# Patient Record
Sex: FEMALE | Race: BLACK OR AFRICAN AMERICAN | Employment: UNEMPLOYED
[De-identification: names, ages, dates, MRNs, and addresses within clinical notes are randomized per-mention and may not be internally consistent; named-entity substitution may affect disease eponyms.]

---

## 2017-02-02 RX ORDER — HYDROCHLOROTHIAZIDE 25 MG/1
TABLET ORAL
Qty: 30 TABLET | Refills: 3 | Status: SHIPPED | OUTPATIENT
Start: 2017-02-02 | End: 2017-12-30 | Stop reason: SDUPTHER

## 2017-02-06 ENCOUNTER — OFFICE VISIT (OUTPATIENT)
Dept: FAMILY MEDICINE CLINIC | Facility: CLINIC | Age: 57
End: 2017-02-06

## 2017-02-06 VITALS
WEIGHT: 286.2 LBS | SYSTOLIC BLOOD PRESSURE: 110 MMHG | BODY MASS INDEX: 50.7 KG/M2 | DIASTOLIC BLOOD PRESSURE: 72 MMHG | HEART RATE: 79 BPM | TEMPERATURE: 98.1 F

## 2017-02-06 DIAGNOSIS — M25.561 ACUTE PAIN OF RIGHT KNEE: Primary | ICD-10-CM

## 2017-02-06 DIAGNOSIS — E66.01 MORBID OBESITY, UNSPECIFIED OBESITY TYPE (HCC): ICD-10-CM

## 2017-02-06 DIAGNOSIS — M17.11 PRIMARY OSTEOARTHRITIS OF RIGHT KNEE: ICD-10-CM

## 2017-02-06 PROCEDURE — 99213 OFFICE O/P EST LOW 20 MIN: CPT | Performed by: FAMILY MEDICINE

## 2017-02-06 RX ORDER — MELOXICAM 15 MG/1
15 TABLET ORAL DAILY
Qty: 30 TABLET | Refills: 3 | Status: SHIPPED | OUTPATIENT
Start: 2017-02-06 | End: 2017-03-08 | Stop reason: ALTCHOICE

## 2017-02-06 ASSESSMENT — ENCOUNTER SYMPTOMS
BACK PAIN: 0
GASTROINTESTINAL NEGATIVE: 1

## 2017-03-08 ENCOUNTER — OFFICE VISIT (OUTPATIENT)
Dept: BARIATRICS/WEIGHT MGMT | Facility: CLINIC | Age: 57
End: 2017-03-08

## 2017-03-08 VITALS
WEIGHT: 280 LBS | BODY MASS INDEX: 49.61 KG/M2 | DIASTOLIC BLOOD PRESSURE: 77 MMHG | HEART RATE: 99 BPM | HEIGHT: 63 IN | SYSTOLIC BLOOD PRESSURE: 112 MMHG

## 2017-03-08 DIAGNOSIS — E08.00 DIABETES MELLITUS DUE TO UNDERLYING CONDITION WITH HYPEROSMOLARITY WITHOUT COMA, WITHOUT LONG-TERM CURRENT USE OF INSULIN (HCC): ICD-10-CM

## 2017-03-08 DIAGNOSIS — I10 ESSENTIAL HYPERTENSION: ICD-10-CM

## 2017-03-08 DIAGNOSIS — K21.9 GASTROESOPHAGEAL REFLUX DISEASE, ESOPHAGITIS PRESENCE NOT SPECIFIED: ICD-10-CM

## 2017-03-08 DIAGNOSIS — E78.5 HYPERLIPIDEMIA WITH TARGET LDL LESS THAN 100: Primary | ICD-10-CM

## 2017-03-08 PROCEDURE — 99204 OFFICE O/P NEW MOD 45 MIN: CPT | Performed by: SURGERY

## 2017-03-08 RX ORDER — LISINOPRIL 10 MG/1
TABLET ORAL
Refills: 0 | COMMUNITY
Start: 2017-01-04 | End: 2017-03-08 | Stop reason: SDUPTHER

## 2017-03-09 ENCOUNTER — OFFICE VISIT (OUTPATIENT)
Dept: FAMILY MEDICINE CLINIC | Facility: CLINIC | Age: 57
End: 2017-03-09

## 2017-03-09 VITALS
BODY MASS INDEX: 49.25 KG/M2 | TEMPERATURE: 96 F | SYSTOLIC BLOOD PRESSURE: 112 MMHG | WEIGHT: 278 LBS | DIASTOLIC BLOOD PRESSURE: 76 MMHG | HEART RATE: 93 BPM

## 2017-03-09 DIAGNOSIS — M25.561 CHRONIC PAIN OF RIGHT KNEE: ICD-10-CM

## 2017-03-09 DIAGNOSIS — G89.29 CHRONIC PAIN OF RIGHT KNEE: ICD-10-CM

## 2017-03-09 DIAGNOSIS — E11.9 TYPE 2 DIABETES MELLITUS WITHOUT COMPLICATION, WITHOUT LONG-TERM CURRENT USE OF INSULIN (HCC): Primary | ICD-10-CM

## 2017-03-09 DIAGNOSIS — E66.01 MORBID OBESITY WITH BMI OF 45.0-49.9, ADULT (HCC): ICD-10-CM

## 2017-03-09 LAB — HBA1C MFR BLD: 6.8 %

## 2017-03-09 PROCEDURE — 99213 OFFICE O/P EST LOW 20 MIN: CPT | Performed by: FAMILY MEDICINE

## 2017-03-09 PROCEDURE — 83036 HEMOGLOBIN GLYCOSYLATED A1C: CPT | Performed by: FAMILY MEDICINE

## 2017-03-09 ASSESSMENT — ENCOUNTER SYMPTOMS
BACK PAIN: 0
GASTROINTESTINAL NEGATIVE: 1

## 2017-03-23 ENCOUNTER — TELEPHONE (OUTPATIENT)
Dept: BARIATRICS/WEIGHT MGMT | Age: 57
End: 2017-03-23

## 2017-04-04 ENCOUNTER — NURSE ONLY (OUTPATIENT)
Dept: BARIATRICS/WEIGHT MGMT | Age: 57
End: 2017-04-04

## 2017-04-04 VITALS — WEIGHT: 287 LBS | BODY MASS INDEX: 50.84 KG/M2

## 2017-04-04 DIAGNOSIS — E66.01 MORBID OBESITY DUE TO EXCESS CALORIES (HCC): Primary | ICD-10-CM

## 2017-04-25 ENCOUNTER — OFFICE VISIT (OUTPATIENT)
Dept: BARIATRICS/WEIGHT MGMT | Age: 57
End: 2017-04-25
Payer: MEDICARE

## 2017-04-25 VITALS
WEIGHT: 277 LBS | BODY MASS INDEX: 49.08 KG/M2 | HEIGHT: 63 IN | SYSTOLIC BLOOD PRESSURE: 130 MMHG | DIASTOLIC BLOOD PRESSURE: 70 MMHG | HEART RATE: 70 BPM | RESPIRATION RATE: 20 BRPM

## 2017-04-25 DIAGNOSIS — E78.5 HYPERLIPIDEMIA, UNSPECIFIED HYPERLIPIDEMIA TYPE: ICD-10-CM

## 2017-04-25 DIAGNOSIS — E78.5 HYPERLIPIDEMIA WITH TARGET LDL LESS THAN 100: ICD-10-CM

## 2017-04-25 DIAGNOSIS — K21.9 GASTROESOPHAGEAL REFLUX DISEASE, ESOPHAGITIS PRESENCE NOT SPECIFIED: ICD-10-CM

## 2017-04-25 DIAGNOSIS — E66.01 OBESITY, MORBID, BMI 40.0-49.9 (HCC): ICD-10-CM

## 2017-04-25 DIAGNOSIS — I10 ESSENTIAL HYPERTENSION: ICD-10-CM

## 2017-04-25 DIAGNOSIS — E11.9 CONTROLLED TYPE 2 DIABETES MELLITUS WITHOUT COMPLICATION, WITHOUT LONG-TERM CURRENT USE OF INSULIN (HCC): Primary | ICD-10-CM

## 2017-04-25 DIAGNOSIS — K57.30 DIVERTICULOSIS OF LARGE INTESTINE WITHOUT HEMORRHAGE: ICD-10-CM

## 2017-04-25 PROCEDURE — 99213 OFFICE O/P EST LOW 20 MIN: CPT | Performed by: NURSE PRACTITIONER

## 2017-05-09 ENCOUNTER — HOSPITAL ENCOUNTER (OUTPATIENT)
Age: 57
Discharge: HOME | End: 2017-05-09
Payer: MEDICARE

## 2017-05-09 DIAGNOSIS — K21.9 GASTROESOPHAGEAL REFLUX DISEASE, ESOPHAGITIS PRESENCE NOT SPECIFIED: ICD-10-CM

## 2017-05-09 DIAGNOSIS — E08.00 DIABETES MELLITUS DUE TO UNDERLYING CONDITION WITH HYPEROSMOLARITY WITHOUT COMA, WITHOUT LONG-TERM CURRENT USE OF INSULIN (HCC): ICD-10-CM

## 2017-05-09 DIAGNOSIS — E78.5 HYPERLIPIDEMIA WITH TARGET LDL LESS THAN 100: ICD-10-CM

## 2017-05-09 DIAGNOSIS — I10 ESSENTIAL HYPERTENSION: ICD-10-CM

## 2017-05-09 LAB
ALBUMIN SERPL-MCNC: 3.9 G/DL (ref 3.5–5.2)
ALBUMIN/GLOBULIN RATIO: 1 (ref 1–2.5)
ALP BLD-CCNC: 82 U/L (ref 35–104)
ALT SERPL-CCNC: 15 U/L (ref 5–33)
ANION GAP SERPL CALCULATED.3IONS-SCNC: 14 MMOL/L (ref 9–17)
AST SERPL-CCNC: 21 U/L
BILIRUB SERPL-MCNC: 0.22 MG/DL (ref 0.3–1.2)
BUN BLDV-MCNC: 13 MG/DL (ref 6–20)
BUN/CREAT BLD: ABNORMAL (ref 9–20)
CALCIUM SERPL-MCNC: 9.4 MG/DL (ref 8.6–10.4)
CHLORIDE BLD-SCNC: 105 MMOL/L (ref 98–107)
CHOLESTEROL/HDL RATIO: 4.6
CHOLESTEROL: 169 MG/DL
CO2: 24 MMOL/L (ref 20–31)
CREAT SERPL-MCNC: 0.87 MG/DL (ref 0.5–0.9)
ESTIMATED AVERAGE GLUCOSE: 143 MG/DL
GFR AFRICAN AMERICAN: >60 ML/MIN
GFR NON-AFRICAN AMERICAN: >60 ML/MIN
GFR SERPL CREATININE-BSD FRML MDRD: ABNORMAL ML/MIN/{1.73_M2}
GFR SERPL CREATININE-BSD FRML MDRD: ABNORMAL ML/MIN/{1.73_M2}
GLUCOSE BLD-MCNC: 93 MG/DL (ref 70–99)
HBA1C MFR BLD: 6.6 % (ref 4–6)
HCT VFR BLD CALC: 35.6 % (ref 36–46)
HDLC SERPL-MCNC: 37 MG/DL
HEMOGLOBIN: 11.6 G/DL (ref 12–16)
LDL CHOLESTEROL: 112 MG/DL (ref 0–130)
MAGNESIUM: 2.4 MG/DL (ref 1.6–2.6)
MCH RBC QN AUTO: 26.7 PG (ref 26–34)
MCHC RBC AUTO-ENTMCNC: 32.5 G/DL (ref 31–37)
MCV RBC AUTO: 82.3 FL (ref 80–100)
PDW BLD-RTO: 16.3 % (ref 12.5–15.4)
PLATELET # BLD: 232 K/UL (ref 140–450)
PMV BLD AUTO: 9.5 FL (ref 6–12)
POTASSIUM SERPL-SCNC: 4.2 MMOL/L (ref 3.7–5.3)
RBC # BLD: 4.33 M/UL (ref 4–5.2)
SODIUM BLD-SCNC: 143 MMOL/L (ref 135–144)
TOTAL PROTEIN: 7.7 G/DL (ref 6.4–8.3)
TRIGL SERPL-MCNC: 100 MG/DL
TSH SERPL DL<=0.05 MIU/L-ACNC: 1.47 MIU/L (ref 0.3–5)
VLDLC SERPL CALC-MCNC: ABNORMAL MG/DL (ref 1–30)
WBC # BLD: 6.8 K/UL (ref 3.5–11)

## 2017-05-09 PROCEDURE — 80061 LIPID PANEL: CPT

## 2017-05-09 PROCEDURE — 85027 COMPLETE CBC AUTOMATED: CPT

## 2017-05-09 PROCEDURE — 83735 ASSAY OF MAGNESIUM: CPT

## 2017-05-09 PROCEDURE — 83550 IRON BINDING TEST: CPT

## 2017-05-09 PROCEDURE — 84590 ASSAY OF VITAMIN A: CPT

## 2017-05-09 PROCEDURE — 82746 ASSAY OF FOLIC ACID SERUM: CPT

## 2017-05-09 PROCEDURE — 36415 COLL VENOUS BLD VENIPUNCTURE: CPT

## 2017-05-09 PROCEDURE — 84630 ASSAY OF ZINC: CPT

## 2017-05-09 PROCEDURE — 84425 ASSAY OF VITAMIN B-1: CPT

## 2017-05-09 PROCEDURE — 84443 ASSAY THYROID STIM HORMONE: CPT

## 2017-05-09 PROCEDURE — 84436 ASSAY OF TOTAL THYROXINE: CPT

## 2017-05-09 PROCEDURE — 83540 ASSAY OF IRON: CPT

## 2017-05-09 PROCEDURE — 83970 ASSAY OF PARATHORMONE: CPT

## 2017-05-09 PROCEDURE — 82607 VITAMIN B-12: CPT

## 2017-05-09 PROCEDURE — 82306 VITAMIN D 25 HYDROXY: CPT

## 2017-05-09 PROCEDURE — 83036 HEMOGLOBIN GLYCOSYLATED A1C: CPT

## 2017-05-09 PROCEDURE — 80053 COMPREHEN METABOLIC PANEL: CPT

## 2017-05-10 LAB
FOLATE: 11.9 NG/ML
IRON SATURATION: 12 % (ref 20–55)
IRON: 32 UG/DL (ref 37–145)
T4 TOTAL: 6.5 UG/DL (ref 4.5–12)
TOTAL IRON BINDING CAPACITY: 268 UG/DL (ref 250–450)
UNSATURATED IRON BINDING CAPACITY: 236 UG/DL (ref 112–347)
VITAMIN B-12: 650 PG/ML (ref 211–946)
VITAMIN D 25-HYDROXY: 13.2 NG/ML (ref 30–100)

## 2017-05-11 LAB
PTH INTACT: 32.94 PG/ML (ref 15–65)
ZINC: 82 UG/DL (ref 60–120)

## 2017-05-12 ENCOUNTER — TELEPHONE (OUTPATIENT)
Dept: BARIATRICS/WEIGHT MGMT | Age: 57
End: 2017-05-12

## 2017-05-12 DIAGNOSIS — E55.9 VITAMIN D DEFICIENCY: ICD-10-CM

## 2017-05-12 DIAGNOSIS — G47.33 OSA (OBSTRUCTIVE SLEEP APNEA): Primary | ICD-10-CM

## 2017-05-12 LAB — VITAMIN B1 WHOLE BLOOD: 54 NMOL/L (ref 70–180)

## 2017-05-12 RX ORDER — ERGOCALCIFEROL 1.25 MG/1
50000 CAPSULE ORAL WEEKLY
Qty: 12 CAPSULE | Refills: 0 | Status: SHIPPED | OUTPATIENT
Start: 2017-05-12 | End: 2017-08-31 | Stop reason: ALTCHOICE

## 2017-05-13 LAB
RETINYL PALMITATE: <0.02 MG/L (ref 0–0.1)
VITAMIN A LEVEL: 0.51 MG/L (ref 0.3–1.2)
VITAMIN A, INTERP: NORMAL

## 2017-05-15 ENCOUNTER — TELEPHONE (OUTPATIENT)
Dept: BARIATRICS/WEIGHT MGMT | Age: 57
End: 2017-05-15

## 2017-05-17 ENCOUNTER — OFFICE VISIT (OUTPATIENT)
Dept: BARIATRICS/WEIGHT MGMT | Age: 57
End: 2017-05-17
Payer: MEDICARE

## 2017-05-17 VITALS
HEIGHT: 63 IN | RESPIRATION RATE: 20 BRPM | BODY MASS INDEX: 49.26 KG/M2 | HEART RATE: 78 BPM | DIASTOLIC BLOOD PRESSURE: 78 MMHG | WEIGHT: 278 LBS | SYSTOLIC BLOOD PRESSURE: 126 MMHG

## 2017-05-17 DIAGNOSIS — G47.33 OSA (OBSTRUCTIVE SLEEP APNEA): ICD-10-CM

## 2017-05-17 DIAGNOSIS — I10 ESSENTIAL HYPERTENSION: ICD-10-CM

## 2017-05-17 DIAGNOSIS — K21.9 GASTROESOPHAGEAL REFLUX DISEASE, ESOPHAGITIS PRESENCE NOT SPECIFIED: ICD-10-CM

## 2017-05-17 DIAGNOSIS — E78.5 HYPERLIPIDEMIA, UNSPECIFIED HYPERLIPIDEMIA TYPE: ICD-10-CM

## 2017-05-17 DIAGNOSIS — E11.9 CONTROLLED TYPE 2 DIABETES MELLITUS WITHOUT COMPLICATION, WITHOUT LONG-TERM CURRENT USE OF INSULIN (HCC): Primary | ICD-10-CM

## 2017-05-17 DIAGNOSIS — E66.01 OBESITY, MORBID, BMI 40.0-49.9 (HCC): ICD-10-CM

## 2017-05-17 PROCEDURE — 99213 OFFICE O/P EST LOW 20 MIN: CPT | Performed by: NURSE PRACTITIONER

## 2017-05-17 RX ORDER — THIAMINE MONONITRATE (VIT B1) 100 MG
100 TABLET ORAL DAILY
COMMUNITY
End: 2018-05-03

## 2017-05-17 RX ORDER — LISINOPRIL 10 MG/1
10 TABLET ORAL DAILY
COMMUNITY
Start: 2017-03-05 | End: 2017-10-30 | Stop reason: SDUPTHER

## 2017-05-21 ENCOUNTER — HOSPITAL ENCOUNTER (OUTPATIENT)
Dept: SLEEP CENTER | Age: 57
Discharge: HOME | End: 2017-05-21
Payer: MEDICARE

## 2017-05-21 PROCEDURE — 95810 POLYSOM 6/> YRS 4/> PARAM: CPT

## 2017-05-21 ASSESSMENT — SLEEP AND FATIGUE QUESTIONNAIRES
HOW LIKELY ARE YOU TO NOD OFF OR FALL ASLEEP WHILE SITTING QUIETLY AFTER LUNCH WITHOUT ALCOHOL: 2
HOW LIKELY ARE YOU TO NOD OFF OR FALL ASLEEP WHILE WATCHING TV: 3
HOW LIKELY ARE YOU TO NOD OFF OR FALL ASLEEP WHILE LYING DOWN TO REST IN THE AFTERNOON WHEN CIRCUMSTANCES PERMIT: 2
HOW LIKELY ARE YOU TO NOD OFF OR FALL ASLEEP WHILE SITTING INACTIVE IN A PUBLIC PLACE: 2
ESS TOTAL SCORE: 16
HOW LIKELY ARE YOU TO NOD OFF OR FALL ASLEEP WHEN YOU ARE A PASSENGER IN A CAR FOR AN HOUR WITHOUT A BREAK: 2
HOW LIKELY ARE YOU TO NOD OFF OR FALL ASLEEP IN A CAR, WHILE STOPPED FOR A FEW MINUTES IN TRAFFIC: 0
HOW LIKELY ARE YOU TO NOD OFF OR FALL ASLEEP WHILE SITTING AND READING: 3
HOW LIKELY ARE YOU TO NOD OFF OR FALL ASLEEP WHILE SITTING AND TALKING TO SOMEONE: 2
NECK CIRCUMFERENCE (INCHES): 43

## 2017-05-22 VITALS — HEIGHT: 63 IN | HEART RATE: 90 BPM | WEIGHT: 280 LBS | RESPIRATION RATE: 16 BRPM | BODY MASS INDEX: 49.61 KG/M2

## 2017-06-11 ENCOUNTER — HOSPITAL ENCOUNTER (OUTPATIENT)
Dept: SLEEP CENTER | Age: 57
Discharge: HOME | End: 2017-06-11
Payer: MEDICARE

## 2017-06-11 PROCEDURE — 95811 POLYSOM 6/>YRS CPAP 4/> PARM: CPT

## 2017-06-14 ENCOUNTER — OFFICE VISIT (OUTPATIENT)
Dept: FAMILY MEDICINE CLINIC | Age: 57
End: 2017-06-14
Payer: MEDICARE

## 2017-06-14 VITALS
HEART RATE: 73 BPM | HEIGHT: 63 IN | WEIGHT: 282.8 LBS | SYSTOLIC BLOOD PRESSURE: 117 MMHG | DIASTOLIC BLOOD PRESSURE: 78 MMHG | TEMPERATURE: 96.1 F | BODY MASS INDEX: 50.11 KG/M2

## 2017-06-14 DIAGNOSIS — Z00.00 ANNUAL PHYSICAL EXAM: Primary | ICD-10-CM

## 2017-06-14 PROCEDURE — 99402 PREV MED CNSL INDIV APPRX 30: CPT | Performed by: FAMILY MEDICINE

## 2017-06-14 PROCEDURE — 86580 TB INTRADERMAL TEST: CPT | Performed by: FAMILY MEDICINE

## 2017-06-14 ASSESSMENT — ENCOUNTER SYMPTOMS
BACK PAIN: 0
GASTROINTESTINAL NEGATIVE: 1

## 2017-06-23 DIAGNOSIS — G47.33 OSA (OBSTRUCTIVE SLEEP APNEA): Primary | ICD-10-CM

## 2017-06-27 ENCOUNTER — OFFICE VISIT (OUTPATIENT)
Dept: BARIATRICS/WEIGHT MGMT | Age: 57
End: 2017-06-27
Payer: MEDICARE

## 2017-06-27 VITALS
SYSTOLIC BLOOD PRESSURE: 132 MMHG | WEIGHT: 282 LBS | RESPIRATION RATE: 20 BRPM | BODY MASS INDEX: 51.89 KG/M2 | HEART RATE: 76 BPM | DIASTOLIC BLOOD PRESSURE: 78 MMHG | HEIGHT: 62 IN

## 2017-06-27 DIAGNOSIS — K21.9 GASTROESOPHAGEAL REFLUX DISEASE, ESOPHAGITIS PRESENCE NOT SPECIFIED: ICD-10-CM

## 2017-06-27 DIAGNOSIS — I10 ESSENTIAL HYPERTENSION: ICD-10-CM

## 2017-06-27 DIAGNOSIS — E11.9 CONTROLLED TYPE 2 DIABETES MELLITUS WITHOUT COMPLICATION, WITHOUT LONG-TERM CURRENT USE OF INSULIN (HCC): Primary | ICD-10-CM

## 2017-06-27 DIAGNOSIS — E78.5 HYPERLIPIDEMIA, UNSPECIFIED HYPERLIPIDEMIA TYPE: ICD-10-CM

## 2017-06-27 DIAGNOSIS — E66.01 MORBID OBESITY WITH BMI OF 50.0-59.9, ADULT (HCC): ICD-10-CM

## 2017-06-27 DIAGNOSIS — G47.33 OSA (OBSTRUCTIVE SLEEP APNEA): ICD-10-CM

## 2017-06-27 PROCEDURE — 99213 OFFICE O/P EST LOW 20 MIN: CPT | Performed by: NURSE PRACTITIONER

## 2017-07-12 ENCOUNTER — NURSE ONLY (OUTPATIENT)
Dept: FAMILY MEDICINE CLINIC | Age: 57
End: 2017-07-12
Payer: MEDICARE

## 2017-07-12 VITALS
WEIGHT: 282.2 LBS | DIASTOLIC BLOOD PRESSURE: 68 MMHG | HEART RATE: 87 BPM | TEMPERATURE: 96.4 F | SYSTOLIC BLOOD PRESSURE: 110 MMHG | HEIGHT: 62 IN | BODY MASS INDEX: 51.93 KG/M2

## 2017-07-12 DIAGNOSIS — Z11.1 SCREENING FOR TUBERCULOSIS: ICD-10-CM

## 2017-07-12 DIAGNOSIS — Z11.1 VISIT FOR MANTOUX TEST: Primary | ICD-10-CM

## 2017-07-12 PROCEDURE — 86580 TB INTRADERMAL TEST: CPT | Performed by: FAMILY MEDICINE

## 2017-07-14 ENCOUNTER — NURSE ONLY (OUTPATIENT)
Dept: FAMILY MEDICINE CLINIC | Age: 57
End: 2017-07-14
Payer: MEDICARE

## 2017-07-14 DIAGNOSIS — Z11.1 ENCOUNTER FOR PPD SKIN TEST READING: Primary | ICD-10-CM

## 2017-07-19 ENCOUNTER — ANESTHESIA (OUTPATIENT)
Dept: ENDOSCOPY | Age: 57
End: 2017-07-19
Payer: MEDICARE

## 2017-07-19 ENCOUNTER — HOSPITAL ENCOUNTER (OUTPATIENT)
Age: 57
Setting detail: OUTPATIENT SURGERY
Discharge: HOME | End: 2017-07-19
Attending: SURGERY | Admitting: SURGERY
Payer: MEDICARE

## 2017-07-19 ENCOUNTER — ANESTHESIA EVENT (OUTPATIENT)
Dept: ENDOSCOPY | Age: 57
End: 2017-07-19
Payer: MEDICARE

## 2017-07-19 VITALS
DIASTOLIC BLOOD PRESSURE: 65 MMHG | SYSTOLIC BLOOD PRESSURE: 128 MMHG | RESPIRATION RATE: 17 BRPM | OXYGEN SATURATION: 97 %

## 2017-07-19 VITALS
WEIGHT: 278 LBS | DIASTOLIC BLOOD PRESSURE: 65 MMHG | SYSTOLIC BLOOD PRESSURE: 100 MMHG | HEART RATE: 67 BPM | RESPIRATION RATE: 19 BRPM | BODY MASS INDEX: 51.16 KG/M2 | TEMPERATURE: 96.8 F | OXYGEN SATURATION: 96 % | HEIGHT: 62 IN

## 2017-07-19 LAB — POC POTASSIUM: 4 MMOL/L (ref 3.5–4.5)

## 2017-07-19 PROCEDURE — 43239 EGD BIOPSY SINGLE/MULTIPLE: CPT | Performed by: SURGERY

## 2017-07-19 PROCEDURE — 7100000011 HC PHASE II RECOVERY - ADDTL 15 MIN: Performed by: SURGERY

## 2017-07-19 PROCEDURE — 3700000000 HC ANESTHESIA ATTENDED CARE: Performed by: SURGERY

## 2017-07-19 PROCEDURE — 3609012400 HC EGD TRANSORAL BIOPSY SINGLE/MULTIPLE: Performed by: SURGERY

## 2017-07-19 PROCEDURE — 87077 CULTURE AEROBIC IDENTIFY: CPT

## 2017-07-19 PROCEDURE — 2580000003 HC RX 258: Performed by: SURGERY

## 2017-07-19 PROCEDURE — 2500000003 HC RX 250 WO HCPCS: Performed by: NURSE ANESTHETIST, CERTIFIED REGISTERED

## 2017-07-19 PROCEDURE — 84132 ASSAY OF SERUM POTASSIUM: CPT

## 2017-07-19 PROCEDURE — 7100000010 HC PHASE II RECOVERY - FIRST 15 MIN: Performed by: SURGERY

## 2017-07-19 PROCEDURE — 6360000002 HC RX W HCPCS: Performed by: NURSE ANESTHETIST, CERTIFIED REGISTERED

## 2017-07-19 RX ORDER — PROPOFOL 10 MG/ML
INJECTION, EMULSION INTRAVENOUS PRN
Status: DISCONTINUED | OUTPATIENT
Start: 2017-07-19 | End: 2017-07-19 | Stop reason: SDUPTHER

## 2017-07-19 RX ORDER — FERROUS SULFATE 325(65) MG
29 TABLET ORAL
COMMUNITY
End: 2018-03-07 | Stop reason: ALTCHOICE

## 2017-07-19 RX ORDER — SODIUM CHLORIDE 9 MG/ML
INJECTION, SOLUTION INTRAVENOUS CONTINUOUS
Status: DISCONTINUED | OUTPATIENT
Start: 2017-07-19 | End: 2017-07-19 | Stop reason: HOSPADM

## 2017-07-19 RX ORDER — LIDOCAINE HYDROCHLORIDE 10 MG/ML
INJECTION, SOLUTION INFILTRATION; PERINEURAL PRN
Status: DISCONTINUED | OUTPATIENT
Start: 2017-07-19 | End: 2017-07-19 | Stop reason: SDUPTHER

## 2017-07-19 RX ADMIN — PROPOFOL 20 MG: 10 INJECTION, EMULSION INTRAVENOUS at 08:00

## 2017-07-19 RX ADMIN — PROPOFOL 120 MG: 10 INJECTION, EMULSION INTRAVENOUS at 07:58

## 2017-07-19 RX ADMIN — SODIUM CHLORIDE 30 ML: 9 INJECTION, SOLUTION INTRAVENOUS at 07:47

## 2017-07-19 RX ADMIN — LIDOCAINE HYDROCHLORIDE 50 MG: 10 INJECTION, SOLUTION INFILTRATION; PERINEURAL at 07:58

## 2017-07-19 RX ADMIN — PROPOFOL 30 MG: 10 INJECTION, EMULSION INTRAVENOUS at 08:02

## 2017-07-19 ASSESSMENT — PAIN SCALES - GENERAL
PAINLEVEL_OUTOF10: 0

## 2017-07-19 ASSESSMENT — PAIN - FUNCTIONAL ASSESSMENT: PAIN_FUNCTIONAL_ASSESSMENT: 0-10

## 2017-07-20 LAB
DIRECT EXAM: NEGATIVE
DIRECT EXAM: NORMAL
Lab: NORMAL
SPECIMEN DESCRIPTION: NORMAL
STATUS: NORMAL

## 2017-07-25 ENCOUNTER — OFFICE VISIT (OUTPATIENT)
Dept: BARIATRICS/WEIGHT MGMT | Age: 57
End: 2017-07-25
Payer: MEDICARE

## 2017-07-25 VITALS
RESPIRATION RATE: 20 BRPM | DIASTOLIC BLOOD PRESSURE: 70 MMHG | SYSTOLIC BLOOD PRESSURE: 110 MMHG | WEIGHT: 280 LBS | BODY MASS INDEX: 51.53 KG/M2 | HEART RATE: 72 BPM | HEIGHT: 62 IN

## 2017-07-25 DIAGNOSIS — E78.5 HYPERLIPIDEMIA, UNSPECIFIED HYPERLIPIDEMIA TYPE: ICD-10-CM

## 2017-07-25 DIAGNOSIS — K21.9 GASTROESOPHAGEAL REFLUX DISEASE, ESOPHAGITIS PRESENCE NOT SPECIFIED: ICD-10-CM

## 2017-07-25 DIAGNOSIS — E66.01 MORBID OBESITY WITH BMI OF 50.0-59.9, ADULT (HCC): ICD-10-CM

## 2017-07-25 DIAGNOSIS — E11.9 CONTROLLED TYPE 2 DIABETES MELLITUS WITHOUT COMPLICATION, WITHOUT LONG-TERM CURRENT USE OF INSULIN (HCC): Primary | ICD-10-CM

## 2017-07-25 DIAGNOSIS — I10 ESSENTIAL HYPERTENSION: ICD-10-CM

## 2017-07-25 DIAGNOSIS — G47.33 OSA (OBSTRUCTIVE SLEEP APNEA): ICD-10-CM

## 2017-07-25 PROCEDURE — 99213 OFFICE O/P EST LOW 20 MIN: CPT | Performed by: NURSE PRACTITIONER

## 2017-07-26 ENCOUNTER — NURSE ONLY (OUTPATIENT)
Dept: BARIATRICS/WEIGHT MGMT | Age: 57
End: 2017-07-26

## 2017-08-23 ENCOUNTER — TELEPHONE (OUTPATIENT)
Dept: BARIATRICS/WEIGHT MGMT | Age: 57
End: 2017-08-23

## 2017-08-31 ENCOUNTER — OFFICE VISIT (OUTPATIENT)
Dept: BARIATRICS/WEIGHT MGMT | Age: 57
End: 2017-08-31
Payer: MEDICARE

## 2017-08-31 VITALS
RESPIRATION RATE: 20 BRPM | HEART RATE: 82 BPM | HEIGHT: 62 IN | WEIGHT: 282 LBS | SYSTOLIC BLOOD PRESSURE: 126 MMHG | DIASTOLIC BLOOD PRESSURE: 74 MMHG | BODY MASS INDEX: 51.89 KG/M2

## 2017-08-31 DIAGNOSIS — G47.33 OSA (OBSTRUCTIVE SLEEP APNEA): ICD-10-CM

## 2017-08-31 DIAGNOSIS — K21.9 GASTROESOPHAGEAL REFLUX DISEASE, ESOPHAGITIS PRESENCE NOT SPECIFIED: ICD-10-CM

## 2017-08-31 DIAGNOSIS — E66.01 MORBID OBESITY WITH BMI OF 50.0-59.9, ADULT (HCC): ICD-10-CM

## 2017-08-31 DIAGNOSIS — E78.5 HYPERLIPIDEMIA, UNSPECIFIED HYPERLIPIDEMIA TYPE: ICD-10-CM

## 2017-08-31 DIAGNOSIS — I10 ESSENTIAL HYPERTENSION: ICD-10-CM

## 2017-08-31 DIAGNOSIS — E11.9 CONTROLLED TYPE 2 DIABETES MELLITUS WITHOUT COMPLICATION, WITHOUT LONG-TERM CURRENT USE OF INSULIN (HCC): Primary | ICD-10-CM

## 2017-08-31 PROCEDURE — 99213 OFFICE O/P EST LOW 20 MIN: CPT | Performed by: NURSE PRACTITIONER

## 2017-09-27 ENCOUNTER — OFFICE VISIT (OUTPATIENT)
Dept: PULMONOLOGY | Age: 57
End: 2017-09-27
Payer: MEDICARE

## 2017-09-27 VITALS
OXYGEN SATURATION: 99 % | RESPIRATION RATE: 18 BRPM | WEIGHT: 280 LBS | SYSTOLIC BLOOD PRESSURE: 127 MMHG | HEART RATE: 100 BPM | HEIGHT: 63 IN | DIASTOLIC BLOOD PRESSURE: 86 MMHG | BODY MASS INDEX: 49.61 KG/M2

## 2017-09-27 DIAGNOSIS — E66.01 MORBID OBESITY WITH BMI OF 45.0-49.9, ADULT (HCC): ICD-10-CM

## 2017-09-27 DIAGNOSIS — Z99.89 OSA ON CPAP: Primary | ICD-10-CM

## 2017-09-27 DIAGNOSIS — G47.33 OSA ON CPAP: Primary | ICD-10-CM

## 2017-09-27 PROCEDURE — 99204 OFFICE O/P NEW MOD 45 MIN: CPT | Performed by: INTERNAL MEDICINE

## 2017-09-27 RX ORDER — BIOTIN 1000 MCG
TABLET,CHEWABLE ORAL
COMMUNITY
End: 2018-05-03

## 2017-09-27 ASSESSMENT — SLEEP AND FATIGUE QUESTIONNAIRES
HOW LIKELY ARE YOU TO NOD OFF OR FALL ASLEEP WHILE SITTING AND TALKING TO SOMEONE: 1
HOW LIKELY ARE YOU TO NOD OFF OR FALL ASLEEP WHILE SITTING QUIETLY AFTER LUNCH WITHOUT ALCOHOL: 1
HOW LIKELY ARE YOU TO NOD OFF OR FALL ASLEEP IN A CAR, WHILE STOPPED FOR A FEW MINUTES IN TRAFFIC: 0
HOW LIKELY ARE YOU TO NOD OFF OR FALL ASLEEP WHILE SITTING AND READING: 1
ESS TOTAL SCORE: 8
HOW LIKELY ARE YOU TO NOD OFF OR FALL ASLEEP WHILE SITTING INACTIVE IN A PUBLIC PLACE: 1
HOW LIKELY ARE YOU TO NOD OFF OR FALL ASLEEP WHILE LYING DOWN TO REST IN THE AFTERNOON WHEN CIRCUMSTANCES PERMIT: 1
HOW LIKELY ARE YOU TO NOD OFF OR FALL ASLEEP WHEN YOU ARE A PASSENGER IN A CAR FOR AN HOUR WITHOUT A BREAK: 1
HOW LIKELY ARE YOU TO NOD OFF OR FALL ASLEEP WHILE WATCHING TV: 2

## 2017-09-29 ENCOUNTER — OFFICE VISIT (OUTPATIENT)
Dept: BARIATRICS/WEIGHT MGMT | Age: 57
End: 2017-09-29
Payer: MEDICARE

## 2017-09-29 VITALS
HEIGHT: 62 IN | HEART RATE: 68 BPM | BODY MASS INDEX: 51.34 KG/M2 | DIASTOLIC BLOOD PRESSURE: 64 MMHG | SYSTOLIC BLOOD PRESSURE: 104 MMHG | WEIGHT: 279 LBS

## 2017-09-29 DIAGNOSIS — K21.9 GASTROESOPHAGEAL REFLUX DISEASE, ESOPHAGITIS PRESENCE NOT SPECIFIED: ICD-10-CM

## 2017-09-29 DIAGNOSIS — G47.33 OSA (OBSTRUCTIVE SLEEP APNEA): ICD-10-CM

## 2017-09-29 DIAGNOSIS — I10 ESSENTIAL HYPERTENSION: ICD-10-CM

## 2017-09-29 DIAGNOSIS — E66.01 MORBID OBESITY WITH BMI OF 50.0-59.9, ADULT (HCC): ICD-10-CM

## 2017-09-29 DIAGNOSIS — E78.5 HYPERLIPIDEMIA WITH TARGET LDL LESS THAN 100: ICD-10-CM

## 2017-09-29 DIAGNOSIS — E78.5 HYPERLIPIDEMIA, UNSPECIFIED HYPERLIPIDEMIA TYPE: ICD-10-CM

## 2017-09-29 DIAGNOSIS — E11.9 CONTROLLED TYPE 2 DIABETES MELLITUS WITHOUT COMPLICATION, WITHOUT LONG-TERM CURRENT USE OF INSULIN (HCC): Primary | ICD-10-CM

## 2017-09-29 PROCEDURE — 99213 OFFICE O/P EST LOW 20 MIN: CPT | Performed by: NURSE PRACTITIONER

## 2017-10-30 NOTE — TELEPHONE ENCOUNTER
Please address the medication refill and close the encounter. If I can be of assistance, please route to the applicable pool. Thank you. Next Visit Date:  Future Appointments  Date Time Provider Rico Lucy   1/10/2018 1:45 PM Edgardo Paiz  [address] Maintenance   Topic Date Due    Diabetic retinal exam  10/24/1972    Cervical cancer screen  10/24/1983    Flu vaccine (1) 09/01/2017    Breast cancer screen  12/08/2017    Diabetic foot exam  12/16/2017    Diabetic microalbuminuria test  01/03/2018    Diabetic hemoglobin A1C test  05/09/2018    Lipid screen  05/09/2018    Colon cancer screen colonoscopy  01/01/2020    DTaP/Tdap/Td vaccine (2 - Td) 03/07/2024    Pneumococcal med risk  Completed    Hepatitis C screen  Completed    HIV screen  Completed       Hemoglobin A1C (%)   Date Value   05/09/2017 6.6 (H)   03/09/2017 6.8   12/16/2016 6.5             ( goal A1C is < 7)   Microalb/Crt.  Ratio (mcg/mg creat)   Date Value   01/03/2017 7     LDL Cholesterol (mg/dL)   Date Value   05/09/2017 112       (goal LDL is <100)   AST (U/L)   Date Value   05/09/2017 21     ALT (U/L)   Date Value   05/09/2017 15     BUN (mg/dL)   Date Value   05/09/2017 13     BP Readings from Last 3 Encounters:   09/29/17 104/64   09/27/17 127/86   08/31/17 126/74          (goal 120/80)    All Future Testing planned in [facility]  Lab Frequency Next Occurrence               Patient Active Problem List:     Gastroesophageal reflux disease     Hyperlipidemia with target LDL less than 100     HTN (hypertension)     Numbness and tingling     Diverticula, colon     Diverticulitis, acute     Eczema     Lump of left breast     Shoulder pain     Right groin pain     Tinea corporis     Hyperlipidemia     Need for influenza vaccination     Screening for condition     Controlled type 2 diabetes mellitus without complication, without long-term current use of insulin (HCC)     Morbid obesity with BMI of 50. 0-59.9, adult ([facility]     SINDI (obstructive sleep apnea)     Vitamin D deficiency

## 2017-10-31 RX ORDER — LISINOPRIL 10 MG/1
10 TABLET ORAL DAILY
Qty: 30 TABLET | Refills: 3 | Status: SHIPPED | OUTPATIENT
Start: 2017-10-31 | End: 2018-05-10 | Stop reason: SDUPTHER

## 2017-11-20 ENCOUNTER — APPOINTMENT (OUTPATIENT)
Dept: CT IMAGING | Age: 57
End: 2017-11-20
Payer: MEDICARE

## 2017-11-20 ENCOUNTER — HOSPITAL ENCOUNTER (EMERGENCY)
Age: 57
Discharge: HOME | End: 2017-11-20
Attending: EMERGENCY MEDICINE
Payer: MEDICARE

## 2017-11-20 ENCOUNTER — APPOINTMENT (OUTPATIENT)
Dept: GENERAL RADIOLOGY | Age: 57
End: 2017-11-20
Payer: MEDICARE

## 2017-11-20 VITALS
BODY MASS INDEX: 49.26 KG/M2 | OXYGEN SATURATION: 99 % | HEART RATE: 70 BPM | HEIGHT: 63 IN | DIASTOLIC BLOOD PRESSURE: 138 MMHG | RESPIRATION RATE: 9 BRPM | TEMPERATURE: 99.1 F | WEIGHT: 278 LBS | SYSTOLIC BLOOD PRESSURE: 155 MMHG

## 2017-11-20 DIAGNOSIS — M54.9 UPPER BACK PAIN: Primary | ICD-10-CM

## 2017-11-20 DIAGNOSIS — M25.512 LEFT SHOULDER PAIN, UNSPECIFIED CHRONICITY: ICD-10-CM

## 2017-11-20 LAB
ANION GAP SERPL CALCULATED.3IONS-SCNC: 9 MMOL/L (ref 9–17)
BUN BLDV-MCNC: 14 MG/DL (ref 6–20)
BUN/CREAT BLD: ABNORMAL (ref 9–20)
CALCIUM SERPL-MCNC: 9 MG/DL (ref 8.6–10.4)
CHLORIDE BLD-SCNC: 105 MMOL/L (ref 98–107)
CO2: 27 MMOL/L (ref 20–31)
CREAT SERPL-MCNC: 0.89 MG/DL (ref 0.5–0.9)
EKG ATRIAL RATE: 96 BPM
EKG P AXIS: 40 DEGREES
EKG P-R INTERVAL: 168 MS
EKG Q-T INTERVAL: 390 MS
EKG QRS DURATION: 90 MS
EKG QTC CALCULATION (BAZETT): 492 MS
EKG R AXIS: -29 DEGREES
EKG T AXIS: 54 DEGREES
EKG VENTRICULAR RATE: 96 BPM
GFR AFRICAN AMERICAN: >60 ML/MIN
GFR NON-AFRICAN AMERICAN: >60 ML/MIN
GFR SERPL CREATININE-BSD FRML MDRD: ABNORMAL ML/MIN/{1.73_M2}
GFR SERPL CREATININE-BSD FRML MDRD: ABNORMAL ML/MIN/{1.73_M2}
GLUCOSE BLD-MCNC: 103 MG/DL (ref 70–99)
POC TROPONIN I: 0 NG/ML (ref 0–0.1)
POC TROPONIN I: 0 NG/ML (ref 0–0.1)
POC TROPONIN INTERP: NORMAL
POC TROPONIN INTERP: NORMAL
POTASSIUM SERPL-SCNC: 4.5 MMOL/L (ref 3.7–5.3)
SODIUM BLD-SCNC: 141 MMOL/L (ref 135–144)

## 2017-11-20 PROCEDURE — 6360000002 HC RX W HCPCS: Performed by: EMERGENCY MEDICINE

## 2017-11-20 PROCEDURE — 96376 TX/PRO/DX INJ SAME DRUG ADON: CPT

## 2017-11-20 PROCEDURE — 73030 X-RAY EXAM OF SHOULDER: CPT

## 2017-11-20 PROCEDURE — 84484 ASSAY OF TROPONIN QUANT: CPT

## 2017-11-20 PROCEDURE — 6360000002 HC RX W HCPCS: Performed by: STUDENT IN AN ORGANIZED HEALTH CARE EDUCATION/TRAINING PROGRAM

## 2017-11-20 PROCEDURE — 72191 CT ANGIOGRAPH PELV W/O&W/DYE: CPT

## 2017-11-20 PROCEDURE — 6360000004 HC RX CONTRAST MEDICATION: Performed by: STUDENT IN AN ORGANIZED HEALTH CARE EDUCATION/TRAINING PROGRAM

## 2017-11-20 PROCEDURE — 71020 XR CHEST STANDARD TWO VW: CPT

## 2017-11-20 PROCEDURE — 6370000000 HC RX 637 (ALT 250 FOR IP): Performed by: STUDENT IN AN ORGANIZED HEALTH CARE EDUCATION/TRAINING PROGRAM

## 2017-11-20 PROCEDURE — 71275 CT ANGIOGRAPHY CHEST: CPT

## 2017-11-20 PROCEDURE — 96374 THER/PROPH/DIAG INJ IV PUSH: CPT

## 2017-11-20 PROCEDURE — 80048 BASIC METABOLIC PNL TOTAL CA: CPT

## 2017-11-20 PROCEDURE — 99285 EMERGENCY DEPT VISIT HI MDM: CPT

## 2017-11-20 PROCEDURE — 93005 ELECTROCARDIOGRAM TRACING: CPT

## 2017-11-20 RX ORDER — TRAMADOL HYDROCHLORIDE 50 MG/1
50 TABLET ORAL EVERY 6 HOURS PRN
Qty: 10 TABLET | Refills: 0 | Status: SHIPPED | OUTPATIENT
Start: 2017-11-20 | End: 2017-11-30

## 2017-11-20 RX ORDER — TRAMADOL HYDROCHLORIDE 50 MG/1
50 TABLET ORAL PRN
Status: DISCONTINUED | OUTPATIENT
Start: 2017-11-20 | End: 2017-11-20

## 2017-11-20 RX ORDER — TRAMADOL HYDROCHLORIDE 50 MG/1
50 TABLET ORAL ONCE
Status: COMPLETED | OUTPATIENT
Start: 2017-11-20 | End: 2017-11-20

## 2017-11-20 RX ADMIN — HYDROMORPHONE HYDROCHLORIDE 0.5 MG: 1 INJECTION, SOLUTION INTRAMUSCULAR; INTRAVENOUS; SUBCUTANEOUS at 12:35

## 2017-11-20 RX ADMIN — TRAMADOL HYDROCHLORIDE 50 MG: 50 TABLET, FILM COATED ORAL at 09:40

## 2017-11-20 RX ADMIN — IOVERSOL 100 ML: 741 INJECTION INTRA-ARTERIAL; INTRAVENOUS at 15:53

## 2017-11-20 RX ADMIN — HYDROMORPHONE HYDROCHLORIDE 0.5 MG: 1 INJECTION, SOLUTION INTRAMUSCULAR; INTRAVENOUS; SUBCUTANEOUS at 10:47

## 2017-11-20 ASSESSMENT — PAIN SCALES - GENERAL
PAINLEVEL_OUTOF10: 8
PAINLEVEL_OUTOF10: 5
PAINLEVEL_OUTOF10: 8
PAINLEVEL_OUTOF10: 8

## 2017-11-20 ASSESSMENT — ENCOUNTER SYMPTOMS
GASTROINTESTINAL NEGATIVE: 1
EYES NEGATIVE: 1
SHORTNESS OF BREATH: 1
BACK PAIN: 1
ALLERGIC/IMMUNOLOGIC NEGATIVE: 1

## 2017-11-20 ASSESSMENT — PAIN DESCRIPTION - LOCATION: LOCATION: BACK;CHEST

## 2017-11-20 ASSESSMENT — PAIN DESCRIPTION - PAIN TYPE: TYPE: ACUTE PAIN

## 2017-11-20 NOTE — ED NOTES
Pt resting on cot, eyes closed. Writer awoke pt to administer medications.       Amber Bird RN  11/20/17 2212

## 2017-11-20 NOTE — ED NOTES
Pt resting on cot, eyes closed. NAD noted. RR even and unlabored.       Ekaterina Carbajal RN  11/20/17 4883

## 2017-11-20 NOTE — ED PROVIDER NOTES
CHEST, BACK, SHOULDER PAIN. HEART SCORE 4. CTA TO R/O DISSECTION DONE, INTERP PENDING. IF NEG PROB OK FOR DISCHARGE. Aminah Prince MD  11/20/17 1504    CTA OF ABD AND CHEST NEG FOR ACUTE VASCULAR OR OTHER SIGNIFICANT ABNORMALITIES. LABWORK NEG. WILL DISCHARGE WITH RX FOR TRAMADOL. F/U WITH DR. Jacob Ya IN AM. RETURN IF SX WORSEN, PROGRESS.         Aminah Prince MD  11/20/17 1781

## 2017-11-20 NOTE — ED PROVIDER NOTES
[facility] ED  Emergency Department Encounter  Non Emergency Medicine Resident     Pt Name: Crystal Zuniga  MRN: [id number]  [place] 1960  Date of evaluation: 11/20/17  PCP:  Shahbaz Negron MD    [address] [place] [address]       Chief Complaint   Patient presents with    Back Pain    Chest Pain     into left arm       HISTORY OF PRESENT ILLNESS  (Location/Symptom, Timing/Onset, Context/Setting, Quality, Duration, Modifying Factors, Severity.)      Crystal Zuniga is a 56 y.o. female who presents with c/o upper back pain since last Thursday. , 8/10 in intensity and without any radiation. She took Flexeril and Motrin for the pain this morning but it did not help. Also c/o pain over the left shoulder. Patient has difficulty in sleeping because of pain. No trauma to the back, did not lift anything heavy. Also c/o SOB since last 1 week. Has h/o sleep apnea and uses CPAP at night. No h/o smoking, Alcohol or REC drugs in the past.           PAST MEDICAL / SURGICAL / SOCIAL / FAMILY HISTORY      has a past medical history of Diverticulitis; GERD (gastroesophageal reflux disease); Hyperlipidemia; Hypertension; Obesity; Sleep apnea; and Type II or unspecified type diabetes mellitus without mention of complication, not stated as uncontrolled. has a past surgical history that includes Hysterectomy; Cholecystectomy; Colonoscopy; and egd transoral biopsy single/multiple (7/19/2017). Social History     Social History    Marital status: Single     Spouse name: N/A    Number of children: N/A    Years of education: N/A     Occupational History    Not on file. Social History Main Topics    Smoking status: Former Smoker    Smokeless tobacco: Never Used    Alcohol use Yes    Drug use: No    Sexual activity: Not on file     Other Topics Concern    Not on file     Social History Narrative    No narrative on file       History reviewed. No pertinent family history.     Allergies:  Review of patient's allergies indicates no known allergies. Home Medications:  Prior to Admission medications    Medication Sig Start Date End Date Taking? Authorizing Provider   lisinopril (PRINIVIL;ZESTRIL) 10 MG tablet Take 1 tablet by mouth daily 10/31/17   Kassie Solares MD   Biotin 1000 MCG CHEW Take by mouth    Historical Provider, MD   ferrous sulfate 325 (65 Fe) MG tablet Take 29 mg by mouth daily (with breakfast)    Historical Provider, MD   vitamin B-1 (THIAMINE) 100 MG tablet Take 100 mg by mouth daily    Historical Provider, MD   metFORMIN (GLUCOPHAGE) 850 MG tablet Take 1 tablet by mouth 2 times daily (with meals) 3/9/17 3/9/18  Sal Cam MD   Elastic Bandages & Supports (KNEE BRACE ADJUSTABLE HINGED) MISC 1 Units by Does not apply route daily 2/6/17   Charly Wolfe DPM   hydrochlorothiazide (HYDRODIURIL) 25 MG tablet take 1 tablet by mouth once daily 2/2/17   Rosa Horton MD   aspirin EC 81 MG EC tablet Take 1 tablet by mouth daily 12/16/16 12/16/17  Rosa Horton MD       REVIEW OF SYSTEMS    (2-9 systems for level 4, 10 or more for level 5)      Review of Systems   Constitutional: Negative. HENT: Positive for congestion. Eyes: Negative. Respiratory: Positive for shortness of breath. Cardiovascular: Negative. Gastrointestinal: Negative. Endocrine: Negative. Genitourinary: Negative. Musculoskeletal: Positive for back pain. Left shoulder pain      Skin: Negative. Allergic/Immunologic: Negative. Neurological: Negative. Hematological: Negative. Psychiatric/Behavioral: Negative. PHYSICAL EXAM   (up to 7 for level 4, 8 or more for level 5)      INITIAL VITALS:   BP (!) 150/100   Pulse 95   Temp 99.1 °F (37.3 °C) (Oral)   Resp 22   Ht 5' 3\" (1.6 m)   Wt 278 lb (126.1 kg)   SpO2 100%   BMI 49.25 kg/m²     Physical Exam   Constitutional: She appears well-nourished. HENT:   Head: Normocephalic and atraumatic.    Eyes: Pupils are equal, round, and

## 2017-11-20 NOTE — ED NOTES
Writer to patient room, patient requesting something to drink. Writer explained awaiting CT.        Nicki Sanchez RN  11/20/17 3110

## 2017-11-20 NOTE — ED NOTES
Pt resting on cot, nad, rr even and unlabored. Pt updated, no needs.  Awaiting testing results     Heena Zepeda RN  11/20/17 5004

## 2017-11-20 NOTE — ED NOTES
Warm blankets provided to patient. Room darkened to promote rest.  NAD note.d  Pt awaiting next step at this time.        Nicki Sanchez RN  11/20/17 0892

## 2017-12-01 ENCOUNTER — OFFICE VISIT (OUTPATIENT)
Dept: FAMILY MEDICINE CLINIC | Age: 57
End: 2017-12-01
Payer: MEDICARE

## 2017-12-01 VITALS
DIASTOLIC BLOOD PRESSURE: 82 MMHG | SYSTOLIC BLOOD PRESSURE: 123 MMHG | BODY MASS INDEX: 49.54 KG/M2 | TEMPERATURE: 98.9 F | WEIGHT: 279.6 LBS | HEIGHT: 63 IN | HEART RATE: 88 BPM

## 2017-12-01 DIAGNOSIS — Z12.39 BREAST CANCER SCREENING: ICD-10-CM

## 2017-12-01 DIAGNOSIS — R05.8 COUGH WITH SPUTUM: ICD-10-CM

## 2017-12-01 DIAGNOSIS — Z13.5 DIABETIC RETINOPATHY SCREENING: ICD-10-CM

## 2017-12-01 DIAGNOSIS — M54.9 ACUTE UPPER BACK PAIN: Primary | ICD-10-CM

## 2017-12-01 DIAGNOSIS — Z23 NEED FOR INFLUENZA VACCINATION: ICD-10-CM

## 2017-12-01 PROCEDURE — 3014F SCREEN MAMMO DOC REV: CPT | Performed by: FAMILY MEDICINE

## 2017-12-01 PROCEDURE — G8417 CALC BMI ABV UP PARAM F/U: HCPCS | Performed by: FAMILY MEDICINE

## 2017-12-01 PROCEDURE — 90688 IIV4 VACCINE SPLT 0.5 ML IM: CPT | Performed by: FAMILY MEDICINE

## 2017-12-01 PROCEDURE — G8427 DOCREV CUR MEDS BY ELIG CLIN: HCPCS | Performed by: FAMILY MEDICINE

## 2017-12-01 PROCEDURE — 99213 OFFICE O/P EST LOW 20 MIN: CPT | Performed by: FAMILY MEDICINE

## 2017-12-01 PROCEDURE — G8484 FLU IMMUNIZE NO ADMIN: HCPCS | Performed by: FAMILY MEDICINE

## 2017-12-01 PROCEDURE — 1036F TOBACCO NON-USER: CPT | Performed by: FAMILY MEDICINE

## 2017-12-01 PROCEDURE — 3017F COLORECTAL CA SCREEN DOC REV: CPT | Performed by: FAMILY MEDICINE

## 2017-12-01 RX ORDER — ACETAMINOPHEN 500 MG
500 TABLET ORAL EVERY 6 HOURS PRN
Qty: 120 TABLET | Refills: 3 | Status: SHIPPED | OUTPATIENT
Start: 2017-12-01 | End: 2018-05-03

## 2017-12-01 RX ORDER — GUAIFENESIN/DEXTROMETHORPHAN 100-10MG/5
5 SYRUP ORAL 3 TIMES DAILY PRN
Qty: 120 ML | Refills: 0 | Status: SHIPPED | OUTPATIENT
Start: 2017-12-01 | End: 2017-12-11

## 2017-12-01 ASSESSMENT — ENCOUNTER SYMPTOMS
SHORTNESS OF BREATH: 0
COUGH: 1
ABDOMINAL PAIN: 0
BACK PAIN: 1
CHEST TIGHTNESS: 0

## 2017-12-01 ASSESSMENT — PATIENT HEALTH QUESTIONNAIRE - PHQ9
2. FEELING DOWN, DEPRESSED OR HOPELESS: 1
SUM OF ALL RESPONSES TO PHQ QUESTIONS 1-9: 1
SUM OF ALL RESPONSES TO PHQ9 QUESTIONS 1 & 2: 1
1. LITTLE INTEREST OR PLEASURE IN DOING THINGS: 0

## 2017-12-01 NOTE — PROGRESS NOTES
Subjective:    Cy Katz is a 56 y.o. female with  has a past medical history of Diverticulitis; GERD (gastroesophageal reflux disease); Hyperlipidemia; Hypertension; Obesity; Sleep apnea; and Type II or unspecified type diabetes mellitus without mention of complication, not stated as uncontrolled. No family history on file. Presented to the office today for:  Chief Complaint   Patient presents with    Follow-Up from Hospital     Shoulder and back pain. Has had for approx. 2 to 2.5 weeks. Was seen at [facility] on 11/20/17. HPI   Ms. Cy Katz is a 57 y/o female patient who comes in today s/p discharge from [facility]. V's for back pain and acute upper back chest pain. Diagnostic imaging were negative for any acute findings and patient was discharged with Tramadol. Patient comes in today because the back pain has not resolved. Although better from time of discharge it has not resolved completely. The pain radiates to her left shoulder blade. She denies any chest pain, SOB, N/V, or F/C. She also comes in today for progressive cough with yellowish / green sputum production present for the last 10 days. Pt states the cough and sputum production is mostly during the nights and completey asymptomatic during the days. Pt admits to a smoking history 20 y/o ago and drinking occasionally. Review of Systems   Constitutional: Negative for chills, fatigue and fever. Eyes: Negative for visual disturbance. Respiratory: Positive for cough. Negative for chest tightness and shortness of breath. Cough with sputum production    Cardiovascular: Negative for chest pain and palpitations. Gastrointestinal: Negative for abdominal pain. Genitourinary: Negative for dysuria and pelvic pain. Musculoskeletal: Positive for back pain. Neurological: Negative for weakness and numbness.           Objective:    /82 (Site: Left Arm, Position: Sitting, Cuff Size: Large Adult)   Pulse 88   Temp 98.9 °F Dispense: 120 tablet; Refill: 3  - Discontinue Tramadol   - Return to office or visit ED, if symptoms do not improve. 6. Pulmonary Nodules on CTA chest  - F/u CTA in 6-12 months         Requested Prescriptions     Signed Prescriptions Disp Refills    acetaminophen (APAP EXTRA STRENGTH) 500 MG tablet 120 tablet 3     Sig: Take 1 tablet by mouth every 6 hours as needed for Pain    guaiFENesin-dextromethorphan (ROBITUSSIN DM) 100-10 MG/5ML syrup 120 mL 0     Sig: Take 5 mLs by mouth 3 times daily as needed for Cough       There are no discontinued medications. Lizbeth received counseling on the following healthy behaviors: nutrition, exercise and medication adherence    Discussed use, benefit, and side effects of prescribed medications. Barriers to medication compliance addressed. All patient questions answered. Pt voiced understanding. Return in about 6 months (around 6/1/2018).

## 2017-12-01 NOTE — PROGRESS NOTES
Visit Information    Have you changed or started any medications since your last visit including any over-the-counter medicines, vitamins, or herbal medicines? no   Have you stopped taking any of your medications? Is so, why? -  no  Are you having any side effects from any of your medications? - no    Have you seen any other physician or provider since your last visit?  no   Have you had any other diagnostic tests since your last visit? yes - X-Ray   Have you been seen in the emergency room and/or had an admission in a hospital since we last saw you?  yes - [facility]   Have you had your routine dental cleaning in the past 6 months?  yes - September     Do you have an active MyChart account? If no, what is the barrier?   No: Declined    Patient Care Team:  Agueda Self MD as PCP - General (Family Medicine)  Quique Hunt MD as Surgeon (Bariatric Surgery)  Kee Crouch MD as Consulting Physician (Pulmonology)    Medical History Review  Past Medical, Family, and Social History reviewed and does not contribute to the patient presenting condition    Health Maintenance   Topic Date Due    Diabetic retinal exam  10/24/1972    Cervical cancer screen  10/24/1983    Flu vaccine (1) 09/01/2017    Breast cancer screen  12/08/2017    Diabetic foot exam  12/16/2017    Diabetic microalbuminuria test  01/03/2018    Diabetic hemoglobin A1C test  05/09/2018    Lipid screen  05/09/2018    Colon cancer screen colonoscopy  01/01/2020    DTaP/Tdap/Td vaccine (2 - Td) 03/07/2024    Pneumococcal med risk  Completed    Hepatitis C screen  Completed    HIV screen  Completed

## 2017-12-30 DIAGNOSIS — E11.9 TYPE 2 DIABETES MELLITUS WITHOUT COMPLICATION, WITHOUT LONG-TERM CURRENT USE OF INSULIN (HCC): ICD-10-CM

## 2018-01-02 RX ORDER — HYDROCHLOROTHIAZIDE 25 MG/1
TABLET ORAL
Qty: 30 TABLET | Refills: 3 | Status: SHIPPED | OUTPATIENT
Start: 2018-01-02 | End: 2019-01-09 | Stop reason: SDUPTHER

## 2018-03-06 ENCOUNTER — OFFICE VISIT (OUTPATIENT)
Dept: FAMILY MEDICINE CLINIC | Age: 58
End: 2018-03-06
Payer: MEDICARE

## 2018-03-06 VITALS
SYSTOLIC BLOOD PRESSURE: 134 MMHG | HEIGHT: 63 IN | TEMPERATURE: 96.6 F | DIASTOLIC BLOOD PRESSURE: 80 MMHG | BODY MASS INDEX: 50.04 KG/M2 | HEART RATE: 102 BPM | WEIGHT: 282.4 LBS

## 2018-03-06 DIAGNOSIS — N39.0 URINARY TRACT INFECTION WITH HEMATURIA, SITE UNSPECIFIED: Primary | ICD-10-CM

## 2018-03-06 DIAGNOSIS — K57.92 DIVERTICULITIS OF INTESTINE WITHOUT PERFORATION OR ABSCESS WITHOUT BLEEDING, UNSPECIFIED PART OF INTESTINAL TRACT: ICD-10-CM

## 2018-03-06 DIAGNOSIS — R31.9 URINARY TRACT INFECTION WITH HEMATURIA, SITE UNSPECIFIED: Primary | ICD-10-CM

## 2018-03-06 LAB
BILIRUBIN, POC: NEGATIVE
BLOOD URINE, POC: NEGATIVE
CLARITY, POC: CLEAR
COLOR, POC: YELLOW
GLUCOSE URINE, POC: NORMAL
KETONES, POC: NEGATIVE
LEUKOCYTE EST, POC: NEGATIVE
NITRITE, POC: NEGATIVE
PH, POC: 5.5
PROTEIN, POC: NEGATIVE
SPECIFIC GRAVITY, POC: 1.01
UROBILINOGEN, POC: 0.2

## 2018-03-06 PROCEDURE — 81003 URINALYSIS AUTO W/O SCOPE: CPT | Performed by: STUDENT IN AN ORGANIZED HEALTH CARE EDUCATION/TRAINING PROGRAM

## 2018-03-06 PROCEDURE — G8484 FLU IMMUNIZE NO ADMIN: HCPCS | Performed by: STUDENT IN AN ORGANIZED HEALTH CARE EDUCATION/TRAINING PROGRAM

## 2018-03-06 PROCEDURE — G8427 DOCREV CUR MEDS BY ELIG CLIN: HCPCS | Performed by: STUDENT IN AN ORGANIZED HEALTH CARE EDUCATION/TRAINING PROGRAM

## 2018-03-06 PROCEDURE — 99213 OFFICE O/P EST LOW 20 MIN: CPT | Performed by: STUDENT IN AN ORGANIZED HEALTH CARE EDUCATION/TRAINING PROGRAM

## 2018-03-06 PROCEDURE — 1036F TOBACCO NON-USER: CPT | Performed by: STUDENT IN AN ORGANIZED HEALTH CARE EDUCATION/TRAINING PROGRAM

## 2018-03-06 PROCEDURE — G8417 CALC BMI ABV UP PARAM F/U: HCPCS | Performed by: STUDENT IN AN ORGANIZED HEALTH CARE EDUCATION/TRAINING PROGRAM

## 2018-03-06 PROCEDURE — 3017F COLORECTAL CA SCREEN DOC REV: CPT | Performed by: STUDENT IN AN ORGANIZED HEALTH CARE EDUCATION/TRAINING PROGRAM

## 2018-03-06 PROCEDURE — 3014F SCREEN MAMMO DOC REV: CPT | Performed by: STUDENT IN AN ORGANIZED HEALTH CARE EDUCATION/TRAINING PROGRAM

## 2018-03-06 RX ORDER — METRONIDAZOLE 500 MG/1
500 TABLET ORAL 3 TIMES DAILY
Qty: 21 TABLET | Refills: 0 | Status: SHIPPED | OUTPATIENT
Start: 2018-03-06 | End: 2018-03-13

## 2018-03-06 RX ORDER — CIPROFLOXACIN 500 MG/1
500 TABLET, FILM COATED ORAL 2 TIMES DAILY
Qty: 14 TABLET | Refills: 0 | Status: SHIPPED | OUTPATIENT
Start: 2018-03-06 | End: 2018-03-13

## 2018-03-06 ASSESSMENT — ENCOUNTER SYMPTOMS
ABDOMINAL DISTENTION: 0
EYE PAIN: 0
CHEST TIGHTNESS: 0
ABDOMINAL PAIN: 1
VOMITING: 0
BLOOD IN STOOL: 0
SORE THROAT: 0
CONSTIPATION: 0
RHINORRHEA: 0
NAUSEA: 0
COUGH: 0
TROUBLE SWALLOWING: 0
DIARRHEA: 0
APNEA: 0
WHEEZING: 0
SHORTNESS OF BREATH: 0

## 2018-03-06 NOTE — PROGRESS NOTES
Attending Physician Statement  I have discussed the care of Torri Beltran, 56 y.o. female,including pertinent history and exam findings,  with the resident Dr. Mayra Hoover MD.  History:  Chief Complaint   Patient presents with    Abdominal Pain     Pt has hx of diverticulits, pain started yesterday      55 yo female here in the office with complaints of left lower quadrant abdominal pain without fever, diarrhea, hematochezia, nausea or vomiting. Patient has history of diverticulosis with diverticulitis and her last exacerbation was approximately 2 years ago. I have reviewed the key elements of the encounter with the resident. Examination was done by resident as documented in residents note. BP Readings from Last 3 Encounters:   03/06/18 134/80   12/01/17 123/82   11/20/17 (!) 155/138     /80 (Site: Left Arm, Position: Sitting, Cuff Size: Medium Adult) Comment: manual  Pulse 102   Temp 96.6 °F (35.9 °C) (Temporal)   Ht 5' 2.99\" (1.6 m)   Wt 282 lb 6.4 oz (128.1 kg)   BMI 50.04 kg/m²   Lab Results   Component Value Date    WBC 6.8 05/09/2017    HGB 11.6 (L) 05/09/2017    HCT 35.6 (L) 05/09/2017     05/09/2017    CHOL 169 05/09/2017    TRIG 100 05/09/2017    HDL 37 (L) 05/09/2017    ALT 15 05/09/2017    AST 21 05/09/2017     11/20/2017    K 4.5 11/20/2017     11/20/2017    CREATININE 0.89 11/20/2017    BUN 14 11/20/2017    CO2 27 11/20/2017    TSH 1.47 05/09/2017    LABA1C 6.6 (H) 05/09/2017    LABMICR 7 01/03/2017     Lab Results   Component Value Date    CALCIUM 9.0 11/20/2017    PHOS 3.1 09/12/2011     Lab Results   Component Value Date    LDLCHOLESTEROL 112 05/09/2017     I agree with the assessment, plan and diagnosis of   1.  Diverticulitis of intestine without perforation or abscess without bleeding, unspecified part of intestinal tract  ciprofloxacin (CIPRO) 500 MG tablet    metroNIDAZOLE (FLAGYL) 500 MG tablet     I agree with  orders as documented by the

## 2018-03-06 NOTE — PROGRESS NOTES
Subjective:    Fernando Okeefe is a 56 y.o. female with  has a past medical history of Diverticulitis; GERD (gastroesophageal reflux disease); Hyperlipidemia; Hypertension; Obesity; Sleep apnea; and Type II or unspecified type diabetes mellitus without mention of complication, not stated as uncontrolled. The patient has a No family history on file. Presented to the office today for:  Chief Complaint   Patient presents with    Abdominal Pain     Pt has hx of diverticulits, pain started yesterday        HPI    The patient presents here today complaining of a flareup of her Diverticulitis. Her symptoms started last night, the patient states that her only symptom right now is left lower quadrant abdominal pain. The pain is 4 out of 10, describes it as burning in nature and dull and achy. The patient denied any associated diarrhea or change in bowel habits. The patient denied any fever or chills. The patient also denied nausea or vomiting. She states that she had some egg salad yesterday morning and has had this discomfort since then. She also said she had pecan nuts, she was previously told not to have any seasonal notes in her diet. The patient denied any dysuria, frequency, urgency or lower abdominal (suprapubic) pain. Review of Systems   Constitutional: Positive for appetite change. Negative for activity change, chills, diaphoresis, fatigue and fever. HENT: Negative for rhinorrhea, sore throat and trouble swallowing. Eyes: Negative for pain and visual disturbance. Respiratory: Negative for apnea, cough, chest tightness, shortness of breath and wheezing. Cardiovascular: Negative for chest pain, palpitations and leg swelling. Gastrointestinal: Positive for abdominal pain. Negative for abdominal distention, blood in stool, constipation, diarrhea, nausea and vomiting. Left lower quadrant abdominal pain, burning characteristic, dull and achy in nature 4 out of 10.    Genitourinary: Component Value Date    CALCIUM 9.0 11/20/2017    PHOS 3.1 09/12/2011     Lab Results   Component Value Date    LDLCHOLESTEROL 112 05/09/2017       Assessment and Plan:    1. Diverticulitis of intestine without perforation or abscess without bleeding, unspecified part of intestinal tract  - Left lower quadrant abdominal tenderness, not associated with nausea,   vomiting, diarrhea, hematochezia, melena or any blood in stool.  - Patient is afebrile, vitals are stable and in no acute distress  - We'll treat this as outpatient diverticulitis, mild  - We'll follow-up with the patient on Friday, patient was told that if condition worsens physical to the emergency department. - ciprofloxacin (CIPRO) 500 MG tablet; Take 1 tablet by mouth 2 times daily for 7 days  Dispense: 14 tablet; Refill: 0  - metroNIDAZOLE (FLAGYL) 500 MG tablet; Take 1 tablet by mouth 3 times daily for 7 days  Dispense: 21 tablet; Refill: 0          Requested Prescriptions     Signed Prescriptions Disp Refills    ciprofloxacin (CIPRO) 500 MG tablet 14 tablet 0     Sig: Take 1 tablet by mouth 2 times daily for 7 days    metroNIDAZOLE (FLAGYL) 500 MG tablet 21 tablet 0     Sig: Take 1 tablet by mouth 3 times daily for 7 days       There are no discontinued medications. Lizbeth received counseling on the following healthy behaviors: nutrition, exercise and medication adherence    Discussed use, benefit, and side effects of prescribed medications. Barriers to medication compliance addressed. All patient questions answered. Pt voiced understanding. Return in about 4 days (around 3/10/2018) for follow up diverticulitis.

## 2018-03-07 ENCOUNTER — OFFICE VISIT (OUTPATIENT)
Dept: PULMONOLOGY | Age: 58
End: 2018-03-07
Payer: MEDICARE

## 2018-03-07 VITALS
TEMPERATURE: 98.5 F | HEIGHT: 62 IN | DIASTOLIC BLOOD PRESSURE: 78 MMHG | RESPIRATION RATE: 14 BRPM | OXYGEN SATURATION: 97 % | HEART RATE: 105 BPM | BODY MASS INDEX: 51.16 KG/M2 | SYSTOLIC BLOOD PRESSURE: 132 MMHG | WEIGHT: 278 LBS

## 2018-03-07 DIAGNOSIS — G47.33 OSA ON CPAP: Primary | ICD-10-CM

## 2018-03-07 DIAGNOSIS — E66.01 MORBID OBESITY WITH BODY MASS INDEX (BMI) OF 50.0 TO 59.9 IN ADULT (HCC): ICD-10-CM

## 2018-03-07 DIAGNOSIS — Z99.89 OSA ON CPAP: Primary | ICD-10-CM

## 2018-03-07 PROCEDURE — G8427 DOCREV CUR MEDS BY ELIG CLIN: HCPCS | Performed by: INTERNAL MEDICINE

## 2018-03-07 PROCEDURE — 1036F TOBACCO NON-USER: CPT | Performed by: INTERNAL MEDICINE

## 2018-03-07 PROCEDURE — 3014F SCREEN MAMMO DOC REV: CPT | Performed by: INTERNAL MEDICINE

## 2018-03-07 PROCEDURE — G8417 CALC BMI ABV UP PARAM F/U: HCPCS | Performed by: INTERNAL MEDICINE

## 2018-03-07 PROCEDURE — G8484 FLU IMMUNIZE NO ADMIN: HCPCS | Performed by: INTERNAL MEDICINE

## 2018-03-07 PROCEDURE — 99214 OFFICE O/P EST MOD 30 MIN: CPT | Performed by: INTERNAL MEDICINE

## 2018-03-07 PROCEDURE — 3017F COLORECTAL CA SCREEN DOC REV: CPT | Performed by: INTERNAL MEDICINE

## 2018-03-07 ASSESSMENT — SLEEP AND FATIGUE QUESTIONNAIRES
HOW LIKELY ARE YOU TO NOD OFF OR FALL ASLEEP WHILE WATCHING TV: 2
HOW LIKELY ARE YOU TO NOD OFF OR FALL ASLEEP WHILE SITTING QUIETLY AFTER LUNCH WITHOUT ALCOHOL: 0
ESS TOTAL SCORE: 11
HOW LIKELY ARE YOU TO NOD OFF OR FALL ASLEEP WHILE SITTING AND READING: 2
HOW LIKELY ARE YOU TO NOD OFF OR FALL ASLEEP WHILE SITTING INACTIVE IN A PUBLIC PLACE: 0
HOW LIKELY ARE YOU TO NOD OFF OR FALL ASLEEP WHILE SITTING AND TALKING TO SOMEONE: 1
HOW LIKELY ARE YOU TO NOD OFF OR FALL ASLEEP IN A CAR, WHILE STOPPED FOR A FEW MINUTES IN TRAFFIC: 0
HOW LIKELY ARE YOU TO NOD OFF OR FALL ASLEEP WHEN YOU ARE A PASSENGER IN A CAR FOR AN HOUR WITHOUT A BREAK: 3
HOW LIKELY ARE YOU TO NOD OFF OR FALL ASLEEP WHILE LYING DOWN TO REST IN THE AFTERNOON WHEN CIRCUMSTANCES PERMIT: 3

## 2018-03-07 NOTE — PROGRESS NOTES
OUTPATIENT PULMONARY PROGRESS NOTE      Patient:  Queta Courser  MRN: [id number]    Consulting Physician: Darling Haque  Reason for Consult: SINDI  Primacy Care Physician: Quinn Hamman, MD    HISTORY OF PRESENT ILLNESS:   The patient is a 56 y.o. female     Since she was seen last time she claims that she's been using CPAP, she admitted that she does not use CPAP every night and when she use it she is not sure that how many hours she use it at night, when she uses CPAP she claimed that she feels better she's more refreshing sleep and during the daytime she also is more active and usually she take a short nap for about 30 minutes almost daily. 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    Initially  She was undergoing the evaluation for bariatric surgery, he had sleep study which was done in may thousand 17 and showed moderate SINDI, she had titration done and started on CPAP at 10 cm with face mask. She started using cpap since June last year. Does not wakes up with choking and gasping sensation. Occasional dry mouth upon awakening. No Fatigue and tiredness during the day. Goes to sleep at 12 30 am and wakes up 6 am. It takes 10 minutes to fall asleep. Wakes up once at night to go to bathroom. Takes 1 nap during the day ( 30 minutes). No headache in am. No car wrecks or near wrecks because of the sleepiness. No nodding off while driving. No weight gain. No forgetfulness or decreased concentration. No nasal congestion or obstruction at night. No significant caffienated drinks or alcohol. Using CPAP 4-5 hr/night. No leg jerks during sleep. No restless feelings in legs at night. No numbness or burning in leg or feet. No leg aches or cramps . No loss of muscle strength when angry or laugh.  No hallucination lisinopril (PRINIVIL;ZESTRIL) 10 MG tablet Take 1 tablet by mouth daily 30 tablet 3    Biotin 1000 MCG CHEW Take by mouth      vitamin B-1 (THIAMINE) 100 MG tablet Take 100 mg by mouth daily      Elastic Bandages & Supports (KNEE BRACE ADJUSTABLE HINGED) MISC 1 Units by Does not apply route daily 1 each 0    [DISCONTINUED] ferrous sulfate 325 (65 Fe) MG tablet Take 29 mg by mouth daily (with breakfast)      aspirin EC 81 MG EC tablet Take 1 tablet by mouth daily 30 tablet 11     No facility-administered encounter medications on file as of 3/7/2018. Social History:   TOBACCO:   reports that she has quit smoking 22 years ago, smoked less then 1 ppd for 13 years. She has never used smokeless tobacco.  ETOH:   reports that she drinks alcohol. OCCUPATION:  Childcare    Family History:   History reviewed. No pertinent family history.     Immunizations:    Immunization History   Administered Date(s) Administered    Influenza Virus Vaccine 10/29/2015    Influenza, Nicci Justinjordyn, 3 Years and older, IM 12/16/2016    Influenza, Nicci Keating, 6 mo and older, IM (FLULAVAL Tyrese Rony) 12/01/2017    PPD Test 07/11/2012, 06/14/2017, 07/12/2017    Pneumococcal Polysaccharide ([id number]) 01/01/2010    Tdap (Boostrix, Adacel) 03/07/2014         REVIEW OF SYSTEMS:  CONSTITUTIONAL:  negative for  fevers, sweats, fatigue, malaise, anorexia and weight loss  EYES:  negative for  double vision, blurred vision, dry eyes, visual disturbance, irritation, redness, icterus and color blindness  HEENT:  negative for  ear drainage, earaches, nasal congestion, epistaxis, sore mouth, sore throat, hoarseness and voice change  RESPIRATORY:  negative for  dry cough, cough with sputum, dyspnea, wheezing, hemoptysis, chest pain, pleuritic pain and cyanosis  CARDIOVASCULAR:  negative for  chest pain, dyspnea, palpitations, orthopnea, PND, exertional chest pressure/discomfort, fatigue, early saiety, edema, syncope  GASTROINTESTINAL:  negative for vomiting, Range Status   11/20/2017 14 6 - 20 mg/dL Final   05/09/2017 13 6 - 20 mg/dL Final   01/03/2017 13 6 - 20 mg/dL Final     CREATININE   Date Value Ref Range Status   11/20/2017 0.89 0.50 - 0.90 mg/dL Final   05/09/2017 0.87 0.50 - 0.90 mg/dL Final   01/03/2017 0.85 0.50 - 0.90 mg/dL Final     Glucose   Date Value Ref Range Status   11/20/2017 103 (H) 70 - 99 mg/dL Final   05/09/2017 93 70 - 99 mg/dL Final   01/03/2017 139 (H) 70 - 99 mg/dL Final   10/09/2011 136 (H) 74 - 106 mg/dL Final   09/12/2011 93 74 - 106 mg/dL Final   09/11/2011 115 (H) 74 - 106 mg/dL Final     Hepatic:   AST   Date Value Ref Range Status   05/09/2017 21 <32 U/L Final   02/16/2015 17 <32 U/L Final   08/24/2013 21 8 - 36 U/L Final     ALT   Date Value Ref Range Status   05/09/2017 15 5 - 33 U/L Final   02/16/2015 14 5 - 33 U/L Final   08/24/2013 22 4 - 40 U/L Final     Total Bilirubin   Date Value Ref Range Status   05/09/2017 0.22 (L) 0.3 - 1.2 mg/dL Final   02/16/2015 0.32 0.3 - 1.2 mg/dL Final   08/24/2013 0.47 0.3 - 1.2 mg/dL Final     Alkaline Phosphatase   Date Value Ref Range Status   05/09/2017 82 35 - 104 U/L Final   02/16/2015 82 35 - 104 U/L Final   08/24/2013 84 25 - 100 U/L Final     Amylase:   Amylase   Date Value Ref Range Status   08/24/2013 77 20 - 104 U/L Final     Comment:     [employer] [id number] [employer], [facility] [phone]     Lipase:   Lipase   Date Value Ref Range Status   02/16/2015 33 13 - 60 U/L Final     Comment:     [employer] [id number] [employer], [address] [place] [address] [phone]     CARDIAC ENZYMES:   Total CK   Date Value Ref Range Status   10/09/2011 200 (H) 26 - 140 U/L Final   10/09/2011 229 (H) 26 - 140 U/L Final     CK-MB   Date Value Ref Range Status   10/09/2011 1.8 0 - 5 ng/mL Final   10/09/2011 1.7 0 - 5 ng/mL Final     POC Troponin I   Date Value Ref Range Status   11/20/2017 0.00 0.00 - 0.10 ng/mL Final   11/20/2017 0.00 0.00 - 0.10 ng/mL Final     Troponin I   Date Value Ref Range Status   10/09/2011 <0.01 0.00 - 0.04 ng/mL Final   10/09/2011 <0.01 0.00 - 0.04 ng/mL Final     BNP: No results found for: BNP  Lipids:   Cholesterol   Date Value Ref Range Status   05/09/2017 169 <200 mg/dL Final     Comment:        Cholesterol Guidelines:      <200  Desirable   200-240  Borderline      >240  Undesirable          HDL   Date Value Ref Range Status   05/09/2017 37 (L) >40 mg/dL Final     Comment:        HDL Guidelines:    <40     Undesirable   40-59    Borderline    >59     Desirable            INR: No results found for: INR  Thyroid:   TSH   Date Value Ref Range Status   05/09/2017 1.47 0.30 - 5.00 mIU/L Final     Comment:     [facility] [id number] [facility], [address] [place] [address] [phone]     Urinalysis:   Blood, UA POC   Date Value Ref Range Status   03/06/2018 negative  Final     Clarity, UA   Date Value Ref Range Status   03/06/2018 clear  Final     Color, UA   Date Value Ref Range Status   03/06/2018 yellow  Final   02/16/2015 YELLOW YEL Final     pH, UA   Date Value Ref Range Status   03/06/2018 5.5  Final   02/16/2015 6.0 5.0 - 8.0 Final     Protein, UA   Date Value Ref Range Status   02/16/2015 NEGATIVE NEG Final     Protein, UA POC   Date Value Ref Range Status   03/06/2018 negative  Final     Specific Gravity, UA   Date Value Ref Range Status   02/16/2015 1.010 1.005 - 1.030 Final     Spec Grav, UA   Date Value Ref Range Status   03/06/2018 1.015  Final     Bilirubin, Urine   Date Value Ref Range Status   09/11/2011 NEGATIVE NEG Final     Bilirubin, UA   Date Value Ref Range Status   03/06/2018 negative  Final     Nitrite, Urine   Date Value Ref Range Status   02/16/2015 NEGATIVE NEG Final     Leukocyte Esterase, Urine   Date Value Ref Range Status   02/16/2015 NEGATIVE NEG Final     Leukocytes, UA   Date Value Ref Range Status   03/06/2018 negative  Final     Glucose, UA   Date Value Ref Range Status   09/11/2011 NEGATIVE NEG Final     Glucose, Ur   Date Value Ref Range Status 02/16/2015 NEGATIVE NEG Final     Glucose, UA POC   Date Value Ref Range Status   03/06/2018 negtive  Final     Cultures:-  -----------------------------------------------------------------    ABGs: No results found for: PHART, PO2ART, SKI8ISN    Pulmonary Functions Testing Results:    No results found for: FEV1, FVC, QSI1MQA, TLC, DLCO    CXR    Sleep Study   May 21/17 moderate SINDI with AHI of 15    Titration study: 6/11/17  Titrated to cpap of 10 cm    Compliance data seen from  1/29/2018 - 2/27/2018  Used 14 out of 30 days, compliance 47%. Usage 1 hour 44 minutes for total days used. Usage 3 hours 43 minutes for the days used. Assessment and Plan       ICD-10-CM ICD-9-CM    1. SINDI on CPAP G47.33 327.23     Z99.89 V46.8    2. Morbid obesity with body mass index (BMI) of 50.0 to 59.9 in adult [facility]) E66.01 278.01     Z68.43 V85.43       Gastroesophageal reflux disease    Hyperlipidemia with target LDL less than 100    HTN (hypertension)    Controlled type 2 diabetes mellitus without complication, without long-term current use of insulin ([facility] [address]       Plan and Recommendations:  Long discussion with the patient as she wanted to undergo bariatric surgery about the compliance and compliance data reviewed and advised her that she need CPAP every night at least 4 hour every night especially the outcome of the surgery would be better if she use CPAP preop and perioperative and to continue CPAP after surgery  She is ok for surgery from pulmonary standpoint provided she continue to use cpap and stay compliant  CPAP at least 4 hrs qhs  Wt loss is recommended and discussed  Follow good sleep hygeine instructions  Use humidifier   Questions answered pertaining to diagnosis and management explained importance of compliance with therapy   Need Compliance data in 3 months  RTC 6 months    It was my pleasure to evaluate Glenis Gonzalez today. Please call with questions.     Talat Amor MD             3/7/2018, 5:03

## 2018-03-12 ENCOUNTER — OFFICE VISIT (OUTPATIENT)
Dept: FAMILY MEDICINE CLINIC | Age: 58
End: 2018-03-12
Payer: MEDICARE

## 2018-03-12 ENCOUNTER — HOSPITAL ENCOUNTER (OUTPATIENT)
Age: 58
Setting detail: SPECIMEN
Discharge: HOME | End: 2018-03-12
Payer: MEDICARE

## 2018-03-12 VITALS
DIASTOLIC BLOOD PRESSURE: 63 MMHG | HEIGHT: 62 IN | WEIGHT: 284.8 LBS | SYSTOLIC BLOOD PRESSURE: 104 MMHG | HEART RATE: 102 BPM | TEMPERATURE: 97.9 F | BODY MASS INDEX: 52.41 KG/M2

## 2018-03-12 DIAGNOSIS — K57.92 DIVERTICULITIS OF INTESTINE WITHOUT PERFORATION OR ABSCESS WITHOUT BLEEDING, UNSPECIFIED PART OF INTESTINAL TRACT: Primary | ICD-10-CM

## 2018-03-12 DIAGNOSIS — E11.9 TYPE 2 DIABETES MELLITUS WITHOUT COMPLICATION, WITHOUT LONG-TERM CURRENT USE OF INSULIN (HCC): ICD-10-CM

## 2018-03-12 LAB
CREATININE URINE: 168.5 MG/DL (ref 28–217)
MICROALBUMIN/CREAT 24H UR: 19 MG/L
MICROALBUMIN/CREAT UR-RTO: 11 MCG/MG CREAT

## 2018-03-12 PROCEDURE — 3017F COLORECTAL CA SCREEN DOC REV: CPT | Performed by: STUDENT IN AN ORGANIZED HEALTH CARE EDUCATION/TRAINING PROGRAM

## 2018-03-12 PROCEDURE — 1036F TOBACCO NON-USER: CPT | Performed by: STUDENT IN AN ORGANIZED HEALTH CARE EDUCATION/TRAINING PROGRAM

## 2018-03-12 PROCEDURE — 3046F HEMOGLOBIN A1C LEVEL >9.0%: CPT | Performed by: STUDENT IN AN ORGANIZED HEALTH CARE EDUCATION/TRAINING PROGRAM

## 2018-03-12 PROCEDURE — G8417 CALC BMI ABV UP PARAM F/U: HCPCS | Performed by: STUDENT IN AN ORGANIZED HEALTH CARE EDUCATION/TRAINING PROGRAM

## 2018-03-12 PROCEDURE — G8482 FLU IMMUNIZE ORDER/ADMIN: HCPCS | Performed by: STUDENT IN AN ORGANIZED HEALTH CARE EDUCATION/TRAINING PROGRAM

## 2018-03-12 PROCEDURE — 3014F SCREEN MAMMO DOC REV: CPT | Performed by: STUDENT IN AN ORGANIZED HEALTH CARE EDUCATION/TRAINING PROGRAM

## 2018-03-12 PROCEDURE — 99213 OFFICE O/P EST LOW 20 MIN: CPT | Performed by: STUDENT IN AN ORGANIZED HEALTH CARE EDUCATION/TRAINING PROGRAM

## 2018-03-12 PROCEDURE — G8427 DOCREV CUR MEDS BY ELIG CLIN: HCPCS | Performed by: STUDENT IN AN ORGANIZED HEALTH CARE EDUCATION/TRAINING PROGRAM

## 2018-03-12 ASSESSMENT — ENCOUNTER SYMPTOMS
VOMITING: 0
ABDOMINAL PAIN: 1
CHEST TIGHTNESS: 0
EYE PAIN: 0
TROUBLE SWALLOWING: 0
NAUSEA: 0
COUGH: 0
ABDOMINAL DISTENTION: 0
BLOOD IN STOOL: 0
SORE THROAT: 0
DIARRHEA: 0
APNEA: 0
CONSTIPATION: 0
WHEEZING: 0
SHORTNESS OF BREATH: 0
RHINORRHEA: 0

## 2018-03-14 ENCOUNTER — NURSE ONLY (OUTPATIENT)
Dept: BARIATRICS/WEIGHT MGMT | Age: 58
End: 2018-03-14

## 2018-03-19 ENCOUNTER — OFFICE VISIT (OUTPATIENT)
Dept: BARIATRICS/WEIGHT MGMT | Age: 58
End: 2018-03-19
Payer: MEDICARE

## 2018-03-19 VITALS
SYSTOLIC BLOOD PRESSURE: 124 MMHG | WEIGHT: 286 LBS | HEART RATE: 68 BPM | BODY MASS INDEX: 52.63 KG/M2 | DIASTOLIC BLOOD PRESSURE: 74 MMHG | HEIGHT: 62 IN | RESPIRATION RATE: 20 BRPM

## 2018-03-19 DIAGNOSIS — E66.01 MORBID OBESITY WITH BMI OF 50.0-59.9, ADULT (HCC): ICD-10-CM

## 2018-03-19 DIAGNOSIS — I10 ESSENTIAL HYPERTENSION: ICD-10-CM

## 2018-03-19 DIAGNOSIS — G47.33 OSA (OBSTRUCTIVE SLEEP APNEA): ICD-10-CM

## 2018-03-19 DIAGNOSIS — K21.9 GASTROESOPHAGEAL REFLUX DISEASE, ESOPHAGITIS PRESENCE NOT SPECIFIED: ICD-10-CM

## 2018-03-19 DIAGNOSIS — E11.9 CONTROLLED TYPE 2 DIABETES MELLITUS WITHOUT COMPLICATION, WITHOUT LONG-TERM CURRENT USE OF INSULIN (HCC): Primary | ICD-10-CM

## 2018-03-19 DIAGNOSIS — E78.5 HYPERLIPIDEMIA, UNSPECIFIED HYPERLIPIDEMIA TYPE: ICD-10-CM

## 2018-03-19 DIAGNOSIS — K57.30 DIVERTICULA, COLON: ICD-10-CM

## 2018-03-19 PROCEDURE — G8417 CALC BMI ABV UP PARAM F/U: HCPCS | Performed by: NURSE PRACTITIONER

## 2018-03-19 PROCEDURE — 99213 OFFICE O/P EST LOW 20 MIN: CPT | Performed by: NURSE PRACTITIONER

## 2018-03-19 PROCEDURE — G8482 FLU IMMUNIZE ORDER/ADMIN: HCPCS | Performed by: NURSE PRACTITIONER

## 2018-03-19 PROCEDURE — 1036F TOBACCO NON-USER: CPT | Performed by: NURSE PRACTITIONER

## 2018-03-19 PROCEDURE — 3017F COLORECTAL CA SCREEN DOC REV: CPT | Performed by: NURSE PRACTITIONER

## 2018-03-19 PROCEDURE — 3014F SCREEN MAMMO DOC REV: CPT | Performed by: NURSE PRACTITIONER

## 2018-03-19 PROCEDURE — G8427 DOCREV CUR MEDS BY ELIG CLIN: HCPCS | Performed by: NURSE PRACTITIONER

## 2018-03-19 PROCEDURE — 3046F HEMOGLOBIN A1C LEVEL >9.0%: CPT | Performed by: NURSE PRACTITIONER

## 2018-03-19 NOTE — PROGRESS NOTES
Pt did not bring goal card, exercise log or education binder; pt reports that she has them at home; requested pt bring these materials with her on her next visit so we could review nutrition goals.
encounter.       Electronically signed by:  Gwen Weiss CNP

## 2018-04-17 ENCOUNTER — OFFICE VISIT (OUTPATIENT)
Dept: BARIATRICS/WEIGHT MGMT | Age: 58
End: 2018-04-17
Payer: MEDICARE

## 2018-04-17 VITALS
DIASTOLIC BLOOD PRESSURE: 76 MMHG | BODY MASS INDEX: 51.89 KG/M2 | WEIGHT: 282 LBS | HEART RATE: 80 BPM | HEIGHT: 62 IN | RESPIRATION RATE: 20 BRPM | SYSTOLIC BLOOD PRESSURE: 126 MMHG

## 2018-04-17 DIAGNOSIS — E66.01 MORBID OBESITY WITH BMI OF 50.0-59.9, ADULT (HCC): ICD-10-CM

## 2018-04-17 DIAGNOSIS — K21.9 GASTROESOPHAGEAL REFLUX DISEASE, ESOPHAGITIS PRESENCE NOT SPECIFIED: ICD-10-CM

## 2018-04-17 DIAGNOSIS — E78.5 HYPERLIPIDEMIA, UNSPECIFIED HYPERLIPIDEMIA TYPE: ICD-10-CM

## 2018-04-17 DIAGNOSIS — G47.33 OSA (OBSTRUCTIVE SLEEP APNEA): ICD-10-CM

## 2018-04-17 DIAGNOSIS — E11.9 CONTROLLED TYPE 2 DIABETES MELLITUS WITHOUT COMPLICATION, WITHOUT LONG-TERM CURRENT USE OF INSULIN (HCC): Primary | ICD-10-CM

## 2018-04-17 DIAGNOSIS — I10 ESSENTIAL HYPERTENSION: ICD-10-CM

## 2018-04-17 PROCEDURE — 3014F SCREEN MAMMO DOC REV: CPT | Performed by: NURSE PRACTITIONER

## 2018-04-17 PROCEDURE — 99213 OFFICE O/P EST LOW 20 MIN: CPT | Performed by: NURSE PRACTITIONER

## 2018-04-17 PROCEDURE — G8427 DOCREV CUR MEDS BY ELIG CLIN: HCPCS | Performed by: NURSE PRACTITIONER

## 2018-04-17 PROCEDURE — 1036F TOBACCO NON-USER: CPT | Performed by: NURSE PRACTITIONER

## 2018-04-17 PROCEDURE — 2022F DILAT RTA XM EVC RTNOPTHY: CPT | Performed by: NURSE PRACTITIONER

## 2018-04-17 PROCEDURE — 3017F COLORECTAL CA SCREEN DOC REV: CPT | Performed by: NURSE PRACTITIONER

## 2018-04-17 PROCEDURE — G8417 CALC BMI ABV UP PARAM F/U: HCPCS | Performed by: NURSE PRACTITIONER

## 2018-04-17 PROCEDURE — 3046F HEMOGLOBIN A1C LEVEL >9.0%: CPT | Performed by: NURSE PRACTITIONER

## 2018-04-18 ENCOUNTER — HOSPITAL ENCOUNTER (OUTPATIENT)
Age: 58
Discharge: HOME | End: 2018-04-18
Payer: MEDICARE

## 2018-04-18 DIAGNOSIS — E66.01 MORBID OBESITY WITH BMI OF 50.0-59.9, ADULT (HCC): ICD-10-CM

## 2018-04-18 DIAGNOSIS — E55.9 VITAMIN D DEFICIENCY: Primary | ICD-10-CM

## 2018-04-18 DIAGNOSIS — G47.33 OSA (OBSTRUCTIVE SLEEP APNEA): ICD-10-CM

## 2018-04-18 DIAGNOSIS — E11.9 CONTROLLED TYPE 2 DIABETES MELLITUS WITHOUT COMPLICATION, WITHOUT LONG-TERM CURRENT USE OF INSULIN (HCC): ICD-10-CM

## 2018-04-18 DIAGNOSIS — K21.9 GASTROESOPHAGEAL REFLUX DISEASE, ESOPHAGITIS PRESENCE NOT SPECIFIED: ICD-10-CM

## 2018-04-18 DIAGNOSIS — E78.5 HYPERLIPIDEMIA, UNSPECIFIED HYPERLIPIDEMIA TYPE: ICD-10-CM

## 2018-04-18 DIAGNOSIS — I10 ESSENTIAL HYPERTENSION: ICD-10-CM

## 2018-04-18 LAB
ABSOLUTE EOS #: 0.22 K/UL (ref 0–0.44)
ABSOLUTE IMMATURE GRANULOCYTE: <0.03 K/UL (ref 0–0.3)
ABSOLUTE LYMPH #: 2.96 K/UL (ref 1.1–3.7)
ABSOLUTE MONO #: 0.38 K/UL (ref 0.1–1.2)
ALBUMIN SERPL-MCNC: 3.9 G/DL (ref 3.5–5.2)
ALBUMIN/GLOBULIN RATIO: 1.1 (ref 1–2.5)
ALP BLD-CCNC: 77 U/L (ref 35–104)
ALT SERPL-CCNC: 13 U/L (ref 5–33)
ANION GAP SERPL CALCULATED.3IONS-SCNC: 16 MMOL/L (ref 9–17)
AST SERPL-CCNC: 16 U/L
BASOPHILS # BLD: 0 % (ref 0–2)
BASOPHILS ABSOLUTE: <0.03 K/UL (ref 0–0.2)
BILIRUB SERPL-MCNC: 0.33 MG/DL (ref 0.3–1.2)
BUN BLDV-MCNC: 14 MG/DL (ref 6–20)
BUN/CREAT BLD: ABNORMAL (ref 9–20)
CALCIUM SERPL-MCNC: 8.9 MG/DL (ref 8.6–10.4)
CHLORIDE BLD-SCNC: 105 MMOL/L (ref 98–107)
CHOLESTEROL/HDL RATIO: 3.9
CHOLESTEROL: 148 MG/DL
CO2: 22 MMOL/L (ref 20–31)
CREAT SERPL-MCNC: 0.91 MG/DL (ref 0.5–0.9)
DIFFERENTIAL TYPE: ABNORMAL
EOSINOPHILS RELATIVE PERCENT: 3 % (ref 1–4)
ESTIMATED AVERAGE GLUCOSE: 143 MG/DL
FERRITIN: 78 UG/L (ref 13–150)
FOLATE: 14.4 NG/ML
GFR AFRICAN AMERICAN: >60 ML/MIN
GFR NON-AFRICAN AMERICAN: >60 ML/MIN
GFR SERPL CREATININE-BSD FRML MDRD: ABNORMAL ML/MIN/{1.73_M2}
GFR SERPL CREATININE-BSD FRML MDRD: ABNORMAL ML/MIN/{1.73_M2}
GLUCOSE BLD-MCNC: 108 MG/DL (ref 70–99)
HBA1C MFR BLD: 6.6 % (ref 4–6)
HCT VFR BLD CALC: 38 % (ref 36.3–47.1)
HDLC SERPL-MCNC: 38 MG/DL
HEMOGLOBIN: 11.7 G/DL (ref 11.9–15.1)
IMMATURE GRANULOCYTES: 0 %
IRON SATURATION: 17 % (ref 20–55)
IRON: 46 UG/DL (ref 37–145)
LDL CHOLESTEROL: 87 MG/DL (ref 0–130)
LYMPHOCYTES # BLD: 43 % (ref 24–43)
MAGNESIUM: 2 MG/DL (ref 1.6–2.6)
MCH RBC QN AUTO: 26.8 PG (ref 25.2–33.5)
MCHC RBC AUTO-ENTMCNC: 30.8 G/DL (ref 28.4–34.8)
MCV RBC AUTO: 87 FL (ref 82.6–102.9)
MONOCYTES # BLD: 6 % (ref 3–12)
NRBC AUTOMATED: 0 PER 100 WBC
PDW BLD-RTO: 15.4 % (ref 11.8–14.4)
PLATELET # BLD: 235 K/UL (ref 138–453)
PLATELET ESTIMATE: ABNORMAL
PMV BLD AUTO: 11.6 FL (ref 8.1–13.5)
POTASSIUM SERPL-SCNC: 4.3 MMOL/L (ref 3.7–5.3)
PTH INTACT: 32.41 PG/ML (ref 15–65)
RBC # BLD: 4.37 M/UL (ref 3.95–5.11)
RBC # BLD: ABNORMAL 10*6/UL
SEG NEUTROPHILS: 48 % (ref 36–65)
SEGMENTED NEUTROPHILS ABSOLUTE COUNT: 3.38 K/UL (ref 1.5–8.1)
SODIUM BLD-SCNC: 143 MMOL/L (ref 135–144)
THYROXINE, FREE: 0.94 NG/DL (ref 0.93–1.7)
TOTAL IRON BINDING CAPACITY: 273 UG/DL (ref 250–450)
TOTAL PROTEIN: 7.5 G/DL (ref 6.4–8.3)
TRIGL SERPL-MCNC: 114 MG/DL
TSH SERPL DL<=0.05 MIU/L-ACNC: 2.25 MIU/L (ref 0.3–5)
UNSATURATED IRON BINDING CAPACITY: 227 UG/DL (ref 112–347)
VITAMIN B-12: 702 PG/ML (ref 232–1245)
VITAMIN D 25-HYDROXY: 14.6 NG/ML (ref 30–100)
VLDLC SERPL CALC-MCNC: ABNORMAL MG/DL (ref 1–30)
WBC # BLD: 7 K/UL (ref 3.5–11.3)
WBC # BLD: ABNORMAL 10*3/UL

## 2018-04-18 PROCEDURE — 82306 VITAMIN D 25 HYDROXY: CPT

## 2018-04-18 PROCEDURE — 83970 ASSAY OF PARATHORMONE: CPT

## 2018-04-18 PROCEDURE — 83540 ASSAY OF IRON: CPT

## 2018-04-18 PROCEDURE — 84590 ASSAY OF VITAMIN A: CPT

## 2018-04-18 PROCEDURE — 80053 COMPREHEN METABOLIC PANEL: CPT

## 2018-04-18 PROCEDURE — 83036 HEMOGLOBIN GLYCOSYLATED A1C: CPT

## 2018-04-18 PROCEDURE — 80061 LIPID PANEL: CPT

## 2018-04-18 PROCEDURE — 82607 VITAMIN B-12: CPT

## 2018-04-18 PROCEDURE — 36415 COLL VENOUS BLD VENIPUNCTURE: CPT

## 2018-04-18 PROCEDURE — 85025 COMPLETE CBC W/AUTO DIFF WBC: CPT

## 2018-04-18 PROCEDURE — 82746 ASSAY OF FOLIC ACID SERUM: CPT

## 2018-04-18 PROCEDURE — 84443 ASSAY THYROID STIM HORMONE: CPT

## 2018-04-18 PROCEDURE — 83550 IRON BINDING TEST: CPT

## 2018-04-18 PROCEDURE — 84439 ASSAY OF FREE THYROXINE: CPT

## 2018-04-18 PROCEDURE — 84425 ASSAY OF VITAMIN B-1: CPT

## 2018-04-18 PROCEDURE — 84630 ASSAY OF ZINC: CPT

## 2018-04-18 PROCEDURE — 82728 ASSAY OF FERRITIN: CPT

## 2018-04-18 PROCEDURE — 83735 ASSAY OF MAGNESIUM: CPT

## 2018-04-18 RX ORDER — ERGOCALCIFEROL 1.25 MG/1
50000 CAPSULE ORAL WEEKLY
Qty: 8 CAPSULE | Refills: 0 | Status: SHIPPED | OUTPATIENT
Start: 2018-04-18 | End: 2018-05-03

## 2018-04-20 LAB
RETINYL PALMITATE: <0.02 MG/L (ref 0–0.1)
VITAMIN A LEVEL: 0.54 MG/L (ref 0.3–1.2)
VITAMIN A, INTERP: NORMAL
ZINC: 91 UG/DL (ref 60–120)

## 2018-04-26 LAB — VITAMIN B1 WHOLE BLOOD: 94 NMOL/L (ref 70–180)

## 2018-05-01 ENCOUNTER — TELEPHONE (OUTPATIENT)
Dept: ADMINISTRATIVE | Age: 58
End: 2018-05-01

## 2018-05-01 ENCOUNTER — TELEPHONE (OUTPATIENT)
Dept: FAMILY MEDICINE CLINIC | Age: 58
End: 2018-05-01

## 2018-05-02 ENCOUNTER — OFFICE VISIT (OUTPATIENT)
Dept: FAMILY MEDICINE CLINIC | Age: 58
End: 2018-05-02
Payer: MEDICARE

## 2018-05-02 VITALS
TEMPERATURE: 96.2 F | SYSTOLIC BLOOD PRESSURE: 105 MMHG | WEIGHT: 278.6 LBS | HEIGHT: 62 IN | DIASTOLIC BLOOD PRESSURE: 61 MMHG | BODY MASS INDEX: 51.27 KG/M2 | HEART RATE: 81 BPM

## 2018-05-02 DIAGNOSIS — M54.9 ACUTE LEFT-SIDED BACK PAIN, UNSPECIFIED BACK LOCATION: ICD-10-CM

## 2018-05-02 DIAGNOSIS — N39.0 URINARY TRACT INFECTION WITHOUT HEMATURIA, SITE UNSPECIFIED: Primary | ICD-10-CM

## 2018-05-02 LAB
BILIRUBIN, POC: NEGATIVE
BLOOD URINE, POC: NEGATIVE
CLARITY, POC: CLEAR
COLOR, POC: NORMAL
GLUCOSE URINE, POC: NEGATIVE
KETONES, POC: NEGATIVE
LEUKOCYTE EST, POC: NEGATIVE
NITRITE, POC: NEGATIVE
PH, POC: 6.5
PROTEIN, POC: NEGATIVE
SPECIFIC GRAVITY, POC: 1.02
UROBILINOGEN, POC: 0.2

## 2018-05-02 PROCEDURE — 3017F COLORECTAL CA SCREEN DOC REV: CPT | Performed by: HOSPITALIST

## 2018-05-02 PROCEDURE — 81002 URINALYSIS NONAUTO W/O SCOPE: CPT | Performed by: HOSPITALIST

## 2018-05-02 PROCEDURE — 99213 OFFICE O/P EST LOW 20 MIN: CPT | Performed by: HOSPITALIST

## 2018-05-02 PROCEDURE — 1036F TOBACCO NON-USER: CPT | Performed by: HOSPITALIST

## 2018-05-02 PROCEDURE — 87086 URINE CULTURE/COLONY COUNT: CPT | Performed by: HOSPITALIST

## 2018-05-02 PROCEDURE — G8427 DOCREV CUR MEDS BY ELIG CLIN: HCPCS | Performed by: HOSPITALIST

## 2018-05-02 PROCEDURE — G8417 CALC BMI ABV UP PARAM F/U: HCPCS | Performed by: HOSPITALIST

## 2018-05-02 RX ORDER — HYDROCODONE BITARTRATE AND ACETAMINOPHEN 5; 325 MG/1; MG/1
1 TABLET ORAL EVERY 6 HOURS PRN
Qty: 10 TABLET | Refills: 0 | Status: SHIPPED | OUTPATIENT
Start: 2018-05-02 | End: 2018-05-05

## 2018-05-02 RX ORDER — CIPROFLOXACIN HCL 100 MG
100 TABLET ORAL 2 TIMES DAILY
Qty: 14 TABLET | Refills: 0 | Status: SHIPPED | OUTPATIENT
Start: 2018-05-02 | End: 2018-05-09

## 2018-05-02 ASSESSMENT — PATIENT HEALTH QUESTIONNAIRE - PHQ9
1. LITTLE INTEREST OR PLEASURE IN DOING THINGS: 0
2. FEELING DOWN, DEPRESSED OR HOPELESS: 0
SUM OF ALL RESPONSES TO PHQ QUESTIONS 1-9: 0
SUM OF ALL RESPONSES TO PHQ9 QUESTIONS 1 & 2: 0

## 2018-05-02 ASSESSMENT — ENCOUNTER SYMPTOMS
APNEA: 0
ABDOMINAL DISTENTION: 0

## 2018-05-03 ENCOUNTER — HOSPITAL ENCOUNTER (EMERGENCY)
Age: 58
Discharge: HOME | End: 2018-05-03
Attending: EMERGENCY MEDICINE
Payer: MEDICARE

## 2018-05-03 ENCOUNTER — APPOINTMENT (OUTPATIENT)
Dept: CT IMAGING | Age: 58
End: 2018-05-03
Payer: MEDICARE

## 2018-05-03 VITALS
TEMPERATURE: 98.1 F | RESPIRATION RATE: 17 BRPM | BODY MASS INDEX: 50.29 KG/M2 | OXYGEN SATURATION: 100 % | WEIGHT: 275 LBS | HEART RATE: 112 BPM | DIASTOLIC BLOOD PRESSURE: 89 MMHG | SYSTOLIC BLOOD PRESSURE: 122 MMHG

## 2018-05-03 DIAGNOSIS — R10.9 LEFT FLANK PAIN: Primary | ICD-10-CM

## 2018-05-03 DIAGNOSIS — R10.32 ABDOMINAL PAIN, LEFT LOWER QUADRANT: ICD-10-CM

## 2018-05-03 LAB
-: NORMAL
ABSOLUTE EOS #: 0.28 K/UL (ref 0–0.44)
ABSOLUTE IMMATURE GRANULOCYTE: <0.03 K/UL (ref 0–0.3)
ABSOLUTE LYMPH #: 3.68 K/UL (ref 1.1–3.7)
ABSOLUTE MONO #: 0.5 K/UL (ref 0.1–1.2)
ALBUMIN SERPL-MCNC: 3.6 G/DL (ref 3.5–5.2)
ALBUMIN/GLOBULIN RATIO: 1 (ref 1–2.5)
ALP BLD-CCNC: 77 U/L (ref 35–104)
ALT SERPL-CCNC: 14 U/L (ref 5–33)
AMORPHOUS: NORMAL
ANION GAP SERPL CALCULATED.3IONS-SCNC: 11 MMOL/L (ref 9–17)
AST SERPL-CCNC: 20 U/L
BACTERIA: NORMAL
BASOPHILS # BLD: 0 % (ref 0–2)
BASOPHILS ABSOLUTE: <0.03 K/UL (ref 0–0.2)
BILIRUB SERPL-MCNC: 0.2 MG/DL (ref 0.3–1.2)
BILIRUBIN DIRECT: <0.08 MG/DL
BILIRUBIN URINE: NEGATIVE
BILIRUBIN, INDIRECT: ABNORMAL MG/DL (ref 0–1)
BUN BLDV-MCNC: 14 MG/DL (ref 6–20)
BUN/CREAT BLD: ABNORMAL (ref 9–20)
CALCIUM SERPL-MCNC: 8.7 MG/DL (ref 8.6–10.4)
CASTS UA: NORMAL /LPF (ref 0–8)
CHLORIDE BLD-SCNC: 104 MMOL/L (ref 98–107)
CO2: 26 MMOL/L (ref 20–31)
COLOR: YELLOW
CREAT SERPL-MCNC: 1.05 MG/DL (ref 0.5–0.9)
CRYSTALS, UA: NORMAL /HPF
DIFFERENTIAL TYPE: ABNORMAL
EOSINOPHILS RELATIVE PERCENT: 4 % (ref 1–4)
EPITHELIAL CELLS UA: NORMAL /HPF (ref 0–5)
GFR AFRICAN AMERICAN: >60 ML/MIN
GFR NON-AFRICAN AMERICAN: 54 ML/MIN
GFR SERPL CREATININE-BSD FRML MDRD: ABNORMAL ML/MIN/{1.73_M2}
GFR SERPL CREATININE-BSD FRML MDRD: ABNORMAL ML/MIN/{1.73_M2}
GLOBULIN: ABNORMAL G/DL (ref 1.5–3.8)
GLUCOSE BLD-MCNC: 115 MG/DL (ref 70–99)
GLUCOSE URINE: NEGATIVE
HCT VFR BLD CALC: 38.9 % (ref 36.3–47.1)
HEMOGLOBIN: 12.1 G/DL (ref 11.9–15.1)
IMMATURE GRANULOCYTES: 0 %
KETONES, URINE: NEGATIVE
LACTIC ACID, WHOLE BLOOD: 1.5 MMOL/L (ref 0.7–2.1)
LEUKOCYTE ESTERASE, URINE: NEGATIVE
LIPASE: 32 U/L (ref 13–60)
LYMPHOCYTES # BLD: 51 % (ref 24–43)
MCH RBC QN AUTO: 27.1 PG (ref 25.2–33.5)
MCHC RBC AUTO-ENTMCNC: 31.1 G/DL (ref 28.4–34.8)
MCV RBC AUTO: 87 FL (ref 82.6–102.9)
MONOCYTES # BLD: 7 % (ref 3–12)
MUCUS: NORMAL
NITRITE, URINE: NEGATIVE
NRBC AUTOMATED: 0 PER 100 WBC
OTHER OBSERVATIONS UA: NORMAL
PDW BLD-RTO: 15.2 % (ref 11.8–14.4)
PH UA: 6 (ref 5–8)
PLATELET # BLD: 217 K/UL (ref 138–453)
PLATELET ESTIMATE: ABNORMAL
PMV BLD AUTO: 11.2 FL (ref 8.1–13.5)
POTASSIUM SERPL-SCNC: 3.9 MMOL/L (ref 3.7–5.3)
PROTEIN UA: NEGATIVE
RBC # BLD: 4.47 M/UL (ref 3.95–5.11)
RBC # BLD: ABNORMAL 10*6/UL
RBC UA: NORMAL /HPF (ref 0–4)
RENAL EPITHELIAL, UA: NORMAL /HPF
SEG NEUTROPHILS: 38 % (ref 36–65)
SEGMENTED NEUTROPHILS ABSOLUTE COUNT: 2.72 K/UL (ref 1.5–8.1)
SODIUM BLD-SCNC: 141 MMOL/L (ref 135–144)
SPECIFIC GRAVITY UA: 1.02 (ref 1–1.03)
TOTAL PROTEIN: 7.3 G/DL (ref 6.4–8.3)
TRICHOMONAS: NORMAL
TURBIDITY: CLEAR
URINE HGB: NEGATIVE
UROBILINOGEN, URINE: NORMAL
WBC # BLD: 7.2 K/UL (ref 3.5–11.3)
WBC # BLD: ABNORMAL 10*3/UL
WBC UA: NORMAL /HPF (ref 0–5)
YEAST: NORMAL

## 2018-05-03 PROCEDURE — 6360000002 HC RX W HCPCS: Performed by: EMERGENCY MEDICINE

## 2018-05-03 PROCEDURE — 99284 EMERGENCY DEPT VISIT MOD MDM: CPT

## 2018-05-03 PROCEDURE — 85025 COMPLETE CBC W/AUTO DIFF WBC: CPT

## 2018-05-03 PROCEDURE — 96374 THER/PROPH/DIAG INJ IV PUSH: CPT

## 2018-05-03 PROCEDURE — 80048 BASIC METABOLIC PNL TOTAL CA: CPT

## 2018-05-03 PROCEDURE — 74177 CT ABD & PELVIS W/CONTRAST: CPT

## 2018-05-03 PROCEDURE — 81001 URINALYSIS AUTO W/SCOPE: CPT

## 2018-05-03 PROCEDURE — 2580000003 HC RX 258: Performed by: EMERGENCY MEDICINE

## 2018-05-03 PROCEDURE — 83690 ASSAY OF LIPASE: CPT

## 2018-05-03 PROCEDURE — 80076 HEPATIC FUNCTION PANEL: CPT

## 2018-05-03 PROCEDURE — 83605 ASSAY OF LACTIC ACID: CPT

## 2018-05-03 PROCEDURE — 6360000004 HC RX CONTRAST MEDICATION: Performed by: EMERGENCY MEDICINE

## 2018-05-03 PROCEDURE — 87086 URINE CULTURE/COLONY COUNT: CPT

## 2018-05-03 RX ORDER — 0.9 % SODIUM CHLORIDE 0.9 %
1000 INTRAVENOUS SOLUTION INTRAVENOUS ONCE
Status: COMPLETED | OUTPATIENT
Start: 2018-05-03 | End: 2018-05-03

## 2018-05-03 RX ORDER — KETOROLAC TROMETHAMINE 30 MG/ML
30 INJECTION, SOLUTION INTRAMUSCULAR; INTRAVENOUS ONCE
Status: COMPLETED | OUTPATIENT
Start: 2018-05-03 | End: 2018-05-03

## 2018-05-03 RX ORDER — ONDANSETRON 2 MG/ML
4 INJECTION INTRAMUSCULAR; INTRAVENOUS ONCE
Status: DISCONTINUED | OUTPATIENT
Start: 2018-05-03 | End: 2018-05-03

## 2018-05-03 RX ADMIN — IOPAMIDOL 75 ML: 755 INJECTION, SOLUTION INTRAVENOUS at 16:40

## 2018-05-03 RX ADMIN — SODIUM CHLORIDE 1000 ML: 9 INJECTION, SOLUTION INTRAVENOUS at 15:04

## 2018-05-03 RX ADMIN — KETOROLAC TROMETHAMINE 30 MG: 30 INJECTION, SOLUTION INTRAMUSCULAR at 15:04

## 2018-05-03 ASSESSMENT — ENCOUNTER SYMPTOMS
ABDOMINAL PAIN: 1
CONSTIPATION: 0
RHINORRHEA: 0
DIARRHEA: 1
NAUSEA: 0
SHORTNESS OF BREATH: 0
SORE THROAT: 0
BLOOD IN STOOL: 0
VOMITING: 0
CHEST TIGHTNESS: 0
BACK PAIN: 0

## 2018-05-03 ASSESSMENT — PAIN DESCRIPTION - LOCATION
LOCATION: BACK
LOCATION: BACK

## 2018-05-03 ASSESSMENT — PAIN SCALES - GENERAL
PAINLEVEL_OUTOF10: 10
PAINLEVEL_OUTOF10: 5

## 2018-05-03 ASSESSMENT — PAIN DESCRIPTION - ORIENTATION
ORIENTATION: LEFT;LOWER
ORIENTATION: LOWER

## 2018-05-03 ASSESSMENT — PAIN DESCRIPTION - DESCRIPTORS: DESCRIPTORS: ACHING

## 2018-05-03 ASSESSMENT — PAIN DESCRIPTION - PAIN TYPE: TYPE: ACUTE PAIN

## 2018-05-03 ASSESSMENT — PAIN DESCRIPTION - PROGRESSION: CLINICAL_PROGRESSION: RAPIDLY IMPROVING

## 2018-05-04 ENCOUNTER — CARE COORDINATION (OUTPATIENT)
Dept: CARE COORDINATION | Age: 58
End: 2018-05-04

## 2018-05-04 LAB
CULTURE: NO GROWTH
CULTURE: NORMAL
Lab: NORMAL
SPECIMEN DESCRIPTION: NORMAL
STATUS: NORMAL

## 2018-05-08 ENCOUNTER — TELEPHONE (OUTPATIENT)
Dept: BARIATRICS/WEIGHT MGMT | Age: 58
End: 2018-05-08

## 2018-05-08 ENCOUNTER — NURSE ONLY (OUTPATIENT)
Dept: BARIATRICS/WEIGHT MGMT | Age: 58
End: 2018-05-08

## 2018-05-08 VITALS — WEIGHT: 280 LBS | BODY MASS INDEX: 51.2 KG/M2

## 2018-05-12 RX ORDER — LISINOPRIL 10 MG/1
TABLET ORAL
Qty: 30 TABLET | Refills: 3 | Status: SHIPPED | OUTPATIENT
Start: 2018-05-12 | End: 2018-12-01 | Stop reason: SDUPTHER

## 2018-05-31 ENCOUNTER — OFFICE VISIT (OUTPATIENT)
Dept: BARIATRICS/WEIGHT MGMT | Age: 58
End: 2018-05-31
Payer: MEDICARE

## 2018-05-31 VITALS
RESPIRATION RATE: 20 BRPM | DIASTOLIC BLOOD PRESSURE: 82 MMHG | WEIGHT: 274 LBS | SYSTOLIC BLOOD PRESSURE: 106 MMHG | HEART RATE: 108 BPM | HEIGHT: 62 IN | BODY MASS INDEX: 50.42 KG/M2

## 2018-05-31 DIAGNOSIS — E55.9 VITAMIN D DEFICIENCY: ICD-10-CM

## 2018-05-31 DIAGNOSIS — E78.5 HYPERLIPIDEMIA, UNSPECIFIED HYPERLIPIDEMIA TYPE: ICD-10-CM

## 2018-05-31 DIAGNOSIS — G47.33 OSA (OBSTRUCTIVE SLEEP APNEA): ICD-10-CM

## 2018-05-31 DIAGNOSIS — E11.9 CONTROLLED TYPE 2 DIABETES MELLITUS WITHOUT COMPLICATION, WITHOUT LONG-TERM CURRENT USE OF INSULIN (HCC): Primary | ICD-10-CM

## 2018-05-31 DIAGNOSIS — I10 ESSENTIAL HYPERTENSION: ICD-10-CM

## 2018-05-31 DIAGNOSIS — K21.9 GASTROESOPHAGEAL REFLUX DISEASE, ESOPHAGITIS PRESENCE NOT SPECIFIED: ICD-10-CM

## 2018-05-31 DIAGNOSIS — E66.01 MORBID OBESITY WITH BMI OF 50.0-59.9, ADULT (HCC): ICD-10-CM

## 2018-05-31 PROCEDURE — 3044F HG A1C LEVEL LT 7.0%: CPT | Performed by: NURSE PRACTITIONER

## 2018-05-31 PROCEDURE — 99213 OFFICE O/P EST LOW 20 MIN: CPT | Performed by: NURSE PRACTITIONER

## 2018-05-31 PROCEDURE — 1036F TOBACCO NON-USER: CPT | Performed by: NURSE PRACTITIONER

## 2018-05-31 PROCEDURE — G8417 CALC BMI ABV UP PARAM F/U: HCPCS | Performed by: NURSE PRACTITIONER

## 2018-05-31 PROCEDURE — 2022F DILAT RTA XM EVC RTNOPTHY: CPT | Performed by: NURSE PRACTITIONER

## 2018-05-31 PROCEDURE — G8427 DOCREV CUR MEDS BY ELIG CLIN: HCPCS | Performed by: NURSE PRACTITIONER

## 2018-05-31 PROCEDURE — 3017F COLORECTAL CA SCREEN DOC REV: CPT | Performed by: NURSE PRACTITIONER

## 2018-05-31 RX ORDER — ERGOCALCIFEROL 1.25 MG/1
50000 CAPSULE ORAL WEEKLY
Qty: 12 CAPSULE | Refills: 0 | Status: SHIPPED | OUTPATIENT
Start: 2018-05-31 | End: 2019-01-15 | Stop reason: ALTCHOICE

## 2018-12-03 RX ORDER — LISINOPRIL 10 MG/1
TABLET ORAL
Qty: 30 TABLET | Refills: 3 | Status: SHIPPED | OUTPATIENT
Start: 2018-12-03 | End: 2019-05-28 | Stop reason: SDUPTHER

## 2018-12-14 ENCOUNTER — OFFICE VISIT (OUTPATIENT)
Dept: FAMILY MEDICINE CLINIC | Age: 58
End: 2018-12-14
Payer: MEDICARE

## 2018-12-14 VITALS
WEIGHT: 271.8 LBS | TEMPERATURE: 97.1 F | DIASTOLIC BLOOD PRESSURE: 70 MMHG | HEART RATE: 108 BPM | BODY MASS INDEX: 50.02 KG/M2 | SYSTOLIC BLOOD PRESSURE: 106 MMHG | HEIGHT: 62 IN

## 2018-12-14 DIAGNOSIS — Z12.39 SCREENING FOR BREAST CANCER: ICD-10-CM

## 2018-12-14 DIAGNOSIS — E11.9 TYPE 2 DIABETES MELLITUS WITHOUT COMPLICATION, WITHOUT LONG-TERM CURRENT USE OF INSULIN (HCC): Primary | ICD-10-CM

## 2018-12-14 DIAGNOSIS — M77.12 LATERAL EPICONDYLITIS OF LEFT ELBOW: ICD-10-CM

## 2018-12-14 DIAGNOSIS — Z00.00 HEALTHCARE MAINTENANCE: ICD-10-CM

## 2018-12-14 PROCEDURE — 99214 OFFICE O/P EST MOD 30 MIN: CPT | Performed by: STUDENT IN AN ORGANIZED HEALTH CARE EDUCATION/TRAINING PROGRAM

## 2018-12-14 PROCEDURE — G8482 FLU IMMUNIZE ORDER/ADMIN: HCPCS | Performed by: STUDENT IN AN ORGANIZED HEALTH CARE EDUCATION/TRAINING PROGRAM

## 2018-12-14 PROCEDURE — 1036F TOBACCO NON-USER: CPT | Performed by: STUDENT IN AN ORGANIZED HEALTH CARE EDUCATION/TRAINING PROGRAM

## 2018-12-14 PROCEDURE — 3017F COLORECTAL CA SCREEN DOC REV: CPT | Performed by: STUDENT IN AN ORGANIZED HEALTH CARE EDUCATION/TRAINING PROGRAM

## 2018-12-14 PROCEDURE — 3044F HG A1C LEVEL LT 7.0%: CPT | Performed by: STUDENT IN AN ORGANIZED HEALTH CARE EDUCATION/TRAINING PROGRAM

## 2018-12-14 PROCEDURE — G8427 DOCREV CUR MEDS BY ELIG CLIN: HCPCS | Performed by: STUDENT IN AN ORGANIZED HEALTH CARE EDUCATION/TRAINING PROGRAM

## 2018-12-14 PROCEDURE — 2022F DILAT RTA XM EVC RTNOPTHY: CPT | Performed by: STUDENT IN AN ORGANIZED HEALTH CARE EDUCATION/TRAINING PROGRAM

## 2018-12-14 PROCEDURE — 90686 IIV4 VACC NO PRSV 0.5 ML IM: CPT | Performed by: STUDENT IN AN ORGANIZED HEALTH CARE EDUCATION/TRAINING PROGRAM

## 2018-12-14 PROCEDURE — G8417 CALC BMI ABV UP PARAM F/U: HCPCS | Performed by: STUDENT IN AN ORGANIZED HEALTH CARE EDUCATION/TRAINING PROGRAM

## 2018-12-14 RX ORDER — LEG BRACE
EACH MISCELLANEOUS
Qty: 1 EACH | Refills: 0 | Status: SHIPPED | OUTPATIENT
Start: 2018-12-14

## 2018-12-14 RX ORDER — GLIPIZIDE 5 MG/1
5 TABLET ORAL 2 TIMES DAILY
Qty: 60 TABLET | Refills: 3 | Status: SHIPPED | OUTPATIENT
Start: 2018-12-14 | End: 2020-01-30

## 2018-12-14 ASSESSMENT — ENCOUNTER SYMPTOMS
BACK PAIN: 0
SHORTNESS OF BREATH: 0
PHOTOPHOBIA: 0
CHOKING: 0
COUGH: 0
EYE PAIN: 0

## 2018-12-14 NOTE — PROGRESS NOTES
Temp 97.1 °F (36.2 °C) (Temporal)   Ht 5' 2\" (1.575 m)   Wt 271 lb 12.8 oz (123.3 kg)   BMI 49.71 kg/m²    BP Readings from Last 3 Encounters:   12/14/18 106/70   05/31/18 106/82   05/03/18 122/89     Physical Exam   Constitutional: She appears well-developed and well-nourished. No distress. Cardiovascular: Normal rate, regular rhythm and normal heart sounds. No murmur heard. Pulmonary/Chest: Effort normal and breath sounds normal. No respiratory distress. She has no wheezes. Musculoskeletal:   Left epicondylar tenderness, no erythem or swelling  ROM intact, sensation intact   Neck ROM intact no radiation of pain    Skin: She is not diaphoretic. Lab Results   Component Value Date    WBC 7.2 05/03/2018    HGB 12.1 05/03/2018    HCT 38.9 05/03/2018     05/03/2018    CHOL 148 04/18/2018    TRIG 114 04/18/2018    HDL 38 (L) 04/18/2018    ALT 14 05/03/2018    AST 20 05/03/2018     05/03/2018    K 3.9 05/03/2018     05/03/2018    CREATININE 1.05 (H) 05/03/2018    BUN 14 05/03/2018    CO2 26 05/03/2018    TSH 2.25 04/18/2018    LABA1C 6.6 (H) 04/18/2018    LABMICR 11 03/12/2018     Lab Results   Component Value Date    CALCIUM 8.7 05/03/2018    PHOS 3.1 09/12/2011     Lab Results   Component Value Date    LDLCHOLESTEROL 87 04/18/2018       Assessment:     1. Type 2 diabetes mellitus without complication, without long-term current use of insulin ([address]    2. Lateral epicondylitis of left elbow    3. Healthcare maintenance    4. Screening for breast cancer        Plan:   1. Type 2 diabetes mellitus without complication, without long-term current use of insulin ([facility])  - Last Hba1c in May 6.6  - Stopped Metformin due to side effects   - Started Glipizide (Glucotrol) 5mg twice daily   - Discussed diet and lifestyle modifications for samson loss and low sugar diet  - Comprehensive Metabolic Panel;  Future  - Hemoglobin A1C; Future  - Discussed need for Eye exam, pt to check with her insurance

## 2019-01-04 ENCOUNTER — TELEPHONE (OUTPATIENT)
Dept: BARIATRICS/WEIGHT MGMT | Age: 59
End: 2019-01-04

## 2019-01-10 RX ORDER — HYDROCHLOROTHIAZIDE 25 MG/1
TABLET ORAL
Qty: 27 TABLET | Refills: 0 | Status: SHIPPED | OUTPATIENT
Start: 2019-01-10 | End: 2019-01-15 | Stop reason: SDUPTHER

## 2019-01-12 ENCOUNTER — HOSPITAL ENCOUNTER (OUTPATIENT)
Dept: MAMMOGRAPHY | Age: 59
Discharge: HOME | End: 2019-01-14
Payer: MEDICARE

## 2019-01-12 DIAGNOSIS — Z12.39 SCREENING FOR BREAST CANCER: ICD-10-CM

## 2019-01-12 PROCEDURE — 77063 BREAST TOMOSYNTHESIS BI: CPT

## 2019-01-15 ENCOUNTER — TELEPHONE (OUTPATIENT)
Dept: ADMINISTRATIVE | Age: 59
End: 2019-01-15

## 2019-01-15 ENCOUNTER — OFFICE VISIT (OUTPATIENT)
Dept: FAMILY MEDICINE CLINIC | Age: 59
End: 2019-01-15
Payer: MEDICARE

## 2019-01-15 ENCOUNTER — HOSPITAL ENCOUNTER (OUTPATIENT)
Age: 59
Setting detail: SPECIMEN
Discharge: HOME | End: 2019-01-15
Payer: MEDICARE

## 2019-01-15 VITALS
BODY MASS INDEX: 49.38 KG/M2 | SYSTOLIC BLOOD PRESSURE: 112 MMHG | WEIGHT: 270 LBS | HEART RATE: 90 BPM | DIASTOLIC BLOOD PRESSURE: 67 MMHG | TEMPERATURE: 97 F

## 2019-01-15 DIAGNOSIS — M65.4 DE QUERVAIN'S DISEASE (TENOSYNOVITIS): ICD-10-CM

## 2019-01-15 DIAGNOSIS — E11.9 CONTROLLED TYPE 2 DIABETES MELLITUS WITHOUT COMPLICATION, WITHOUT LONG-TERM CURRENT USE OF INSULIN (HCC): ICD-10-CM

## 2019-01-15 DIAGNOSIS — E03.9 HYPOTHYROIDISM, UNSPECIFIED TYPE: ICD-10-CM

## 2019-01-15 DIAGNOSIS — N89.8 VAGINAL PRURITUS: Primary | ICD-10-CM

## 2019-01-15 DIAGNOSIS — N89.8 VAGINAL PRURITUS: ICD-10-CM

## 2019-01-15 LAB
DIRECT EXAM: NORMAL
HBA1C MFR BLD: 6.5 %
Lab: NORMAL
SPECIMEN DESCRIPTION: NORMAL
STATUS: NORMAL

## 2019-01-15 PROCEDURE — 3017F COLORECTAL CA SCREEN DOC REV: CPT | Performed by: STUDENT IN AN ORGANIZED HEALTH CARE EDUCATION/TRAINING PROGRAM

## 2019-01-15 PROCEDURE — 99214 OFFICE O/P EST MOD 30 MIN: CPT | Performed by: STUDENT IN AN ORGANIZED HEALTH CARE EDUCATION/TRAINING PROGRAM

## 2019-01-15 PROCEDURE — 3044F HG A1C LEVEL LT 7.0%: CPT | Performed by: STUDENT IN AN ORGANIZED HEALTH CARE EDUCATION/TRAINING PROGRAM

## 2019-01-15 PROCEDURE — 2022F DILAT RTA XM EVC RTNOPTHY: CPT | Performed by: STUDENT IN AN ORGANIZED HEALTH CARE EDUCATION/TRAINING PROGRAM

## 2019-01-15 PROCEDURE — 99211 OFF/OP EST MAY X REQ PHY/QHP: CPT | Performed by: STUDENT IN AN ORGANIZED HEALTH CARE EDUCATION/TRAINING PROGRAM

## 2019-01-15 PROCEDURE — G8482 FLU IMMUNIZE ORDER/ADMIN: HCPCS | Performed by: STUDENT IN AN ORGANIZED HEALTH CARE EDUCATION/TRAINING PROGRAM

## 2019-01-15 PROCEDURE — 83036 HEMOGLOBIN GLYCOSYLATED A1C: CPT | Performed by: STUDENT IN AN ORGANIZED HEALTH CARE EDUCATION/TRAINING PROGRAM

## 2019-01-15 PROCEDURE — 1036F TOBACCO NON-USER: CPT | Performed by: STUDENT IN AN ORGANIZED HEALTH CARE EDUCATION/TRAINING PROGRAM

## 2019-01-15 PROCEDURE — G8427 DOCREV CUR MEDS BY ELIG CLIN: HCPCS | Performed by: STUDENT IN AN ORGANIZED HEALTH CARE EDUCATION/TRAINING PROGRAM

## 2019-01-15 PROCEDURE — G8417 CALC BMI ABV UP PARAM F/U: HCPCS | Performed by: STUDENT IN AN ORGANIZED HEALTH CARE EDUCATION/TRAINING PROGRAM

## 2019-01-15 RX ORDER — FLUCONAZOLE 150 MG/1
150 TABLET ORAL ONCE
Qty: 1 TABLET | Refills: 0 | Status: SHIPPED | OUTPATIENT
Start: 2019-01-15 | End: 2019-01-15

## 2019-01-15 RX ORDER — HYDROCHLOROTHIAZIDE 25 MG/1
TABLET ORAL
Qty: 30 TABLET | Refills: 3 | Status: SHIPPED | OUTPATIENT
Start: 2019-01-15 | End: 2019-09-19 | Stop reason: SDUPTHER

## 2019-01-15 RX ORDER — ESTRADIOL 0.1 MG/G
1 CREAM VAGINAL DAILY
Qty: 1 TUBE | Refills: 3 | Status: CANCELLED | OUTPATIENT
Start: 2019-01-15

## 2019-01-15 ASSESSMENT — ENCOUNTER SYMPTOMS: SHORTNESS OF BREATH: 0

## 2019-01-16 ENCOUNTER — OFFICE VISIT (OUTPATIENT)
Dept: BARIATRICS/WEIGHT MGMT | Age: 59
End: 2019-01-16
Payer: MEDICARE

## 2019-01-16 VITALS
SYSTOLIC BLOOD PRESSURE: 122 MMHG | HEIGHT: 62 IN | RESPIRATION RATE: 22 BRPM | DIASTOLIC BLOOD PRESSURE: 78 MMHG | HEART RATE: 80 BPM | BODY MASS INDEX: 50.05 KG/M2 | WEIGHT: 272 LBS

## 2019-01-16 DIAGNOSIS — E78.5 HYPERLIPIDEMIA WITH TARGET LDL LESS THAN 100: ICD-10-CM

## 2019-01-16 DIAGNOSIS — G47.33 OSA (OBSTRUCTIVE SLEEP APNEA): ICD-10-CM

## 2019-01-16 DIAGNOSIS — K57.30 DIVERTICULA, COLON: ICD-10-CM

## 2019-01-16 DIAGNOSIS — E11.9 CONTROLLED TYPE 2 DIABETES MELLITUS WITHOUT COMPLICATION, WITHOUT LONG-TERM CURRENT USE OF INSULIN (HCC): Primary | ICD-10-CM

## 2019-01-16 DIAGNOSIS — I10 ESSENTIAL HYPERTENSION: ICD-10-CM

## 2019-01-16 DIAGNOSIS — E66.01 MORBID OBESITY WITH BODY MASS INDEX (BMI) OF 40.0 TO 49.9 (HCC): ICD-10-CM

## 2019-01-16 DIAGNOSIS — K21.9 GASTROESOPHAGEAL REFLUX DISEASE, ESOPHAGITIS PRESENCE NOT SPECIFIED: ICD-10-CM

## 2019-01-16 PROCEDURE — G8417 CALC BMI ABV UP PARAM F/U: HCPCS | Performed by: NURSE PRACTITIONER

## 2019-01-16 PROCEDURE — 3017F COLORECTAL CA SCREEN DOC REV: CPT | Performed by: NURSE PRACTITIONER

## 2019-01-16 PROCEDURE — G8427 DOCREV CUR MEDS BY ELIG CLIN: HCPCS | Performed by: NURSE PRACTITIONER

## 2019-01-16 PROCEDURE — 1036F TOBACCO NON-USER: CPT | Performed by: NURSE PRACTITIONER

## 2019-01-16 PROCEDURE — 3044F HG A1C LEVEL LT 7.0%: CPT | Performed by: NURSE PRACTITIONER

## 2019-01-16 PROCEDURE — 2022F DILAT RTA XM EVC RTNOPTHY: CPT | Performed by: NURSE PRACTITIONER

## 2019-01-16 PROCEDURE — G8482 FLU IMMUNIZE ORDER/ADMIN: HCPCS | Performed by: NURSE PRACTITIONER

## 2019-01-16 PROCEDURE — 99213 OFFICE O/P EST LOW 20 MIN: CPT | Performed by: NURSE PRACTITIONER

## 2019-02-26 ENCOUNTER — OFFICE VISIT (OUTPATIENT)
Dept: BARIATRICS/WEIGHT MGMT | Age: 59
End: 2019-02-26
Payer: MEDICARE

## 2019-02-26 VITALS
BODY MASS INDEX: 47.91 KG/M2 | HEIGHT: 63 IN | DIASTOLIC BLOOD PRESSURE: 70 MMHG | SYSTOLIC BLOOD PRESSURE: 118 MMHG | WEIGHT: 270.4 LBS

## 2019-02-26 DIAGNOSIS — G47.33 OSA (OBSTRUCTIVE SLEEP APNEA): Primary | ICD-10-CM

## 2019-02-26 DIAGNOSIS — E11.9 CONTROLLED TYPE 2 DIABETES MELLITUS WITHOUT COMPLICATION, WITHOUT LONG-TERM CURRENT USE OF INSULIN (HCC): ICD-10-CM

## 2019-02-26 PROCEDURE — 1036F TOBACCO NON-USER: CPT | Performed by: SURGERY

## 2019-02-26 PROCEDURE — G8427 DOCREV CUR MEDS BY ELIG CLIN: HCPCS | Performed by: SURGERY

## 2019-02-26 PROCEDURE — 3044F HG A1C LEVEL LT 7.0%: CPT | Performed by: SURGERY

## 2019-02-26 PROCEDURE — 99213 OFFICE O/P EST LOW 20 MIN: CPT | Performed by: SURGERY

## 2019-02-26 PROCEDURE — 3017F COLORECTAL CA SCREEN DOC REV: CPT | Performed by: SURGERY

## 2019-02-26 PROCEDURE — G8417 CALC BMI ABV UP PARAM F/U: HCPCS | Performed by: SURGERY

## 2019-02-26 PROCEDURE — 2022F DILAT RTA XM EVC RTNOPTHY: CPT | Performed by: SURGERY

## 2019-02-26 PROCEDURE — G8482 FLU IMMUNIZE ORDER/ADMIN: HCPCS | Performed by: SURGERY

## 2019-03-04 ENCOUNTER — TELEPHONE (OUTPATIENT)
Dept: BARIATRICS/WEIGHT MGMT | Age: 59
End: 2019-03-04

## 2019-03-12 ENCOUNTER — OFFICE VISIT (OUTPATIENT)
Dept: PULMONOLOGY | Age: 59
End: 2019-03-12
Payer: MEDICARE

## 2019-03-12 VITALS
HEART RATE: 78 BPM | HEIGHT: 63 IN | BODY MASS INDEX: 49.08 KG/M2 | SYSTOLIC BLOOD PRESSURE: 120 MMHG | OXYGEN SATURATION: 98 % | RESPIRATION RATE: 12 BRPM | WEIGHT: 277 LBS | DIASTOLIC BLOOD PRESSURE: 82 MMHG

## 2019-03-12 DIAGNOSIS — Z99.89 OSA ON CPAP: Primary | ICD-10-CM

## 2019-03-12 DIAGNOSIS — E66.01 MORBID OBESITY WITH BMI OF 45.0-49.9, ADULT (HCC): ICD-10-CM

## 2019-03-12 DIAGNOSIS — G47.33 OSA ON CPAP: Primary | ICD-10-CM

## 2019-03-12 PROCEDURE — G8417 CALC BMI ABV UP PARAM F/U: HCPCS | Performed by: INTERNAL MEDICINE

## 2019-03-12 PROCEDURE — G8427 DOCREV CUR MEDS BY ELIG CLIN: HCPCS | Performed by: INTERNAL MEDICINE

## 2019-03-12 PROCEDURE — G8482 FLU IMMUNIZE ORDER/ADMIN: HCPCS | Performed by: INTERNAL MEDICINE

## 2019-03-12 PROCEDURE — 99214 OFFICE O/P EST MOD 30 MIN: CPT | Performed by: INTERNAL MEDICINE

## 2019-03-12 PROCEDURE — 1036F TOBACCO NON-USER: CPT | Performed by: INTERNAL MEDICINE

## 2019-03-12 PROCEDURE — 3017F COLORECTAL CA SCREEN DOC REV: CPT | Performed by: INTERNAL MEDICINE

## 2019-03-12 ASSESSMENT — SLEEP AND FATIGUE QUESTIONNAIRES
HOW LIKELY ARE YOU TO NOD OFF OR FALL ASLEEP IN A CAR, WHILE STOPPED FOR A FEW MINUTES IN TRAFFIC: 0
HOW LIKELY ARE YOU TO NOD OFF OR FALL ASLEEP WHILE LYING DOWN TO REST IN THE AFTERNOON WHEN CIRCUMSTANCES PERMIT: 3
HOW LIKELY ARE YOU TO NOD OFF OR FALL ASLEEP WHILE SITTING AND TALKING TO SOMEONE: 0
HOW LIKELY ARE YOU TO NOD OFF OR FALL ASLEEP WHILE SITTING AND READING: 1
HOW LIKELY ARE YOU TO NOD OFF OR FALL ASLEEP WHILE SITTING INACTIVE IN A PUBLIC PLACE: 1
ESS TOTAL SCORE: 10
HOW LIKELY ARE YOU TO NOD OFF OR FALL ASLEEP WHEN YOU ARE A PASSENGER IN A CAR FOR AN HOUR WITHOUT A BREAK: 2
HOW LIKELY ARE YOU TO NOD OFF OR FALL ASLEEP WHILE WATCHING TV: 2
HOW LIKELY ARE YOU TO NOD OFF OR FALL ASLEEP WHILE SITTING QUIETLY AFTER LUNCH WITHOUT ALCOHOL: 1

## 2019-04-15 DIAGNOSIS — E11.9 TYPE 2 DIABETES MELLITUS WITHOUT COMPLICATION, WITHOUT LONG-TERM CURRENT USE OF INSULIN (HCC): ICD-10-CM

## 2019-04-15 NOTE — TELEPHONE ENCOUNTER
Next Visit Date:  Future Appointments   Date Time Provider Rico Ayala   3/10/2020  9:00 [address],  [address] Maintenance   Topic Date Due    Diabetic retinal exam  10/24/1972    Hepatitis B Vaccine (1 of 3 - Risk 3-dose series) 10/24/1981    Cervical cancer screen  10/24/1983    Diabetic foot exam  12/16/2017    Diabetic microalbuminuria test  03/12/2019    Lipid screen  04/18/2019    Shingles Vaccine (1 of 2) 01/15/2020 (Originally 10/24/2012)    Potassium monitoring  05/03/2019    Creatinine monitoring  05/03/2019    A1C test (Diabetic or Prediabetic)  01/15/2020    Colon cancer screen colonoscopy  04/06/2020    Breast cancer screen  01/12/2021    DTaP/Tdap/Td vaccine (2 - Td) 03/07/2024    Flu vaccine  Completed    Pneumococcal 0-64 years Vaccine  Completed    Hepatitis C screen  Completed    HIV screen  Completed       Hemoglobin A1C (%)   Date Value   01/15/2019 6.5   04/18/2018 6.6 (H)   05/09/2017 6.6 (H)             ( goal A1C is < 7)   Microalb/Crt.  Ratio (mcg/mg creat)   Date Value   03/12/2018 11     LDL Cholesterol (mg/dL)   Date Value   04/18/2018 87   05/09/2017 112       (goal LDL is <100)   AST (U/L)   Date Value   05/03/2018 20     ALT (U/L)   Date Value   05/03/2018 14     BUN (mg/dL)   Date Value   05/03/2018 14     BP Readings from Last 3 Encounters:   03/12/19 120/82   02/26/19 118/70   01/16/19 122/78          (goal 120/80)    All Future Testing planned in [facility]  Lab Frequency Next Occurrence   CT ABDOMEN PELVIS WO CONTRAST Additional Contrast? None Once 05/02/2019   Comprehensive Metabolic Panel Once 66/39/2183   Hemoglobin A1C Once 12/14/2019               Patient Active Problem List:     Gastroesophageal reflux disease     Hyperlipidemia with target LDL less than 100     HTN (hypertension)     Numbness and tingling     Diverticula, colon     Diverticulitis, acute     Eczema     Lump of left breast     Shoulder pain     Right groin pain     Tinea corporis     Hyperlipidemia     Controlled type 2 diabetes mellitus without complication, without long-term current use of insulin ([facility])     Morbid obesity with body mass index (BMI) of 40.0 to 49.9 ([facility])     SINDI (obstructive sleep apnea)     Vitamin D deficiency         +

## 2019-05-23 ENCOUNTER — TELEPHONE (OUTPATIENT)
Dept: FAMILY MEDICINE CLINIC | Age: 59
End: 2019-05-23

## 2019-05-28 RX ORDER — LISINOPRIL 10 MG/1
TABLET ORAL
Qty: 30 TABLET | Refills: 3 | Status: SHIPPED | OUTPATIENT
Start: 2019-05-28 | End: 2020-01-16

## 2019-05-28 NOTE — TELEPHONE ENCOUNTER
Last visit:   Last Med refill:   Does patient have enough medication for 72 hours: Yes    Next Visit Date:  Future Appointments   Date Time Provider Rico Thompsoni   6/27/2019  3:15 PM MD Porsha Silva  [facility]   3/10/2020  9:00 AM Edgardo Valdivia Mc,  [address] Maintenance   Topic Date Due    Diabetic retinal exam  10/24/1972    Hepatitis B Vaccine (1 of 3 - Risk 3-dose series) 10/24/1981    Cervical cancer screen  10/24/1983    Diabetic foot exam  12/16/2017    Diabetic microalbuminuria test  03/12/2019    Lipid screen  04/18/2019    Potassium monitoring  05/03/2019    Creatinine monitoring  05/03/2019    Shingles Vaccine (1 of 2) 01/15/2020 (Originally 10/24/2012)    A1C test (Diabetic or Prediabetic)  01/15/2020    Colon cancer screen colonoscopy  04/06/2020    Breast cancer screen  01/12/2021    DTaP/Tdap/Td vaccine (2 - Td) 03/07/2024    Flu vaccine  Completed    Pneumococcal 0-64 years Vaccine  Completed    Hepatitis C screen  Completed    HIV screen  Completed       Hemoglobin A1C (%)   Date Value   01/15/2019 6.5   04/18/2018 6.6 (H)   05/09/2017 6.6 (H)             ( goal A1C is < 7)   Microalb/Crt.  Ratio (mcg/mg creat)   Date Value   03/12/2018 11     LDL Cholesterol (mg/dL)   Date Value   04/18/2018 87   05/09/2017 112       (goal LDL is <100)   AST (U/L)   Date Value   05/03/2018 20     ALT (U/L)   Date Value   05/03/2018 14     BUN (mg/dL)   Date Value   05/03/2018 14     BP Readings from Last 3 Encounters:   03/12/19 120/82   02/26/19 118/70   01/16/19 122/78          (goal 120/80)    All Future Testing planned in [facility]  Lab Frequency Next Occurrence   Comprehensive Metabolic Panel Once 91/64/4155   Hemoglobin A1C Once 12/14/2019               Patient Active Problem List:     Gastroesophageal reflux disease     Hyperlipidemia with target LDL less than 100     HTN (hypertension)     Numbness and tingling     Diverticula, colon     Diverticulitis, acute

## 2019-06-10 ENCOUNTER — HOSPITAL ENCOUNTER (OUTPATIENT)
Age: 59
Discharge: HOME | End: 2019-06-10
Payer: MEDICARE

## 2019-06-10 LAB — RUBV IGG SER QL: 37.7 IU/ML

## 2019-06-10 PROCEDURE — 86762 RUBELLA ANTIBODY: CPT

## 2019-06-10 PROCEDURE — 36415 COLL VENOUS BLD VENIPUNCTURE: CPT

## 2019-06-10 PROCEDURE — 86735 MUMPS ANTIBODY: CPT

## 2019-06-10 PROCEDURE — 86481 TB AG RESPONSE T-CELL SUSP: CPT

## 2019-06-10 PROCEDURE — 86765 RUBEOLA ANTIBODY: CPT

## 2019-06-10 PROCEDURE — 86787 VARICELLA-ZOSTER ANTIBODY: CPT

## 2019-06-12 LAB
MEASLES IMMUNE (IGG): 7.4
MUV IGG SER QL: 8.85
VZV IGG SER QL IA: 0.79

## 2019-06-16 LAB — T-SPOT TB TEST: NORMAL

## 2019-06-24 DIAGNOSIS — E11.40 TYPE 2 DIABETES MELLITUS WITH DIABETIC NEUROPATHY, WITHOUT LONG-TERM CURRENT USE OF INSULIN (HCC): ICD-10-CM

## 2019-06-25 NOTE — TELEPHONE ENCOUNTER
Last visit: 01/15/2019  Last Med refill:   Does patient have enough medication for 72 hours:     Next Visit Date:  Future Appointments   Date Time Provider Rico Ayala   3/10/2020  9:00 [address],  [address] Maintenance   Topic Date Due    Diabetic retinal exam  10/24/1972    Hepatitis B Vaccine (1 of 3 - Risk 3-dose series) 10/24/1981    Cervical cancer screen  10/24/1983    Diabetic foot exam  12/16/2017    Diabetic microalbuminuria test  03/12/2019    Lipid screen  04/18/2019    Potassium monitoring  05/03/2019    Creatinine monitoring  05/03/2019    Shingles Vaccine (1 of 2) 01/15/2020 (Originally 10/24/2012)    A1C test (Diabetic or Prediabetic)  01/15/2020    Colon cancer screen colonoscopy  04/06/2020    Breast cancer screen  01/12/2021    DTaP/Tdap/Td vaccine (2 - Td) 03/07/2024    Flu vaccine  Completed    Pneumococcal 0-64 years Vaccine  Completed    Hepatitis C screen  Completed    HIV screen  Completed       Hemoglobin A1C (%)   Date Value   01/15/2019 6.5   04/18/2018 6.6 (H)   05/09/2017 6.6 (H)             ( goal A1C is < 7)   Microalb/Crt.  Ratio (mcg/mg creat)   Date Value   03/12/2018 11     LDL Cholesterol (mg/dL)   Date Value   04/18/2018 87   05/09/2017 112       (goal LDL is <100)   AST (U/L)   Date Value   05/03/2018 20     ALT (U/L)   Date Value   05/03/2018 14     BUN (mg/dL)   Date Value   05/03/2018 14     BP Readings from Last 3 Encounters:   03/12/19 120/82   02/26/19 118/70   01/16/19 122/78          (goal 120/80)    All Future Testing planned in [facility]  Lab Frequency Next Occurrence   Comprehensive Metabolic Panel Once 03/58/6883   Hemoglobin A1C Once 12/14/2019               Patient Active Problem List:     Gastroesophageal reflux disease     Hyperlipidemia with target LDL less than 100     HTN (hypertension)     Numbness and tingling     Diverticula, colon     Diverticulitis, acute     Eczema     Lump of left breast     Shoulder pain     Right groin pain     Tinea corporis     Hyperlipidemia     Controlled type 2 diabetes mellitus without complication, without long-term current use of insulin ([facility])     Morbid obesity with body mass index (BMI) of 40.0 to 49.9 ([facility])     SINDI (obstructive sleep apnea)     Vitamin D deficiency

## 2019-06-26 RX ORDER — ACETAMINOPHEN/DIPHENHYDRAMINE 500MG-25MG
TABLET ORAL
Qty: 30 TABLET | Refills: 11 | Status: ON HOLD | OUTPATIENT
Start: 2019-06-26 | End: 2019-10-19 | Stop reason: HOSPADM

## 2019-08-19 ENCOUNTER — APPOINTMENT (OUTPATIENT)
Dept: GENERAL RADIOLOGY | Age: 59
End: 2019-08-19

## 2019-08-19 ENCOUNTER — HOSPITAL ENCOUNTER (EMERGENCY)
Age: 59
Discharge: HOME | End: 2019-08-19
Attending: EMERGENCY MEDICINE

## 2019-08-19 VITALS
RESPIRATION RATE: 18 BRPM | TEMPERATURE: 98.7 F | DIASTOLIC BLOOD PRESSURE: 78 MMHG | OXYGEN SATURATION: 97 % | WEIGHT: 271 LBS | BODY MASS INDEX: 49.87 KG/M2 | HEART RATE: 84 BPM | HEIGHT: 62 IN | SYSTOLIC BLOOD PRESSURE: 115 MMHG

## 2019-08-19 DIAGNOSIS — R07.89 MUSCULAR CHEST PAIN: Primary | ICD-10-CM

## 2019-08-19 LAB
ABSOLUTE EOS #: 0.24 K/UL (ref 0–0.44)
ABSOLUTE IMMATURE GRANULOCYTE: 0.03 K/UL (ref 0–0.3)
ABSOLUTE LYMPH #: 3.79 K/UL (ref 1.1–3.7)
ABSOLUTE MONO #: 0.58 K/UL (ref 0.1–1.2)
ANION GAP SERPL CALCULATED.3IONS-SCNC: 12 MMOL/L (ref 9–17)
BASOPHILS # BLD: 0 % (ref 0–2)
BASOPHILS ABSOLUTE: 0.03 K/UL (ref 0–0.2)
BUN BLDV-MCNC: 18 MG/DL (ref 6–20)
BUN/CREAT BLD: ABNORMAL (ref 9–20)
CALCIUM SERPL-MCNC: 8.9 MG/DL (ref 8.6–10.4)
CHLORIDE BLD-SCNC: 102 MMOL/L (ref 98–107)
CO2: 25 MMOL/L (ref 20–31)
CREAT SERPL-MCNC: 0.95 MG/DL (ref 0.5–0.9)
DIFFERENTIAL TYPE: ABNORMAL
EOSINOPHILS RELATIVE PERCENT: 3 % (ref 1–4)
GFR AFRICAN AMERICAN: >60 ML/MIN
GFR NON-AFRICAN AMERICAN: >60 ML/MIN
GFR SERPL CREATININE-BSD FRML MDRD: ABNORMAL ML/MIN/{1.73_M2}
GFR SERPL CREATININE-BSD FRML MDRD: ABNORMAL ML/MIN/{1.73_M2}
GLUCOSE BLD-MCNC: 97 MG/DL (ref 70–99)
HCT VFR BLD CALC: 39.7 % (ref 36.3–47.1)
HEMOGLOBIN: 12.4 G/DL (ref 11.9–15.1)
IMMATURE GRANULOCYTES: 0 %
LYMPHOCYTES # BLD: 44 % (ref 24–43)
MCH RBC QN AUTO: 28.2 PG (ref 25.2–33.5)
MCHC RBC AUTO-ENTMCNC: 31.2 G/DL (ref 28.4–34.8)
MCV RBC AUTO: 90.4 FL (ref 82.6–102.9)
MONOCYTES # BLD: 7 % (ref 3–12)
NRBC AUTOMATED: 0 PER 100 WBC
PDW BLD-RTO: 14.5 % (ref 11.8–14.4)
PLATELET # BLD: 241 K/UL (ref 138–453)
PLATELET ESTIMATE: ABNORMAL
PMV BLD AUTO: 11.8 FL (ref 8.1–13.5)
POTASSIUM SERPL-SCNC: 3.9 MMOL/L (ref 3.7–5.3)
RBC # BLD: 4.39 M/UL (ref 3.95–5.11)
RBC # BLD: ABNORMAL 10*6/UL
SEG NEUTROPHILS: 46 % (ref 36–65)
SEGMENTED NEUTROPHILS ABSOLUTE COUNT: 3.87 K/UL (ref 1.5–8.1)
SODIUM BLD-SCNC: 139 MMOL/L (ref 135–144)
TROPONIN INTERP: NORMAL
TROPONIN T: NORMAL NG/ML
TROPONIN, HIGH SENSITIVITY: 9 NG/L (ref 0–14)
WBC # BLD: 8.5 K/UL (ref 3.5–11.3)
WBC # BLD: ABNORMAL 10*3/UL

## 2019-08-19 PROCEDURE — 93005 ELECTROCARDIOGRAM TRACING: CPT | Performed by: STUDENT IN AN ORGANIZED HEALTH CARE EDUCATION/TRAINING PROGRAM

## 2019-08-19 PROCEDURE — 84484 ASSAY OF TROPONIN QUANT: CPT

## 2019-08-19 PROCEDURE — 99285 EMERGENCY DEPT VISIT HI MDM: CPT

## 2019-08-19 PROCEDURE — 80048 BASIC METABOLIC PNL TOTAL CA: CPT

## 2019-08-19 PROCEDURE — 6370000000 HC RX 637 (ALT 250 FOR IP): Performed by: STUDENT IN AN ORGANIZED HEALTH CARE EDUCATION/TRAINING PROGRAM

## 2019-08-19 PROCEDURE — 85025 COMPLETE CBC W/AUTO DIFF WBC: CPT

## 2019-08-19 PROCEDURE — 71046 X-RAY EXAM CHEST 2 VIEWS: CPT

## 2019-08-19 RX ORDER — ACETAMINOPHEN 500 MG
1000 TABLET ORAL ONCE
Status: COMPLETED | OUTPATIENT
Start: 2019-08-19 | End: 2019-08-19

## 2019-08-19 RX ORDER — IBUPROFEN 800 MG/1
800 TABLET ORAL EVERY 6 HOURS PRN
Qty: 21 TABLET | Refills: 0 | Status: ON HOLD | OUTPATIENT
Start: 2019-08-19 | End: 2019-10-19 | Stop reason: HOSPADM

## 2019-08-19 RX ORDER — ASPIRIN 81 MG/1
324 TABLET, CHEWABLE ORAL ONCE
Status: COMPLETED | OUTPATIENT
Start: 2019-08-19 | End: 2019-08-19

## 2019-08-19 RX ADMIN — ACETAMINOPHEN 1000 MG: 500 TABLET ORAL at 19:19

## 2019-08-19 RX ADMIN — ASPIRIN 81 MG 324 MG: 81 TABLET ORAL at 19:20

## 2019-08-19 ASSESSMENT — ENCOUNTER SYMPTOMS
ABDOMINAL PAIN: 0
DIARRHEA: 0
SHORTNESS OF BREATH: 0
COUGH: 0
VOMITING: 0
WHEEZING: 0
NAUSEA: 0
BLOOD IN STOOL: 0
BACK PAIN: 0

## 2019-08-19 ASSESSMENT — PAIN DESCRIPTION - FREQUENCY: FREQUENCY: INTERMITTENT

## 2019-08-19 ASSESSMENT — PAIN DESCRIPTION - PAIN TYPE: TYPE: ACUTE PAIN

## 2019-08-19 ASSESSMENT — PAIN DESCRIPTION - PROGRESSION: CLINICAL_PROGRESSION: NOT CHANGED

## 2019-08-19 ASSESSMENT — PAIN DESCRIPTION - LOCATION: LOCATION: CHEST

## 2019-08-19 ASSESSMENT — PAIN - FUNCTIONAL ASSESSMENT: PAIN_FUNCTIONAL_ASSESSMENT: PREVENTS OR INTERFERES SOME ACTIVE ACTIVITIES AND ADLS

## 2019-08-19 ASSESSMENT — PAIN SCALES - WONG BAKER: WONGBAKER_NUMERICALRESPONSE: 0

## 2019-08-19 ASSESSMENT — PAIN SCALES - GENERAL: PAINLEVEL_OUTOF10: 8

## 2019-08-19 ASSESSMENT — HEART SCORE: ECG: 0

## 2019-08-19 ASSESSMENT — PAIN DESCRIPTION - ORIENTATION: ORIENTATION: MID

## 2019-08-19 ASSESSMENT — PAIN DESCRIPTION - ONSET: ONSET: GRADUAL

## 2019-08-19 ASSESSMENT — PAIN DESCRIPTION - DESCRIPTORS: DESCRIPTORS: ACHING

## 2019-08-20 LAB
EKG ATRIAL RATE: 92 BPM
EKG P AXIS: 27 DEGREES
EKG P-R INTERVAL: 166 MS
EKG Q-T INTERVAL: 420 MS
EKG QRS DURATION: 98 MS
EKG QTC CALCULATION (BAZETT): 519 MS
EKG R AXIS: -40 DEGREES
EKG T AXIS: 25 DEGREES
EKG VENTRICULAR RATE: 92 BPM

## 2019-09-19 DIAGNOSIS — E03.9 HYPOTHYROIDISM, UNSPECIFIED TYPE: ICD-10-CM

## 2019-09-21 RX ORDER — HYDROCHLOROTHIAZIDE 25 MG/1
TABLET ORAL
Qty: 30 TABLET | Refills: 3 | Status: SHIPPED | OUTPATIENT
Start: 2019-09-21 | End: 2020-04-09

## 2019-10-17 ENCOUNTER — HOSPITAL ENCOUNTER (INPATIENT)
Age: 59
LOS: 2 days | Discharge: HOME | DRG: 378 | End: 2019-10-19
Attending: EMERGENCY MEDICINE | Admitting: FAMILY MEDICINE

## 2019-10-17 ENCOUNTER — NURSE TRIAGE (OUTPATIENT)
Dept: OTHER | Facility: CLINIC | Age: 59
End: 2019-10-17

## 2019-10-17 DIAGNOSIS — K92.2 GASTROINTESTINAL HEMORRHAGE, UNSPECIFIED GASTROINTESTINAL HEMORRHAGE TYPE: Primary | ICD-10-CM

## 2019-10-17 PROBLEM — K92.1 MELENA: Status: ACTIVE | Noted: 2019-10-17

## 2019-10-17 LAB
ABSOLUTE EOS #: 0.22 K/UL (ref 0–0.44)
ABSOLUTE IMMATURE GRANULOCYTE: <0.03 K/UL (ref 0–0.3)
ABSOLUTE LYMPH #: 3.06 K/UL (ref 1.1–3.7)
ABSOLUTE MONO #: 0.5 K/UL (ref 0.1–1.2)
ALBUMIN SERPL-MCNC: 3.6 G/DL (ref 3.5–5.2)
ALBUMIN/GLOBULIN RATIO: 1 (ref 1–2.5)
ALP BLD-CCNC: 72 U/L (ref 35–104)
ALT SERPL-CCNC: 15 U/L (ref 5–33)
ANION GAP SERPL CALCULATED.3IONS-SCNC: 8 MMOL/L (ref 9–17)
AST SERPL-CCNC: 19 U/L
BASOPHILS # BLD: 0 % (ref 0–2)
BASOPHILS ABSOLUTE: 0.03 K/UL (ref 0–0.2)
BILIRUB SERPL-MCNC: 0.16 MG/DL (ref 0.3–1.2)
BUN BLDV-MCNC: 23 MG/DL (ref 6–20)
BUN/CREAT BLD: ABNORMAL (ref 9–20)
CALCIUM SERPL-MCNC: 9.1 MG/DL (ref 8.6–10.4)
CHLORIDE BLD-SCNC: 106 MMOL/L (ref 98–107)
CO2: 24 MMOL/L (ref 20–31)
CREAT SERPL-MCNC: 0.85 MG/DL (ref 0.5–0.9)
DIFFERENTIAL TYPE: ABNORMAL
EOSINOPHILS RELATIVE PERCENT: 3 % (ref 1–4)
GFR AFRICAN AMERICAN: >60 ML/MIN
GFR NON-AFRICAN AMERICAN: >60 ML/MIN
GFR SERPL CREATININE-BSD FRML MDRD: ABNORMAL ML/MIN/{1.73_M2}
GFR SERPL CREATININE-BSD FRML MDRD: ABNORMAL ML/MIN/{1.73_M2}
GLUCOSE BLD-MCNC: 108 MG/DL (ref 70–99)
GLUCOSE BLD-MCNC: 118 MG/DL (ref 65–105)
GLUCOSE BLD-MCNC: 84 MG/DL (ref 65–105)
HCT VFR BLD CALC: 33.6 % (ref 36.3–47.1)
HEMOGLOBIN: 10.5 G/DL (ref 11.9–15.1)
IMMATURE GRANULOCYTES: 0 %
INR BLD: 1
LYMPHOCYTES # BLD: 38 % (ref 24–43)
MCH RBC QN AUTO: 28.1 PG (ref 25.2–33.5)
MCHC RBC AUTO-ENTMCNC: 31.3 G/DL (ref 28.4–34.8)
MCV RBC AUTO: 89.8 FL (ref 82.6–102.9)
MONOCYTES # BLD: 6 % (ref 3–12)
NRBC AUTOMATED: 0 PER 100 WBC
PARTIAL THROMBOPLASTIN TIME: 22.9 SEC (ref 20.5–30.5)
PDW BLD-RTO: 14.8 % (ref 11.8–14.4)
PLATELET # BLD: 226 K/UL (ref 138–453)
PLATELET ESTIMATE: ABNORMAL
PMV BLD AUTO: 11.3 FL (ref 8.1–13.5)
POTASSIUM SERPL-SCNC: 4.3 MMOL/L (ref 3.7–5.3)
PROTHROMBIN TIME: 10.9 SEC (ref 9–12)
RBC # BLD: 3.74 M/UL (ref 3.95–5.11)
RBC # BLD: ABNORMAL 10*6/UL
SEG NEUTROPHILS: 53 % (ref 36–65)
SEGMENTED NEUTROPHILS ABSOLUTE COUNT: 4.25 K/UL (ref 1.5–8.1)
SODIUM BLD-SCNC: 138 MMOL/L (ref 135–144)
TOTAL PROTEIN: 7.2 G/DL (ref 6.4–8.3)
WBC # BLD: 8.1 K/UL (ref 3.5–11.3)
WBC # BLD: ABNORMAL 10*3/UL

## 2019-10-17 PROCEDURE — 99284 EMERGENCY DEPT VISIT MOD MDM: CPT

## 2019-10-17 PROCEDURE — 96374 THER/PROPH/DIAG INJ IV PUSH: CPT

## 2019-10-17 PROCEDURE — C9113 INJ PANTOPRAZOLE SODIUM, VIA: HCPCS | Performed by: STUDENT IN AN ORGANIZED HEALTH CARE EDUCATION/TRAINING PROGRAM

## 2019-10-17 PROCEDURE — 85610 PROTHROMBIN TIME: CPT

## 2019-10-17 PROCEDURE — C9113 INJ PANTOPRAZOLE SODIUM, VIA: HCPCS | Performed by: FAMILY MEDICINE

## 2019-10-17 PROCEDURE — 6360000002 HC RX W HCPCS: Performed by: STUDENT IN AN ORGANIZED HEALTH CARE EDUCATION/TRAINING PROGRAM

## 2019-10-17 PROCEDURE — 2580000003 HC RX 258: Performed by: FAMILY MEDICINE

## 2019-10-17 PROCEDURE — 82947 ASSAY GLUCOSE BLOOD QUANT: CPT

## 2019-10-17 PROCEDURE — 85730 THROMBOPLASTIN TIME PARTIAL: CPT

## 2019-10-17 PROCEDURE — 85025 COMPLETE CBC W/AUTO DIFF WBC: CPT

## 2019-10-17 PROCEDURE — C9113 INJ PANTOPRAZOLE SODIUM, VIA: HCPCS | Performed by: INTERNAL MEDICINE

## 2019-10-17 PROCEDURE — 2580000003 HC RX 258: Performed by: STUDENT IN AN ORGANIZED HEALTH CARE EDUCATION/TRAINING PROGRAM

## 2019-10-17 PROCEDURE — 6360000002 HC RX W HCPCS: Performed by: INTERNAL MEDICINE

## 2019-10-17 PROCEDURE — 85018 HEMOGLOBIN: CPT

## 2019-10-17 PROCEDURE — 6360000002 HC RX W HCPCS: Performed by: FAMILY MEDICINE

## 2019-10-17 PROCEDURE — 2580000003 HC RX 258: Performed by: INTERNAL MEDICINE

## 2019-10-17 PROCEDURE — 99254 IP/OBS CNSLTJ NEW/EST MOD 60: CPT | Performed by: INTERNAL MEDICINE

## 2019-10-17 PROCEDURE — 80053 COMPREHEN METABOLIC PANEL: CPT

## 2019-10-17 PROCEDURE — 6370000000 HC RX 637 (ALT 250 FOR IP): Performed by: INTERNAL MEDICINE

## 2019-10-17 PROCEDURE — 2060000000 HC ICU INTERMEDIATE R&B

## 2019-10-17 PROCEDURE — 99223 1ST HOSP IP/OBS HIGH 75: CPT | Performed by: FAMILY MEDICINE

## 2019-10-17 RX ORDER — MORPHINE SULFATE 2 MG/ML
2 INJECTION, SOLUTION INTRAMUSCULAR; INTRAVENOUS
Status: DISCONTINUED | OUTPATIENT
Start: 2019-10-17 | End: 2019-10-19 | Stop reason: HOSPADM

## 2019-10-17 RX ORDER — DEXTROSE MONOHYDRATE 50 MG/ML
100 INJECTION, SOLUTION INTRAVENOUS PRN
Status: DISCONTINUED | OUTPATIENT
Start: 2019-10-17 | End: 2019-10-19 | Stop reason: HOSPADM

## 2019-10-17 RX ORDER — NICOTINE POLACRILEX 4 MG
15 LOZENGE BUCCAL PRN
Status: DISCONTINUED | OUTPATIENT
Start: 2019-10-17 | End: 2019-10-19 | Stop reason: HOSPADM

## 2019-10-17 RX ORDER — HYDROCODONE BITARTRATE AND ACETAMINOPHEN 5; 325 MG/1; MG/1
2 TABLET ORAL EVERY 4 HOURS PRN
Status: DISCONTINUED | OUTPATIENT
Start: 2019-10-17 | End: 2019-10-19 | Stop reason: HOSPADM

## 2019-10-17 RX ORDER — ACETAMINOPHEN 325 MG/1
650 TABLET ORAL EVERY 4 HOURS PRN
Status: DISCONTINUED | OUTPATIENT
Start: 2019-10-17 | End: 2019-10-19 | Stop reason: HOSPADM

## 2019-10-17 RX ORDER — SODIUM CHLORIDE 0.9 % (FLUSH) 0.9 %
10 SYRINGE (ML) INJECTION PRN
Status: DISCONTINUED | OUTPATIENT
Start: 2019-10-17 | End: 2019-10-19 | Stop reason: HOSPADM

## 2019-10-17 RX ORDER — 0.9 % SODIUM CHLORIDE 0.9 %
10 VIAL (ML) INJECTION EVERY 12 HOURS
Status: DISCONTINUED | OUTPATIENT
Start: 2019-10-17 | End: 2019-10-19 | Stop reason: HOSPADM

## 2019-10-17 RX ORDER — SODIUM CHLORIDE 9 MG/ML
INJECTION, SOLUTION INTRAVENOUS CONTINUOUS
Status: DISCONTINUED | OUTPATIENT
Start: 2019-10-17 | End: 2019-10-19 | Stop reason: HOSPADM

## 2019-10-17 RX ORDER — PANTOPRAZOLE SODIUM 40 MG/10ML
40 INJECTION, POWDER, LYOPHILIZED, FOR SOLUTION INTRAVENOUS ONCE
Status: COMPLETED | OUTPATIENT
Start: 2019-10-17 | End: 2019-10-17

## 2019-10-17 RX ORDER — ONDANSETRON 2 MG/ML
4 INJECTION INTRAMUSCULAR; INTRAVENOUS EVERY 6 HOURS PRN
Status: DISCONTINUED | OUTPATIENT
Start: 2019-10-17 | End: 2019-10-19 | Stop reason: HOSPADM

## 2019-10-17 RX ORDER — 0.9 % SODIUM CHLORIDE 0.9 %
1000 INTRAVENOUS SOLUTION INTRAVENOUS ONCE
Status: COMPLETED | OUTPATIENT
Start: 2019-10-17 | End: 2019-10-17

## 2019-10-17 RX ORDER — 0.9 % SODIUM CHLORIDE 0.9 %
10 VIAL (ML) INJECTION ONCE
Status: COMPLETED | OUTPATIENT
Start: 2019-10-17 | End: 2019-10-17

## 2019-10-17 RX ORDER — MORPHINE SULFATE 4 MG/ML
4 INJECTION, SOLUTION INTRAMUSCULAR; INTRAVENOUS
Status: DISCONTINUED | OUTPATIENT
Start: 2019-10-17 | End: 2019-10-19 | Stop reason: HOSPADM

## 2019-10-17 RX ORDER — PANTOPRAZOLE SODIUM 40 MG/10ML
40 INJECTION, POWDER, LYOPHILIZED, FOR SOLUTION INTRAVENOUS EVERY 12 HOURS
Status: DISCONTINUED | OUTPATIENT
Start: 2019-10-17 | End: 2019-10-17

## 2019-10-17 RX ORDER — HYDROCODONE BITARTRATE AND ACETAMINOPHEN 5; 325 MG/1; MG/1
1 TABLET ORAL EVERY 4 HOURS PRN
Status: DISCONTINUED | OUTPATIENT
Start: 2019-10-17 | End: 2019-10-19 | Stop reason: HOSPADM

## 2019-10-17 RX ORDER — DEXTROSE MONOHYDRATE 25 G/50ML
12.5 INJECTION, SOLUTION INTRAVENOUS PRN
Status: DISCONTINUED | OUTPATIENT
Start: 2019-10-17 | End: 2019-10-19 | Stop reason: HOSPADM

## 2019-10-17 RX ORDER — SODIUM CHLORIDE 0.9 % (FLUSH) 0.9 %
10 SYRINGE (ML) INJECTION EVERY 12 HOURS SCHEDULED
Status: DISCONTINUED | OUTPATIENT
Start: 2019-10-17 | End: 2019-10-19 | Stop reason: HOSPADM

## 2019-10-17 RX ADMIN — Medication 10 ML: at 16:59

## 2019-10-17 RX ADMIN — PANTOPRAZOLE SODIUM 40 MG: 40 INJECTION, POWDER, FOR SOLUTION INTRAVENOUS at 10:34

## 2019-10-17 RX ADMIN — SODIUM CHLORIDE 10 ML: 9 INJECTION, SOLUTION INTRAMUSCULAR; INTRAVENOUS; SUBCUTANEOUS at 10:34

## 2019-10-17 RX ADMIN — SODIUM CHLORIDE: 9 INJECTION, SOLUTION INTRAVENOUS at 23:45

## 2019-10-17 RX ADMIN — PANTOPRAZOLE SODIUM 40 MG: 40 INJECTION, POWDER, FOR SOLUTION INTRAVENOUS at 16:58

## 2019-10-17 RX ADMIN — SODIUM CHLORIDE 8 MG/HR: 9 INJECTION, SOLUTION INTRAVENOUS at 18:22

## 2019-10-17 RX ADMIN — SODIUM CHLORIDE 1000 ML: 9 INJECTION, SOLUTION INTRAVENOUS at 10:33

## 2019-10-17 RX ADMIN — POLYETHYLENE GLYCOL 3350, SODIUM SULFATE ANHYDROUS, SODIUM BICARBONATE, SODIUM CHLORIDE, POTASSIUM CHLORIDE 4000 ML: 236; 22.74; 6.74; 5.86; 2.97 POWDER, FOR SOLUTION ORAL at 18:20

## 2019-10-17 RX ADMIN — SODIUM CHLORIDE: 9 INJECTION, SOLUTION INTRAVENOUS at 16:50

## 2019-10-17 ASSESSMENT — ENCOUNTER SYMPTOMS
NAUSEA: 0
DIARRHEA: 0
WHEEZING: 0
CHEST TIGHTNESS: 0
BLOOD IN STOOL: 1
VOMITING: 0
CONSTIPATION: 0
ABDOMINAL PAIN: 0
SHORTNESS OF BREATH: 0
COUGH: 0
RHINORRHEA: 0

## 2019-10-17 ASSESSMENT — PAIN SCALES - GENERAL: PAINLEVEL_OUTOF10: 0

## 2019-10-18 ENCOUNTER — ANESTHESIA (OUTPATIENT)
Dept: ENDOSCOPY | Age: 59
DRG: 378 | End: 2019-10-18

## 2019-10-18 ENCOUNTER — ANESTHESIA EVENT (OUTPATIENT)
Dept: ENDOSCOPY | Age: 59
DRG: 378 | End: 2019-10-18

## 2019-10-18 VITALS
DIASTOLIC BLOOD PRESSURE: 53 MMHG | SYSTOLIC BLOOD PRESSURE: 86 MMHG | OXYGEN SATURATION: 98 % | RESPIRATION RATE: 15 BRPM

## 2019-10-18 LAB
ANION GAP SERPL CALCULATED.3IONS-SCNC: 9 MMOL/L (ref 9–17)
BUN BLDV-MCNC: 13 MG/DL (ref 6–20)
BUN/CREAT BLD: ABNORMAL (ref 9–20)
CALCIUM SERPL-MCNC: 8.3 MG/DL (ref 8.6–10.4)
CHLORIDE BLD-SCNC: 107 MMOL/L (ref 98–107)
CO2: 26 MMOL/L (ref 20–31)
CREAT SERPL-MCNC: 0.83 MG/DL (ref 0.5–0.9)
GFR AFRICAN AMERICAN: >60 ML/MIN
GFR NON-AFRICAN AMERICAN: >60 ML/MIN
GFR SERPL CREATININE-BSD FRML MDRD: ABNORMAL ML/MIN/{1.73_M2}
GFR SERPL CREATININE-BSD FRML MDRD: ABNORMAL ML/MIN/{1.73_M2}
GLUCOSE BLD-MCNC: 108 MG/DL (ref 65–105)
GLUCOSE BLD-MCNC: 123 MG/DL (ref 65–105)
GLUCOSE BLD-MCNC: 130 MG/DL (ref 70–99)
GLUCOSE BLD-MCNC: 139 MG/DL (ref 65–105)
GLUCOSE BLD-MCNC: 92 MG/DL (ref 65–105)
HCT VFR BLD CALC: 29.6 % (ref 36.3–47.1)
HEMOGLOBIN: 9.3 G/DL (ref 11.9–15.1)
HEMOGLOBIN: 9.4 G/DL (ref 11.9–15.1)
INR BLD: 1.2
MCH RBC QN AUTO: 28.8 PG (ref 25.2–33.5)
MCHC RBC AUTO-ENTMCNC: 31.8 G/DL (ref 28.4–34.8)
MCV RBC AUTO: 90.8 FL (ref 82.6–102.9)
NRBC AUTOMATED: 0 PER 100 WBC
PDW BLD-RTO: 14.8 % (ref 11.8–14.4)
PLATELET # BLD: 209 K/UL (ref 138–453)
PMV BLD AUTO: 11.3 FL (ref 8.1–13.5)
POTASSIUM SERPL-SCNC: 3.9 MMOL/L (ref 3.7–5.3)
PROTHROMBIN TIME: 12.1 SEC (ref 9–12)
RBC # BLD: 3.26 M/UL (ref 3.95–5.11)
SODIUM BLD-SCNC: 142 MMOL/L (ref 135–144)
WBC # BLD: 7.1 K/UL (ref 3.5–11.3)

## 2019-10-18 PROCEDURE — 82947 ASSAY GLUCOSE BLOOD QUANT: CPT

## 2019-10-18 PROCEDURE — 85610 PROTHROMBIN TIME: CPT

## 2019-10-18 PROCEDURE — C9113 INJ PANTOPRAZOLE SODIUM, VIA: HCPCS | Performed by: INTERNAL MEDICINE

## 2019-10-18 PROCEDURE — 0DJ08ZZ INSPECTION OF UPPER INTESTINAL TRACT, VIA NATURAL OR ARTIFICIAL OPENING ENDOSCOPIC: ICD-10-PCS | Performed by: INTERNAL MEDICINE

## 2019-10-18 PROCEDURE — 7100000010 HC PHASE II RECOVERY - FIRST 15 MIN: Performed by: INTERNAL MEDICINE

## 2019-10-18 PROCEDURE — 6360000002 HC RX W HCPCS: Performed by: INTERNAL MEDICINE

## 2019-10-18 PROCEDURE — 45378 DIAGNOSTIC COLONOSCOPY: CPT | Performed by: INTERNAL MEDICINE

## 2019-10-18 PROCEDURE — 2580000003 HC RX 258: Performed by: INTERNAL MEDICINE

## 2019-10-18 PROCEDURE — 6360000002 HC RX W HCPCS: Performed by: SPECIALIST

## 2019-10-18 PROCEDURE — 85027 COMPLETE CBC AUTOMATED: CPT

## 2019-10-18 PROCEDURE — 3700000000 HC ANESTHESIA ATTENDED CARE: Performed by: INTERNAL MEDICINE

## 2019-10-18 PROCEDURE — 0DJD8ZZ INSPECTION OF LOWER INTESTINAL TRACT, VIA NATURAL OR ARTIFICIAL OPENING ENDOSCOPIC: ICD-10-PCS | Performed by: INTERNAL MEDICINE

## 2019-10-18 PROCEDURE — 3700000001 HC ADD 15 MINUTES (ANESTHESIA): Performed by: INTERNAL MEDICINE

## 2019-10-18 PROCEDURE — 36415 COLL VENOUS BLD VENIPUNCTURE: CPT

## 2019-10-18 PROCEDURE — 3609010300 HC COLONOSCOPY W/BIOPSY SINGLE/MULTIPLE: Performed by: INTERNAL MEDICINE

## 2019-10-18 PROCEDURE — 1200000000 HC SEMI PRIVATE

## 2019-10-18 PROCEDURE — 99232 SBSQ HOSP IP/OBS MODERATE 35: CPT | Performed by: FAMILY MEDICINE

## 2019-10-18 PROCEDURE — 80048 BASIC METABOLIC PNL TOTAL CA: CPT

## 2019-10-18 PROCEDURE — 43235 EGD DIAGNOSTIC BRUSH WASH: CPT | Performed by: INTERNAL MEDICINE

## 2019-10-18 PROCEDURE — 7100000011 HC PHASE II RECOVERY - ADDTL 15 MIN: Performed by: INTERNAL MEDICINE

## 2019-10-18 PROCEDURE — 3609017100 HC EGD: Performed by: INTERNAL MEDICINE

## 2019-10-18 PROCEDURE — 85018 HEMOGLOBIN: CPT

## 2019-10-18 RX ORDER — SODIUM CHLORIDE, SODIUM LACTATE, POTASSIUM CHLORIDE, CALCIUM CHLORIDE 600; 310; 30; 20 MG/100ML; MG/100ML; MG/100ML; MG/100ML
INJECTION, SOLUTION INTRAVENOUS CONTINUOUS
Status: CANCELLED | OUTPATIENT
Start: 2019-10-18

## 2019-10-18 RX ORDER — FENTANYL CITRATE 50 UG/ML
25 INJECTION, SOLUTION INTRAMUSCULAR; INTRAVENOUS ONCE
Status: CANCELLED | OUTPATIENT
Start: 2019-10-18 | End: 2019-10-18

## 2019-10-18 RX ORDER — SODIUM CHLORIDE 0.9 % (FLUSH) 0.9 %
10 SYRINGE (ML) INJECTION EVERY 12 HOURS SCHEDULED
Status: CANCELLED | OUTPATIENT
Start: 2019-10-18

## 2019-10-18 RX ORDER — ONDANSETRON 2 MG/ML
4 INJECTION INTRAMUSCULAR; INTRAVENOUS ONCE
Status: CANCELLED | OUTPATIENT
Start: 2019-10-18 | End: 2019-10-18

## 2019-10-18 RX ORDER — PROPOFOL 10 MG/ML
INJECTION, EMULSION INTRAVENOUS PRN
Status: DISCONTINUED | OUTPATIENT
Start: 2019-10-18 | End: 2019-10-18 | Stop reason: SDUPTHER

## 2019-10-18 RX ORDER — SODIUM CHLORIDE 0.9 % (FLUSH) 0.9 %
10 SYRINGE (ML) INJECTION PRN
Status: CANCELLED | OUTPATIENT
Start: 2019-10-18

## 2019-10-18 RX ADMIN — PROPOFOL 100 MG: 10 INJECTION, EMULSION INTRAVENOUS at 13:30

## 2019-10-18 RX ADMIN — SODIUM CHLORIDE, PRESERVATIVE FREE 10 ML: 5 INJECTION INTRAVENOUS at 21:33

## 2019-10-18 RX ADMIN — SODIUM CHLORIDE: 9 INJECTION, SOLUTION INTRAVENOUS at 23:44

## 2019-10-18 RX ADMIN — PROPOFOL 100 MG: 10 INJECTION, EMULSION INTRAVENOUS at 13:36

## 2019-10-18 RX ADMIN — SODIUM CHLORIDE 8 MG/HR: 9 INJECTION, SOLUTION INTRAVENOUS at 03:42

## 2019-10-18 ASSESSMENT — ENCOUNTER SYMPTOMS
SHORTNESS OF BREATH: 0
VOMITING: 0
CONSTIPATION: 0
RHINORRHEA: 0
BLOOD IN STOOL: 1
WHEEZING: 0
NAUSEA: 0
ABDOMINAL PAIN: 0
DIARRHEA: 0
CHEST TIGHTNESS: 0
COUGH: 0

## 2019-10-18 ASSESSMENT — PAIN SCALES - GENERAL
PAINLEVEL_OUTOF10: 0
PAINLEVEL_OUTOF10: 0

## 2019-10-19 VITALS
WEIGHT: 287.26 LBS | TEMPERATURE: 98.5 F | DIASTOLIC BLOOD PRESSURE: 63 MMHG | OXYGEN SATURATION: 90 % | SYSTOLIC BLOOD PRESSURE: 109 MMHG | RESPIRATION RATE: 18 BRPM | BODY MASS INDEX: 52.86 KG/M2 | HEART RATE: 80 BPM | HEIGHT: 62 IN

## 2019-10-19 LAB
GLUCOSE BLD-MCNC: 116 MG/DL (ref 65–105)
GLUCOSE BLD-MCNC: 139 MG/DL (ref 65–105)
HEMOGLOBIN: 8.7 G/DL (ref 11.9–15.1)
HEMOGLOBIN: 9.2 G/DL (ref 11.9–15.1)

## 2019-10-19 PROCEDURE — 82947 ASSAY GLUCOSE BLOOD QUANT: CPT

## 2019-10-19 PROCEDURE — 6360000002 HC RX W HCPCS: Performed by: INTERNAL MEDICINE

## 2019-10-19 PROCEDURE — 90686 IIV4 VACC NO PRSV 0.5 ML IM: CPT | Performed by: INTERNAL MEDICINE

## 2019-10-19 PROCEDURE — 2580000003 HC RX 258: Performed by: INTERNAL MEDICINE

## 2019-10-19 PROCEDURE — G0008 ADMIN INFLUENZA VIRUS VAC: HCPCS | Performed by: INTERNAL MEDICINE

## 2019-10-19 PROCEDURE — 36415 COLL VENOUS BLD VENIPUNCTURE: CPT

## 2019-10-19 PROCEDURE — 99232 SBSQ HOSP IP/OBS MODERATE 35: CPT | Performed by: FAMILY MEDICINE

## 2019-10-19 PROCEDURE — 85018 HEMOGLOBIN: CPT

## 2019-10-19 PROCEDURE — APPNB45 APP NON BILLABLE 31-45 MINUTES: Performed by: INTERNAL MEDICINE

## 2019-10-19 RX ORDER — PANTOPRAZOLE SODIUM 40 MG/1
40 TABLET, DELAYED RELEASE ORAL
Qty: 30 TABLET | Refills: 5 | Status: SHIPPED | OUTPATIENT
Start: 2019-10-19 | End: 2021-04-28

## 2019-10-19 RX ORDER — FERROUS SULFATE 325(65) MG
325 TABLET ORAL
Qty: 60 TABLET | Refills: 6 | Status: ON HOLD | OUTPATIENT
Start: 2019-10-19 | End: 2020-05-18

## 2019-10-19 RX ADMIN — IRON SUCROSE 200 MG: 20 INJECTION, SOLUTION INTRAVENOUS at 09:33

## 2019-10-19 RX ADMIN — SODIUM CHLORIDE, PRESERVATIVE FREE 10 ML: 5 INJECTION INTRAVENOUS at 09:35

## 2019-10-19 RX ADMIN — INFLUENZA A VIRUS A/BRISBANE/02/2018 IVR-190 (H1N1) ANTIGEN (PROPIOLACTONE INACTIVATED), INFLUENZA A VIRUS A/KANSAS/14/2017 X-327 (H3N2) ANTIGEN (PROPIOLACTONE INACTIVATED), INFLUENZA B VIRUS B/MARYLAND/15/2016 ANTIGEN (PROPIOLACTONE INACTIVATED), INFLUENZA B VIRUS B/PHUKET/3073/2013 BVR-1B ANTIGEN (PROPIOLACTONE INACTIVATED) 0.5 ML: 15; 15; 15; 15 INJECTION, SUSPENSION INTRAMUSCULAR at 09:30

## 2019-10-19 ASSESSMENT — PAIN SCALES - GENERAL
PAINLEVEL_OUTOF10: 0

## 2019-10-19 ASSESSMENT — ENCOUNTER SYMPTOMS
WHEEZING: 0
DIARRHEA: 0
CHEST TIGHTNESS: 0
BLOOD IN STOOL: 0
ABDOMINAL PAIN: 0
VOMITING: 0
CONSTIPATION: 0
NAUSEA: 0
COUGH: 0
RHINORRHEA: 0
SHORTNESS OF BREATH: 0

## 2019-10-22 ENCOUNTER — TELEPHONE (OUTPATIENT)
Dept: FAMILY MEDICINE CLINIC | Age: 59
End: 2019-10-22

## 2019-11-01 ENCOUNTER — OFFICE VISIT (OUTPATIENT)
Dept: FAMILY MEDICINE CLINIC | Age: 59
End: 2019-11-01

## 2019-11-01 ENCOUNTER — HOSPITAL ENCOUNTER (OUTPATIENT)
Age: 59
Setting detail: SPECIMEN
Discharge: HOME | End: 2019-11-01

## 2019-11-01 VITALS
SYSTOLIC BLOOD PRESSURE: 118 MMHG | BODY MASS INDEX: 50.26 KG/M2 | TEMPERATURE: 97.6 F | WEIGHT: 274.8 LBS | HEART RATE: 98 BPM | OXYGEN SATURATION: 99 % | DIASTOLIC BLOOD PRESSURE: 62 MMHG

## 2019-11-01 DIAGNOSIS — Z00.00 HEALTHCARE MAINTENANCE: ICD-10-CM

## 2019-11-01 DIAGNOSIS — Z13.220 SCREENING FOR HYPERLIPIDEMIA: ICD-10-CM

## 2019-11-01 DIAGNOSIS — N89.8 VAGINAL PRURITUS: ICD-10-CM

## 2019-11-01 DIAGNOSIS — K92.1 MELENA: ICD-10-CM

## 2019-11-01 DIAGNOSIS — K92.1 MELENA: Primary | ICD-10-CM

## 2019-11-01 LAB
CHOLESTEROL/HDL RATIO: 4.1
CHOLESTEROL: 157 MG/DL
CREATININE URINE: 209.1 MG/DL (ref 28–217)
HCT VFR BLD CALC: 35.7 % (ref 36.3–47.1)
HDLC SERPL-MCNC: 38 MG/DL
HEMOGLOBIN: 10.6 G/DL (ref 11.9–15.1)
LDL CHOLESTEROL: 97 MG/DL (ref 0–130)
MCH RBC QN AUTO: 27.5 PG (ref 25.2–33.5)
MCHC RBC AUTO-ENTMCNC: 29.7 G/DL (ref 28.4–34.8)
MCV RBC AUTO: 92.5 FL (ref 82.6–102.9)
MICROALBUMIN/CREAT 24H UR: <12 MG/L
MICROALBUMIN/CREAT UR-RTO: NORMAL MCG/MG CREAT
NRBC AUTOMATED: 0 PER 100 WBC
PDW BLD-RTO: 15.5 % (ref 11.8–14.4)
PLATELET # BLD: 332 K/UL (ref 138–453)
PMV BLD AUTO: 11.1 FL (ref 8.1–13.5)
RBC # BLD: 3.86 M/UL (ref 3.95–5.11)
TRIGL SERPL-MCNC: 108 MG/DL
VLDLC SERPL CALC-MCNC: ABNORMAL MG/DL (ref 1–30)
WBC # BLD: 7.5 K/UL (ref 3.5–11.3)

## 2019-11-01 PROCEDURE — 1111F DSCHRG MED/CURRENT MED MERGE: CPT | Performed by: STUDENT IN AN ORGANIZED HEALTH CARE EDUCATION/TRAINING PROGRAM

## 2019-11-01 PROCEDURE — 99211 OFF/OP EST MAY X REQ PHY/QHP: CPT | Performed by: HOSPITALIST

## 2019-11-01 PROCEDURE — 99213 OFFICE O/P EST LOW 20 MIN: CPT | Performed by: STUDENT IN AN ORGANIZED HEALTH CARE EDUCATION/TRAINING PROGRAM

## 2019-11-01 ASSESSMENT — PATIENT HEALTH QUESTIONNAIRE - PHQ9
SUM OF ALL RESPONSES TO PHQ9 QUESTIONS 1 & 2: 0
1. LITTLE INTEREST OR PLEASURE IN DOING THINGS: 0
SUM OF ALL RESPONSES TO PHQ QUESTIONS 1-9: 0
SUM OF ALL RESPONSES TO PHQ QUESTIONS 1-9: 0
2. FEELING DOWN, DEPRESSED OR HOPELESS: 0

## 2019-11-01 ASSESSMENT — ENCOUNTER SYMPTOMS
CONSTIPATION: 0
DIARRHEA: 0
ABDOMINAL PAIN: 0
VOMITING: 0
NAUSEA: 0
CHEST TIGHTNESS: 0
SHORTNESS OF BREATH: 0
BLOOD IN STOOL: 0

## 2019-12-20 ENCOUNTER — OFFICE VISIT (OUTPATIENT)
Dept: FAMILY MEDICINE CLINIC | Age: 59
End: 2019-12-20

## 2019-12-20 VITALS
DIASTOLIC BLOOD PRESSURE: 82 MMHG | WEIGHT: 281.8 LBS | TEMPERATURE: 98.5 F | SYSTOLIC BLOOD PRESSURE: 138 MMHG | BODY MASS INDEX: 51.54 KG/M2 | HEART RATE: 88 BPM

## 2019-12-20 DIAGNOSIS — K92.1 MELENA: Primary | ICD-10-CM

## 2019-12-20 DIAGNOSIS — E11.9 TYPE 2 DIABETES MELLITUS WITHOUT COMPLICATION, WITHOUT LONG-TERM CURRENT USE OF INSULIN (HCC): ICD-10-CM

## 2019-12-20 LAB — HBA1C MFR BLD: 6.3 %

## 2019-12-20 PROCEDURE — 99211 OFF/OP EST MAY X REQ PHY/QHP: CPT | Performed by: FAMILY MEDICINE

## 2019-12-20 PROCEDURE — 83036 HEMOGLOBIN GLYCOSYLATED A1C: CPT | Performed by: STUDENT IN AN ORGANIZED HEALTH CARE EDUCATION/TRAINING PROGRAM

## 2019-12-20 PROCEDURE — 99213 OFFICE O/P EST LOW 20 MIN: CPT | Performed by: STUDENT IN AN ORGANIZED HEALTH CARE EDUCATION/TRAINING PROGRAM

## 2019-12-20 RX ORDER — ATORVASTATIN CALCIUM 40 MG/1
40 TABLET, FILM COATED ORAL DAILY
Qty: 30 TABLET | Refills: 3 | Status: ON HOLD | OUTPATIENT
Start: 2019-12-20 | End: 2020-05-21 | Stop reason: SDUPTHER

## 2019-12-20 ASSESSMENT — ENCOUNTER SYMPTOMS
CONSTIPATION: 0
NAUSEA: 0
SHORTNESS OF BREATH: 0
BLOOD IN STOOL: 0
VOMITING: 0
DIARRHEA: 0
ABDOMINAL PAIN: 0

## 2020-01-13 NOTE — TELEPHONE ENCOUNTER
Please address the medication refill and close the encounter. If I can be of assistance, please route to the applicable pool. Thank you. Last visit: 12/20/19  Last Med refill: 05/28/19  Does patient have enough medication for 72 hours: No:     Next Visit Date:  Future Appointments   Date Time Provider Rico Ayala   3/10/2020  9:00 [address],  [address] Maintenance   Topic Date Due    Diabetic retinal exam  10/24/1972    Cervical cancer screen  10/24/1983    Diabetic foot exam  12/16/2017    Shingles Vaccine (1 of 2) 01/15/2020 (Originally 10/24/2012)    Hepatitis B vaccine (1 of 3 - Risk 3-dose series) 11/01/2020 (Originally 10/24/1981)    Potassium monitoring  10/18/2020    Creatinine monitoring  10/18/2020    Diabetic microalbuminuria test  11/01/2020    Lipid screen  11/01/2020    A1C test (Diabetic or Prediabetic)  12/20/2020    Breast cancer screen  01/12/2021    DTaP/Tdap/Td vaccine (2 - Td) 03/07/2024    Colon cancer screen colonoscopy  10/18/2029    Flu vaccine  Completed    Pneumococcal 0-64 years Vaccine  Completed    Hepatitis C screen  Completed    HIV screen  Completed       Hemoglobin A1C (%)   Date Value   12/20/2019 6.3   01/15/2019 6.5   04/18/2018 6.6 (H)             ( goal A1C is < 7)   Microalb/Crt.  Ratio (mcg/mg creat)   Date Value   11/01/2019 CANNOT BE CALCULATED     LDL Cholesterol (mg/dL)   Date Value   11/01/2019 97   04/18/2018 87       (goal LDL is <100)   AST (U/L)   Date Value   10/17/2019 19     ALT (U/L)   Date Value   10/17/2019 15     BUN (mg/dL)   Date Value   10/18/2019 13     BP Readings from Last 3 Encounters:   12/20/19 138/82   11/01/19 118/62   10/19/19 109/63          (goal 120/80)    All Future Testing planned in [facility]  Lab Frequency Next Occurrence               Patient Active Problem List:     Gastroesophageal reflux disease     Hyperlipidemia with target LDL less than 100     HTN (hypertension)     Numbness

## 2020-01-16 RX ORDER — LISINOPRIL 10 MG/1
TABLET ORAL
Qty: 30 TABLET | Refills: 3 | Status: SHIPPED | OUTPATIENT
Start: 2020-01-16 | End: 2020-06-18

## 2020-01-29 NOTE — TELEPHONE ENCOUNTER
Controlled type 2 diabetes mellitus without complication, without long-term current use of insulin ([facility])     Morbid obesity with body mass index (BMI) of 40.0 to 49.9 ([facility])     SINDI (obstructive sleep apnea)     Vitamin D deficiency     Melena           Please address the medication refill and close the encounter. If I can be of assistance, please route to the applicable pool. Thank you.

## 2020-01-30 RX ORDER — GLIPIZIDE 5 MG/1
TABLET ORAL
Qty: 60 TABLET | Refills: 3 | Status: SHIPPED | OUTPATIENT
Start: 2020-01-30 | End: 2020-02-04 | Stop reason: ALTCHOICE

## 2020-01-31 ENCOUNTER — TELEPHONE (OUTPATIENT)
Dept: FAMILY MEDICINE CLINIC | Age: 60
End: 2020-01-31

## 2020-01-31 NOTE — TELEPHONE ENCOUNTER
PC from patient she states since she has been taking the glipiZIDE (GLUCOTROL) 5 MG tablet  She has been feeling real jittery. She also states when she takes her pill her sugars drop down into the 80's-90's.  She would like to know is this normal.

## 2020-02-03 NOTE — TELEPHONE ENCOUNTER
Patient contacted office again regard message. She states she spoke with Matt Cesar on 1/27/20 about this issue and was worried because she has not heard anything. Patient states she has been off medication for 7 days now and her BS was 184 at the time of this call. Please advise.

## 2020-02-03 NOTE — TELEPHONE ENCOUNTER
Patient states she has been off her medication now for 7 days and wants to know what she needs to do. She says her sugars has been high.

## 2020-02-04 ENCOUNTER — OFFICE VISIT (OUTPATIENT)
Dept: FAMILY MEDICINE CLINIC | Age: 60
End: 2020-02-04
Payer: COMMERCIAL

## 2020-02-04 VITALS
SYSTOLIC BLOOD PRESSURE: 105 MMHG | HEIGHT: 62 IN | DIASTOLIC BLOOD PRESSURE: 69 MMHG | HEART RATE: 86 BPM | BODY MASS INDEX: 50.97 KG/M2 | WEIGHT: 277 LBS

## 2020-02-04 PROCEDURE — 99213 OFFICE O/P EST LOW 20 MIN: CPT | Performed by: STUDENT IN AN ORGANIZED HEALTH CARE EDUCATION/TRAINING PROGRAM

## 2020-02-04 RX ORDER — METFORMIN HYDROCHLORIDE 500 MG/1
500 TABLET, EXTENDED RELEASE ORAL 2 TIMES DAILY WITH MEALS
Qty: 30 TABLET | Refills: 5 | Status: SHIPPED | OUTPATIENT
Start: 2020-02-04 | End: 2020-11-09

## 2020-02-04 ASSESSMENT — PATIENT HEALTH QUESTIONNAIRE - PHQ9
SUM OF ALL RESPONSES TO PHQ QUESTIONS 1-9: 0
SUM OF ALL RESPONSES TO PHQ QUESTIONS 1-9: 0
1. LITTLE INTEREST OR PLEASURE IN DOING THINGS: 0
SUM OF ALL RESPONSES TO PHQ9 QUESTIONS 1 & 2: 0
2. FEELING DOWN, DEPRESSED OR HOPELESS: 0

## 2020-02-04 ASSESSMENT — ENCOUNTER SYMPTOMS
ABDOMINAL PAIN: 0
COUGH: 0

## 2020-02-04 NOTE — PATIENT INSTRUCTIONS
Thank you for letting us take care of you today. We hope all your questions were addressed. If a question was overlooked or something else comes to mind after you return home, please contact a member of your Care Team listed below. Please make sure you have a routine office visit set up to follow-up on [address]. Your Care Team at [place] [address] is Team #3  Elijah Bagley MD (Faculty)  Brian Gaitan MD (Faculty  Yodit Powers MD (Resident)  Adrian Grey MD (Resident)   Tati Brown MD (Resident)  Juanjo Leary MD (Resident)  Constantin Alvarado MD (Resident)  GEOVANI Sagastume., MARILU Wood., [facility] office)  [facility] office)  Norman Aparicio ([address] [facility])  Helen Laird [place] ([address] [facility])  Octavia Baker, Ph.D., (Behavioral Services)  Phillip Lewis, [address] [place] [address] (Clinical Pharmacist)     Office phone number: [phone]    If you need to get in right away due to illness, please be advised we have \"Same Day\" appointments available Monday-Friday. Please call us at [phone] option #3 to schedule your \"Same Day\" appointment. Patient Education        metformin  Pronunciation:  met FOR min  Brand:  Fortamet, Glucophage, Glucophage XR, Glumetza, Riomet  What is the most important information I should know about metformin? You should not use this medicine if you have severe kidney disease, metabolic acidosis, or diabetic ketoacidosis (call your doctor for treatment). If you need to have any type of x-ray or CT scan using a dye that is injected into your veins, you may need to temporarily stop taking metformin. You may develop lactic acidosis, a dangerous build-up of lactic acid in your blood. Call your doctor or get emergency medical help if you have unusual muscle pain, trouble breathing, stomach pain, dizziness, feeling cold, or feeling very weak or tired. What is metformin?   Metformin is used together with diet and exercise to improve blood sugar control in adults with type 2 diabetes mellitus. Metformin is sometimes used together with insulin or other medications, but metformin is not for treating type 1 diabetes. Metformin may also be used for purposes not listed in this medication guide. What should I discuss with my healthcare provider before taking metformin? You should not use metformin if you are allergic to it, or if you have:  · severe kidney disease; or  · metabolic acidosis or diabetic ketoacidosis (call your doctor for treatment). If you need to have surgery or any type of x-ray or CT scan using a dye that is injected into your veins, you may need to temporarily stop taking metformin. Be sure your caregivers know ahead of time that you are using this medication. Tell your doctor if you have ever had:  · kidney disease (your kidney function may need to be checked before you take this medicine);  · high ketone levels in your blood or urine;  · heart disease, congestive heart failure;  · liver disease; or  · if you also use insulin, or other oral diabetes medications. You may develop lactic acidosis, a dangerous build-up of lactic acid in your blood. This may be more likely if you have other medical conditions, a severe infection, chronic alcoholism, or if you are 72 or older. Ask your doctor about your risk. Follow your doctor's instructions about using this medicine if you are pregnant. Blood sugar control is very important during pregnancy, and your dose needs may be different during each trimester of pregnancy. Tell your doctor if you become pregnant while taking metformin. Metformin may stimulate ovulation in a premenopausal woman and may increase the risk of unintended pregnancy. Talk to your doctor about your risk. You should not breastfeed while using this medicine. Metformin should not be given to a child younger than 8years old.  Some forms of metformin are not approved for use by anyone younger than 18 years next dose. Do not take two doses at one time. What happens if I overdose? Seek emergency medical attention or call the Poison Help line at 1-142.861.3216. An overdose can cause severe hypoglycemia or lactic acidosis. What should I avoid while taking metformin? Avoid drinking alcohol. It lowers blood sugar and may increase your risk of lactic acidosis. What are the possible side effects of metformin? Get emergency medical help if you have signs of an allergic reaction:  hives; difficult breathing; swelling of your face, lips, tongue, or throat. Some people using metformin develop lactic acidosis, which can be fatal. Get emergency medical help if you have even mild symptoms such as:  · unusual muscle pain;  · feeling cold;  · trouble breathing;  · feeling dizzy, light-headed, tired, or very weak;  · stomach pain, vomiting; or  · slow or irregular heart rate. Common side effects may include:  · low blood sugar;  · nausea, upset stomach; or  · diarrhea. This is not a complete list of side effects and others may occur. Call your doctor for medical advice about side effects. You may report side effects to FDA at 2-475-JCS-3417. What other drugs will affect metformin? Many drugs can affect metformin, making this medicine less effective or increasing your risk of lactic acidosis. This includes prescription and over-the-counter medicines, vitamins, and herbal products. Not all possible interactions are listed here. Tell your doctor about all your current medicines and any medicine you start or stop using. Where can I get more information? Your pharmacist can provide more information about metformin. Remember, keep this and all other medicines out of the reach of children, never share your medicines with others, and use this medication only for the indication prescribed.   Every effort has been made to ensure that the information provided by [address] is accurate, up-to-date, and complete, but no guarantee is made to that effect. Drug information contained herein may be time sensitive. [employer] information has been compiled for use by healthcare practitioners and consumers in the [place] and therefore [facility] does not warrant that uses outside of the [place] are appropriate, unless specifically indicated otherwise. [facility]'s drug information does not endorse drugs, diagnose patients or recommend therapy. [facility] drug information is an informational resource designed to assist licensed healthcare practitioners in caring for their patients and/or to serve consumers viewing this service as a supplement to, and not a substitute for, the expertise, skill, knowledge and judgment of healthcare practitioners. The absence of a warning for a given drug or drug combination in no way should be construed to indicate that the drug or drug combination is safe, effective or appropriate for any given patient. [facility] does not assume any responsibility for any aspect of healthcare administered with the aid of information [facility] provides. The information contained herein is not intended to cover all possible uses, directions, precautions, warnings, drug interactions, allergic reactions, or adverse effects. If you have questions about the drugs you are taking, check with your doctor, nurse or pharmacist.  Copyright 6534-5476 [address]: 17.01. Revision date: 9/10/2019. Care instructions adapted under license by [facility] ([facility]). If you have questions about a medical condition or this instruction, always ask your healthcare professional. [place] [address] any warranty or liability for your use of this information.

## 2020-02-04 NOTE — PROGRESS NOTES
Visit Information    Have you changed or started any medications since your last visit including any over-the-counter medicines, vitamins, or herbal medicines? no   Have you stopped taking any of your medications? Is so, why? -  no  Are you having any side effects from any of your medications? - no    Have you seen any other physician or provider since your last visit?  no   Have you had any other diagnostic tests since your last visit?  no   Have you been seen in the emergency room and/or had an admission in a hospital since we last saw you?  no   Have you had your routine dental cleaning in the past 6 months?  no     Do you have an active MyChart account? If no, what is the barrier?   Yes    Patient Care Team:  Shefali Jo MD as PCP - General (Family Medicine)  Damon Hadley MD as Surgeon (Bariatric Surgery)  Sravan Barakat MD as Consulting Physician (Pulmonology)    Medical History Review  Past Medical, Family, and Social History reviewed and does not contribute to the patient presenting condition    Health Maintenance   Topic Date Due    Diabetic retinal exam  10/24/1972    Cervical cancer screen  10/24/1983    Shingles Vaccine (1 of 2) 10/24/2012    Diabetic foot exam  12/16/2017    Hepatitis B vaccine (1 of 3 - Risk 3-dose series) 11/01/2020 (Originally 10/24/1981)    Potassium monitoring  10/18/2020    Creatinine monitoring  10/18/2020    Diabetic microalbuminuria test  11/01/2020    Lipid screen  11/01/2020    A1C test (Diabetic or Prediabetic)  12/20/2020    Breast cancer screen  01/12/2021    DTaP/Tdap/Td vaccine (2 - Td) 03/07/2024    Colon cancer screen colonoscopy  10/18/2029    Flu vaccine  Completed    Pneumococcal 0-64 years Vaccine  Completed    Hepatitis C screen  Completed    HIV screen  Completed

## 2020-02-04 NOTE — PROGRESS NOTES
Subjective:      Gali Alcala is a 64 y.o. female with Hx of  has a past medical history of Diverticulitis, GERD (gastroesophageal reflux disease), Hyperlipidemia, Hypertension, Obesity, Sleep apnea, and Type II or unspecified type diabetes mellitus without mention of complication, not stated as uncontrolled. HPI    Ms. Sari Wilder is a 51-year-old female presenting to the office with complaints of jitteriness. Patient reports this is been going on for about 3 months. She states that it occurs when she takes her glipizide 5 mg. She states she checks her sugars and they become low. Patient has been on glipizide for about a year but these have recently occurred. Patient reports she has been out of her medication for about 8 days and states that since she has not been taking it the symptoms have resolved. Patient was previously on metformin however she is unsure why it was stopped. Patient last A1c was 6.3 in December. Patient has no other complaints today. Review of Systems   Constitutional: Negative for activity change and appetite change. Respiratory: Negative for cough. Gastrointestinal: Negative for abdominal pain. Musculoskeletal: Negative for arthralgias. Neurological: Positive for dizziness and light-headedness. Negative for tremors, syncope and weakness. History reviewed. No pertinent family history.     Social History     Socioeconomic History    Marital status: Single     Spouse name: Not on file    Number of children: Not on file    Years of education: Not on file    Highest education level: Not on file   Occupational History    Not on file   Social Needs    Financial resource strain: Not on file    Food insecurity:     Worry: Not on file     Inability: Not on file    Transportation needs:     Medical: Not on file     Non-medical: Not on file   Tobacco Use    Smoking status: Former Smoker     Packs/day: 0.25     Years: 7.00     Pack years: 1.75     Start date: 1/1/1984 Last attempt to quit: 1991     Years since quittin.1    Smokeless tobacco: Never Used   Substance and Sexual Activity    Alcohol use: Not Currently    Drug use: No    Sexual activity: Not on file   Lifestyle    Physical activity:     Days per week: Not on file     Minutes per session: Not on file    Stress: Not on file   Relationships    Social connections:     Talks on phone: Not on file     Gets together: Not on file     Attends [employer] service: Not on file     Active member of club or organization: Not on file     Attends meetings of clubs or organizations: Not on file     Relationship status: Not on file    Intimate partner violence:     Fear of current or ex partner: Not on file     Emotionally abused: Not on file     Physically abused: Not on file     Forced sexual activity: Not on file   Other Topics Concern    Not on file   Social History Narrative    Not on file       Objective:      /69 (Site: Right Upper Arm, Position: Sitting, Cuff Size: Large Adult)   Pulse 86   Ht 5' 2\" (1.575 m)   Wt 277 lb (125.6 kg)   BMI 50.66 kg/m²    BP Readings from Last 3 Encounters:   20 105/69   19 138/82   19 118/62       Physical Exam    General Appearance:  A&Ox3, NAD   Lungs:  Clear to auscultation B/L. Cardiovascular:  NL S1/S2, RRR, no m,g,r. Abdomen: + BS, Soft, nontender, nondistended, no masses or organomegaly  Extremities: No cyanosis, clubbing or edema    Assessment:     1. Controlled type 2 diabetes mellitus without complication, without long-term current use of insulin ([facility] [address]        Plan:      1. Controlled type 2 diabetes mellitus without complication, without long-term current use of insulin ([facility])  - metFORMIN (GLUCOPHAGE-XR) 500 MG extended release tablet; Take 1 tablet by mouth 2 times daily (with meals) (Patient not taking: Reported on 2020)  Dispense: 30 tablet;  Refill: 5  - Will dc glipizide and restart Metformin per patient preference       Billye Bosworth received counseling on the following healthy behaviors: nutrition, exercise and medication adherence  Reviewed prior labs and health maintenance  Continue current medications, diet and exercise. Discussed use, benefit, and side effects of prescribed medications. Barriers to medication compliance addressed. Patient given educational materials - see patient instructions  Was a self-tracking handout given in paper form or via [employer]? No    Requested Prescriptions     Signed Prescriptions Disp Refills    metFORMIN (GLUCOPHAGE-XR) 500 MG extended release tablet 30 tablet 5     Sig: Take 1 tablet by mouth 2 times daily (with meals)     Patient not taking: Reported on 2/4/2020       All patient questions answered. Patient voiced understanding. Quality Measures    Body mass index is 50.66 kg/m². Elevated. Weight control planned discussed Healthy diet and regular exercise. BP: 105/69 Blood pressure is normal. Treatment plan consists of No treatment change needed.     Lab Results   Component Value Date    LDLCHOLESTEROL 97 11/01/2019    (goal LDL reduction with dx if diabetes is 50% LDL reduction)      PHQ Scores 2/4/2020 11/1/2019 5/2/2018 12/1/2017   PHQ2 Score 0 0 0 1   PHQ9 Score 0 0 0 1     Interpretation of Total Score Depression Severity: 1-4 = Minimal depression, 5-9 = Mild depression, 10-14 = Moderate depression, 15-19 = Moderately severe depression, 20-27 = Severe depression    Requested Prescriptions     Signed Prescriptions Disp Refills    metFORMIN (GLUCOPHAGE-XR) 500 MG extended release tablet 30 tablet 5     Sig: Take 1 tablet by mouth 2 times daily (with meals)     Patient not taking: Reported on 2/4/2020       Medications Discontinued During This Encounter   Medication Reason    glipiZIDE (GLUCOTROL) 5 MG tablet Therapy completed    metFORMIN (GLUCOPHAGE) 850 MG tablet Therapy completed       Lizbeth received counseling on the following healthy behaviors: nutrition, exercise and medication

## 2020-02-04 NOTE — PROGRESS NOTES
advised to continue to monitor and follow up. Hgb A1C is 6.3. follow up is planned to discuss further course of care. Return in about 3 months (around 5/4/2020).    (Juancarlos Capellan ) Dr. Alex Gonzalez MD

## 2020-02-19 ENCOUNTER — TELEPHONE (OUTPATIENT)
Dept: BARIATRICS/WEIGHT MGMT | Age: 60
End: 2020-02-19

## 2020-04-09 RX ORDER — HYDROCHLOROTHIAZIDE 25 MG/1
TABLET ORAL
Qty: 30 TABLET | Refills: 3 | Status: SHIPPED | OUTPATIENT
Start: 2020-04-09 | End: 2020-10-06

## 2020-04-15 ENCOUNTER — TELEPHONE (OUTPATIENT)
Dept: PULMONOLOGY | Age: 60
End: 2020-04-15

## 2020-04-23 ENCOUNTER — NURSE TRIAGE (OUTPATIENT)
Dept: OTHER | Facility: CLINIC | Age: 60
End: 2020-04-23

## 2020-04-23 NOTE — TELEPHONE ENCOUNTER
you traveled out of the country in the last month? \" (e.g., travel history, exposures)        no    Protocols used: BREATHING DIFFICULTY-ADULT-[place]    Patient calling to report that she has noticed shortness of breath/having to take a deep breath for the past couple months. She states that she does have SINDI and has a CPAP machine but she wasn't using it b/c she was waiting on a chamber part to come in. She states she used it for the first time in 6 months last night and slept well but still noticed today that she is still dealing with the shortness of breath. She also states that she's noticed wheezing when she breaths. Denies fever or chest pain. 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 I gave her the information for the flu clinic with location and hours. She states she is unable to go today but she will go tomorrow.

## 2020-04-29 ENCOUNTER — TELEPHONE (OUTPATIENT)
Dept: FAMILY MEDICINE CLINIC | Age: 60
End: 2020-04-29

## 2020-04-29 NOTE — TELEPHONE ENCOUNTER
Please have patient book a same day evisit/telephone encounter to further address shortness of breath. If there are any concerning signs or symptoms or changes, patient can contact EMS/911, and/or present to the ED for immediate evaluation and management. Thank you.   Electronically signed by Bacilio Sher MD on 4/29/2020 at 1:57 PM

## 2020-04-29 NOTE — TELEPHONE ENCOUNTER
Shaheed Pt is calling to let her Dr know, when she is at work and macey her mask she be SOB, when home mask off she is not, Pt is concern why is she having SOB with her mask on, no fever or cough can reach Pt at [phone] thank you.

## 2020-04-29 NOTE — TELEPHONE ENCOUNTER
Attempted to contact patient to advise of message, no answer voicemail was left. If patient calls back please advise or message and make appt.

## 2020-05-18 ENCOUNTER — HOSPITAL ENCOUNTER (OUTPATIENT)
Age: 60
Setting detail: OBSERVATION
Discharge: HOME | End: 2020-05-21
Attending: EMERGENCY MEDICINE | Admitting: FAMILY MEDICINE
Payer: COMMERCIAL

## 2020-05-18 ENCOUNTER — APPOINTMENT (OUTPATIENT)
Dept: GENERAL RADIOLOGY | Age: 60
End: 2020-05-18
Payer: COMMERCIAL

## 2020-05-18 PROBLEM — R07.9 CHEST PAIN: Status: ACTIVE | Noted: 2020-05-18

## 2020-05-18 LAB
ABSOLUTE EOS #: 0.19 K/UL (ref 0–0.44)
ABSOLUTE IMMATURE GRANULOCYTE: <0.03 K/UL (ref 0–0.3)
ABSOLUTE LYMPH #: 3 K/UL (ref 1.1–3.7)
ABSOLUTE MONO #: 0.45 K/UL (ref 0.1–1.2)
ANION GAP SERPL CALCULATED.3IONS-SCNC: 13 MMOL/L (ref 9–17)
BASOPHILS # BLD: 0 % (ref 0–2)
BASOPHILS ABSOLUTE: 0.03 K/UL (ref 0–0.2)
BUN BLDV-MCNC: 11 MG/DL (ref 6–20)
BUN/CREAT BLD: ABNORMAL (ref 9–20)
CALCIUM SERPL-MCNC: 9.4 MG/DL (ref 8.6–10.4)
CHLORIDE BLD-SCNC: 100 MMOL/L (ref 98–107)
CO2: 23 MMOL/L (ref 20–31)
CREAT SERPL-MCNC: 0.89 MG/DL (ref 0.5–0.9)
D-DIMER QUANTITATIVE: 0.4 MG/L FEU
DIFFERENTIAL TYPE: ABNORMAL
EOSINOPHILS RELATIVE PERCENT: 3 % (ref 1–4)
GFR AFRICAN AMERICAN: >60 ML/MIN
GFR NON-AFRICAN AMERICAN: >60 ML/MIN
GFR SERPL CREATININE-BSD FRML MDRD: ABNORMAL ML/MIN/{1.73_M2}
GFR SERPL CREATININE-BSD FRML MDRD: ABNORMAL ML/MIN/{1.73_M2}
GLUCOSE BLD-MCNC: 105 MG/DL (ref 65–105)
GLUCOSE BLD-MCNC: 107 MG/DL (ref 65–105)
GLUCOSE BLD-MCNC: 132 MG/DL (ref 65–105)
GLUCOSE BLD-MCNC: 134 MG/DL (ref 70–99)
HCT VFR BLD CALC: 41 % (ref 36.3–47.1)
HEMOGLOBIN: 12.5 G/DL (ref 11.9–15.1)
IMMATURE GRANULOCYTES: 0 %
LYMPHOCYTES # BLD: 43 % (ref 24–43)
MCH RBC QN AUTO: 27.2 PG (ref 25.2–33.5)
MCHC RBC AUTO-ENTMCNC: 30.5 G/DL (ref 28.4–34.8)
MCV RBC AUTO: 89.3 FL (ref 82.6–102.9)
MONOCYTES # BLD: 7 % (ref 3–12)
NRBC AUTOMATED: 0 PER 100 WBC
PDW BLD-RTO: 16.1 % (ref 11.8–14.4)
PLATELET # BLD: 232 K/UL (ref 138–453)
PLATELET ESTIMATE: ABNORMAL
PMV BLD AUTO: 11 FL (ref 8.1–13.5)
POTASSIUM SERPL-SCNC: 4 MMOL/L (ref 3.7–5.3)
RBC # BLD: 4.59 M/UL (ref 3.95–5.11)
RBC # BLD: ABNORMAL 10*6/UL
SEG NEUTROPHILS: 47 % (ref 36–65)
SEGMENTED NEUTROPHILS ABSOLUTE COUNT: 3.22 K/UL (ref 1.5–8.1)
SODIUM BLD-SCNC: 136 MMOL/L (ref 135–144)
TROPONIN INTERP: NORMAL
TROPONIN T: NORMAL NG/ML
TROPONIN, HIGH SENSITIVITY: 6 NG/L (ref 0–14)
TROPONIN, HIGH SENSITIVITY: 7 NG/L (ref 0–14)
TROPONIN, HIGH SENSITIVITY: 7 NG/L (ref 0–14)
TROPONIN, HIGH SENSITIVITY: <6 NG/L (ref 0–14)
WBC # BLD: 6.9 K/UL (ref 3.5–11.3)
WBC # BLD: ABNORMAL 10*3/UL

## 2020-05-18 PROCEDURE — 96372 THER/PROPH/DIAG INJ SC/IM: CPT

## 2020-05-18 PROCEDURE — 36415 COLL VENOUS BLD VENIPUNCTURE: CPT

## 2020-05-18 PROCEDURE — 99285 EMERGENCY DEPT VISIT HI MDM: CPT

## 2020-05-18 PROCEDURE — 84484 ASSAY OF TROPONIN QUANT: CPT

## 2020-05-18 PROCEDURE — 6370000000 HC RX 637 (ALT 250 FOR IP): Performed by: EMERGENCY MEDICINE

## 2020-05-18 PROCEDURE — 82947 ASSAY GLUCOSE BLOOD QUANT: CPT

## 2020-05-18 PROCEDURE — 71046 X-RAY EXAM CHEST 2 VIEWS: CPT

## 2020-05-18 PROCEDURE — 6370000000 HC RX 637 (ALT 250 FOR IP): Performed by: STUDENT IN AN ORGANIZED HEALTH CARE EDUCATION/TRAINING PROGRAM

## 2020-05-18 PROCEDURE — 6360000002 HC RX W HCPCS: Performed by: STUDENT IN AN ORGANIZED HEALTH CARE EDUCATION/TRAINING PROGRAM

## 2020-05-18 PROCEDURE — 85025 COMPLETE CBC W/AUTO DIFF WBC: CPT

## 2020-05-18 PROCEDURE — G0378 HOSPITAL OBSERVATION PER HR: HCPCS

## 2020-05-18 PROCEDURE — 93005 ELECTROCARDIOGRAM TRACING: CPT | Performed by: EMERGENCY MEDICINE

## 2020-05-18 PROCEDURE — 85379 FIBRIN DEGRADATION QUANT: CPT

## 2020-05-18 PROCEDURE — 2580000003 HC RX 258: Performed by: STUDENT IN AN ORGANIZED HEALTH CARE EDUCATION/TRAINING PROGRAM

## 2020-05-18 PROCEDURE — 80048 BASIC METABOLIC PNL TOTAL CA: CPT

## 2020-05-18 RX ORDER — HYDROCHLOROTHIAZIDE 25 MG/1
25 TABLET ORAL DAILY
Status: DISCONTINUED | OUTPATIENT
Start: 2020-05-18 | End: 2020-05-21 | Stop reason: HOSPADM

## 2020-05-18 RX ORDER — ASPIRIN 81 MG/1
81 TABLET ORAL DAILY
Status: DISCONTINUED | OUTPATIENT
Start: 2020-05-18 | End: 2020-05-21 | Stop reason: HOSPADM

## 2020-05-18 RX ORDER — ASPIRIN 81 MG/1
243 TABLET, CHEWABLE ORAL ONCE
Status: COMPLETED | OUTPATIENT
Start: 2020-05-18 | End: 2020-05-18

## 2020-05-18 RX ORDER — MAGNESIUM SULFATE 1 G/100ML
1 INJECTION INTRAVENOUS PRN
Status: DISCONTINUED | OUTPATIENT
Start: 2020-05-18 | End: 2020-05-21 | Stop reason: HOSPADM

## 2020-05-18 RX ORDER — POTASSIUM CHLORIDE 7.45 MG/ML
10 INJECTION INTRAVENOUS PRN
Status: DISCONTINUED | OUTPATIENT
Start: 2020-05-18 | End: 2020-05-21 | Stop reason: HOSPADM

## 2020-05-18 RX ORDER — LISINOPRIL 10 MG/1
10 TABLET ORAL DAILY
Status: DISCONTINUED | OUTPATIENT
Start: 2020-05-18 | End: 2020-05-21 | Stop reason: HOSPADM

## 2020-05-18 RX ORDER — NICOTINE POLACRILEX 4 MG
15 LOZENGE BUCCAL PRN
Status: DISCONTINUED | OUTPATIENT
Start: 2020-05-18 | End: 2020-05-21 | Stop reason: HOSPADM

## 2020-05-18 RX ORDER — SODIUM CHLORIDE 0.9 % (FLUSH) 0.9 %
10 SYRINGE (ML) INJECTION EVERY 12 HOURS SCHEDULED
Status: DISCONTINUED | OUTPATIENT
Start: 2020-05-18 | End: 2020-05-21 | Stop reason: HOSPADM

## 2020-05-18 RX ORDER — ACETAMINOPHEN 650 MG/1
650 SUPPOSITORY RECTAL EVERY 6 HOURS PRN
Status: DISCONTINUED | OUTPATIENT
Start: 2020-05-18 | End: 2020-05-21 | Stop reason: HOSPADM

## 2020-05-18 RX ORDER — NITROGLYCERIN 0.4 MG/1
0.4 TABLET SUBLINGUAL EVERY 5 MIN PRN
Status: DISCONTINUED | OUTPATIENT
Start: 2020-05-18 | End: 2020-05-21 | Stop reason: HOSPADM

## 2020-05-18 RX ORDER — ACETAMINOPHEN 325 MG/1
650 TABLET ORAL EVERY 6 HOURS PRN
Status: DISCONTINUED | OUTPATIENT
Start: 2020-05-18 | End: 2020-05-21 | Stop reason: HOSPADM

## 2020-05-18 RX ORDER — SODIUM CHLORIDE 0.9 % (FLUSH) 0.9 %
10 SYRINGE (ML) INJECTION PRN
Status: DISCONTINUED | OUTPATIENT
Start: 2020-05-18 | End: 2020-05-21 | Stop reason: HOSPADM

## 2020-05-18 RX ORDER — PANTOPRAZOLE SODIUM 40 MG/1
40 TABLET, DELAYED RELEASE ORAL
Status: DISCONTINUED | OUTPATIENT
Start: 2020-05-19 | End: 2020-05-21 | Stop reason: HOSPADM

## 2020-05-18 RX ORDER — ATORVASTATIN CALCIUM 40 MG/1
40 TABLET, FILM COATED ORAL DAILY
Status: DISCONTINUED | OUTPATIENT
Start: 2020-05-18 | End: 2020-05-21 | Stop reason: HOSPADM

## 2020-05-18 RX ORDER — ONDANSETRON 2 MG/ML
4 INJECTION INTRAMUSCULAR; INTRAVENOUS EVERY 6 HOURS PRN
Status: DISCONTINUED | OUTPATIENT
Start: 2020-05-18 | End: 2020-05-21 | Stop reason: HOSPADM

## 2020-05-18 RX ORDER — PROMETHAZINE HYDROCHLORIDE 25 MG/1
12.5 TABLET ORAL EVERY 6 HOURS PRN
Status: DISCONTINUED | OUTPATIENT
Start: 2020-05-18 | End: 2020-05-21 | Stop reason: HOSPADM

## 2020-05-18 RX ORDER — DEXTROSE MONOHYDRATE 25 G/50ML
12.5 INJECTION, SOLUTION INTRAVENOUS PRN
Status: DISCONTINUED | OUTPATIENT
Start: 2020-05-18 | End: 2020-05-21 | Stop reason: HOSPADM

## 2020-05-18 RX ORDER — LANOLIN ALCOHOL/MO/W.PET/CERES
325 CREAM (GRAM) TOPICAL
Status: DISCONTINUED | OUTPATIENT
Start: 2020-05-18 | End: 2020-05-21 | Stop reason: HOSPADM

## 2020-05-18 RX ORDER — DEXTROSE MONOHYDRATE 50 MG/ML
100 INJECTION, SOLUTION INTRAVENOUS PRN
Status: DISCONTINUED | OUTPATIENT
Start: 2020-05-18 | End: 2020-05-21 | Stop reason: HOSPADM

## 2020-05-18 RX ORDER — POTASSIUM CHLORIDE 20 MEQ/1
40 TABLET, EXTENDED RELEASE ORAL PRN
Status: DISCONTINUED | OUTPATIENT
Start: 2020-05-18 | End: 2020-05-21 | Stop reason: HOSPADM

## 2020-05-18 RX ADMIN — Medication 10 ML: at 20:59

## 2020-05-18 RX ADMIN — ENOXAPARIN SODIUM 40 MG: 40 INJECTION SUBCUTANEOUS at 14:53

## 2020-05-18 RX ADMIN — DESMOPRESSIN ACETATE 40 MG: 0.2 TABLET ORAL at 14:09

## 2020-05-18 RX ADMIN — ASPIRIN 81 MG 243 MG: 81 TABLET ORAL at 09:15

## 2020-05-18 RX ADMIN — HYDROCHLOROTHIAZIDE 25 MG: 25 TABLET ORAL at 14:10

## 2020-05-18 RX ADMIN — LISINOPRIL 10 MG: 10 TABLET ORAL at 14:10

## 2020-05-18 RX ADMIN — FERROUS SULFATE TAB EC 325 MG (65 MG FE EQUIVALENT) 325 MG: 325 (65 FE) TABLET DELAYED RESPONSE at 14:54

## 2020-05-18 ASSESSMENT — PAIN SCALES - GENERAL
PAINLEVEL_OUTOF10: 0
PAINLEVEL_OUTOF10: 6

## 2020-05-18 ASSESSMENT — PAIN DESCRIPTION - DESCRIPTORS: DESCRIPTORS: ACHING

## 2020-05-18 ASSESSMENT — ENCOUNTER SYMPTOMS
DIARRHEA: 0
CHEST TIGHTNESS: 1
BACK PAIN: 0
COUGH: 0
ABDOMINAL DISTENTION: 0
NAUSEA: 0
APNEA: 0
CONSTIPATION: 0
VOMITING: 0
ABDOMINAL PAIN: 0
CHOKING: 0
SORE THROAT: 0
SHORTNESS OF BREATH: 1

## 2020-05-18 ASSESSMENT — PAIN DESCRIPTION - ORIENTATION: ORIENTATION: MID;RIGHT

## 2020-05-18 ASSESSMENT — HEART SCORE: ECG: 1

## 2020-05-18 ASSESSMENT — PAIN DESCRIPTION - PAIN TYPE: TYPE: ACUTE PAIN

## 2020-05-18 ASSESSMENT — PAIN DESCRIPTION - LOCATION: LOCATION: CHEST

## 2020-05-18 ASSESSMENT — PAIN DESCRIPTION - FREQUENCY: FREQUENCY: CONTINUOUS

## 2020-05-18 NOTE — H&P
4. 59 3.95 - 5.11 m/uL    Hemoglobin 12.5 11.9 - 15.1 g/dL    Hematocrit 41.0 36.3 - 47.1 %    MCV 89.3 82.6 - 102.9 fL    MCH 27.2 25.2 - 33.5 pg    MCHC 30.5 28.4 - 34.8 g/dL    RDW 16.1 (H) 11.8 - 14.4 %    Platelets 940 433 - 909 k/uL    MPV 11.0 8.1 - 13.5 fL    NRBC Automated 0.0 0.0 per 100 WBC    Differential Type NOT REPORTED     Seg Neutrophils 47 36 - 65 %    Lymphocytes 43 24 - 43 %    Monocytes 7 3 - 12 %    Eosinophils % 3 1 - 4 %    Basophils 0 0 - 2 %    Immature Granulocytes 0 0 %    Segs Absolute 3.22 1.50 - 8.10 k/uL    Absolute Lymph # 3.00 1. 10 - 3.70 k/uL    Absolute Mono # 0.45 0.10 - 1.20 k/uL    Absolute Eos # 0.19 0.00 - 0.44 k/uL    Basophils Absolute 0.03 0.00 - 0.20 k/uL    Absolute Immature Granulocyte <0.03 0.00 - 0.30 k/uL    WBC Morphology NOT REPORTED     RBC Morphology ANISOCYTOSIS PRESENT     Platelet Estimate NOT REPORTED    BASIC METABOLIC PANEL    Collection Time: 05/18/20  9:20 AM   Result Value Ref Range    Glucose 134 (H) 70 - 99 mg/dL    BUN 11 6 - 20 mg/dL    CREATININE 0.89 0.50 - 0.90 mg/dL    Bun/Cre Ratio NOT REPORTED 9 - 20    Calcium 9.4 8.6 - 10.4 mg/dL    Sodium 136 135 - 144 mmol/L    Potassium 4.0 3.7 - 5.3 mmol/L    Chloride 100 98 - 107 mmol/L    CO2 23 20 - 31 mmol/L    Anion Gap 13 9 - 17 mmol/L    GFR Non-African American >60 >60 mL/min    GFR African American >60 >60 mL/min    GFR Comment          GFR Staging NOT REPORTED    Troponin    Collection Time: 05/18/20  9:20 AM   Result Value Ref Range    Troponin, High Sensitivity 7 0 - 14 ng/L    Troponin T NOT REPORTED <0.03 ng/mL    Troponin Interp NOT REPORTED    D-Dimer, Quantitative    Collection Time: 05/18/20  9:20 AM   Result Value Ref Range    D-Dimer, Quant 0.40 mg/L FEU         Imaging/Diagonstics:  Xr Chest Standard (2 Vw)    Result Date: 5/18/2020  EXAMINATION: TWO XRAY VIEWS OF THE CHEST 5/18/2020 9:42 am COMPARISON: Chest August 19, 2019.  HISTORY: ORDERING SYSTEM PROVIDED HISTORY: chest pain right side TECHNOLOGIST PROVIDED HISTORY: chest pain right side Reason for Exam: RT chest pain this AM/ erect/  gp used Acuity: Unknown Type of Exam: Unknown FINDINGS: Cardiomegaly is unchanged. Bibasilar atelectasis. No focal consolidation, pneumothorax, pleural effusion or free air. Osseous structures demonstrate degenerative change. Cardiomegaly with bibasilar atelectasis. No focal consolidation is seen to suggest pneumonia. ASSESSMENT:       Principal Problem:    Chest pain  Active Problems:    Gastroesophageal reflux disease    HTN (hypertension)    Hyperlipidemia    Controlled type 2 diabetes mellitus without complication, without long-term current use of insulin (HCC)    Morbid obesity with body mass index (BMI) of 40.0 to 49.9 ([facility])    SINDI (obstructive sleep apnea)  Resolved Problems:    * No resolved hospital problems. *      PLAN:     Patient status: Admit the patient as Observation in the Med/Surge Unit    Acute onset chest pain   -Heart score 5   -On ASA 81 mg at home   -Stress test pending  -D-dimer and trop unremarkable  -CXR - Cardiomegaly with bibasilar atelectasis   -Tylenol 650 mg Q6 h PRN   -Consult to cardiology; appreciate recommendations  -CMP w/ reflex to Mg in AM   -Monitor I&Os  -Potassium sliding scale in place  -Magnesium sliding scale in place     Hyperlipidemia  -Lipitor 40 mg PO as home medication  -Fasting lipid panel pending    Hypertension  -HCTz 25 mg PO  -Lisinopril 10 mg PO    Type 2 DM well controlled without complications  -Metformin held  -LDSS   -Hypoglycemia protocol  -POCT Glucose    DVT prophylaxis: Lovenox 40 mg SC  GI prophylaxis: Protonix 40 mg PO daily before breakfast  PT/OT Eval and treat  Dispositon: social work     Consultations:   Consults: IP CONSULT TO FAMILY MEDICINE  IP CONSULT TO CARDIOLOGY  PT/OT       The severity of this patient's signs and symptoms (specify chest pain) indicate the need for an inpatient admission.       Above plan discussed with the patient, who agreed to the above plan   Plan will be discussed with the attending, Dr. Ravinder Elias MD  Family Medicine Resident  5/18/2020 10:59 AM     ATTENDING NOTE    I have reviewed and discussed key elements of [address] with the resident including plan of care and follow up and agree with the care serenity plan.

## 2020-05-18 NOTE — ED PROVIDER NOTES
Complaint      Chief Complaint   Patient presents with    Chest Pain     intermittent righ/mid sternal cp ongoing since this morning           height is 5' 2\" (1.575 m). Her oral temperature is 98.4 °F (36.9 °C). Her blood pressure is 105/64 and her pulse is 104. Her respiration is 16 and oxygen saturation is 99%. DIAGNOSTIC RESULTS       RADIOLOGY:   XR CHEST STANDARD (2 VW)    (Results Pending)         LABS:  Labs Reviewed   CBC WITH AUTO DIFFERENTIAL - Abnormal; Notable for the following components:       Result Value    RDW 16.1 (*)     All other components within normal limits   D-DIMER, QUANTITATIVE   BASIC METABOLIC PANEL   TROPONIN   TROPONIN         EMERGENCY DEPARTMENT COURSE:     -------------------------      BP: 105/64, Temp: 98.4 °F (36.9 °C), Pulse: 104, Resp: 16    System Problem List     Patient Active Problem List   Diagnosis    Gastroesophageal reflux disease    Hyperlipidemia with target LDL less than 100    HTN (hypertension)    Numbness and tingling    Diverticula, colon    Diverticulitis, acute    Eczema    Lump of left breast    Shoulder pain    Right groin pain    Tinea corporis    Hyperlipidemia    Controlled type 2 diabetes mellitus without complication, without long-term current use of insulin (HCC)    Morbid obesity with body mass index (BMI) of 40.0 to 49.9 (HCC)    SINDI (obstructive sleep apnea)    Vitamin D deficiency    Melena         Comments  Chronic Prob List noted          Singh MD, F.A.C.E.P.   Attending Emergency Physician         Migdalia Yuan MD  05/18/20 3862
consult to [facility]    EKG 12 Lead    Insert peripheral IV    PATIENT STATUS (FROM ED OR OR/PROCEDURAL) Observation    PATIENT STATUS (FROM ED OR OR/PROCEDURAL) Observation       MEDICATIONS ORDERED:  Orders Placed This Encounter   Medications    aspirin chewable tablet 243 mg       DDX: ACS, PE, aortic dissection, pneumonia    DIAGNOSTIC RESULTS / EMERGENCY DEPARTMENT COURSE / MDM   :  Results for orders placed or performed during the hospital encounter of 05/18/20   CBC WITH AUTO DIFFERENTIAL   Result Value Ref Range    WBC 6.9 3.5 - 11.3 k/uL    RBC 4.59 3.95 - 5.11 m/uL    Hemoglobin 12.5 11.9 - 15.1 g/dL    Hematocrit 41.0 36.3 - 47.1 %    MCV 89.3 82.6 - 102.9 fL    MCH 27.2 25.2 - 33.5 pg    MCHC 30.5 28.4 - 34.8 g/dL    RDW 16.1 (H) 11.8 - 14.4 %    Platelets 263 164 - 629 k/uL    MPV 11.0 8.1 - 13.5 fL    NRBC Automated 0.0 0.0 per 100 WBC    Differential Type NOT REPORTED     Seg Neutrophils 47 36 - 65 %    Lymphocytes 43 24 - 43 %    Monocytes 7 3 - 12 %    Eosinophils % 3 1 - 4 %    Basophils 0 0 - 2 %    Immature Granulocytes 0 0 %    Segs Absolute 3.22 1.50 - 8.10 k/uL    Absolute Lymph # 3.00 1. 10 - 3.70 k/uL    Absolute Mono # 0.45 0.10 - 1.20 k/uL    Absolute Eos # 0.19 0.00 - 0.44 k/uL    Basophils Absolute 0.03 0.00 - 0.20 k/uL    Absolute Immature Granulocyte <0.03 0.00 - 0.30 k/uL    WBC Morphology NOT REPORTED     RBC Morphology ANISOCYTOSIS PRESENT     Platelet Estimate NOT REPORTED    BASIC METABOLIC PANEL   Result Value Ref Range    Glucose 134 (H) 70 - 99 mg/dL    BUN 11 6 - 20 mg/dL    CREATININE 0.89 0.50 - 0.90 mg/dL    Bun/Cre Ratio NOT REPORTED 9 - 20    Calcium 9.4 8.6 - 10.4 mg/dL    Sodium 136 135 - 144 mmol/L    Potassium 4.0 3.7 - 5.3 mmol/L    Chloride 100 98 - 107 mmol/L    CO2 23 20 - 31 mmol/L    Anion Gap 13 9 - 17 mmol/L    GFR Non-African American >60 >60 mL/min    GFR African American >60 >60 mL/min    GFR Comment          GFR Staging NOT REPORTED    Troponin

## 2020-05-18 NOTE — CARE COORDINATION
Case Management Initial Discharge Plan  Martín Zaidi,             Met with:patient to discuss discharge plans. Information verified: address, contacts, phone number, , insurance Yes  Emergency Contact/Next of Kin name & number: tulio Goff [phone]  PCP: Live Bolivar MD  Date of last visit: 2020    Insurance Provider: Medical mutual    Discharge Planning    Living Arrangements:  Family Members   Support Systems:  Family Members, Kelvin Ryan has 2 stories  few stairs to climb to get into front door, flight stairs to climb to reach second floor  Location of bedroom/bathroom in home [place]    Patient able to perform ADL's:Independent    Current Services (outpatient & in home) none  DME equipment: none    DME provider:       Potential Assistance Needed:  N/A    Patient agreeable to home care: No  Ovid of choice provided:  n/a    Prior SNF/Rehab Placement and Facility:   Agreeable to SNF/Rehab: No  Ovid of choice provided: n/a   Evaluation: no    Expected Discharge date:  20  Patient expects to be discharged to:  home  Follow Up Appointment: Best Day/ Time:      Transportation provider: self/family  Transportation arrangements needed for discharge: Yes    Readmission Risk              Risk of Unplanned Readmission:        0             Does patient have a readmission risk score greater than 14?: No  If yes, follow-up appointment must be made within 7 days of discharge.      Goals of Care: Pain management find out what is wrong      Discharge Plan: home independently          Electronically signed by [place] RN on 20 at 4:09 PM EDT

## 2020-05-19 ENCOUNTER — APPOINTMENT (OUTPATIENT)
Dept: NUCLEAR MEDICINE | Age: 60
End: 2020-05-19
Payer: COMMERCIAL

## 2020-05-19 LAB
ABSOLUTE EOS #: 0.26 K/UL (ref 0–0.44)
ABSOLUTE IMMATURE GRANULOCYTE: <0.03 K/UL (ref 0–0.3)
ABSOLUTE LYMPH #: 2.89 K/UL (ref 1.1–3.7)
ABSOLUTE MONO #: 0.38 K/UL (ref 0.1–1.2)
ALBUMIN SERPL-MCNC: 3.4 G/DL (ref 3.5–5.2)
ALBUMIN/GLOBULIN RATIO: 0.9 (ref 1–2.5)
ALP BLD-CCNC: 84 U/L (ref 35–104)
ALT SERPL-CCNC: 17 U/L (ref 5–33)
ANION GAP SERPL CALCULATED.3IONS-SCNC: 12 MMOL/L (ref 9–17)
AST SERPL-CCNC: 17 U/L
BASOPHILS # BLD: 0 % (ref 0–2)
BASOPHILS ABSOLUTE: 0.03 K/UL (ref 0–0.2)
BILIRUB SERPL-MCNC: 0.36 MG/DL (ref 0.3–1.2)
BUN BLDV-MCNC: 13 MG/DL (ref 6–20)
BUN/CREAT BLD: ABNORMAL (ref 9–20)
CALCIUM SERPL-MCNC: 9.2 MG/DL (ref 8.6–10.4)
CHLORIDE BLD-SCNC: 102 MMOL/L (ref 98–107)
CHOLESTEROL/HDL RATIO: 3.8
CHOLESTEROL: 124 MG/DL
CO2: 24 MMOL/L (ref 20–31)
CREAT SERPL-MCNC: 0.81 MG/DL (ref 0.5–0.9)
DIFFERENTIAL TYPE: ABNORMAL
EKG ATRIAL RATE: 94 BPM
EKG P AXIS: 45 DEGREES
EKG P-R INTERVAL: 176 MS
EKG Q-T INTERVAL: 386 MS
EKG QRS DURATION: 94 MS
EKG QTC CALCULATION (BAZETT): 482 MS
EKG R AXIS: -41 DEGREES
EKG T AXIS: 23 DEGREES
EKG VENTRICULAR RATE: 94 BPM
EOSINOPHILS RELATIVE PERCENT: 4 % (ref 1–4)
GFR AFRICAN AMERICAN: >60 ML/MIN
GFR NON-AFRICAN AMERICAN: >60 ML/MIN
GFR SERPL CREATININE-BSD FRML MDRD: ABNORMAL ML/MIN/{1.73_M2}
GFR SERPL CREATININE-BSD FRML MDRD: ABNORMAL ML/MIN/{1.73_M2}
GLUCOSE BLD-MCNC: 112 MG/DL (ref 65–105)
GLUCOSE BLD-MCNC: 114 MG/DL (ref 65–105)
GLUCOSE BLD-MCNC: 135 MG/DL (ref 70–99)
GLUCOSE BLD-MCNC: 143 MG/DL (ref 65–105)
GLUCOSE BLD-MCNC: 159 MG/DL (ref 65–105)
HCT VFR BLD CALC: 40.3 % (ref 36.3–47.1)
HDLC SERPL-MCNC: 33 MG/DL
HEMOGLOBIN: 12.4 G/DL (ref 11.9–15.1)
IMMATURE GRANULOCYTES: 0 %
LDL CHOLESTEROL: 70 MG/DL (ref 0–130)
LYMPHOCYTES # BLD: 43 % (ref 24–43)
MAGNESIUM: 2 MG/DL (ref 1.6–2.6)
MCH RBC QN AUTO: 27.9 PG (ref 25.2–33.5)
MCHC RBC AUTO-ENTMCNC: 30.8 G/DL (ref 28.4–34.8)
MCV RBC AUTO: 90.6 FL (ref 82.6–102.9)
MONOCYTES # BLD: 6 % (ref 3–12)
NRBC AUTOMATED: 0 PER 100 WBC
PDW BLD-RTO: 16.3 % (ref 11.8–14.4)
PLATELET # BLD: 213 K/UL (ref 138–453)
PLATELET ESTIMATE: ABNORMAL
PMV BLD AUTO: 11.1 FL (ref 8.1–13.5)
POTASSIUM SERPL-SCNC: 4 MMOL/L (ref 3.7–5.3)
RBC # BLD: 4.45 M/UL (ref 3.95–5.11)
RBC # BLD: ABNORMAL 10*6/UL
SEG NEUTROPHILS: 47 % (ref 36–65)
SEGMENTED NEUTROPHILS ABSOLUTE COUNT: 3.17 K/UL (ref 1.5–8.1)
SODIUM BLD-SCNC: 138 MMOL/L (ref 135–144)
TOTAL PROTEIN: 7.1 G/DL (ref 6.4–8.3)
TRIGL SERPL-MCNC: 103 MG/DL
VLDLC SERPL CALC-MCNC: ABNORMAL MG/DL (ref 1–30)
WBC # BLD: 6.8 K/UL (ref 3.5–11.3)
WBC # BLD: ABNORMAL 10*3/UL

## 2020-05-19 PROCEDURE — 2580000003 HC RX 258: Performed by: STUDENT IN AN ORGANIZED HEALTH CARE EDUCATION/TRAINING PROGRAM

## 2020-05-19 PROCEDURE — 93017 CV STRESS TEST TRACING ONLY: CPT

## 2020-05-19 PROCEDURE — 3430000000 HC RX DIAGNOSTIC RADIOPHARMACEUTICAL: Performed by: STUDENT IN AN ORGANIZED HEALTH CARE EDUCATION/TRAINING PROGRAM

## 2020-05-19 PROCEDURE — 82947 ASSAY GLUCOSE BLOOD QUANT: CPT

## 2020-05-19 PROCEDURE — 6370000000 HC RX 637 (ALT 250 FOR IP): Performed by: STUDENT IN AN ORGANIZED HEALTH CARE EDUCATION/TRAINING PROGRAM

## 2020-05-19 PROCEDURE — 76937 US GUIDE VASCULAR ACCESS: CPT

## 2020-05-19 PROCEDURE — 85025 COMPLETE CBC W/AUTO DIFF WBC: CPT

## 2020-05-19 PROCEDURE — G0378 HOSPITAL OBSERVATION PER HR: HCPCS

## 2020-05-19 PROCEDURE — 93010 ELECTROCARDIOGRAM REPORT: CPT | Performed by: INTERNAL MEDICINE

## 2020-05-19 PROCEDURE — A9500 TC99M SESTAMIBI: HCPCS | Performed by: STUDENT IN AN ORGANIZED HEALTH CARE EDUCATION/TRAINING PROGRAM

## 2020-05-19 PROCEDURE — 80061 LIPID PANEL: CPT

## 2020-05-19 PROCEDURE — 80053 COMPREHEN METABOLIC PANEL: CPT

## 2020-05-19 PROCEDURE — 6360000002 HC RX W HCPCS: Performed by: STUDENT IN AN ORGANIZED HEALTH CARE EDUCATION/TRAINING PROGRAM

## 2020-05-19 PROCEDURE — 83735 ASSAY OF MAGNESIUM: CPT

## 2020-05-19 PROCEDURE — 36415 COLL VENOUS BLD VENIPUNCTURE: CPT

## 2020-05-19 PROCEDURE — 78452 HT MUSCLE IMAGE SPECT MULT: CPT

## 2020-05-19 RX ORDER — ALBUTEROL SULFATE 90 UG/1
2 AEROSOL, METERED RESPIRATORY (INHALATION) PRN
Status: ACTIVE | OUTPATIENT
Start: 2020-05-19 | End: 2020-05-19

## 2020-05-19 RX ORDER — SODIUM CHLORIDE 0.9 % (FLUSH) 0.9 %
10 SYRINGE (ML) INJECTION PRN
Status: ACTIVE | OUTPATIENT
Start: 2020-05-19 | End: 2020-05-19

## 2020-05-19 RX ORDER — UREA 10 %
1 LOTION (ML) TOPICAL NIGHTLY PRN
Status: DISCONTINUED | OUTPATIENT
Start: 2020-05-19 | End: 2020-05-21 | Stop reason: HOSPADM

## 2020-05-19 RX ORDER — NITROGLYCERIN 0.4 MG/1
0.4 TABLET SUBLINGUAL EVERY 5 MIN PRN
Status: ACTIVE | OUTPATIENT
Start: 2020-05-19 | End: 2020-05-19

## 2020-05-19 RX ORDER — SODIUM CHLORIDE 9 MG/ML
500 INJECTION, SOLUTION INTRAVENOUS CONTINUOUS PRN
Status: ACTIVE | OUTPATIENT
Start: 2020-05-19 | End: 2020-05-19

## 2020-05-19 RX ORDER — ATROPINE SULFATE 0.1 MG/ML
0.5 INJECTION INTRAVENOUS EVERY 5 MIN PRN
Status: ACTIVE | OUTPATIENT
Start: 2020-05-19 | End: 2020-05-19

## 2020-05-19 RX ORDER — METOPROLOL TARTRATE 5 MG/5ML
5 INJECTION INTRAVENOUS EVERY 5 MIN PRN
Status: ACTIVE | OUTPATIENT
Start: 2020-05-19 | End: 2020-05-19

## 2020-05-19 RX ORDER — AMINOPHYLLINE DIHYDRATE 25 MG/ML
50 INJECTION, SOLUTION INTRAVENOUS PRN
Status: ACTIVE | OUTPATIENT
Start: 2020-05-19 | End: 2020-05-19

## 2020-05-19 RX ADMIN — Medication 1 MG: at 23:46

## 2020-05-19 RX ADMIN — Medication 81 MG: at 08:01

## 2020-05-19 RX ADMIN — REGADENOSON 0.4 MG: 0.08 INJECTION, SOLUTION INTRAVENOUS at 10:54

## 2020-05-19 RX ADMIN — TETRAKIS(2-METHOXYISOBUTYLISOCYANIDE)COPPER(I) TETRAFLUOROBORATE 30 MILLICURIE: 1 INJECTION, POWDER, LYOPHILIZED, FOR SOLUTION INTRAVENOUS at 10:55

## 2020-05-19 RX ADMIN — SODIUM CHLORIDE, PRESERVATIVE FREE 10 ML: 5 INJECTION INTRAVENOUS at 10:55

## 2020-05-19 RX ADMIN — FERROUS SULFATE TAB EC 325 MG (65 MG FE EQUIVALENT) 325 MG: 325 (65 FE) TABLET DELAYED RESPONSE at 16:31

## 2020-05-19 RX ADMIN — Medication 10 ML: at 20:24

## 2020-05-19 RX ADMIN — Medication 10 ML: at 10:29

## 2020-05-19 RX ADMIN — Medication 10 ML: at 10:54

## 2020-05-19 ASSESSMENT — ENCOUNTER SYMPTOMS
ABDOMINAL PAIN: 0
NAUSEA: 0
WHEEZING: 0
CONSTIPATION: 0
SORE THROAT: 0
VOMITING: 0
DIARRHEA: 0
SHORTNESS OF BREATH: 0

## 2020-05-19 NOTE — PROGRESS NOTES
Estimate NOT REPORTED    BASIC METABOLIC PANEL    Collection Time: 05/18/20  9:20 AM   Result Value Ref Range    Glucose 134 (H) 70 - 99 mg/dL    BUN 11 6 - 20 mg/dL    CREATININE 0.89 0.50 - 0.90 mg/dL    Bun/Cre Ratio NOT REPORTED 9 - 20    Calcium 9.4 8.6 - 10.4 mg/dL    Sodium 136 135 - 144 mmol/L    Potassium 4.0 3.7 - 5.3 mmol/L    Chloride 100 98 - 107 mmol/L    CO2 23 20 - 31 mmol/L    Anion Gap 13 9 - 17 mmol/L    GFR Non-African American >60 >60 mL/min    GFR African American >60 >60 mL/min    GFR Comment          GFR Staging NOT REPORTED    Troponin    Collection Time: 05/18/20  9:20 AM   Result Value Ref Range    Troponin, High Sensitivity 7 0 - 14 ng/L    Troponin T NOT REPORTED <0.03 ng/mL    Troponin Interp NOT REPORTED    D-Dimer, Quantitative    Collection Time: 05/18/20  9:20 AM   Result Value Ref Range    D-Dimer, Quant 0.40 mg/L FEU   Troponin    Collection Time: 05/18/20 11:32 AM   Result Value Ref Range    Troponin, High Sensitivity 7 0 - 14 ng/L    Troponin T NOT REPORTED <0.03 ng/mL    Troponin Interp NOT REPORTED    POC Glucose Fingerstick    Collection Time: 05/18/20 12:27 PM   Result Value Ref Range    POC Glucose 107 (H) 65 - 105 mg/dL   Troponin    Collection Time: 05/18/20  2:14 PM   Result Value Ref Range    Troponin, High Sensitivity 6 0 - 14 ng/L    Troponin T NOT REPORTED <0.03 ng/mL    Troponin Interp NOT REPORTED    Troponin    Collection Time: 05/18/20  4:15 PM   Result Value Ref Range    Troponin, High Sensitivity <6 0 - 14 ng/L    Troponin T NOT REPORTED <0.03 ng/mL    Troponin Interp NOT REPORTED    POC Glucose Fingerstick    Collection Time: 05/18/20  4:38 PM   Result Value Ref Range    POC Glucose 105 65 - 105 mg/dL   POC Glucose Fingerstick    Collection Time: 05/18/20  8:57 PM   Result Value Ref Range    POC Glucose 132 (H) 65 - 105 mg/dL   Comprehensive Metabolic Panel w/ Reflex to MG    Collection Time: 05/19/20  5:37 AM   Result Value Ref Range    Glucose 135 (H) 70 - 99 mg/dL    BUN 13 6 - 20 mg/dL    CREATININE 0.81 0.50 - 0.90 mg/dL    Bun/Cre Ratio NOT REPORTED 9 - 20    Calcium 9.2 8.6 - 10.4 mg/dL    Sodium 138 135 - 144 mmol/L    Potassium 4.0 3.7 - 5.3 mmol/L    Chloride 102 98 - 107 mmol/L    CO2 24 20 - 31 mmol/L    Anion Gap 12 9 - 17 mmol/L    Alkaline Phosphatase 84 35 - 104 U/L    ALT 17 5 - 33 U/L    AST 17 <32 U/L    Total Bilirubin 0.36 0.3 - 1.2 mg/dL    Total Protein 7.1 6.4 - 8.3 g/dL    Alb 3.4 (L) 3.5 - 5.2 g/dL    Albumin/Globulin Ratio 0.9 (L) 1.0 - 2.5    GFR Non-African American >60 >60 mL/min    GFR African American >60 >60 mL/min    GFR Comment          GFR Staging NOT REPORTED    Magnesium    Collection Time: 05/19/20  5:37 AM   Result Value Ref Range    Magnesium 2.0 1.6 - 2.6 mg/dL   Lipid panel - fasting    Collection Time: 05/19/20  5:37 AM   Result Value Ref Range    Cholesterol 124 <200 mg/dL    HDL 33 (L) >40 mg/dL    LDL Cholesterol 70 0 - 130 mg/dL    Chol/HDL Ratio 3.8 <5    Triglycerides 103 <150 mg/dL    VLDL NOT REPORTED 1 - 30 mg/dL   CBC auto differential    Collection Time: 05/19/20  5:37 AM   Result Value Ref Range    WBC 6.8 3.5 - 11.3 k/uL    RBC 4.45 3.95 - 5.11 m/uL    Hemoglobin 12.4 11.9 - 15.1 g/dL    Hematocrit 40.3 36.3 - 47.1 %    MCV 90.6 82.6 - 102.9 fL    MCH 27.9 25.2 - 33.5 pg    MCHC 30.8 28.4 - 34.8 g/dL    RDW 16.3 (H) 11.8 - 14.4 %    Platelets 074 850 - 140 k/uL    MPV 11.1 8.1 - 13.5 fL    NRBC Automated 0.0 0.0 per 100 WBC    Differential Type NOT REPORTED     Seg Neutrophils 47 36 - 65 %    Lymphocytes 43 24 - 43 %    Monocytes 6 3 - 12 %    Eosinophils % 4 1 - 4 %    Basophils 0 0 - 2 %    Immature Granulocytes 0 0 %    Segs Absolute 3.17 1.50 - 8.10 k/uL    Absolute Lymph # 2.89 1.10 - 3.70 k/uL    Absolute Mono # 0.38 0.10 - 1.20 k/uL    Absolute Eos # 0.26 0.00 - 0.44 k/uL    Basophils Absolute 0.03 0.00 - 0.20 k/uL    Absolute Immature Granulocyte <0.03 0.00 - 0.30 k/uL    WBC Morphology NOT REPORTED (obstructive sleep apnea)  Resolved Problems:    * No resolved hospital problems. *      PLAN:     Acute onset chest pain r/o ACS  -Heart score 5   -EKG: No significant changes when compared to prior  -D-dimer and trop unremarkable  -CXR - Cardiomegaly with bibasilar atelectasis   -Stress test ordered this am  -Consult to cardiology; appreciate recommendations  -Tylenol 650 mg Q6 h PRN   -Likely discharge home if Stress Test unremarkable     Hyperlipidemia  -Continue Lipitor 40 mg PO  -Fasting lipid panel reviewed     Hypertension  -HCTZ 25 mg PO  -Lisinopril 10 mg PO  - BP well controlled at 137/77 this am     Type 2 DM well controlled without complications  -Metformin held  -LDSS   -Hypoglycemia protocol  -POCT Glucose     DVT prophylaxis: Lovenox 40 mg SC  GI prophylaxis: Protonix 40 mg PO daily before breakfast      Discussed care plan with nurse after getting her input. Above plan discussed with the patient, who agreed to the above plan   Plan will be discussed with the attending, Dr. Jeremi Colon MD  Family Medicine Resident  5/19/2020 7:53 AM     ATTENDING NOTE    I have reviewed and discussed key elements of [address] with the resident including plan of care and follow up and agree with the care serenity plan. Paitent out of room for stress test.    If stress is negative can go home. MI ruled out . Was admitted for stress test due to risk factors.

## 2020-05-20 LAB
GLUCOSE BLD-MCNC: 107 MG/DL (ref 65–105)
GLUCOSE BLD-MCNC: 114 MG/DL (ref 65–105)
GLUCOSE BLD-MCNC: 130 MG/DL (ref 65–105)
LV EF: 62 %
LVEF MODALITY: NORMAL
SARS-COV-2, PCR: NORMAL
SARS-COV-2, RAPID: NORMAL
SARS-COV-2: NOT DETECTED
SOURCE: NORMAL

## 2020-05-20 PROCEDURE — G0378 HOSPITAL OBSERVATION PER HR: HCPCS

## 2020-05-20 PROCEDURE — A9500 TC99M SESTAMIBI: HCPCS | Performed by: STUDENT IN AN ORGANIZED HEALTH CARE EDUCATION/TRAINING PROGRAM

## 2020-05-20 PROCEDURE — U0003 INFECTIOUS AGENT DETECTION BY NUCLEIC ACID (DNA OR RNA); SEVERE ACUTE RESPIRATORY SYNDROME CORONAVIRUS 2 (SARS-COV-2) (CORONAVIRUS DISEASE [COVID-19]), AMPLIFIED PROBE TECHNIQUE, MAKING USE OF HIGH THROUGHPUT TECHNOLOGIES AS DESCRIBED BY CMS-2020-01-R: HCPCS

## 2020-05-20 PROCEDURE — 2580000003 HC RX 258: Performed by: STUDENT IN AN ORGANIZED HEALTH CARE EDUCATION/TRAINING PROGRAM

## 2020-05-20 PROCEDURE — 1200000000 HC SEMI PRIVATE

## 2020-05-20 PROCEDURE — 82947 ASSAY GLUCOSE BLOOD QUANT: CPT

## 2020-05-20 PROCEDURE — 6370000000 HC RX 637 (ALT 250 FOR IP): Performed by: STUDENT IN AN ORGANIZED HEALTH CARE EDUCATION/TRAINING PROGRAM

## 2020-05-20 PROCEDURE — 6370000000 HC RX 637 (ALT 250 FOR IP): Performed by: INTERNAL MEDICINE

## 2020-05-20 PROCEDURE — 99222 1ST HOSP IP/OBS MODERATE 55: CPT | Performed by: FAMILY MEDICINE

## 2020-05-20 PROCEDURE — 3430000000 HC RX DIAGNOSTIC RADIOPHARMACEUTICAL: Performed by: STUDENT IN AN ORGANIZED HEALTH CARE EDUCATION/TRAINING PROGRAM

## 2020-05-20 RX ORDER — SODIUM CHLORIDE 0.9 % (FLUSH) 0.9 %
10 SYRINGE (ML) INJECTION PRN
Status: DISCONTINUED | OUTPATIENT
Start: 2020-05-20 | End: 2020-05-21 | Stop reason: HOSPADM

## 2020-05-20 RX ADMIN — FERROUS SULFATE TAB EC 325 MG (65 MG FE EQUIVALENT) 325 MG: 325 (65 FE) TABLET DELAYED RESPONSE at 16:42

## 2020-05-20 RX ADMIN — Medication 10 ML: at 20:55

## 2020-05-20 RX ADMIN — SODIUM CHLORIDE, PRESERVATIVE FREE 10 ML: 5 INJECTION INTRAVENOUS at 07:40

## 2020-05-20 RX ADMIN — TETRAKIS(2-METHOXYISOBUTYLISOCYANIDE)COPPER(I) TETRAFLUOROBORATE 40 MILLICURIE: 1 INJECTION, POWDER, LYOPHILIZED, FOR SOLUTION INTRAVENOUS at 07:40

## 2020-05-20 RX ADMIN — ACETAMINOPHEN 650 MG: 325 TABLET ORAL at 19:16

## 2020-05-20 RX ADMIN — DESMOPRESSIN ACETATE 40 MG: 0.2 TABLET ORAL at 09:28

## 2020-05-20 RX ADMIN — Medication 81 MG: at 09:28

## 2020-05-20 RX ADMIN — METOPROLOL TARTRATE 25 MG: 25 TABLET ORAL at 12:24

## 2020-05-20 RX ADMIN — METOPROLOL TARTRATE 25 MG: 25 TABLET ORAL at 20:55

## 2020-05-20 ASSESSMENT — ENCOUNTER SYMPTOMS
WHEEZING: 0
SHORTNESS OF BREATH: 0
VOMITING: 0
CONSTIPATION: 0
SORE THROAT: 0
DIARRHEA: 0
ABDOMINAL PAIN: 0
NAUSEA: 0

## 2020-05-20 ASSESSMENT — PAIN SCALES - GENERAL
PAINLEVEL_OUTOF10: 2
PAINLEVEL_OUTOF10: 0

## 2020-05-20 NOTE — DISCHARGE SUMMARY
plan.    Had cath today. Not available for exam at time of rounds. Cleared by cardiology for discharge after cardiac cath.

## 2020-05-20 NOTE — PROGRESS NOTES
smoking about 36 years ago. She has a 1.75 pack-year smoking history. She has never used smokeless tobacco. She reports previous alcohol use. She reports that she does not use drugs. FAMILY HISTORY:     family history is not on file. HOME MEDICATIONS:      Prior to Admission medications    Medication Sig Start Date End Date Taking? Authorizing Provider   hydroCHLOROthiazide (HYDRODIURIL) 25 MG tablet take 1 tablet by mouth once daily 4/9/20   Leena Eddy MD   metFORMIN (GLUCOPHAGE-XR) 500 MG extended release tablet Take 1 tablet by mouth 2 times daily (with meals)  Patient not taking: Reported on 2/4/2020 2/4/20   Roberto Covarrubias MD   lisinopril (PRINIVIL;ZESTRIL) 10 MG tablet take 1 tablet by mouth once daily 1/16/20   Leena Eddy MD   atorvastatin (LIPITOR) 40 MG tablet Take 1 tablet by mouth daily  Patient not taking: Reported on 2/4/2020 12/20/19   Leena Eddy MD   pantoprazole (PROTONIX) 40 MG tablet Take 1 tablet by mouth every morning (before breakfast) 10/19/19   John Paul Benavides MD   Elastic Bandages & Supports (ACE ELBOW BRACE LARGE/X-LARGE) MISC 1 Brace as need for elbow pain  Patient not taking: Reported on 2/4/2020 12/14/18   Dami Waters MD   aspirin 81 MG tablet Take 81 mg by mouth daily    Historical Provider, MD       ALLERGIES:     Patient has no known allergies. OBJECTIVE:       Vitals:    05/19/20 0622 05/19/20 0923 05/19/20 1930 05/20/20 0600   BP:  106/61 109/69    Pulse:  68 82    Resp:  15 16    Temp:  98.2 °F (36.8 °C) 98 °F (36.7 °C)    TempSrc:  Oral Oral    SpO2:  (!) 77% 95%    Weight: 278 lb 11.2 oz (126.4 kg)   279 lb 6.4 oz (126.7 kg)   Height:           No intake or output data in the 24 hours ending 05/20/20 0747    PHYSICAL EXAM:  Physical Exam  Constitutional:       General: She is not in acute distress. Appearance: She is well-developed. HENT:      Head: Normocephalic and atraumatic. Eyes:      Pupils: Pupils are equal, round, and reactive to light. complications  -Metformin held  -LDSS   -Hypoglycemia protocol  -POCT Glucose     DVT prophylaxis: Lovenox 40 mg SC  GI prophylaxis: Protonix 40 mg PO daily before breakfast    Discussed care plan with nurse after getting her input. Above plan discussed with the patient, who agreed to the above plan   Plan will be discussed with the attending, Dr. Sergei Kim MD  Family Medicine Resident  5/20/2020 7:47 AM     ATTENDING NOTE    i have reviewed key elements of Justina Lopez history and exam. And I examined Dallis Sarah Ann  . I have discussed the treatment plan with the resident and agree with the plan. Patient willing to reconsider getting cardiac cath done during this admission.

## 2020-05-21 ENCOUNTER — APPOINTMENT (OUTPATIENT)
Dept: CARDIAC CATH/INVASIVE PROCEDURES | Age: 60
End: 2020-05-21
Payer: COMMERCIAL

## 2020-05-21 VITALS
HEIGHT: 62 IN | TEMPERATURE: 98.1 F | OXYGEN SATURATION: 97 % | DIASTOLIC BLOOD PRESSURE: 55 MMHG | RESPIRATION RATE: 16 BRPM | WEIGHT: 279.4 LBS | HEART RATE: 91 BPM | BODY MASS INDEX: 51.41 KG/M2 | SYSTOLIC BLOOD PRESSURE: 96 MMHG

## 2020-05-21 LAB
GLUCOSE BLD-MCNC: 138 MG/DL (ref 65–105)
GLUCOSE BLD-MCNC: 163 MG/DL (ref 65–105)
GLUCOSE BLD-MCNC: 93 MG/DL (ref 65–105)
GLUCOSE BLD-MCNC: 96 MG/DL (ref 65–105)

## 2020-05-21 PROCEDURE — 6360000004 HC RX CONTRAST MEDICATION

## 2020-05-21 PROCEDURE — C1725 CATH, TRANSLUMIN NON-LASER: HCPCS

## 2020-05-21 PROCEDURE — 2500000003 HC RX 250 WO HCPCS

## 2020-05-21 PROCEDURE — G0378 HOSPITAL OBSERVATION PER HR: HCPCS

## 2020-05-21 PROCEDURE — 6360000002 HC RX W HCPCS

## 2020-05-21 PROCEDURE — 99238 HOSP IP/OBS DSCHRG MGMT 30/<: CPT | Performed by: FAMILY MEDICINE

## 2020-05-21 PROCEDURE — C1894 INTRO/SHEATH, NON-LASER: HCPCS

## 2020-05-21 PROCEDURE — C1769 GUIDE WIRE: HCPCS

## 2020-05-21 PROCEDURE — 93458 L HRT ARTERY/VENTRICLE ANGIO: CPT | Performed by: INTERNAL MEDICINE

## 2020-05-21 PROCEDURE — 82947 ASSAY GLUCOSE BLOOD QUANT: CPT

## 2020-05-21 PROCEDURE — 2709999900 HC NON-CHARGEABLE SUPPLY

## 2020-05-21 RX ORDER — ATORVASTATIN CALCIUM 40 MG/1
40 TABLET, FILM COATED ORAL DAILY
Qty: 30 TABLET | Refills: 3 | Status: SHIPPED | OUTPATIENT
Start: 2020-05-21 | End: 2021-09-02 | Stop reason: SDUPTHER

## 2020-05-21 RX ORDER — SODIUM CHLORIDE 0.9 % (FLUSH) 0.9 %
10 SYRINGE (ML) INJECTION EVERY 12 HOURS SCHEDULED
Status: DISCONTINUED | OUTPATIENT
Start: 2020-05-21 | End: 2020-05-21 | Stop reason: HOSPADM

## 2020-05-21 RX ORDER — LANOLIN ALCOHOL/MO/W.PET/CERES
325 CREAM (GRAM) TOPICAL
Qty: 90 TABLET | Refills: 3 | Status: SHIPPED | OUTPATIENT
Start: 2020-05-21

## 2020-05-21 RX ORDER — ACETAMINOPHEN 325 MG/1
650 TABLET ORAL EVERY 4 HOURS PRN
Status: DISCONTINUED | OUTPATIENT
Start: 2020-05-21 | End: 2020-05-21 | Stop reason: HOSPADM

## 2020-05-21 RX ORDER — UREA 10 %
1 LOTION (ML) TOPICAL NIGHTLY PRN
Qty: 30 TABLET | Refills: 2 | Status: SHIPPED | OUTPATIENT
Start: 2020-05-21

## 2020-05-21 RX ORDER — NITROGLYCERIN 0.4 MG/1
TABLET SUBLINGUAL
Qty: 25 TABLET | Refills: 3 | Status: SHIPPED | OUTPATIENT
Start: 2020-05-21 | End: 2021-04-28

## 2020-05-21 RX ORDER — SODIUM CHLORIDE 0.9 % (FLUSH) 0.9 %
10 SYRINGE (ML) INJECTION PRN
Status: DISCONTINUED | OUTPATIENT
Start: 2020-05-21 | End: 2020-05-21 | Stop reason: HOSPADM

## 2020-05-21 ASSESSMENT — ENCOUNTER SYMPTOMS
SHORTNESS OF BREATH: 0
WHEEZING: 0
ABDOMINAL PAIN: 0
SORE THROAT: 0
VOMITING: 0
NAUSEA: 0
CONSTIPATION: 0
DIARRHEA: 0

## 2020-05-21 ASSESSMENT — PAIN SCALES - GENERAL: PAINLEVEL_OUTOF10: 0

## 2020-05-21 NOTE — PROGRESS NOTES
history. She has never used smokeless tobacco. She reports previous alcohol use. She reports that she does not use drugs. FAMILY HISTORY:     family history is not on file. HOME MEDICATIONS:      Prior to Admission medications    Medication Sig Start Date End Date Taking? Authorizing Provider   hydroCHLOROthiazide (HYDRODIURIL) 25 MG tablet take 1 tablet by mouth once daily 4/9/20   Mariely Waters MD   metFORMIN (GLUCOPHAGE-XR) 500 MG extended release tablet Take 1 tablet by mouth 2 times daily (with meals)  Patient not taking: Reported on 2/4/2020 2/4/20   Abdirahman Adams MD   lisinopril (PRINIVIL;ZESTRIL) 10 MG tablet take 1 tablet by mouth once daily 1/16/20   Mairely Waters MD   atorvastatin (LIPITOR) 40 MG tablet Take 1 tablet by mouth daily  Patient not taking: Reported on 2/4/2020 12/20/19   Mariely Waters MD   pantoprazole (PROTONIX) 40 MG tablet Take 1 tablet by mouth every morning (before breakfast) 10/19/19   Donita Oquendo MD   Elastic Bandages & Supports (ACE ELBOW BRACE LARGE/X-LARGE) MIS 1 Brace as need for elbow pain  Patient not taking: Reported on 2/4/2020 12/14/18   Saira Brooke MD   aspirin 81 MG tablet Take 81 mg by mouth daily    Historical Provider, MD       ALLERGIES:     Patient has no known allergies. OBJECTIVE:       Vitals:    05/20/20 1212 05/20/20 2018 05/21/20 0419 05/21/20 0814   BP: 126/71 105/64 108/67 117/66   Pulse: 80 76 75 79   Resp:  18 16 16   Temp:  98.4 °F (36.9 °C) 98.1 °F (36.7 °C) 97.9 °F (36.6 °C)   TempSrc:   Oral Oral   SpO2:  98%  98%   Weight:       Height:             Intake/Output Summary (Last 24 hours) at 5/21/2020 2306  Last data filed at 5/21/2020 0419  Gross per 24 hour   Intake 270 ml   Output --   Net 270 ml       PHYSICAL EXAM:  Physical Exam  Constitutional:       General: She is not in acute distress. Appearance: She is well-developed. HENT:      Head: Normocephalic and atraumatic.    Eyes:      Pupils: Pupils are equal, round, and reactive resolved hospital problems. *      PLAN:     Chest Pain with abnormal stress test  -Possible reversible ischemia of the inferior and apical walls. Hypokinesis of the lateral wall. LVEF 62%. High Risk Stratification  -Tylenol and Nitro PRN  - Cardiology following   - Will go down for Cath today  - Currently NPO     Hyperlipidemia  -Continue Lipitor 40 mg PO  -Fasting lipid panel reviewed     Hypertension  -HCTZ 25 mg PO  -Lisinopril 10 mg PO  - BP well controlled at 117/66 this am     Type 2 DM well controlled without complications  -Metformin held  -LDSS   -Hypoglycemia protocol  -POCT Glucose     DVT prophylaxis: Lovenox 40 mg SC  GI prophylaxis: Protonix 40 mg PO daily before breakfast    Discussed care plan with nurse after getting her input. Above plan discussed with the patient, who agreed to the above plan   Plan will be discussed with the attending, Dr. Lauren Miles MD  Family Medicine Resident  5/21/2020 8:52 AM     ATTENDING NOTE    I have reviewed and discussed key elements of [address] with the resident including plan of care and follow up and agree with the care serenity plan. Out for procedure. Home later today if cleared by cardiology.

## 2020-05-21 NOTE — CARE COORDINATION
Discharge 1 [facility] Case Management Department  Written by: Placido Portillo RN    Patient Name: Shawn Gaspar  Attending Provider: Rivka Hernandez MD  Admit Date: 2020  8:57 AM  MRN: [id number]  Account: [de-identified]                     : 1960  Discharge Date: 2020      Disposition: home    Placido Portillo RN

## 2020-05-21 NOTE — OP NOTE
[facility]        Date:   5/21/2020  Patient name:  Michelle Diggs  Date of admission:  5/18/2020  8:57 AM  MRN:   [id number]  YOB: 1960    CARDIAC CATHETERIZATION    Operators:  Primary:  Dr Keegan Kurtz        Procedure performed:     [x] Left Heart Catheterization. [] Graft Angiography. [x] Left Ventriculography. [] Right Heart Catheterization. [x] Coronary Angiography. [] Aortic Valve Studies. [] PCI:      [] Other:       Pre Procedure Conscious Sedation Data:    ASA Class:    [] I [x] II [] III [] IV    Mallampati Class:  [] I [x] II [] III [] IV      Indication:  [] STEMI      [x] + Stress test  [] ACS      [] + EKG Changes  [] Non Q MI       [] Significant Risk Factors  [] Recurrent Angina             [] Diabetes Mellitus    [] New LBBB      [] Uncontrolled HTN. [] CHF / Low EF changes     [] Abnormal CTA / Ca Score  [] Other:     Procedure:  Access:  [] Femoral artery  [x] Radial  artery       [x] Right   [] Left    Procedure: After informed consent was obtained with explanation of the risks and benefits, patient was brought to the cath lab. The access area was prepped and draped in sterile fashion. 1% lidocaine was used for local block. The artery was cannulated with 6  Fr sheath with brisk arterial blood return. The side port was frequently flushed and aspirated with normal saline. Findings:    LMCA: Mild irregularities 10-20%. LAD: Mild irregularities 10-20%. LCx: Mild irregularities 10-20%. RCA: Mild irregularities 10-20%.     Coronary Tree      Dominance: Left      The LV gram was performed in the JAIME 30 position. LVEF: 60%. LV Wall Motion: Normal          Conclusions:   Mild CAD.   Normal global left ventricular systolic function. Recommendations:  Medical therapy and risk factor modifications.         History and Risk Factors    [x] Hypertension     [] Family history of CAD  [x] Hyperlipidemia     [] Cerebrovascular Disease   [] Prior MI

## 2020-05-21 NOTE — PROGRESS NOTES
[facility]  Progress Note                   Date:   5/21/2020  Patient name: Severo Casper  Date of admission:  5/18/2020  8:57 AM  MRN:   [id number]  YOB: 1960  PCP: Marry Busby MD    Reason for Admission: Chest pain [R07.9]  Chest pain [R07.9]  Chest pain [R07.9]    Subjective:       Clinical Changes /Abnormalities: Pt. Seen & examined alone in room. Denies CP or SOB. Tele SR. Awaiting cardiac cath this afternoon. Denies any chest pain or SOB. Review of Systems    Medications:   Scheduled Meds:   metoprolol tartrate  25 mg Oral BID    sodium chloride flush  10 mL Intravenous 2 times per day    enoxaparin  40 mg Subcutaneous Daily    aspirin  81 mg Oral Daily    atorvastatin  40 mg Oral Daily    ferrous sulfate  325 mg Oral BID AC    hydroCHLOROthiazide  25 mg Oral Daily    lisinopril  10 mg Oral Daily    pantoprazole  40 mg Oral QAM AC    insulin lispro  0-6 Units Subcutaneous TID WC    insulin lispro  0-3 Units Subcutaneous Nightly     Continuous Infusions:   dextrose       CBC:   Recent Labs     05/19/20  0537   WBC 6.8   HGB 12.4        BMP:    Recent Labs     05/19/20  0537      K 4.0      CO2 24   BUN 13   CREATININE 0.81   GLUCOSE 135*     Hepatic:  Recent Labs     05/19/20  0537   AST 17   ALT 17   BILITOT 0.36   ALKPHOS 84     Troponin:   Recent Labs     05/18/20  1132 05/18/20  1414 05/18/20  1615   TROPHS 7 6 <6     BNP: No results for input(s): BNP in the last 72 hours. Lipids:   Recent Labs     05/19/20  0537   CHOL 124   HDL 33*     INR: No results for input(s): INR in the last 72 hours. Objective:   Vitals: /66   Pulse 79   Temp 97.9 °F (36.6 °C) (Oral)   Resp 16   Ht 5' 2\" (1.575 m)   Wt 279 lb 6.4 oz (126.7 kg)   SpO2 98%   BMI 51.10 kg/m²   General appearance: alert and cooperative with exam  HEENT: Head: Normocephalic, no lesions, without obvious abnormality.   Neck:no JVD, trachea midline, no

## 2020-05-22 ENCOUNTER — CARE COORDINATION (OUTPATIENT)
Dept: OTHER | Facility: CLINIC | Age: 60
End: 2020-05-22

## 2020-05-26 ENCOUNTER — CARE COORDINATION (OUTPATIENT)
Dept: OTHER | Facility: CLINIC | Age: 60
End: 2020-05-26

## 2020-06-02 ENCOUNTER — TELEPHONE (OUTPATIENT)
Dept: FAMILY MEDICINE CLINIC | Age: 60
End: 2020-06-02

## 2020-06-02 NOTE — TELEPHONE ENCOUNTER
Patient had Work Forms sent to the office but we can not fax them until patient signs them. Writer spoke with patient who says she will come into the office to sign forms. Paperwork left at .

## 2020-06-16 NOTE — TELEPHONE ENCOUNTER
Initial Anesthesia Post-op Note    Patient: Margaux Morrow  Procedure(s) Performed: CYSTOSCOPYRIGHT URETERAL STENT INSERTION  Anesthesia type: General    Vital Last Value   Temperature 37.1 °C (98.8 °F) (03/29/18 1258)   Pulse 66 (03/29/18 1258)   Respiratory Rate 8 (03/29/18 1258)   Non-Invasive   Blood Pressure 143/65 (03/29/18 1258)   Arterial  Blood Pressure     Pulse Oximetry 94 % (03/29/18 1258)     Last 24 I/O:   Intake/Output Summary (Last 24 hours) at 03/29/18 1259  Last data filed at 03/29/18 1249   Gross per 24 hour   Intake              320 ml   Output              250 ml   Net               70 ml       PATIENT LOCATION: PACU Phase 1  POST-OP VITAL SIGNS: stable  LEVEL OF CONSCIOUSNESS: participates in exam, awake, answers questions appropriately, alert and oriented  RESPIRATORY STATUS: spontaneous ventilation, unassisted and room air  CARDIOVASCULAR: blood pressure returned to baseline and stable  HYDRATION: euvolemic    PAIN MANAGEMENT: well controlled  NAUSEA: None  AIRWAY PATENCY: patent  POST-OP ASSESSMENT: no complications, patient tolerated procedure well with no complications, no evidence of recall and sufficiently recovered from acute administration of anesthesia effects and able to participate in evaluation  COMPLICATIONS: none  Comments: Pt to PACU. VSS. Pt denies any pain/nausea. Report given to RN.   HANDOFF:  Handoff to receiving nurse was performed and questions were answered       Please address the medication refill and close the encounter. If I can be of assistance, please route to the applicable pool. Thank you. Last visit: 02/04/20  Last Med refill: 01/16/20  Does patient have enough medication for 72 hours: No:     Next Visit Date:  No future appointments. Health Maintenance   Topic Date Due    Diabetic retinal exam  10/24/1972    Cervical cancer screen  10/24/1983    Diabetic foot exam  12/16/2017    Hepatitis B vaccine (1 of 3 - Risk 3-dose series) 11/01/2020 (Originally 10/24/1981)    Shingles Vaccine (1 of 2) 02/04/2021 (Originally 10/24/2012)    Diabetic microalbuminuria test  11/01/2020    A1C test (Diabetic or Prediabetic)  12/20/2020    Breast cancer screen  01/12/2021    Lipid screen  05/19/2021    Potassium monitoring  05/19/2021    Creatinine monitoring  05/19/2021    DTaP/Tdap/Td vaccine (2 - Td) 03/07/2024    Colon cancer screen colonoscopy  10/18/2029    Flu vaccine  Completed    Pneumococcal 0-64 years Vaccine  Completed    Hepatitis C screen  Completed    HIV screen  Completed    Hepatitis A vaccine  Aged Out    Hib vaccine  Aged Out    Meningococcal (ACWY) vaccine  Aged Out       Hemoglobin A1C (%)   Date Value   12/20/2019 6.3   01/15/2019 6.5   04/18/2018 6.6 (H)             ( goal A1C is < 7)   Microalb/Crt.  Ratio (mcg/mg creat)   Date Value   11/01/2019 CANNOT BE CALCULATED     LDL Cholesterol (mg/dL)   Date Value   05/19/2020 70   11/01/2019 97       (goal LDL is <100)   AST (U/L)   Date Value   05/19/2020 17     ALT (U/L)   Date Value   05/19/2020 17     BUN (mg/dL)   Date Value   05/19/2020 13     BP Readings from Last 3 Encounters:   05/21/20 (!) 96/55   02/04/20 105/69   12/20/19 138/82          (goal 120/80)    All Future Testing planned in [facility]  Lab Frequency Next Occurrence               Patient Active Problem List:     Gastroesophageal reflux disease     Hyperlipidemia with target LDL less than 100     HTN (hypertension)

## 2020-06-18 RX ORDER — LISINOPRIL 10 MG/1
TABLET ORAL
Qty: 30 TABLET | Refills: 3 | Status: SHIPPED | OUTPATIENT
Start: 2020-06-18 | End: 2020-12-01

## 2020-10-01 NOTE — TELEPHONE ENCOUNTER
Last Visit Date:  2-4-20  Next Visit Date:  10/12/2020    Hemoglobin A1C (%)   Date Value   12/20/2019 6.3   01/15/2019 6.5   04/18/2018 6.6 (H)             ( goal A1C is < 7)   Microalb/Crt.  Ratio (mcg/mg creat)   Date Value   11/01/2019 CANNOT BE CALCULATED     LDL Cholesterol (mg/dL)   Date Value   05/19/2020 70       (goal LDL is <100)   AST (U/L)   Date Value   05/19/2020 17     ALT (U/L)   Date Value   05/19/2020 17     BUN (mg/dL)   Date Value   05/19/2020 13     BP Readings from Last 3 Encounters:   05/21/20 (!) 96/55   02/04/20 105/69   12/20/19 138/82          (goal 120/80)        Patient Active Problem List:     Gastroesophageal reflux disease     Hyperlipidemia with target LDL less than 100     HTN (hypertension)     Numbness and tingling     Diverticula, colon     Diverticulitis, acute     Eczema     Lump of left breast     Shoulder pain     Right groin pain     Tinea corporis     Hyperlipidemia     Controlled type 2 diabetes mellitus without complication, without long-term current use of insulin (HCC)     Morbid obesity with body mass index (BMI) of 40.0 to 49.9 (HCC)     SINDI (obstructive sleep apnea)     Vitamin D deficiency     Melena     Chest pain     Abnormal stress test      ----Per Hoyt

## 2020-10-05 ENCOUNTER — OFFICE VISIT (OUTPATIENT)
Dept: PRIMARY CARE CLINIC | Age: 60
End: 2020-10-05
Payer: MEDICARE

## 2020-10-05 VITALS
HEART RATE: 89 BPM | DIASTOLIC BLOOD PRESSURE: 76 MMHG | TEMPERATURE: 99.5 F | SYSTOLIC BLOOD PRESSURE: 118 MMHG | OXYGEN SATURATION: 99 %

## 2020-10-05 PROCEDURE — G8428 CUR MEDS NOT DOCUMENT: HCPCS | Performed by: INTERNAL MEDICINE

## 2020-10-05 PROCEDURE — 3017F COLORECTAL CA SCREEN DOC REV: CPT | Performed by: INTERNAL MEDICINE

## 2020-10-05 PROCEDURE — G8417 CALC BMI ABV UP PARAM F/U: HCPCS | Performed by: INTERNAL MEDICINE

## 2020-10-05 PROCEDURE — 1036F TOBACCO NON-USER: CPT | Performed by: INTERNAL MEDICINE

## 2020-10-05 PROCEDURE — 99213 OFFICE O/P EST LOW 20 MIN: CPT | Performed by: INTERNAL MEDICINE

## 2020-10-05 PROCEDURE — G8484 FLU IMMUNIZE NO ADMIN: HCPCS | Performed by: INTERNAL MEDICINE

## 2020-10-05 RX ORDER — FERROUS SULFATE 325(65) MG
TABLET ORAL
COMMUNITY
Start: 2020-07-11

## 2020-10-05 RX ORDER — SULFACETAMIDE SODIUM 100 MG/ML
2 SOLUTION/ DROPS OPHTHALMIC EVERY 4 HOURS
Qty: 1 BOTTLE | Refills: 1 | Status: SHIPPED | OUTPATIENT
Start: 2020-10-05 | End: 2020-10-15

## 2020-10-05 NOTE — LETTER
[address] [address]  Phone: [phone]  Fax: [phone]    Sophie Kauffman MD        October 5, 2020     Patient: Katja Pearson   YOB: 1960   Date of Visit: 10/5/2020       To Whom it May Concern:    Katja Pearson was seen in my clinic on 10/5/2020. She may return to work on 10/6/20. If you have any questions or concerns, please don't hesitate to call.     Sincerely,         Sophie Kauffman MD

## 2020-10-05 NOTE — PROGRESS NOTES
[address] [address]  Dept: [phone]  Dept Fax: [phone]    Alden Sharpe is a 64 y.o. female who presents to the urgent care today for her medicalconditions/complaints as noted below. Alden Sharpe is c/o of Eye Drainage (Eye Drainage and Swelling)      HPI:     Conjunctivitis    The current episode started 3 to 5 days ago. The onset was sudden. The problem occurs continuously. The problem has been unchanged. The eye pain is moderate. The left eye is affected. She has been behaving normally. Past Medical History:   Diagnosis Date    Diverticulitis     2 times    Former smoker     Former smoker     GERD (gastroesophageal reflux disease)     Hyperlipidemia     Hypertension     Morbid obesity with BMI of 45.0-49.9, adult ([facility] [address]     SINDI on CPAP     Sleep apnea     SINDI on CPAP    Type II or unspecified type diabetes mellitus without mention of complication, not stated as uncontrolled         Current Outpatient Medications   Medication Sig Dispense Refill    FEROSUL 325 (65 Fe) MG tablet take 1 tablet by mouth twice a day before meals      sulfacetamide (BLEPH-10) 10 % ophthalmic solution Place 2 drops into the left eye every 4 hours for 10 days 1 Bottle 1    lisinopril (PRINIVIL;ZESTRIL) 10 MG tablet take 1 tablet by mouth once daily 30 tablet 3    atorvastatin (LIPITOR) 40 MG tablet Take 1 tablet by mouth daily 30 tablet 3    ferrous sulfate (FE TABS 325) 325 (65 Fe) MG EC tablet Take 1 tablet by mouth 2 times daily (before meals) 90 tablet 3    melatonin 1 MG tablet Take 1 tablet by mouth nightly as needed for Sleep 30 tablet 2    metoprolol tartrate (LOPRESSOR) 25 MG tablet Take 1 tablet by mouth 2 times daily 60 tablet 3    nitroGLYCERIN (NITROSTAT) 0.4 MG SL tablet up to max of 3 total doses.  If no relief after 1 dose, call 911. 25 tablet 3    hydroCHLOROthiazide (HYDRODIURIL) 25 MG tablet take 1 tablet by mouth once daily 30 tablet 3    metFORMIN (GLUCOPHAGE-XR) 500 MG extended release tablet Take 1 tablet by mouth 2 times daily (with meals) 30 tablet 5    pantoprazole (PROTONIX) 40 MG tablet Take 1 tablet by mouth every morning (before breakfast) 30 tablet 5    Elastic Bandages & Supports (ACE ELBOW BRACE LARGE/X-LARGE) MISC 1 Brace as need for elbow pain 1 each 0    aspirin 81 MG tablet Take 81 mg by mouth daily       No current facility-administered medications for this visit. No Known Allergies    Health Maintenance   Topic Date Due    Diabetic retinal exam  10/24/1972    Cervical cancer screen  10/24/1983    Diabetic foot exam  12/16/2017    Flu vaccine (1) 09/01/2020    Hepatitis B vaccine (1 of 3 - Risk 3-dose series) 11/01/2020 (Originally 10/24/1981)    Shingles Vaccine (1 of 2) 02/04/2021 (Originally 10/24/2012)    Diabetic microalbuminuria test  11/01/2020    A1C test (Diabetic or Prediabetic)  12/20/2020    Breast cancer screen  01/12/2021    Lipid screen  05/19/2021    Potassium monitoring  05/19/2021    Creatinine monitoring  05/19/2021    DTaP/Tdap/Td vaccine (2 - Td) 03/07/2024    Colon cancer screen colonoscopy  10/18/2029    Pneumococcal 0-64 years Vaccine  Completed    Hepatitis C screen  Completed    HIV screen  Completed    Hepatitis A vaccine  Aged Out    Hib vaccine  Aged Out    Meningococcal (ACWY) vaccine  Aged Out       Subjective:      Review of Systems   All other systems reviewed and are negative. Objective:     Physical Exam  Vitals signs reviewed. Constitutional:       Appearance: Normal appearance. HENT:      Head: Normocephalic and atraumatic. Eyes:      General:         Left eye: Discharge present. Conjunctiva/sclera:      Left eye: Left conjunctiva is injected. Exudate present. Skin:     General: Skin is warm and dry. Neurological:      General: No focal deficit present.       Mental Status: She is alert and oriented to person, place, and time.   Psychiatric:         Mood and Affect: Mood normal.         Behavior: Behavior normal.       /76   Pulse 89   Temp 99.5 °F (37.5 °C)   SpO2 99%       Assessment:       Diagnosis Orders   1. Blepharoconjunctivitis of left eye, unspecified blepharoconjunctivitis type  sulfacetamide (BLEPH-10) 10 % ophthalmic solution       Plan:      No follow-ups on file. Orders Placed This Encounter   Medications    sulfacetamide (BLEPH-10) 10 % ophthalmic solution     Sig: Place 2 drops into the left eye every 4 hours for 10 days     Dispense:  1 Bottle     Refill:  1     No orders of the defined types were placed in this encounter. Patient given educational materials - see patient instructions. Discussed use, benefit, and side effects of prescribed medications. All patientquestions answered. Pt voiced understanding.     Electronically signed by Jaime Dennison MD on 10/5/2020at 10:16 AM

## 2020-10-06 ENCOUNTER — OFFICE VISIT (OUTPATIENT)
Dept: FAMILY MEDICINE CLINIC | Age: 60
End: 2020-10-06
Payer: MEDICARE

## 2020-10-06 VITALS
WEIGHT: 282 LBS | TEMPERATURE: 97.3 F | DIASTOLIC BLOOD PRESSURE: 86 MMHG | BODY MASS INDEX: 51.89 KG/M2 | HEIGHT: 62 IN | SYSTOLIC BLOOD PRESSURE: 134 MMHG | HEART RATE: 107 BPM

## 2020-10-06 PROBLEM — H10.532 CONTACT BLEPHAROCONJUNCTIVITIS OF LEFT EYE: Status: ACTIVE | Noted: 2020-10-06

## 2020-10-06 PROCEDURE — G8484 FLU IMMUNIZE NO ADMIN: HCPCS | Performed by: FAMILY MEDICINE

## 2020-10-06 PROCEDURE — G8417 CALC BMI ABV UP PARAM F/U: HCPCS | Performed by: FAMILY MEDICINE

## 2020-10-06 PROCEDURE — 1036F TOBACCO NON-USER: CPT | Performed by: FAMILY MEDICINE

## 2020-10-06 PROCEDURE — 3017F COLORECTAL CA SCREEN DOC REV: CPT | Performed by: FAMILY MEDICINE

## 2020-10-06 PROCEDURE — G8427 DOCREV CUR MEDS BY ELIG CLIN: HCPCS | Performed by: FAMILY MEDICINE

## 2020-10-06 PROCEDURE — 99213 OFFICE O/P EST LOW 20 MIN: CPT | Performed by: FAMILY MEDICINE

## 2020-10-06 RX ORDER — HYDROCHLOROTHIAZIDE 25 MG/1
TABLET ORAL
Qty: 30 TABLET | Refills: 3 | Status: SHIPPED | OUTPATIENT
Start: 2020-10-06 | End: 2021-09-02 | Stop reason: SDUPTHER

## 2020-10-06 ASSESSMENT — ENCOUNTER SYMPTOMS
EYE DISCHARGE: 1
WHEEZING: 0
SHORTNESS OF BREATH: 0
PHOTOPHOBIA: 0
EYE PAIN: 1
EYE ITCHING: 1
EYE REDNESS: 1

## 2020-10-06 ASSESSMENT — PATIENT HEALTH QUESTIONNAIRE - PHQ9
1. LITTLE INTEREST OR PLEASURE IN DOING THINGS: 0
SUM OF ALL RESPONSES TO PHQ QUESTIONS 1-9: 0
SUM OF ALL RESPONSES TO PHQ9 QUESTIONS 1 & 2: 0
2. FEELING DOWN, DEPRESSED OR HOPELESS: 0
SUM OF ALL RESPONSES TO PHQ QUESTIONS 1-9: 0

## 2020-10-06 NOTE — PROGRESS NOTES
Visit Information    Have you changed or started any medications since your last visit including any over-the-counter medicines, vitamins, or herbal medicines? no   Have you stopped taking any of your medications? Is so, why? -  no  Are you having any side effects from any of your medications? - no    Have you seen any other physician or provider since your last visit?  no   Have you had any other diagnostic tests since your last visit?  no   Have you been seen in the emergency room and/or had an admission in a hospital since we last saw you?  no   Have you had your routine dental cleaning in the past 6 months?  no     Do you have an active MyChart account? If no, what is the barrier?   Yes    Patient Care Team:  Jose Gilbert MD as PCP - General (Family Medicine)  Jannet Mohr MD as PCP - [facility] Empaneled Provider  Piotr Hoffman MD as Consulting Physician (Pulmonology)    Medical History Review  Past Medical, Family, and Social History reviewed and does not contribute to the patient presenting condition    Health Maintenance   Topic Date Due    Diabetic retinal exam  10/24/1972    Cervical cancer screen  10/24/1983    Diabetic foot exam  12/16/2017    Flu vaccine (1) 09/01/2020    Hepatitis B vaccine (1 of 3 - Risk 3-dose series) 11/01/2020 (Originally 10/24/1981)    Shingles Vaccine (1 of 2) 02/04/2021 (Originally 10/24/2012)    Diabetic microalbuminuria test  11/01/2020    A1C test (Diabetic or Prediabetic)  12/20/2020    Breast cancer screen  01/12/2021    Lipid screen  05/19/2021    Potassium monitoring  05/19/2021    Creatinine monitoring  05/19/2021    DTaP/Tdap/Td vaccine (2 - Td) 03/07/2024    Colon cancer screen colonoscopy  10/18/2029    Pneumococcal 0-64 years Vaccine  Completed    Hepatitis C screen  Completed    HIV screen  Completed    Hepatitis A vaccine  Aged Out    Hib vaccine  Aged Out    Meningococcal (ACWY) vaccine  Aged Out

## 2020-10-06 NOTE — PROGRESS NOTES
Subjective:    Vinod Blakely is a 64 y.o. female with  has a past medical history of Diverticulitis, Former smoker, Former smoker, GERD (gastroesophageal reflux disease), Hyperlipidemia, Hypertension, Morbid obesity with BMI of 45.0-49.9, adult ([address] SINDI on CPAP, Sleep apnea, and Type II or unspecified type diabetes mellitus without mention of complication, not stated as uncontrolled. Family History   Problem Relation Age of Onset    High Blood Pressure Mother     Diabetes Mother    Daniela See Cancer Father        Presented tothe office today for:  Chief Complaint   Patient presents with    Eye Problem       HPI  Patient 72-year-old female presenting for blepharoconjunctivitis  Left-sided  Patient was seen in urgent care yesterday and was given sulfacetamide ointment  Still has some swelling which concerned the patient  Patient was reassured to continue with the sulfacetamide ointment and warm compresses. Review of Systems   Constitutional: Negative for chills and fever. Eyes: Positive for pain, discharge, redness and itching. Negative for photophobia and visual disturbance. Respiratory: Negative for shortness of breath and wheezing. Cardiovascular: Negative for chest pain and palpitations. Neurological: Negative for facial asymmetry and headaches. Objective:    /86   Pulse 107   Temp 97.3 °F (36.3 °C) (Temporal)   Ht 5' 2\" (1.575 m)   Wt 282 lb (127.9 kg)   BMI 51.58 kg/m²    BP Readings from Last 3 Encounters:   10/06/20 134/86   10/05/20 118/76   05/21/20 (!) 96/55     Physical Exam  Constitutional:       Appearance: She is obese. Eyes:      General:         Right eye: No discharge. Left eye: Hordeolum present. Extraocular Movements: Extraocular movements intact. Right eye: Normal extraocular motion. Left eye: Normal extraocular motion. Pupils: Pupils are equal, round, and reactive to light.    Neck:      Musculoskeletal: Normal range of motion and neck supple. Cardiovascular:      Rate and Rhythm: Normal rate and regular rhythm. Pulses: Normal pulses. Heart sounds: Normal heart sounds. Pulmonary:      Effort: Pulmonary effort is normal.      Breath sounds: Normal breath sounds. Neurological:      Mental Status: She is alert. Lab Results   Component Value Date    WBC 6.8 05/19/2020    HGB 12.4 05/19/2020    HCT 40.3 05/19/2020     05/19/2020    CHOL 124 05/19/2020    TRIG 103 05/19/2020    HDL 33 (L) 05/19/2020    ALT 17 05/19/2020    AST 17 05/19/2020     05/19/2020    K 4.0 05/19/2020     05/19/2020    CREATININE 0.81 05/19/2020    BUN 13 05/19/2020    CO2 24 05/19/2020    TSH 2.25 04/18/2018    INR 1.2 10/18/2019    LABA1C 6.3 12/20/2019    LABMICR CANNOT BE CALCULATED 11/01/2019     Lab Results   Component Value Date    CALCIUM 9.2 05/19/2020    PHOS 3.1 09/12/2011     Lab Results   Component Value Date    LDLCHOLESTEROL 70 05/19/2020       Assessment and Plan:    1. Contact blepharoconjunctivitis of left eye  Counseled patient on warm compresses over 15 minutes  Eye washing with tear free shampoo  Continue sulfacetamide ointment      Requested Prescriptions      No prescriptions requested or ordered in this encounter       There are no discontinued medications. Return if symptoms worsen or fail to improve. Lizbeth received counseling on the following healthy behaviors: nutrition, exercise and medication adherence  Reviewed prior labs and health maintenance  Continue current medications, diet and exercise. Discussed use, benefit, and side effects of prescribed medications. Barriers to medication compliance addressed. Patient given educational materials - see patient instructions  Was a self-tracking handout given in paper form or via [employer] softwaret? No    Requested Prescriptions      No prescriptions requested or ordered in this encounter       All patient questions answered.   Patient voiced understanding. Quality Measures    Body mass index is 51.58 kg/m². Elevated. Weight control planned discussed Healthy diet and regular exercise. BP: 134/86 Blood pressure is normal. Treatment plan consists of No treatment change needed.     Lab Results   Component Value Date    LDLCHOLESTEROL 70 05/19/2020    (goal LDL reduction with dx if diabetes is 50% LDL reduction)      PHQ Scores 10/6/2020 2/4/2020 11/1/2019 5/2/2018 12/1/2017   PHQ2 Score 0 0 0 0 1   PHQ9 Score 0 0 0 0 1     Interpretation of Total Score Depression Severity: 1-4 = Minimal depression, 5-9 = Mild depression, 10-14 = Moderate depression, 15-19 = Moderately severe depression, 20-27 = Severe depression

## 2020-11-09 RX ORDER — METFORMIN HYDROCHLORIDE 500 MG/1
TABLET, EXTENDED RELEASE ORAL
Qty: 30 TABLET | Refills: 5 | Status: SHIPPED | OUTPATIENT
Start: 2020-11-09 | End: 2021-04-01

## 2020-11-09 NOTE — TELEPHONE ENCOUNTER
Last visit: 10/06/2020  Last Med refill:   Does patient have enough medication for 72 hours:     Next Visit Date:  No future appointments. Health Maintenance   Topic Date Due    Diabetic retinal exam  10/24/1972    Hepatitis B vaccine (1 of 3 - Risk 3-dose series) 10/24/1981    Cervical cancer screen  10/24/1983    Diabetic foot exam  12/16/2017    Flu vaccine (1) 09/01/2020    Diabetic microalbuminuria test  11/01/2020    Shingles Vaccine (1 of 2) 02/04/2021 (Originally 10/24/2012)    A1C test (Diabetic or Prediabetic)  12/20/2020    Breast cancer screen  01/12/2021    Lipid screen  05/19/2021    Potassium monitoring  05/19/2021    Creatinine monitoring  05/19/2021    DTaP/Tdap/Td vaccine (2 - Td) 03/07/2024    Colon cancer screen colonoscopy  10/18/2029    Pneumococcal 0-64 years Vaccine  Completed    Hepatitis C screen  Completed    HIV screen  Completed    Hepatitis A vaccine  Aged Out    Hib vaccine  Aged Out    Meningococcal (ACWY) vaccine  Aged Out       Hemoglobin A1C (%)   Date Value   12/20/2019 6.3   01/15/2019 6.5   04/18/2018 6.6 (H)             ( goal A1C is < 7)   Microalb/Crt.  Ratio (mcg/mg creat)   Date Value   11/01/2019 CANNOT BE CALCULATED     LDL Cholesterol (mg/dL)   Date Value   05/19/2020 70   11/01/2019 97       (goal LDL is <100)   AST (U/L)   Date Value   05/19/2020 17     ALT (U/L)   Date Value   05/19/2020 17     BUN (mg/dL)   Date Value   05/19/2020 13     BP Readings from Last 3 Encounters:   10/06/20 134/86   10/05/20 118/76   05/21/20 (!) 96/55          (goal 120/80)    All Future Testing planned in [facility]  Lab Frequency Next Occurrence               Patient Active Problem List:     Gastroesophageal reflux disease     Hyperlipidemia with target LDL less than 100     HTN (hypertension)     Numbness and tingling     Diverticula, colon     Diverticulitis, acute     Eczema     Lump of left breast     Shoulder pain     Right groin pain     Tinea corporis Hyperlipidemia     Controlled type 2 diabetes mellitus without complication, without long-term current use of insulin ([facility])     Morbid obesity with body mass index (BMI) of 40.0 to 49.9 ([facility])     SINDI (obstructive sleep apnea)     Vitamin D deficiency     Melena     Chest pain     Abnormal stress test     Contact blepharoconjunctivitis of left eye

## 2020-11-25 ENCOUNTER — APPOINTMENT (OUTPATIENT)
Dept: GENERAL RADIOLOGY | Age: 60
End: 2020-11-25
Payer: MEDICARE

## 2020-11-25 ENCOUNTER — HOSPITAL ENCOUNTER (EMERGENCY)
Age: 60
Discharge: HOME | End: 2020-11-25
Attending: EMERGENCY MEDICINE
Payer: MEDICARE

## 2020-11-25 VITALS
OXYGEN SATURATION: 98 % | BODY MASS INDEX: 50.14 KG/M2 | TEMPERATURE: 98.7 F | WEIGHT: 283 LBS | RESPIRATION RATE: 16 BRPM | HEART RATE: 92 BPM | DIASTOLIC BLOOD PRESSURE: 69 MMHG | HEIGHT: 63 IN | SYSTOLIC BLOOD PRESSURE: 135 MMHG

## 2020-11-25 PROCEDURE — 73562 X-RAY EXAM OF KNEE 3: CPT

## 2020-11-25 PROCEDURE — 99282 EMERGENCY DEPT VISIT SF MDM: CPT

## 2020-11-25 RX ORDER — IBUPROFEN 800 MG/1
800 TABLET ORAL EVERY 8 HOURS PRN
Qty: 20 TABLET | Refills: 0 | Status: SHIPPED | OUTPATIENT
Start: 2020-11-25 | End: 2021-04-28

## 2020-11-25 RX ORDER — ACETAMINOPHEN AND CODEINE PHOSPHATE 300; 30 MG/1; MG/1
1 TABLET ORAL EVERY 6 HOURS PRN
Qty: 20 TABLET | Refills: 0 | Status: SHIPPED | OUTPATIENT
Start: 2020-11-25 | End: 2020-11-30

## 2020-11-25 ASSESSMENT — ENCOUNTER SYMPTOMS
COLOR CHANGE: 0
EYE DISCHARGE: 0
VOMITING: 0
SHORTNESS OF BREATH: 0
CONSTIPATION: 0
DIARRHEA: 0
ABDOMINAL PAIN: 0
EYE REDNESS: 0
FACIAL SWELLING: 0
COUGH: 0

## 2020-11-25 ASSESSMENT — PAIN SCALES - GENERAL: PAINLEVEL_OUTOF10: 7

## 2020-11-25 ASSESSMENT — PAIN DESCRIPTION - LOCATION: LOCATION: KNEE

## 2020-11-25 NOTE — ED PROVIDER NOTES
PANTOPRAZOLE (PROTONIX) 40 MG TABLET    Take 1 tablet by mouth every morning (before breakfast)       PAST MEDICAL HISTORY         Diagnosis Date    Diverticulitis     2 times    Former smoker     Former smoker     GERD (gastroesophageal reflux disease)     Hyperlipidemia     Hypertension     Morbid obesity with BMI of 45.0-49.9, adult ([address]     SINDI on CPAP     Sleep apnea     SINDI on CPAP    Type II or unspecified type diabetes mellitus without mention of complication, not stated as uncontrolled        SURGICAL HISTORY           Procedure Laterality Date    CHOLECYSTECTOMY      COLONOSCOPY      COLONOSCOPY N/A 10/18/2019    COLONOSCOPY WITH BIOPSY performed by Brian Turner MD at [address] [place] [address] EGD TRANSORAL BIOPSY SINGLE/MULTIPLE  2017    EGD BIOPSY performed by Dede Torres MD at [address] [place] [address] N/A 10/18/2019    EGD DIAGNOSTIC ONLY performed by Brian Turner MD at [facility]         FAMILY HISTORY           Problem Relation Age of Onset    High Blood Pressure Mother     Diabetes Mother     Cancer Father      Family Status   Relation Name Status    Mother      Father          SOCIAL HISTORY      reports that she quit smoking about 29 years ago. She started smoking about 36 years ago. She has a 1.75 pack-year smoking history. She has never used smokeless tobacco. She reports previous alcohol use. She reports that she does not use drugs. REVIEW OF SYSTEMS    (2-9 systems for level 4, 10 or more for level 5)     Review of Systems   Constitutional: Negative for chills, fatigue and fever. HENT: Negative for congestion, ear discharge and facial swelling. Eyes: Negative for discharge and redness. Respiratory: Negative for cough and shortness of breath. Cardiovascular: Negative for chest pain. Gastrointestinal: Negative for abdominal pain, constipation, diarrhea and vomiting. interpreted by the Emergency Department Physician who either signs or Co-signs this chart in the absence of a cardiologist.    Not indicate    RADIOLOGY:   Non-plain film images such as CT, Ultrasound and MRI are read by the radiologist. Alicia Bartlett radiographic images are visualized and preliminarily interpreted by the emergency physician with the below findings:    Interpretation per the Radiologist below, if available at the time of this note:    Xr Knee Left (3 Views)    Result Date: 11/25/2020  EXAMINATION: THREE X-RAY VIEWS OF THE LEFT KNEE 11/25/2020 10:31 am COMPARISON: 03/03/2010 HISTORY: ORDERING SYSTEM PROVIDED HISTORY: Pain TECHNOLOGIST PROVIDED HISTORY: Pain Acuity: Acute Type of Exam: Unknown FINDINGS: No evidence of acute fracture or dislocation. Similar small benign-appearing soft tissue calcifications in the proximal and posterolateral leg. Radiographically there is no significant joint effusion. Progression of mild to moderate degenerative changes involving primarily the medial compartment with joint space loss and osteophytes. Lateral view is obtained in a slight degree of obliquity. No acute osseous abnormality. Mild-to-moderate degenerative changes have progressed since the prior study. LABS:  Labs Reviewed - No data to display    All other labs were within normal range or not returned as of this dictation. EMERGENCY DEPARTMENT COURSE and DIFFERENTIAL DIAGNOSIS/MDM:   Vitals:    Vitals:    11/25/20 1009   BP: 135/69   Pulse: 92   Resp: 16   Temp: 98.7 °F (37.1 °C)   SpO2: 98%   Weight: 283 lb (128.4 kg)   Height: 5' 3\" (1.6 m)       Orders Placed This Encounter   Medications    ibuprofen (ADVIL;MOTRIN) 800 MG tablet     Sig: Take 1 tablet by mouth every 8 hours as needed for Pain     Dispense:  20 tablet     Refill:  0    acetaminophen-codeine (TYLENOL/CODEINE #3) 300-30 MG per tablet     Sig: Take 1 tablet by mouth every 6 hours as needed for Pain for up to 5 days.      Dispense:  20 tablet     Refill:  0       Medical Decision Making: X-ray findings discussed with the patient. She is referred to orthopedics and was prescribed pain medication. Treatment diagnosis and the importance of follow-up were discussed with the patient. CONSULTS:  None    PROCEDURES:  None    FINAL IMPRESSION      1. Arthritis of left knee          DISPOSITION/PLAN   DISPOSITION Decision To Discharge 11/25/2020 11:05:43 AM      PATIENT REFERRED TO:   Mishel Lopez MD  [facility] [address]  [phone]      As needed    [facility] ED  [address] [facility]  [phone]    If symptoms worsen    Sagrario Baker MD  [address] [address]  [phone]            DISCHARGE MEDICATIONS:     New Prescriptions    ACETAMINOPHEN-CODEINE (TYLENOL/CODEINE #3) 300-30 MG PER TABLET    Take 1 tablet by mouth every 6 hours as needed for Pain for up to 5 days.     IBUPROFEN (ADVIL;MOTRIN) 800 MG TABLET    Take 1 tablet by mouth every 8 hours as needed for Pain         (Please note that portions of this note were completed with a voice recognition program.  Efforts were made to edit the dictations but occasionally words are mis-transcribed.)    Mare Gaona MD  Attending Emergency Physician            Mare Gaona MD  11/25/20 [id number]

## 2020-12-01 RX ORDER — LISINOPRIL 10 MG/1
TABLET ORAL
Qty: 30 TABLET | Refills: 3 | Status: SHIPPED | OUTPATIENT
Start: 2020-12-01 | End: 2021-09-24

## 2020-12-01 NOTE — TELEPHONE ENCOUNTER
Kyree Request for pending medication. Last Visit Date: 10/6/2020  Next Visit Date:  No future appointments. Health Maintenance   Topic Date Due    Diabetic retinal exam  10/24/1972    Hepatitis B vaccine (1 of 3 - Risk 3-dose series) 10/24/1981    Cervical cancer screen  10/24/1983    Diabetic foot exam  12/16/2017    Flu vaccine (1) 09/01/2020    Diabetic microalbuminuria test  11/01/2020    Shingles Vaccine (1 of 2) 02/04/2021 (Originally 10/24/2012)    A1C test (Diabetic or Prediabetic)  12/20/2020    Breast cancer screen  01/12/2021    Lipid screen  05/19/2021    Potassium monitoring  05/19/2021    Creatinine monitoring  05/19/2021    DTaP/Tdap/Td vaccine (2 - Td) 03/07/2024    Colon cancer screen colonoscopy  10/18/2029    Hepatitis C screen  Completed    HIV screen  Completed    Hepatitis A vaccine  Aged Out    Hib vaccine  Aged Out    Meningococcal (ACWY) vaccine  Aged Out    Pneumococcal 0-64 years Vaccine  Aged Out       Hemoglobin A1C (%)   Date Value   12/20/2019 6.3   01/15/2019 6.5   04/18/2018 6.6 (H)             ( goal A1C is < 7)   Microalb/Crt. Ratio (mcg/mg creat)   Date Value   11/01/2019 CANNOT BE CALCULATED     LDL Cholesterol (mg/dL)   Date Value   05/19/2020 70       (goal LDL is <100)   AST (U/L)   Date Value   05/19/2020 17     ALT (U/L)   Date Value   05/19/2020 17     BUN (mg/dL)   Date Value   05/19/2020 13     BP Readings from Last 3 Encounters:   11/25/20 135/69   10/06/20 134/86   10/05/20 118/76          (goal 120/80)    All Future Testing planned in [facility]  Lab Frequency Next Occurrence       Next Visit Date:  No future appointments.       Patient Active Problem List:     Gastroesophageal reflux disease     Hyperlipidemia with target LDL less than 100     HTN (hypertension)     Numbness and tingling     Diverticula, colon     Diverticulitis, acute     Eczema     Lump of left breast     Shoulder pain     Right groin pain     Tinea corporis

## 2020-12-16 ENCOUNTER — OFFICE VISIT (OUTPATIENT)
Dept: ORTHOPEDIC SURGERY | Age: 60
End: 2020-12-16
Payer: MEDICARE

## 2020-12-16 VITALS — TEMPERATURE: 97 F | HEIGHT: 63 IN | RESPIRATION RATE: 16 BRPM | WEIGHT: 283 LBS | BODY MASS INDEX: 50.14 KG/M2

## 2020-12-16 PROCEDURE — 3017F COLORECTAL CA SCREEN DOC REV: CPT | Performed by: ORTHOPAEDIC SURGERY

## 2020-12-16 PROCEDURE — 99204 OFFICE O/P NEW MOD 45 MIN: CPT | Performed by: ORTHOPAEDIC SURGERY

## 2020-12-16 PROCEDURE — G8427 DOCREV CUR MEDS BY ELIG CLIN: HCPCS | Performed by: ORTHOPAEDIC SURGERY

## 2020-12-16 PROCEDURE — 1036F TOBACCO NON-USER: CPT | Performed by: ORTHOPAEDIC SURGERY

## 2020-12-16 PROCEDURE — G8484 FLU IMMUNIZE NO ADMIN: HCPCS | Performed by: ORTHOPAEDIC SURGERY

## 2020-12-16 PROCEDURE — G8417 CALC BMI ABV UP PARAM F/U: HCPCS | Performed by: ORTHOPAEDIC SURGERY

## 2020-12-16 NOTE — LETTER
[facility]  [address]  [phone]        12/16/2020     Patient: Shaista Coronel  YOB: 1960    Dear Farhana Barboza MD,    I had the pleasure of seeing one of your patients, Shaista Coronel recently in the office. Below are the relevant portions of my assessment and plan of care. ASSESSMENT AND PLAN:  She has left knee pain, likely secondary to degenerative meniscal tearing as well as some underlying arthritis. She also has left foot pain secondary multiple issues--she has a component of posterior tibial tendinitis along with some degenerative changes of her subtalar joint and mildly diffusely throughout the midfoot. She also has a trigger finger of her left ring finger. Notably, she has a somewhat complex past medical history. She has a history of non-insulin-dependent diabetes. We had a discussion today about the likely diagnosis and its natural history, physical exam and imaging findings, as well as various treatment options in detail. Surgically, we discussed multiple options for the future, but at this time patient wishes to pursue conservative management. Orders/referrals were placed as below at today's visit. The patient was provided information on how to obtain an over-the-counter flatfoot style orthotic. The patient was referred to physical therapy for my flatfoot protocol, as well as for her knee pain. After initially discussing the options, the patient did wish to proceed with an injection of her left ring finger, but then changed her mind after a discussion of the risks.

## 2020-12-16 NOTE — PROGRESS NOTES
[facility]  [facility] [address]  Dept: [phone]    Ambulatory Orthopedic Consult      CHIEF COMPLAINT:    Chief Complaint   Patient presents with    Knee Pain     left knee    Foot Pain     left foot    Finger Pain     left finger       HISTORY OF PRESENT ILLNESS:      The patient is a left-hand dominant 64 y.o. female who is being seen for evaluation of pain at the above location (left anterior knee greater than left dorsal midfoot and lateral/medial hindfoot, as well as left ring finger), which began in the beginning of November 2020 atraumatically. The pain is described mainly with mechanical terms (dull/sharp/throbbing). The pain is worse with activity and better with rest. The patient reports a static course. The patient has tried:      [x]  rest/activity modification          [x]  NSAIDs      []  opiates      []  orthotics        []  change in shoes   [x]  home exercises  []  physical therapy      []  CAM boot     []  brace:    []  injection:       []  surgery:      Patient also reports that her left ring finger pain is associated with intermittent locking. REVIEW OF SYSTEMS:  Constitutional: Negative for fever. HENT: Negative for tinnitus. Eyes: Negative for pain. Respiratory: Negative for shortness of breath. Cardiovascular: Negative for chest pain. Gastrointestinal: Negative for abdominal pain. Genitourinary: Negative for dysuria. Skin: Negative for rash. Neurological: Negative for headaches. Hematological: Does not bruise/bleed easily.    Musculoskeletal: See HPI for pertinent positives     Past Medical History:    She  has a past medical history of Diverticulitis, Former smoker, Former smoker, GERD (gastroesophageal reflux disease), Hyperlipidemia, Hypertension, Morbid obesity with BMI of 45.0-49.9, adult ([facility] [address] SINDI on CPAP, Sleep apnea, and Type II or unspecified type diabetes mellitus without mention of complication, not stated as uncontrolled. Past Surgical History:    She  has a past surgical history that includes Hysterectomy; Cholecystectomy; Colonoscopy; pr egd transoral biopsy single/multiple (7/19/2017); Upper gastrointestinal endoscopy (N/A, 10/18/2019); and Colonoscopy (N/A, 10/18/2019). Current Medications:     Current Outpatient Medications:     lisinopril (PRINIVIL;ZESTRIL) 10 MG tablet, take 1 tablet by mouth once daily, Disp: 30 tablet, Rfl: 3    ibuprofen (ADVIL;MOTRIN) 800 MG tablet, Take 1 tablet by mouth every 8 hours as needed for Pain, Disp: 20 tablet, Rfl: 0    metFORMIN (GLUCOPHAGE-XR) 500 MG extended release tablet, take 1 tablet by mouth twice a day with meals, Disp: 30 tablet, Rfl: 5    hydroCHLOROthiazide (HYDRODIURIL) 25 MG tablet, take 1 tablet by mouth once daily, Disp: 30 tablet, Rfl: 3    FEROSUL 325 (65 Fe) MG tablet, take 1 tablet by mouth twice a day before meals, Disp: , Rfl:     atorvastatin (LIPITOR) 40 MG tablet, Take 1 tablet by mouth daily, Disp: 30 tablet, Rfl: 3    ferrous sulfate (FE TABS 325) 325 (65 Fe) MG EC tablet, Take 1 tablet by mouth 2 times daily (before meals), Disp: 90 tablet, Rfl: 3    melatonin 1 MG tablet, Take 1 tablet by mouth nightly as needed for Sleep, Disp: 30 tablet, Rfl: 2    metoprolol tartrate (LOPRESSOR) 25 MG tablet, Take 1 tablet by mouth 2 times daily, Disp: 60 tablet, Rfl: 3    nitroGLYCERIN (NITROSTAT) 0.4 MG SL tablet, up to max of 3 total doses. If no relief after 1 dose, call 911., Disp: 25 tablet, Rfl: 3    pantoprazole (PROTONIX) 40 MG tablet, Take 1 tablet by mouth every morning (before breakfast), Disp: 30 tablet, Rfl: 5    Elastic Bandages & Supports (ACE ELBOW BRACE LARGE/X-LARGE) MISC, 1 Brace as need for elbow pain, Disp: 1 each, Rfl: 0    aspirin 81 MG tablet, Take 81 mg by mouth daily, Disp: , Rfl:      Allergies:    Patient has no known allergies.     Family History:  family history includes Cancer in her father; Diabetes in her mother; High Blood Pressure in her mother.     Social History:   Social History     Occupational History    Not on file   Tobacco Use    Smoking status: Former Smoker     Packs/day: 0.25     Years: 7.00     Pack years: 1.75     Start date: 1984     Quit date: 1991     Years since quittin.9    Smokeless tobacco: Never Used   Substance and Sexual Activity    Alcohol use: Not Currently    Drug use: No    Sexual activity: Not on file     Occupation: Works as a      OBJECTIVE:  Temp 97 °F (36.1 °C)   Resp 16   Ht 5' 3\" (1.6 m)   Wt 283 lb (128.4 kg)   BMI 50.13 kg/m²    Psych: alert and oriented to person, time, and place  Cardio:  well perfused extremities  Resp:  normal respiratory effort  Skin:  no cyanosis  Hem/lymph:  no lymphedema  Neuro:  sensation to light touch grossly intact throughout all nerve distributions in the bilateral feet and hands  Musculoskeletal:    RLE:    Grossly normal range of motion of hip, slightly decreased flexion/extension of the knee  Painless range of motion of hip, but pain on extremes of knee motion  Stable exam of hip/knee  Skin intact without erythema/warmth  Grossly neurovascularly intact distally in the lower extremity  Appropriate strength on manual muscle testing of the iliopsoas, hamstrings, quadriceps, tibialis anterior, gastroc-soleus complex  Tenderness:  anteromedial and anterolateral knee joint line  -Kirk's test positive for pain      LLE:    Grossly normal range of motion of hip, slightly decreased flexion extension of the knee  Painless range of motion of hip, but pain on extremes of knee motion  Stable exam of hip/knee  Skin intact without erythema/warmth  Grossly neurovascularly intact distally in the lower extremity  Appropriate strength on manual muscle testing of the iliopsoas, hamstrings, quadriceps, tibialis anterior, gastroc-soleus complex  Tenderness:  anteromedial and anterolateral knee joint line, diffusely throughout the midfoot, sinus Tarsi, and along the course the posterior tibial tendon  -Kirk's test positive for pain      Affected upper extremity (left):  Grossly normal range of motion of wrist/fingers  Painless range of motion of wrist/fingers except for ring finger  Stable exam of wrist/hand  Skin intact without erythema/warmth   Grossly neurovascularly intact distally throughout able to fire EPL/FPL/IO/EDC/FDP  Tenderness to palpation:  A1 pulley of the ring finger  -Durkan's compression test negative  -triggering elicited at the Ring finger       Unaffected upper extremity (right):  Grossly normal range of motion of wrist/fingers  Painless range of motion of above  Stable exam of wrist/hand  Skin intact without erythema/warmth  Neurovascularly intact distally, able to fire EPL/FPL/IO/EDC/FDP  Tenderness to palpation:  none   -no triggering elicited, no nodules palpated        RADIOLOGY:   12/16/2020 FINDINGS:  Three weightbearing views (AP, Mortise, and Lateral) of the left ankle and three weightbearing views (AP, Oblique, Lateral) of the left foot and four weightbearing views (AP, Lateral, Tunnel and Sunrise) of the left knee and 3 views (AP, oblique, lateral) of the left hand were obtained in the office today and reviewed, revealing no acute fracture, dislocation, or radioopaque foreign body/tumor. The ankle mortise is maintained with no widening of the clear spaces. Overall alignment is satisfactory. Tricompartmental degenerative changes of the knee with joint space narrowing, sclerosis, and osteophytes. Degenerative changes at the subtalar joint, as well as mildly diffusely throughout the midfoot. IMPRESSION:  No acute fracture/dislocation. Generative changes as above.     Electronically signed by Marilynn Ch MD        Xr Knee Left (3 Views)    Result Date: 11/25/2020  EXAMINATION: THREE X-RAY VIEWS OF THE LEFT KNEE 11/25/2020 10:31 am COMPARISON: 03/03/2010 HISTORY: ORDERING SYSTEM PROVIDED HISTORY: Pain TECHNOLOGIST PROVIDED HISTORY: Pain Acuity: Acute Type of Exam: Unknown FINDINGS: No evidence of acute fracture or dislocation. Similar small benign-appearing soft tissue calcifications in the proximal and posterolateral leg. Radiographically there is no significant joint effusion. Progression of mild to moderate degenerative changes involving primarily the medial compartment with joint space loss and osteophytes. Lateral view is obtained in a slight degree of obliquity. No acute osseous abnormality. Mild-to-moderate degenerative changes have progressed since the prior study. ASSESSMENT AND PLAN:  Body mass index is 50.13 kg/m². She has left knee pain, likely secondary to degenerative meniscal tearing as well as some underlying arthritis. She also has left foot pain secondary multiple issues--she has a component of posterior tibial tendinitis along with some degenerative changes of her subtalar joint and mildly diffusely throughout the midfoot. She also has a trigger finger of her left ring finger. Notably, she has a somewhat complex past medical history. She has a history of non-insulin-dependent diabetes. We had a discussion today about the likely diagnosis and its natural history, physical exam and imaging findings, as well as various treatment options in detail. Surgically, we discussed multiple options for the future, but at this time patient wishes to pursue conservative management. Orders/referrals were placed as below at today's visit. The patient was provided information on how to obtain an over-the-counter flatfoot style orthotic. The patient was referred to physical therapy for my flatfoot protocol, as well as for her knee pain. After initially discussing the options, the patient did wish to proceed with an injection of her left ring finger, but then changed her mind after a discussion of the risks.     I provided a prescription for Voltaren (4g TOPL q QID PRN pain). I recommend this over the use of oral NSAIDs because given the patient's condition, the use of oral NSAIDs for an extended period of time will likely be necessary to help allow appropriate meaningful baseline function; and given the risk for development of side effects (GI bleeding/ulcers) with chronic use of oral anti-inflammatories, this is a much safer option. This is especially important in this situation given the patient's age as well. All questions were answered and the above plan was agreed upon. The patient will return to clinic in 8 weeks without x-rays. At her next visit, we will again discuss a left ring finger trigger finger injection as well as a left knee injection, and possibly consider an MRI of her left knee, depending on how she is doing. At the patient's next visit, depending on how the patient is doing and/or new imaging/labs results, we may consider the following options:    []  Orthotic (OTC)     []  Orthotic (custom)          []  Rocker bottom shoes     []  Brace (OTC)        []  Brace (custom)             []  CAM boot        []  Night splint         []  Heel cups        []  Strap      []  Toe sleeves/splints    []  PT:                     []  Wean out of immobilization   []  Advance activity       []  Topical               []  NSAIDs          []  [place]         []  Referral:         []  Stress xrays       []  CT         []  MRI        []  Injection:         []  Consider OR      []  Pick OR date    Return in about 8 weeks (around 2/10/2021). No orders of the defined types were placed in this encounter.     Orders Placed This Encounter   Procedures    XR ANKLE LEFT (MIN 3 VIEWS)     WEIGHT BEARING 3 VIEWS:  AP, MORTISE, LATERAL  Please include entire foot     Standing Status:   Future     Number of Occurrences:   1     Standing Expiration Date:   1/16/2021     Order Specific Question:   Reason for exam:     Answer: eval alignment    XR FOOT LEFT (MIN 3 VIEWS)     WEIGHTBEARING 3 views: AP, Oblique, Lateral     Standing Status:   Future     Number of Occurrences:   1     Standing Expiration Date:   1/16/2021     Order Specific Question:   Reason for exam:     Answer:   eval alignment    XR HAND LEFT (MIN 3 VIEWS)     Standing Status:   Future     Number of Occurrences:   1     Standing Expiration Date:   12/16/2021    Ambulatory referral to Physical Therapy     Referral Priority:   Routine     Referral Type:   Eval and Treat     Referral Reason:   Specialty Services Required     Requested Specialty:   Physical Therapy     Number of Visits Requested:   1    Ambulatory referral to Physical Therapy     Referral Priority:   Routine     Referral Type:   Eval and Treat     Referral Reason:   Specialty Services Required     Requested Specialty:   Physical Therapy     Number of Visits Requested:   1         Reina Saha MD  Orthopedic Surgery        Please excuse any typos/errors, as this note was created with the assistance of voice recognition software. While intending to generate a document that actually reflects the content of the visit, the document can still have some errors including those of syntax and sound-a-like substitutions which may escape proof reading. In such instances, actual meaning can be extrapolated by context.

## 2020-12-23 ENCOUNTER — HOSPITAL ENCOUNTER (EMERGENCY)
Age: 60
Discharge: HOME | End: 2020-12-23
Attending: EMERGENCY MEDICINE
Payer: MEDICARE

## 2020-12-23 VITALS
RESPIRATION RATE: 20 BRPM | HEART RATE: 81 BPM | WEIGHT: 273 LBS | OXYGEN SATURATION: 100 % | SYSTOLIC BLOOD PRESSURE: 132 MMHG | DIASTOLIC BLOOD PRESSURE: 85 MMHG | TEMPERATURE: 97.9 F | HEIGHT: 62 IN | BODY MASS INDEX: 50.24 KG/M2

## 2020-12-23 PROCEDURE — 6370000000 HC RX 637 (ALT 250 FOR IP): Performed by: STUDENT IN AN ORGANIZED HEALTH CARE EDUCATION/TRAINING PROGRAM

## 2020-12-23 PROCEDURE — 99283 EMERGENCY DEPT VISIT LOW MDM: CPT

## 2020-12-23 RX ORDER — OXYMETAZOLINE HYDROCHLORIDE 5 G/100ML
2 SPRAY NASAL 2 TIMES DAILY PRN
Qty: 1 BOTTLE | Refills: 0
Start: 2020-12-23 | End: 2021-01-22

## 2020-12-23 RX ORDER — OXYMETAZOLINE HYDROCHLORIDE 0.05 G/100ML
1 SPRAY NASAL ONCE
Status: COMPLETED | OUTPATIENT
Start: 2020-12-23 | End: 2020-12-23

## 2020-12-23 RX ADMIN — Medication 1 SPRAY: at 13:01

## 2020-12-23 ASSESSMENT — ENCOUNTER SYMPTOMS
NAUSEA: 0
VOMITING: 0

## 2020-12-23 NOTE — ED PROVIDER NOTES
[facility] ED  Emergency Department Encounter  Emergency Medicine Resident     Pt Name: Vinod Blakely  MRN: [id number]  [place] 1960  Date of evaluation: 12/23/20  PCP:  MD Merrill Sanchez       Chief Complaint   Patient presents with    Epistaxis       HISTORY OFPRESENT ILLNESS  (Location/Symptom, Timing/Onset, Context/Setting, Quality, Duration, Modifying Factors,Severity.)      Vinod Blakely is a 64 y. o.yo female who presents with nose bleed. Patient has been having multiple nosebleeds throughout the week, states there has been multiple losses of family members and she has been crying, blowing her nose along with the winter weather is causing her nose to be dry and she has been having nosebleeds. Denies being on any blood thinners, denies nasal or facial trauma, states she does take aspirin at home but that is the only blood thinning medication that she has. Denies any nosebleeds in the past before this, states that she just lost her brother today and is in a very teary mood, seen blowing her nose with specks of blood on the tissue paper. The nosebleed is from the left nare but seems to be very minimal.  Patient denies feeling lightheaded or short of breath at this time. PAST MEDICAL / SURGICAL / SOCIAL / FAMILY HISTORY      has a past medical history of Diverticulitis, Former smoker, Former smoker, GERD (gastroesophageal reflux disease), Hyperlipidemia, Hypertension, Morbid obesity with BMI of 45.0-49.9, adult ([address] SINDI on CPAP, Sleep apnea, and Type II or unspecified type diabetes mellitus without mention of complication, not stated as uncontrolled. has a past surgical history that includes Hysterectomy; Cholecystectomy; Colonoscopy; pr egd transoral biopsy single/multiple (7/19/2017); Upper gastrointestinal endoscopy (N/A, 10/18/2019); and Colonoscopy (N/A, 10/18/2019).      Social History     Socioeconomic History    Marital status: Single     Spouse name: Not on file    Number of children: Not on file    Years of education: Not on file    Highest education level: Not on file   Occupational History    Not on file   Social Needs    Financial resource strain: Not on file    Food insecurity     Worry: Not on file     Inability: Not on file    Transportation needs     Medical: Not on file     Non-medical: Not on file   Tobacco Use    Smoking status: Former Smoker     Packs/day: 0.25     Years: 7.00     Pack years: 1.75     Start date: 1984     Quit date: 1991     Years since quittin.9    Smokeless tobacco: Never Used   Substance and Sexual Activity    Alcohol use: Not Currently    Drug use: No    Sexual activity: Not on file   Lifestyle    Physical activity     Days per week: Not on file     Minutes per session: Not on file    Stress: Not on file   Relationships    Social connections     Talks on phone: Not on file     Gets together: Not on file     Attends Yarsanism service: Not on file     Active member of club or organization: Not on file     Attends meetings of clubs or organizations: Not on file     Relationship status: Not on file    Intimate partner violence     Fear of current or ex partner: Not on file     Emotionally abused: Not on file     Physically abused: Not on file     Forced sexual activity: Not on file   Other Topics Concern    Not on file   Social History Narrative    Not on file       Family History   Problem Relation Age of Onset    High Blood Pressure Mother     Diabetes Mother     Cancer Father         Allergies:  Patient has no known allergies. Home Medications:  Prior to Admission medications    Medication Sig Start Date End Date Taking?  Authorizing Provider   diclofenac sodium (VOLTAREN) 1 % GEL Apply 2 g topically 2 times daily 20   Jaun Srivastava MD   lisinopril (PRINIVIL;ZESTRIL) 10 MG tablet take 1 tablet by mouth once daily 20   Jaspal Kang MD   ibuprofen (ADVIL;MOTRIN) 800 MG tablet Take 1 tablet by mouth every 8 hours as needed for Pain 11/25/20   Kal Sandoval MD   metFORMIN (GLUCOPHAGE-XR) 500 MG extended release tablet take 1 tablet by mouth twice a day with meals 11/9/20   Kishor Melendez MD   hydroCHLOROthiazide (HYDRODIURIL) 25 MG tablet take 1 tablet by mouth once daily 10/6/20   Jennifer Briseno MD   FEROSUL 325 (65 Fe) MG tablet take 1 tablet by mouth twice a day before meals 7/11/20   Historical Provider, MD   atorvastatin (LIPITOR) 40 MG tablet Take 1 tablet by mouth daily 5/21/20   Nelly Tidwell MD   ferrous sulfate (FE TABS 325) 325 (65 Fe) MG EC tablet Take 1 tablet by mouth 2 times daily (before meals) 5/21/20   Nelly Tidwell MD   melatonin 1 MG tablet Take 1 tablet by mouth nightly as needed for Sleep 5/21/20   Nelly Tidwell MD   metoprolol tartrate (LOPRESSOR) 25 MG tablet Take 1 tablet by mouth 2 times daily 5/21/20   Nelly Tidwell MD   nitroGLYCERIN (NITROSTAT) 0.4 MG SL tablet up to max of 3 total doses. If no relief after 1 dose, call 911. 5/21/20   Nelly Tidwell MD   pantoprazole (PROTONIX) 40 MG tablet Take 1 tablet by mouth every morning (before breakfast) 10/19/19   Matthieu Lopez MD   Elastic Bandages & Supports (ACE ELBOW BRACE LARGE/X-LARGE) MISC 1 Brace as need for elbow pain 12/14/18   Jonah Rodarte MD   aspirin 81 MG tablet Take 81 mg by mouth daily    Historical Provider, MD       REVIEW OFSYSTEMS    (2-9 systems for level 4, 10 or more for level 5)      Review of Systems   Constitutional: Negative for diaphoresis and fever. HENT: Positive for nosebleeds. Negative for congestion. Gastrointestinal: Negative for nausea and vomiting. Skin: Negative for wound. Neurological: Negative for headaches. Psychiatric/Behavioral: Negative for confusion.        PHYSICAL EXAM   (up to 7 for level 4, 8 or more forlevel 5)      ED TRIAGE VITALS BP: 132/85, Temp: 97.9 °F (36.6 °C), Pulse: 81, Resp: 20, SpO2: 100 %    Vitals:    12/23/20 1252 12/23/20 1257   BP:  132/85 Pulse:  81   Resp:  20   Temp: 97.9 °F (36.6 °C)    TempSrc: Skin    SpO2:  100%   Weight:  273 lb (123.8 kg)   Height:  5' 2\" (1.575 m)       Physical Exam  HENT:      Head: Normocephalic. Nose:      Comments: Left nare with erythema and signs of dried blood     Mouth/Throat:      Mouth: Mucous membranes are moist.   Eyes:      Extraocular Movements: Extraocular movements intact. Neck:      Musculoskeletal: Normal range of motion and neck supple. Cardiovascular:      Rate and Rhythm: Normal rate. Pulmonary:      Effort: Pulmonary effort is normal.   Abdominal:      Palpations: Abdomen is soft. Neurological:      Mental Status: She is alert. DIFFERENTIAL  DIAGNOSIS     PLAN (LABS / IMAGING / EKG):  No orders of the defined types were placed in this encounter. MEDICATIONS ORDERED:  Orders Placed This Encounter   Medications    oxymetazoline (AFRIN) 0.05 % nasal spray 1 spray       DDX:     Epistaxis, anticoagulation, nasal trauma    Initial MDM/Plan: 64 y.o. female who presents with epistaxis. Patient has been having multiple nosebleeds throughout the week, states there has been multiple losses of family members and she has been crying, blowing her nose along with the winter weather is causing her nose to be dry and she has been having nosebleeds. Denies being on any blood thinners, denies nasal or facial trauma, states she does take aspirin at home but that is the only blood thinning medication that she has. Denies any nosebleeds in the past before this, states that she just lost her brother today and is in a very teary mood, seen blowing her nose with specks of blood on the tissue paper. The nosebleed is from the left nare but seems to be very minimal.  Patient denies feeling lightheaded or short of breath at this time. DIAGNOSTIC RESULTS / EMERGENCYDEPARTMENT COURSE / MDM     LABS:  No results found for this visit on 12/23/20.     RADIOLOGY:  No orders to display EMERGENCY DEPARTMENT COURSE:  ED Course as of Dec 23 1321   Wed Dec 23, 2020   1302 Patient seen and assessed in the emergency department no acute respiratory or cardiovascular distress. Patient has been having multiple nosebleeds throughout the week, states there has been multiple losses of family members and she has been crying, blowing her nose along with the winter weather is causing her nose to be dry and she has been having nosebleeds. Denies being on any blood thinners, denies nasal or facial trauma, states she does take aspirin at home but that is the only blood thinning medication that she has. Denies any nosebleeds in the past before this, states that she just lost her brother today and is in a very teary mood, seen blowing her nose with specks of blood on the tissue paper. The nosebleed is from the left nare but seems to be very minimal.  Patient denies feeling lightheaded or short of breath at this time. [PS]   1316 Afrin used area clamped bleeding controlled and hemostasis achieved    [PS]   1316 Pulse: 81 [PS]      ED Course User Index  [PS] Patito Madrigal MD          PROCEDURES:  None    CONSULTS:  None    CRITICAL CARE:  Please see attending note    FINAL IMPRESSION      No diagnosis found. DISPOSITION / PLAN     DISPOSITION     discharge    PATIENT REFERRED TO:  No follow-up provider specified.     DISCHARGE MEDICATIONS:  New Prescriptions    No medications on file       Patito Madrigal MD  Emergency Medicine Resident    (Please note that portions of this note were completed with a voice recognition program.Efforts were made to edit the dictations but occasionally words are mis-transcribed.)     Patito Madrigal MD  Resident  12/23/20 9826

## 2020-12-23 NOTE — ED PROVIDER NOTES
[facility]     Emergency Department     Faculty Attestation    I performed a history and physical examination of the patient and discussed management with the resident. I reviewed the residents note and agree with the documented findings including all diagnostic interpretations and plan of care. Any areas of disagreement are noted on the chart. I was personally present for the key portions of any procedures. I have documented in the chart those procedures where I was not present during the key portions. I have reviewed the emergency nurses triage note. I agree with the chief complaint, past medical history, past surgical history, allergies, medications, social and family history as documented unless otherwise noted below. Documentation of the HPI, Physical Exam and Medical Decision Making performed by scribes is based on my personal performance of the HPI, PE and MDM. For Physician Assistant/ Nurse Practitioner cases/documentation I have personally evaluated this patient and have completed at least one if not all key elements of the E/M (history, physical exam, and MDM). Additional findings are as noted. This patient was evaluated in the Emergency Department for symptoms described in the history of present illness. He/she was evaluated in the context of the global COVID-19 pandemic, which necessitated consideration that the patient might be at risk for infection with the SARS-CoV-2 virus that causes COVID-19. Institutional protocols and algorithms that pertain to the evaluation of patients at risk for COVID-19 are in a state of rapid change based on information released by regulatory bodies including the CDC and federal and state organizations. These policies and algorithms were followed during the patient's care in the ED. Primary Care Physician: Juani Steele MD    History:  This is a 64 y.o. female who presents to the Emergency Department with complaint of epistaxis. Intermittent over the past week. Streaky. No large clots. Reports significant crying and blowing her nose due to stress with multiple family members deaths. Physical:     height is 5' 2\" (1.575 m) and weight is 273 lb (123.8 kg). Her skin temperature is 97.9 °F (36.6 °C). Her blood pressure is 132/85 and her pulse is 81. Her respiration is 20 and oxygen saturation is 100%. 64 y.o. female no acute distress, nose bilaterally shows injection there is no obvious bleeding or bulging vessels.   Nasal passages are clear otherwise    Impression: Epistaxis likely due to mucosal irritation    Plan: Melba Harvey MD, Charles Ram  Attending Emergency Physician        Sergio Brothers MD  12/23/20

## 2020-12-24 ENCOUNTER — TELEPHONE (OUTPATIENT)
Dept: FAMILY MEDICINE CLINIC | Age: 60
End: 2020-12-24

## 2021-01-22 ENCOUNTER — TELEPHONE (OUTPATIENT)
Dept: ORTHOPEDIC SURGERY | Age: 61
End: 2021-01-22

## 2021-01-22 NOTE — TELEPHONE ENCOUNTER
Left message to call me back regarding voicemail message that was left. She states that her knee arthritis is no better. She has a follow up on 2/12/21 with Dr Radha Quintanilla. Last ov note states she was referred to physical therapy for her knee, and Voltaren was Rx to use QID. Asked patient to call me back regarding questions I have for her.

## 2021-01-28 ENCOUNTER — TELEPHONE (OUTPATIENT)
Dept: ORTHOPEDIC SURGERY | Age: 61
End: 2021-01-28

## 2021-01-28 DIAGNOSIS — M79.672 LEFT FOOT PAIN: ICD-10-CM

## 2021-01-28 DIAGNOSIS — M23.307 DEGENERATIVE TEAR OF MENISCUS OF LEFT KNEE: ICD-10-CM

## 2021-01-28 DIAGNOSIS — M19.079 ARTHRITIS OF SUBTALAR JOINT: ICD-10-CM

## 2021-01-28 DIAGNOSIS — M19.079 ARTHRITIS OF MIDFOOT: ICD-10-CM

## 2021-01-29 ENCOUNTER — TELEPHONE (OUTPATIENT)
Dept: ORTHOPEDIC SURGERY | Age: 61
End: 2021-01-29

## 2021-01-29 NOTE — TELEPHONE ENCOUNTER
Spoke with the patient and she scheduled an appointment for 2/5/21 for a possible knee injection since Voltaren is not working to control her pain.

## 2021-02-01 ENCOUNTER — HOSPITAL ENCOUNTER (OUTPATIENT)
Dept: PHYSICAL THERAPY | Age: 61
Setting detail: THERAPIES SERIES
Discharge: HOME | End: 2021-02-01
Payer: MEDICARE

## 2021-02-01 NOTE — FLOWSHEET NOTE
[x] [facility]) [facility]  [address]    [phone]  F: [phone]   [] [address]  [facility] [address]   [address]  P: [phone]  F: [phone]  [] [address]  [address]  P: [phone]  F: [phone] [] [address]  P: [phone]  F: [phone]  [] [address]  [facility]   [address]   [place]: [phone]  F: [phone]   [] [place] [address] [facility] [address]  [place]: [phone]   F: [phone]     Physical Therapy Cancel/No Show note    Date: 2021  Patient: Linda Dumont  : 1960  MRN: [id number]    Cancels/No Shows to date:     For today's appointment patient:    [x]  Cancelled    [] Rescheduled appointment    [] No-show     Reason given by patient:    []  Patient ill    []  Conflicting appointment    [] No transportation      [] Conflict with work    [x] No reason given    [] Weather related    [] COVID-19    [] Other:      Comments: Pt cancelled initial evaluation, no reason given, although the weather was bad ([address] under level 1 snow emergency)        [] Next appointment was confirmed    Electronically signed by: La Nena Chery PT

## 2021-02-05 ENCOUNTER — OFFICE VISIT (OUTPATIENT)
Dept: ORTHOPEDIC SURGERY | Age: 61
End: 2021-02-05
Payer: MEDICARE

## 2021-02-05 VITALS — TEMPERATURE: 97.6 F | HEIGHT: 62 IN | RESPIRATION RATE: 12 BRPM | BODY MASS INDEX: 50.24 KG/M2 | WEIGHT: 273 LBS

## 2021-02-05 DIAGNOSIS — M65.342 TRIGGER RING FINGER OF LEFT HAND: ICD-10-CM

## 2021-02-05 DIAGNOSIS — M23.307 DEGENERATIVE TEAR OF MENISCUS OF LEFT KNEE: Primary | ICD-10-CM

## 2021-02-05 PROCEDURE — 3017F COLORECTAL CA SCREEN DOC REV: CPT | Performed by: ORTHOPAEDIC SURGERY

## 2021-02-05 PROCEDURE — G8427 DOCREV CUR MEDS BY ELIG CLIN: HCPCS | Performed by: ORTHOPAEDIC SURGERY

## 2021-02-05 PROCEDURE — 99213 OFFICE O/P EST LOW 20 MIN: CPT | Performed by: ORTHOPAEDIC SURGERY

## 2021-02-05 PROCEDURE — G8417 CALC BMI ABV UP PARAM F/U: HCPCS | Performed by: ORTHOPAEDIC SURGERY

## 2021-02-05 PROCEDURE — G8484 FLU IMMUNIZE NO ADMIN: HCPCS | Performed by: ORTHOPAEDIC SURGERY

## 2021-02-05 PROCEDURE — 20610 DRAIN/INJ JOINT/BURSA W/O US: CPT | Performed by: ORTHOPAEDIC SURGERY

## 2021-02-05 PROCEDURE — 1036F TOBACCO NON-USER: CPT | Performed by: ORTHOPAEDIC SURGERY

## 2021-02-05 RX ORDER — TRIAMCINOLONE ACETONIDE 40 MG/ML
40 INJECTION, SUSPENSION INTRA-ARTICULAR; INTRAMUSCULAR ONCE
Status: COMPLETED | OUTPATIENT
Start: 2021-02-05 | End: 2021-02-05

## 2021-02-05 RX ORDER — LIDOCAINE HYDROCHLORIDE 10 MG/ML
4 INJECTION, SOLUTION INFILTRATION; PERINEURAL ONCE
Status: COMPLETED | OUTPATIENT
Start: 2021-02-05 | End: 2021-02-05

## 2021-02-05 RX ADMIN — TRIAMCINOLONE ACETONIDE 40 MG: 40 INJECTION, SUSPENSION INTRA-ARTICULAR; INTRAMUSCULAR at 10:33

## 2021-02-05 RX ADMIN — LIDOCAINE HYDROCHLORIDE 4 ML: 10 INJECTION, SOLUTION INFILTRATION; PERINEURAL at 10:57

## 2021-02-05 NOTE — PROGRESS NOTES
[facility]  Formerly [facility], [facility] Dereje Pagan  [facility] [address]  Dept: [phone]    Ambulatory Orthopedic Consult      CHIEF COMPLAINT:    Chief Complaint   Patient presents with    Knee Pain     Left Knee       HISTORY OF PRESENT ILLNESS:      The patient is a left-hand dominant 64 y.o. female who is being seen for evaluation of pain at the above location (left anterior knee greater than left dorsal midfoot and lateral/medial hindfoot, as well as left ring finger), which began in the beginning of November 2020 atraumatically. The pain is described mainly with mechanical terms (dull/sharp/throbbing). The pain is worse with activity and better with rest. The patient reports a static course. The patient has tried:      [x]  rest/activity modification          [x]  NSAIDs      []  opiates      []  orthotics        []  change in shoes   [x]  home exercises  []  physical therapy      []  CAM boot     []  brace:    []  injection:       []  surgery:      The patient also reports that her left ring finger pain is associated with intermittent locking. INTERVAL HISTORY 2/5/2021:  She is seen again today in the office for follow up of a previous issue (as above--left knee and left ring finger pain). Since being seen last, the patient is doing worse, particularly in terms of her left knee. At today's visit, she is not using a brace or assistive device. The location and quality of the pain have not significantly changed since the last visit. REVIEW OF SYSTEMS:  Constitutional: Negative for fever. HENT: Negative for tinnitus. Eyes: Negative for pain. Respiratory: Negative for shortness of breath. Cardiovascular: Negative for chest pain. Gastrointestinal: Negative for abdominal pain. Genitourinary: Negative for dysuria. Skin: Negative for rash. Neurological: Negative for headaches. Hematological: Does not bruise/bleed easily. Musculoskeletal: See HPI for pertinent positives     Past Medical History:    She  has a past medical history of Diverticulitis, Former smoker, Former smoker, GERD (gastroesophageal reflux disease), Hyperlipidemia, Hypertension, Morbid obesity with BMI of 45.0-49.9, adult ([address] SINDI on CPAP, Sleep apnea, and Type II or unspecified type diabetes mellitus without mention of complication, not stated as uncontrolled. Past Surgical History:    She  has a past surgical history that includes Hysterectomy; Cholecystectomy; Colonoscopy; pr egd transoral biopsy single/multiple (7/19/2017); Upper gastrointestinal endoscopy (N/A, 10/18/2019); and Colonoscopy (N/A, 10/18/2019). Current Medications:     Current Outpatient Medications:     diclofenac sodium (VOLTAREN) 1 % GEL, Apply 2 g topically 2 times daily, Disp: 350 g, Rfl: 0    lisinopril (PRINIVIL;ZESTRIL) 10 MG tablet, take 1 tablet by mouth once daily, Disp: 30 tablet, Rfl: 3    ibuprofen (ADVIL;MOTRIN) 800 MG tablet, Take 1 tablet by mouth every 8 hours as needed for Pain, Disp: 20 tablet, Rfl: 0    metFORMIN (GLUCOPHAGE-XR) 500 MG extended release tablet, take 1 tablet by mouth twice a day with meals, Disp: 30 tablet, Rfl: 5    hydroCHLOROthiazide (HYDRODIURIL) 25 MG tablet, take 1 tablet by mouth once daily, Disp: 30 tablet, Rfl: 3    FEROSUL 325 (65 Fe) MG tablet, take 1 tablet by mouth twice a day before meals, Disp: , Rfl:     atorvastatin (LIPITOR) 40 MG tablet, Take 1 tablet by mouth daily, Disp: 30 tablet, Rfl: 3    ferrous sulfate (FE TABS 325) 325 (65 Fe) MG EC tablet, Take 1 tablet by mouth 2 times daily (before meals), Disp: 90 tablet, Rfl: 3    melatonin 1 MG tablet, Take 1 tablet by mouth nightly as needed for Sleep, Disp: 30 tablet, Rfl: 2    metoprolol tartrate (LOPRESSOR) 25 MG tablet, Take 1 tablet by mouth 2 times daily, Disp: 60 tablet, Rfl: 3    nitroGLYCERIN (NITROSTAT) 0.4 MG SL tablet, up to max of 3 total doses.  If no relief after 1 dose, call 911., Disp: 25 tablet, Rfl: 3    pantoprazole (PROTONIX) 40 MG tablet, Take 1 tablet by mouth every morning (before breakfast), Disp: 30 tablet, Rfl: 5    Elastic Bandages & Supports (ACE ELBOW BRACE LARGE/X-LARGE) MISC, 1 Brace as need for elbow pain, Disp: 1 each, Rfl: 0    aspirin 81 MG tablet, Take 81 mg by mouth daily, Disp: , Rfl:      Allergies:    Patient has no known allergies. Family History:  family history includes Cancer in her father; Diabetes in her mother; High Blood Pressure in her mother.     Social History:   Social History     Occupational History    Not on file   Tobacco Use    Smoking status: Former Smoker     Packs/day: 0.25     Years: 7.00     Pack years: 1.75     Start date: 1984     Quit date: 1991     Years since quittin.1    Smokeless tobacco: Never Used   Substance and Sexual Activity    Alcohol use: Not Currently    Drug use: No    Sexual activity: Not on file     Occupation: Works as a      OBJECTIVE:  Temp 97.6 °F (36.4 °C)   Resp 12   Ht 5' 2\" (1.575 m)   Wt 273 lb (123.8 kg)   BMI 49.93 kg/m²    Psych: alert and oriented to person, time, and place  Cardio:  well perfused extremities  Resp:  normal respiratory effort  Skin:  no cyanosis  Hem/lymph:  no lymphedema  Neuro:  sensation to light touch grossly intact throughout all nerve distributions in the bilateral feet and hands  Musculoskeletal:    RLE:    Grossly normal range of motion of hip, slightly decreased flexion/extension of the knee  Painless range of motion of hip, but pain on extremes of knee motion  Stable exam of hip/knee  Skin intact without erythema/warmth  Grossly neurovascularly intact distally in the lower extremity  Appropriate strength on manual muscle testing of the iliopsoas, hamstrings, quadriceps, tibialis anterior, gastroc-soleus complex  Tenderness:  anteromedial and anterolateral knee joint line  -Kirk's test positive for pain      LLE: Grossly normal range of motion of hip, slightly decreased flexion extension of the knee  Painless range of motion of hip, but pain on extremes of knee motion  Stable exam of hip/knee  Skin intact without erythema/warmth  Grossly neurovascularly intact distally in the lower extremity  Appropriate strength on manual muscle testing of the iliopsoas, hamstrings, quadriceps, tibialis anterior, gastroc-soleus complex  Tenderness:  anteromedial and anterolateral knee joint line, diffusely throughout the midfoot, sinus Tarsi, and along the course the posterior tibial tendon  -Kirk's test positive for pain      Affected upper extremity (left):  Grossly normal range of motion of wrist/fingers  Painless range of motion of wrist/fingers except for ring finger  Stable exam of wrist/hand  Skin intact without erythema/warmth   Grossly neurovascularly intact distally throughout able to fire EPL/FPL/IO/EDC/FDP  Tenderness to palpation:  A1 pulley of the ring finger  -Durkan's compression test negative  -triggering elicited at the Ring finger       Unaffected upper extremity (right):  Grossly normal range of motion of wrist/fingers  Painless range of motion of above  Stable exam of wrist/hand  Skin intact without erythema/warmth  Neurovascularly intact distally, able to fire EPL/FPL/IO/EDC/FDP  Tenderness to palpation:  none   -no triggering elicited, no nodules palpated        RADIOLOGY:   2/5/2021 No new radiology images today. Prior images reviewed for reference. FINDINGS:  Three weightbearing views (AP, Mortise, and Lateral) of the left ankle and three weightbearing views (AP, Oblique, Lateral) of the left foot and four weightbearing views (AP, Lateral, Tunnel and Sunrise) of the left knee and 3 views (AP, oblique, lateral) of the left hand were obtained in the office today and reviewed, revealing no acute fracture, dislocation, or radioopaque foreign body/tumor.  The ankle mortise is maintained with no widening of for the future, but at this time patient wishes to pursue conservative management. Orders/referrals were placed as below at today's visit. She was previously provided information on how to obtain an over-the-counter flatfoot style orthotic, and referred to physical therapy for my flatfoot protocol as well as for her left knee. She will continue to use Voltaren gel as needed, which she reports helps. After discussing treatment options, she wished to proceed with a left knee injection as below. All questions were answered and the above plan was agreed upon. The patient will return to clinic in 8 weeks without x-rays. At her next visit, depending on how she is doing, we we will again discuss a left ring finger trigger finger injection, a possible left knee MRI, depending on how she is doing. KNEE INJECTION PROCEDURE NOTE: After discussing the risks/benefits/alternatives to injection, an informed consent was obtained verbally. The left knee was verified as the correct location and allergies were reviewed. The skin overlying the injection site was cleaned with an alcohol swab followed by a local prep with Chloraprep. A 22 gauge needle was introduced into the above location via the following approach:  superolateral. A mixture of 40 mg of Kenalog and 4 mL of 1% Lidocaine without epinephrine was injected. The patient was noted to tolerate the procedure well without immediate complication.                  At the patient's next visit, depending on how the patient is doing and/or new imaging/labs results, we may consider the following options:    []  Orthotic (OTC)     []  Orthotic (custom)          []  Rocker bottom shoes     []  Brace (OTC)        []  Brace (custom)             []  CAM boot        []  Night splint         []  Heel cups        []  Strap      []  Toe sleeves/splints    []  PT:                     []  Wean out of immobilization   []  Advance activity       []  Topical               [] NSAIDs          []  [place]nd[place]         []  Referral:         []  Stress xrays       []  CT         []  MRI        []  Injection:         []  Consider OR      []  Pick OR date    Return in about 8 weeks (around 4/2/2021). No orders of the defined types were placed in this encounter. No orders of the defined types were placed in this encounter. Cody Poe MD  Orthopedic Surgery        Please excuse any typos/errors, as this note was created with the assistance of voice recognition software. While intending to generate a document that actually reflects the content of the visit, the document can still have some errors including those of syntax and sound-a-like substitutions which may escape proof reading. In such instances, actual meaning can be extrapolated by context.

## 2021-04-01 ENCOUNTER — HOSPITAL ENCOUNTER (OUTPATIENT)
Age: 61
Setting detail: SPECIMEN
Discharge: HOME | End: 2021-04-01
Payer: MEDICARE

## 2021-04-01 ENCOUNTER — OFFICE VISIT (OUTPATIENT)
Dept: FAMILY MEDICINE CLINIC | Age: 61
End: 2021-04-01
Payer: MEDICARE

## 2021-04-01 VITALS
SYSTOLIC BLOOD PRESSURE: 121 MMHG | HEART RATE: 90 BPM | TEMPERATURE: 96.6 F | WEIGHT: 281 LBS | BODY MASS INDEX: 49.79 KG/M2 | HEIGHT: 63 IN | DIASTOLIC BLOOD PRESSURE: 80 MMHG

## 2021-04-01 DIAGNOSIS — L20.82 FLEXURAL ECZEMA: ICD-10-CM

## 2021-04-01 DIAGNOSIS — Z12.31 ENCOUNTER FOR SCREENING MAMMOGRAM FOR BREAST CANCER: ICD-10-CM

## 2021-04-01 DIAGNOSIS — R73.03 PREDIABETES: ICD-10-CM

## 2021-04-01 DIAGNOSIS — N30.00 ACUTE CYSTITIS WITHOUT HEMATURIA: Primary | ICD-10-CM

## 2021-04-01 DIAGNOSIS — Z23 NEED FOR PROPHYLACTIC VACCINATION AND INOCULATION AGAINST VARICELLA: ICD-10-CM

## 2021-04-01 DIAGNOSIS — I10 ESSENTIAL HYPERTENSION: ICD-10-CM

## 2021-04-01 PROBLEM — E11.9 CONTROLLED TYPE 2 DIABETES MELLITUS WITHOUT COMPLICATION, WITHOUT LONG-TERM CURRENT USE OF INSULIN (HCC): Status: RESOLVED | Noted: 2017-04-25 | Resolved: 2021-04-01

## 2021-04-01 LAB
ANION GAP SERPL CALCULATED.3IONS-SCNC: 10 MMOL/L (ref 9–17)
BUN BLDV-MCNC: 19 MG/DL (ref 6–20)
BUN/CREAT BLD: ABNORMAL (ref 9–20)
CALCIUM SERPL-MCNC: 9.2 MG/DL (ref 8.6–10.4)
CHLORIDE BLD-SCNC: 106 MMOL/L (ref 98–107)
CO2: 25 MMOL/L (ref 20–31)
CREAT SERPL-MCNC: 0.91 MG/DL (ref 0.5–0.9)
GFR AFRICAN AMERICAN: >60 ML/MIN
GFR NON-AFRICAN AMERICAN: >60 ML/MIN
GFR SERPL CREATININE-BSD FRML MDRD: ABNORMAL ML/MIN/{1.73_M2}
GFR SERPL CREATININE-BSD FRML MDRD: ABNORMAL ML/MIN/{1.73_M2}
GLUCOSE BLD-MCNC: 117 MG/DL (ref 70–99)
HBA1C MFR BLD: 6.5 %
POTASSIUM SERPL-SCNC: 4.2 MMOL/L (ref 3.7–5.3)
SODIUM BLD-SCNC: 141 MMOL/L (ref 135–144)

## 2021-04-01 PROCEDURE — 1036F TOBACCO NON-USER: CPT | Performed by: STUDENT IN AN ORGANIZED HEALTH CARE EDUCATION/TRAINING PROGRAM

## 2021-04-01 PROCEDURE — 3017F COLORECTAL CA SCREEN DOC REV: CPT | Performed by: STUDENT IN AN ORGANIZED HEALTH CARE EDUCATION/TRAINING PROGRAM

## 2021-04-01 PROCEDURE — 99213 OFFICE O/P EST LOW 20 MIN: CPT | Performed by: STUDENT IN AN ORGANIZED HEALTH CARE EDUCATION/TRAINING PROGRAM

## 2021-04-01 PROCEDURE — 83036 HEMOGLOBIN GLYCOSYLATED A1C: CPT | Performed by: STUDENT IN AN ORGANIZED HEALTH CARE EDUCATION/TRAINING PROGRAM

## 2021-04-01 PROCEDURE — G8427 DOCREV CUR MEDS BY ELIG CLIN: HCPCS | Performed by: STUDENT IN AN ORGANIZED HEALTH CARE EDUCATION/TRAINING PROGRAM

## 2021-04-01 PROCEDURE — G8417 CALC BMI ABV UP PARAM F/U: HCPCS | Performed by: STUDENT IN AN ORGANIZED HEALTH CARE EDUCATION/TRAINING PROGRAM

## 2021-04-01 RX ORDER — NITROFURANTOIN 25; 75 MG/1; MG/1
100 CAPSULE ORAL 2 TIMES DAILY
Qty: 20 CAPSULE | Refills: 0 | Status: SHIPPED | OUTPATIENT
Start: 2021-04-01 | End: 2021-04-11

## 2021-04-01 ASSESSMENT — ENCOUNTER SYMPTOMS
NAUSEA: 0
VOMITING: 0
COUGH: 0
SHORTNESS OF BREATH: 0
ABDOMINAL PAIN: 0
DIARRHEA: 0
CONSTIPATION: 0

## 2021-04-01 NOTE — PROGRESS NOTES
Subjective:    Genie Hernández is a 64 y.o. female with  has a past medical history of Diverticulitis, Former smoker, Former smoker, GERD (gastroesophageal reflux disease), Hyperlipidemia, Hypertension, Morbid obesity with BMI of 45.0-49.9, adult ([address] SINDI on CPAP, Sleep apnea, and Type II or unspecified type diabetes mellitus without mention of complication, not stated as uncontrolled. Family History   Problem Relation Age of Onset    High Blood Pressure Mother     Diabetes Mother    Sima Guthrie Cancer Father        Presented tot office today for:  Chief Complaint   Patient presents with    Back Pain       HPI   65 yo female presents for     Urinary issues  Bladder pressure post void   Onset 2-3 days  Increased urinary frequency  Denies fever/chills, abdominal pain, hematuria, post void dribbling, vaginal discharge, history of std's    Rash  Onset: off and on for a little while  Pt states she has an external rash on vagina  Used vagisil, vaseline and aquafor with some relief   Pruritis when its dry, but not moisturized     Pre-diabetes   A1c Dec '19: 6.3%   On Metformin XR 500mg daily   Denies polyuria/dipsia/phagia    HTN   Well controlled  Denies HA, blurry vision, chest pain, sob     Review of Systems   Constitutional: Negative for chills, fatigue and fever. HENT: Negative for congestion. Eyes: Negative for visual disturbance. Respiratory: Negative for cough and shortness of breath. Cardiovascular: Negative for chest pain, palpitations and leg swelling. Gastrointestinal: Negative for abdominal pain, constipation, diarrhea, nausea and vomiting. Genitourinary: Negative for dysuria and frequency. Musculoskeletal: Negative for myalgias. Neurological: Negative for weakness, light-headedness and headaches.        Objective:    /80 (Site: Right Upper Arm, Position: Sitting, Cuff Size: Medium Adult)   Pulse 90   Temp 96.6 °F (35.9 °C) (Temporal)   Ht 5' 3\" (1.6 m)   Wt 281 lb (127.5 kg)   BMI 49.78 kg/m²    BP Readings from Last 3 Encounters:   04/01/21 121/80   12/23/20 132/85   11/25/20 135/69       Physical Exam  Vitals signs reviewed. Constitutional:       General: She is not in acute distress. Eyes:      Conjunctiva/sclera: Conjunctivae normal.   Neck:      Musculoskeletal: Neck supple. Thyroid: No thyromegaly. Cardiovascular:      Rate and Rhythm: Normal rate and regular rhythm. Heart sounds: Normal heart sounds. Pulmonary:      Effort: Pulmonary effort is normal.      Breath sounds: Normal breath sounds. Abdominal:      General: Bowel sounds are normal.      Palpations: Abdomen is soft. Tenderness: There is no abdominal tenderness. Musculoskeletal:         General: No tenderness. Right lower leg: No edema. Left lower leg: No edema. Skin:     General: Skin is warm and dry. Findings: Rash (chaperone present. macular papular dry scaling rash noted in flexural areas (neck, b/l cubital fossa area). ) present. Comments: Groin rash: macular lesions varying in sizes, well hdyrated, healing , no scaling or dryness noted  No erythema   Neurological:      Mental Status: She is alert. Motor: No abnormal muscle tone. Lab Results   Component Value Date    WBC 6.8 05/19/2020    HGB 12.4 05/19/2020    HCT 40.3 05/19/2020     05/19/2020    CHOL 124 05/19/2020    TRIG 103 05/19/2020    HDL 33 (L) 05/19/2020    ALT 17 05/19/2020    AST 17 05/19/2020     05/19/2020    K 4.0 05/19/2020     05/19/2020    CREATININE 0.81 05/19/2020    BUN 13 05/19/2020    CO2 24 05/19/2020    TSH 2.25 04/18/2018    INR 1.2 10/18/2019    LABA1C 6.5 04/01/2021    LABMICR CANNOT BE CALCULATED 11/01/2019     Lab Results   Component Value Date    CALCIUM 9.2 05/19/2020    PHOS 3.1 09/12/2011     Lab Results   Component Value Date    LDLCHOLESTEROL 70 05/19/2020       Assessment and Plan:    1.  Acute cystitis without hematuria  - nitrofurantoin, questions answered. Pt voicedunderstanding. Return in about 3 months (around 7/1/2021) for a1c .

## 2021-04-01 NOTE — PROGRESS NOTES
GERTRUDIS Digital Screen Bilateral [[id number]]   6. Need for prophylactic vaccination and inoculation against varicella  zoster recombinant adjuvanted vaccine [facility]) 50 MCG/0.5ML SUSR injection     I agree with  orders as documented by the resident. Recommendations:   Patient was counseled, exam is benign, UA is consistent with UTI and urine sent for culture and sensitivity. Patient was started on Macrobid until culture data is reviewed. Eczema treatment discussed and patient started on topical steroids in addition to emollient use daily. Labs, vaccines and health maintenance updated. Return in about 3 months (around 7/1/2021) for a1c .    ([facility] ) Dr. Cr Burns MD

## 2021-04-01 NOTE — PROGRESS NOTES
Visit Information    Have you changed or started any medications since your last visit including any over-the-counter medicines, vitamins, or herbal medicines? no   Have you stopped taking any of your medications? Is so, why? -  no  Are you having any side effects from any of your medications? - no    Have you seen any other physician or provider since your last visit?  no   Have you had any other diagnostic tests since your last visit?  no   Have you been seen in the emergency room and/or had an admission in a hospital since we last saw you?  no   Have you had your routine dental cleaning in the past 6 months?  no     Do you have an active MyChart account? If no, what is the barrier?   Yes    Patient Care Team:  Toribio Calvin MD as PCP - General (Family Medicine)  Marsha Sanderson MD as PCP - [facility] Provider  Jenelle Conteh MD as Consulting Physician (Pulmonology)    Medical History Review  Past Medical, Family, and Social History reviewed and does not contribute to the patient presenting condition    Health Maintenance   Topic Date Due    Diabetic retinal exam  Never done    Hepatitis B vaccine (1 of 3 - Risk 3-dose series) Never done    Cervical cancer screen  Never done    Shingles Vaccine (1 of 2) Never done    Diabetic foot exam  12/16/2017    Diabetic microalbuminuria test  11/01/2020    A1C test (Diabetic or Prediabetic)  12/20/2020    Breast cancer screen  01/12/2021    COVID-19 Vaccine (2 - Pfizer 2-dose series) 02/10/2021    Lipid screen  05/19/2021    Potassium monitoring  05/19/2021    Creatinine monitoring  05/19/2021    Flu vaccine (Season Ended) 09/01/2021    DTaP/Tdap/Td vaccine (2 - Td) 03/07/2024    Colon cancer screen colonoscopy  10/18/2029    Pneumococcal 0-64 years Vaccine  Completed    Hepatitis C screen  Completed    HIV screen  Completed    Hepatitis A vaccine  Aged Out    Hib vaccine  Aged Out    Meningococcal (ACWY) vaccine  Aged Out

## 2021-04-05 ENCOUNTER — TELEPHONE (OUTPATIENT)
Dept: ORTHOPEDIC SURGERY | Age: 61
End: 2021-04-05

## 2021-04-06 ENCOUNTER — OFFICE VISIT (OUTPATIENT)
Dept: ORTHOPEDIC SURGERY | Age: 61
End: 2021-04-06
Payer: MEDICARE

## 2021-04-06 VITALS
HEART RATE: 77 BPM | RESPIRATION RATE: 12 BRPM | HEIGHT: 63 IN | TEMPERATURE: 97.1 F | OXYGEN SATURATION: 100 % | WEIGHT: 281 LBS | BODY MASS INDEX: 49.79 KG/M2

## 2021-04-06 DIAGNOSIS — M65.342 TRIGGER RING FINGER OF LEFT HAND: ICD-10-CM

## 2021-04-06 DIAGNOSIS — M19.079 ARTHRITIS OF MIDFOOT: ICD-10-CM

## 2021-04-06 DIAGNOSIS — M23.307 DEGENERATIVE TEAR OF MENISCUS OF LEFT KNEE: Primary | ICD-10-CM

## 2021-04-06 DIAGNOSIS — M19.079 ARTHRITIS OF SUBTALAR JOINT: ICD-10-CM

## 2021-04-06 PROCEDURE — 3017F COLORECTAL CA SCREEN DOC REV: CPT | Performed by: ORTHOPAEDIC SURGERY

## 2021-04-06 PROCEDURE — 1036F TOBACCO NON-USER: CPT | Performed by: ORTHOPAEDIC SURGERY

## 2021-04-06 PROCEDURE — 99214 OFFICE O/P EST MOD 30 MIN: CPT | Performed by: ORTHOPAEDIC SURGERY

## 2021-04-06 PROCEDURE — G8417 CALC BMI ABV UP PARAM F/U: HCPCS | Performed by: ORTHOPAEDIC SURGERY

## 2021-04-06 PROCEDURE — G8427 DOCREV CUR MEDS BY ELIG CLIN: HCPCS | Performed by: ORTHOPAEDIC SURGERY

## 2021-04-06 RX ORDER — NAPROXEN 500 MG/1
500 TABLET ORAL 2 TIMES DAILY PRN
Qty: 60 TABLET | Refills: 0 | Status: SHIPPED | OUTPATIENT
Start: 2021-04-06 | End: 2021-04-28

## 2021-04-06 NOTE — PROGRESS NOTES
[facility]  [facility]  [address] [address]  Dept: [phone]    Ambulatory Orthopedic Consult      CHIEF COMPLAINT:    Chief Complaint   Patient presents with    Knee Pain     Left knee       HISTORY OF PRESENT ILLNESS:      The patient is a left-hand dominant 64 y.o. female who is being seen for evaluation of pain at the above location (left anterior knee greater than left dorsal midfoot and lateral/medial hindfoot, as well as left ring finger), which began in the beginning of November 2020 atraumatically. The pain is described mainly with mechanical terms (dull/sharp/throbbing). The pain is worse with activity and better with rest. The patient reports a static course. The patient has tried:      [x]  rest/activity modification          [x]  NSAIDs      []  opiates      []  orthotics        []  change in shoes   [x]  home exercises  []  physical therapy      []  CAM boot     []  brace:    []  injection:       []  surgery:      The patient also reports that her left ring finger pain is associated with intermittent locking. INTERVAL HISTORY 2/5/2021:  She is seen again today in the office for follow up of a previous issue (as above--left knee and left ring finger pain). Since being seen last, the patient is doing worse, particularly in terms of her left knee. At today's visit, she is not using a brace or assistive device. The location and quality of the pain have not significantly changed since the last visit. INTERVAL HISTORY 4/6/2021:  She is seen again today in the office for follow up after an injection. Since being seen last, the patient is doing better. At today's visit, she is using no brace or assistive device. She received an injection of the left knee, and reports the injection helped approximately 100% in terms of pain.   She also reports that her left ring finger trigger finger is not bothering her, and reports that her foot pain has significantly improved. REVIEW OF SYSTEMS:  Constitutional: Negative for fever. HENT: Negative for tinnitus. Eyes: Negative for pain. Respiratory: Negative for shortness of breath. Cardiovascular: Negative for chest pain. Gastrointestinal: Negative for abdominal pain. Genitourinary: Negative for dysuria. Skin: Negative for rash. Neurological: Negative for headaches. Hematological: Does not bruise/bleed easily. Musculoskeletal: See HPI for pertinent positives     Past Medical History:    She  has a past medical history of Diverticulitis, Former smoker, Former smoker, GERD (gastroesophageal reflux disease), Hyperlipidemia, Hypertension, Morbid obesity with BMI of 45.0-49.9, adult ([facility] [address] SINDI on CPAP, Sleep apnea, and Type II or unspecified type diabetes mellitus without mention of complication, not stated as uncontrolled. Past Surgical History:    She  has a past surgical history that includes Hysterectomy; Cholecystectomy; Colonoscopy; pr egd transoral biopsy single/multiple (7/19/2017); Upper gastrointestinal endoscopy (N/A, 10/18/2019); and Colonoscopy (N/A, 10/18/2019). Current Medications:     Current Outpatient Medications:     naproxen (EC NAPROSYN) 500 MG EC tablet, Take 1 tablet by mouth 2 times daily as needed for Pain, Disp: 60 tablet, Rfl: 0    betamethasone valerate (VALISONE) 0.1 % cream, Apply topically 2 times daily. , Disp: 15 g, Rfl: 3    nitrofurantoin, macrocrystal-monohydrate, (MACROBID) 100 MG capsule, Take 1 capsule by mouth 2 times daily for 10 days, Disp: 20 capsule, Rfl: 0    metFORMIN (GLUCOPHAGE) 500 MG tablet, Take 1 tablet by mouth daily (with breakfast), Disp: 30 tablet, Rfl: 0    diclofenac sodium (VOLTAREN) 1 % GEL, Apply 2 g topically 2 times daily, Disp: 350 g, Rfl: 0    lisinopril (PRINIVIL;ZESTRIL) 10 MG tablet, take 1 tablet by mouth once daily, Disp: 30 tablet, Rfl: 3    ibuprofen (ADVIL;MOTRIN) 800 MG tablet, Take 1 tablet by mouth every 8 hours as needed for Pain, Disp: 20 tablet, Rfl: 0    hydroCHLOROthiazide (HYDRODIURIL) 25 MG tablet, take 1 tablet by mouth once daily, Disp: 30 tablet, Rfl: 3    FEROSUL 325 (65 Fe) MG tablet, take 1 tablet by mouth twice a day before meals, Disp: , Rfl:     atorvastatin (LIPITOR) 40 MG tablet, Take 1 tablet by mouth daily, Disp: 30 tablet, Rfl: 3    ferrous sulfate (FE TABS 325) 325 (65 Fe) MG EC tablet, Take 1 tablet by mouth 2 times daily (before meals), Disp: 90 tablet, Rfl: 3    melatonin 1 MG tablet, Take 1 tablet by mouth nightly as needed for Sleep, Disp: 30 tablet, Rfl: 2    metoprolol tartrate (LOPRESSOR) 25 MG tablet, Take 1 tablet by mouth 2 times daily, Disp: 60 tablet, Rfl: 3    nitroGLYCERIN (NITROSTAT) 0.4 MG SL tablet, up to max of 3 total doses. If no relief after 1 dose, call 911., Disp: 25 tablet, Rfl: 3    pantoprazole (PROTONIX) 40 MG tablet, Take 1 tablet by mouth every morning (before breakfast), Disp: 30 tablet, Rfl: 5    Elastic Bandages & Supports (ACE ELBOW BRACE LARGE/X-LARGE) MISC, 1 Brace as need for elbow pain, Disp: 1 each, Rfl: 0    aspirin 81 MG tablet, Take 81 mg by mouth daily, Disp: , Rfl:      Allergies:    Patient has no known allergies. Family History:  family history includes Cancer in her father; Diabetes in her mother; High Blood Pressure in her mother.     Social History:   Social History     Occupational History    Not on file   Tobacco Use    Smoking status: Former Smoker     Packs/day: 0.25     Years: 7.00     Pack years: 1.75     Start date: 1984     Quit date: 1991     Years since quittin.2    Smokeless tobacco: Never Used   Substance and Sexual Activity    Alcohol use: Not Currently    Drug use: No    Sexual activity: Not on file     Occupation: Works as a      OBJECTIVE:  Pulse 77   Temp 97.1 °F (36.2 °C)   Resp 12   Ht 5' 3\" (1.6 m)   Wt 281 lb (127.5 kg)   SpO2 100%   BMI 49.78 kg/m² Psych: alert and oriented to person, time, and place  Cardio:  well perfused extremities  Resp:  normal respiratory effort  Skin:  no cyanosis  Hem/lymph:  no lymphedema  Neuro:  sensation to light touch grossly intact throughout all nerve distributions in the bilateral feet and hands  Musculoskeletal:    RLE:    Grossly normal range of motion of hip, slightly decreased flexion/extension of the knee  Painless range of motion of hip, but pain on extremes of knee motion  Stable exam of hip/knee  Skin intact without erythema/warmth  Grossly neurovascularly intact distally in the lower extremity  Appropriate strength on manual muscle testing of the iliopsoas, hamstrings, quadriceps, tibialis anterior, gastroc-soleus complex  Tenderness:  anteromedial and anterolateral knee joint line --mild  -Kirk's test positive for pain      LLE:    Grossly normal range of motion of hip, slightly decreased flexion extension of the knee  Painless range of motion of hip, but pain on extremes of knee motion  Stable exam of hip/knee  Skin intact without erythema/warmth  Grossly neurovascularly intact distally in the lower extremity  Appropriate strength on manual muscle testing of the iliopsoas, hamstrings, quadriceps, tibialis anterior, gastroc-soleus complex  Tenderness:  anteromedial and anterolateral knee joint line (improved), diffusely throughout the midfoot, sinus Tarsi, and along the course the posterior tibial tendon  -Kirk's test positive for pain      Affected upper extremity (left):  Grossly normal range of motion of wrist/fingers  Painless range of motion of wrist/fingers except for ring finger  Stable exam of wrist/hand  Skin intact without erythema/warmth   Grossly neurovascularly intact distally throughout able to fire EPL/FPL/IO/EDC/FDP  Tenderness to palpation:  A1 pulley of the ring finger--improved  -Durkan's compression test negative  -triggering elicited at the Ring finger       Unaffected upper extremity (right):  Grossly normal range of motion of wrist/fingers  Painless range of motion of above  Stable exam of wrist/hand  Skin intact without erythema/warmth  Neurovascularly intact distally, able to fire EPL/FPL/IO/EDC/FDP  Tenderness to palpation:  none   -no triggering elicited, no nodules palpated        RADIOLOGY:   4/6/2021 No new radiology images today. Prior images reviewed for reference. FINDINGS:  Three weightbearing views (AP, Mortise, and Lateral) of the left ankle and three weightbearing views (AP, Oblique, Lateral) of the left foot and four weightbearing views (AP, Lateral, Tunnel and Sunrise) of the left knee and 3 views (AP, oblique, lateral) of the left hand were obtained in the office today and reviewed, revealing no acute fracture, dislocation, or radioopaque foreign body/tumor. The ankle mortise is maintained with no widening of the clear spaces. Overall alignment is satisfactory. Tricompartmental degenerative changes of the knee with joint space narrowing, sclerosis, and osteophytes. Degenerative changes at the subtalar joint, as well as mildly diffusely throughout the midfoot. IMPRESSION:  No acute fracture/dislocation. Generative changes as above. Electronically signed by Martín Hammond MD        Xr Knee Left (3 Views)    Result Date: 11/25/2020  EXAMINATION: THREE X-RAY VIEWS OF THE LEFT KNEE 11/25/2020 10:31 am COMPARISON: 03/03/2010 HISTORY: ORDERING SYSTEM PROVIDED HISTORY: Pain TECHNOLOGIST PROVIDED HISTORY: Pain Acuity: Acute Type of Exam: Unknown FINDINGS: No evidence of acute fracture or dislocation. Similar small benign-appearing soft tissue calcifications in the proximal and posterolateral leg. Radiographically there is no significant joint effusion. Progression of mild to moderate degenerative changes involving primarily the medial compartment with joint space loss and osteophytes. Lateral view is obtained in a slight degree of obliquity.      No acute osseous in the future. She may also continue to use her Voltaren gel as needed. All questions were answered and the above plan was agreed upon. The patient will return to clinic in the future as needed. At her next visit, depending on how she is doing, we may discuss a left ring finger trigger finger injection, and/or possible left knee MRI if we are heading towards possible surgery as above. At the patient's next visit, depending on how the patient is doing and/or new imaging/labs results, we may consider the following options:    []  Orthotic (OTC)     []  Orthotic (custom)          []  Rocker bottom shoes     []  Brace (OTC)        []  Brace (custom)             []  CAM boot        []  Night splint         []  Heel cups        []  Strap      []  Toe sleeves/splints    []  PT:                     []  Wean out of immobilization   []  Advance activity       []  Topical               []  NSAIDs          []  [place]nd[place]         []  Referral:         []  Stress xrays       []  CT         []  MRI        []  Injection:         []  Consider OR      []  Pick OR date    Return if symptoms worsen or fail to improve. No orders of the defined types were placed in this encounter. Orders Placed This Encounter   Procedures    Ambulatory referral to Physical Therapy     Referral Priority:   Routine     Referral Type:   Eval and Treat     Referral Reason:   Specialty Services Required     Requested Specialty:   Physical Therapy     Number of Visits Requested:   1         Rachna Turner MD  Orthopedic Surgery        Please excuse any typos/errors, as this note was created with the assistance of voice recognition software. While intending to generate a document that actually reflects the content of the visit, the document can still have some errors including those of syntax and sound-a-like substitutions which may escape proof reading. In such instances, actual meaning can be extrapolated by context.

## 2021-04-12 RX ORDER — NAPROXEN 500 MG/1
TABLET ORAL
Qty: 60 TABLET | Refills: 0 | Status: SHIPPED | OUTPATIENT
Start: 2021-04-12

## 2021-04-23 DIAGNOSIS — R73.03 PREDIABETES: ICD-10-CM

## 2021-04-23 NOTE — TELEPHONE ENCOUNTER
E-scribe request for metformin. Please review and e-scribe if applicable. Last Visit Date: 4/1/2021  Next Visit Date:  Visit date not found    Hemoglobin A1C (%)   Date Value   04/01/2021 6.5   12/20/2019 6.3   01/15/2019 6.5             ( goal A1C is < 7)   Microalb/Crt.  Ratio (mcg/mg creat)   Date Value   11/01/2019 CANNOT BE CALCULATED     LDL Cholesterol (mg/dL)   Date Value   05/19/2020 70       (goal LDL is <100)   AST (U/L)   Date Value   05/19/2020 17     ALT (U/L)   Date Value   05/19/2020 17     BUN (mg/dL)   Date Value   04/01/2021 19     BP Readings from Last 3 Encounters:   04/01/21 121/80   12/23/20 132/85   11/25/20 135/69          (goal 120/80)        Patient Active Problem List:     Gastroesophageal reflux disease     Hyperlipidemia with target LDL less than 100     HTN (hypertension)     Numbness and tingling     Diverticula, colon     Diverticulitis, acute     Eczema     Lump of left breast     Shoulder pain     Right groin pain     Tinea corporis     Hyperlipidemia     Morbid obesity with body mass index (BMI) of 40.0 to 49.9 (HCC)     SINDI (obstructive sleep apnea)     Vitamin D deficiency     Melena     Chest pain     Abnormal stress test     Contact blepharoconjunctivitis of left eye     Prediabetes     Encounter for screening mammogram for breast cancer     Need for prophylactic vaccination and inoculation against varicella     Acute cystitis without hematuria

## 2021-04-28 ENCOUNTER — HOSPITAL ENCOUNTER (EMERGENCY)
Age: 61
Discharge: HOME | End: 2021-04-28
Attending: EMERGENCY MEDICINE
Payer: MEDICARE

## 2021-04-28 ENCOUNTER — APPOINTMENT (OUTPATIENT)
Dept: GENERAL RADIOLOGY | Age: 61
End: 2021-04-28
Payer: MEDICARE

## 2021-04-28 VITALS
OXYGEN SATURATION: 100 % | RESPIRATION RATE: 16 BRPM | HEART RATE: 72 BPM | DIASTOLIC BLOOD PRESSURE: 100 MMHG | BODY MASS INDEX: 49.26 KG/M2 | WEIGHT: 278 LBS | SYSTOLIC BLOOD PRESSURE: 149 MMHG | HEIGHT: 63 IN | TEMPERATURE: 97 F

## 2021-04-28 DIAGNOSIS — S86.912A STRAIN OF LEFT KNEE, INITIAL ENCOUNTER: Primary | ICD-10-CM

## 2021-04-28 PROCEDURE — 99283 EMERGENCY DEPT VISIT LOW MDM: CPT

## 2021-04-28 PROCEDURE — 73562 X-RAY EXAM OF KNEE 3: CPT

## 2021-04-28 ASSESSMENT — ENCOUNTER SYMPTOMS
EYES NEGATIVE: 1
GASTROINTESTINAL NEGATIVE: 1
RESPIRATORY NEGATIVE: 1
BACK PAIN: 1

## 2021-04-28 ASSESSMENT — PAIN DESCRIPTION - LOCATION: LOCATION: KNEE

## 2021-04-28 ASSESSMENT — PAIN DESCRIPTION - ORIENTATION: ORIENTATION: LEFT

## 2021-04-28 NOTE — ED NOTES
Pt to ED with c/o L knee pain. Pt reports she is a  through [employer] and she was working yesterday and tripped, (without falling,) over her cart, and twisted her L knee the wrong way. Pt reports hx of arthritis. Pt denies any other complaints at this time. Pt sitting on cot. RR even and non labored. NAD noted at this time. Call light within reach. Will continue to monitor.       Catia Smith RN  04/28/21 [id number]

## 2021-04-28 NOTE — ED PROVIDER NOTES
[address]  ED  Emergency Department Encounter  Emergency Medicine Resident     Pt Name: Zo Bonilla  AP  Birthdate 1960  Date of evaluation: 21  PCP:  Gavino Bach MD    [facility]       Chief Complaint   Patient presents with    Knee Pain     both but left is worse. knees are giving out, states she has history of arthritis. severe left knee pain since yesterday       HISTORY OF PRESENT ILLNESS  (Location/Symptom, Timing/Onset, Context/Setting, Quality, Duration, ModifyingFactors, Severity.)      Zo Bonilla is a 64 y.o. female with presents emerge department for evaluation of left knee pain. Patient states that 2 days ago while she was at work she stumbled over her cleaning cart at which time she twisted her knee. Patient states she did not fall nor lose consciousness and this was a mechanical incident. Patient states that since that time she has had increasing pain in her knee while she is walking, but not at rest.  Patient is neurovascularly intact states that she has full sensation in all her digits, feet, leg and at the knee. Patient states that there is no pain around the knee while at rest and that her knee does not feel more swollen normal.  Patient states that she does have a past history of arthritis and bilaterally in both of her knees but that this is the first time that she feels as though her knee is \"going out\". Patient denies any other constitutional symptoms at this time. Patient denies: Fever, nausea, vomiting, chills, chest pain, headache, changes in vision, changes in her bladder function, shortness of breath or difficulty with ambulation.     PAST MEDICAL / SURGICAL / SOCIAL /FAMILY HISTORY      has a past medical history of Diverticulitis, Former smoker, Former smoker, GERD (gastroesophageal reflux disease), Hyperlipidemia, Hypertension, Morbid obesity with BMI of 45.0-49.9, adult ([facility] [address] SINDI on CPAP, Sleep apnea, and Type II or unspecified type diabetes mellitus without mention of complication, not stated as uncontrolled. No other pertinent PMH on review with patient/guardian. has a past surgical history that includes Hysterectomy; Cholecystectomy; Colonoscopy; pr egd transoral biopsy single/multiple (2017); Upper gastrointestinal endoscopy (N/A, 10/18/2019); and Colonoscopy (N/A, 10/18/2019). No other pertinent PSH on review with patient/guardian.   Social History     Socioeconomic History    Marital status: Single     Spouse name: Not on file    Number of children: Not on file    Years of education: Not on file    Highest education level: Not on file   Occupational History    Not on file   Social Needs    Financial resource strain: Not on file    Food insecurity     Worry: Not on file     Inability: Not on file    Transportation needs     Medical: Not on file     Non-medical: Not on file   Tobacco Use    Smoking status: Former Smoker     Packs/day: 0.25     Years: 7.00     Pack years: 1.75     Start date: 1984     Quit date: 1991     Years since quittin.3    Smokeless tobacco: Never Used   Substance and Sexual Activity    Alcohol use: Not Currently    Drug use: No    Sexual activity: Not on file   Lifestyle    Physical activity     Days per week: Not on file     Minutes per session: Not on file    Stress: Not on file   Relationships    Social connections     Talks on phone: Not on file     Gets together: Not on file     Attends Jew service: Not on file     Active member of club or organization: Not on file     Attends meetings of clubs or organizations: Not on file     Relationship status: Not on file    Intimate partner violence     Fear of current or ex partner: Not on file     Emotionally abused: Not on file     Physically abused: Not on file     Forced sexual activity: Not on file   Other Topics Concern    Not on file   Social History Narrative    Not on file       I counseled the patient against using tobacco products. Family History   Problem Relation Age of Onset    High Blood Pressure Mother     Diabetes Mother     Cancer Father      No other pertinent FamHx on review with patient/guardian. Allergies:  Patient has no known allergies. Home Medications:  Prior to Admission medications    Medication Sig Start Date End Date Taking? Authorizing Provider   metFORMIN (GLUCOPHAGE) 500 MG tablet take 1 tablet by mouth every morning with breakfast 4/23/21  Yes Lennox Reno, MD   naproxen (NAPROSYN) 500 MG tablet take 1 tablet by mouth twice a day if needed for pain 4/12/21  Yes Dax Flores MD   lisinopril (PRINIVIL;ZESTRIL) 10 MG tablet take 1 tablet by mouth once daily 12/1/20  Yes Parker Najera MD   diclofenac sodium (VOLTAREN) 1 % GEL Apply 2 g topically 2 times daily 1/29/21   Dax Flores MD   hydroCHLOROthiazide (HYDRODIURIL) 25 MG tablet take 1 tablet by mouth once daily 10/6/20   Tiffanie Nunez MD   FEROSUL 325 (65 Fe) MG tablet take 1 tablet by mouth twice a day before meals 7/11/20   Historical Provider, MD   atorvastatin (LIPITOR) 40 MG tablet Take 1 tablet by mouth daily 5/21/20   Alisha Jain MD   ferrous sulfate (FE TABS 325) 325 (65 Fe) MG EC tablet Take 1 tablet by mouth 2 times daily (before meals) 5/21/20   Alisha Jain MD   melatonin 1 MG tablet Take 1 tablet by mouth nightly as needed for Sleep 5/21/20   Alisha Jain MD   Elastic Bandages & Supports (ACE ELBOW BRACE LARGE/X-LARGE) MISC 1 Brace as need for elbow pain 12/14/18   Du Early MD   aspirin 81 MG tablet Take 81 mg by mouth daily    Historical Provider, MD       REVIEW OF SYSTEMS    (2-9 systems for level 4, 10 ormore for level 5)      Review of Systems   Constitutional: Positive for activity change (Since the injury). Negative for appetite change, diaphoresis, fatigue and fever. HENT: Negative. Eyes: Negative. Respiratory: Negative. Cardiovascular: Negative. Gastrointestinal: Negative. Genitourinary: Negative. Musculoskeletal: Positive for arthralgias, back pain and myalgias. Negative for gait problem and joint swelling. Neurological: Negative. Psychiatric/Behavioral: Negative. PHYSICAL EXAM   (up to 7 for level 4, 8 or more for level 5)      INITIAL VITALS:   BP (!) 149/100   Pulse 72   Temp 97 °F (36.1 °C) (Oral)   Resp 16   Ht 5' 3\" (1.6 m)   Wt 278 lb (126.1 kg)   SpO2 100%   BMI 49.25 kg/m²     Physical Exam  Vitals signs reviewed. Constitutional:       General: She is not in acute distress. Appearance: She is obese. She is not toxic-appearing. HENT:      Head: Normocephalic and atraumatic. Nose: Nose normal.      Mouth/Throat:      Mouth: Mucous membranes are dry. Pharynx: Oropharynx is clear. Eyes:      Extraocular Movements: Extraocular movements intact. Conjunctiva/sclera: Conjunctivae normal.      Pupils: Pupils are equal, round, and reactive to light. Neck:      Musculoskeletal: Normal range of motion and neck supple. Cardiovascular:      Rate and Rhythm: Normal rate and regular rhythm. Pulses: Normal pulses. Heart sounds: Normal heart sounds. Pulmonary:      Effort: Pulmonary effort is normal.      Breath sounds: Normal breath sounds. Abdominal:      General: Abdomen is flat. Palpations: Abdomen is soft. Musculoskeletal:      Left knee: Tenderness found. Medial joint line tenderness noted. Skin:     General: Skin is warm and dry. Capillary Refill: Capillary refill takes less than 2 seconds. Coloration: Skin is not jaundiced. Findings: No bruising or lesion. Neurological:      General: No focal deficit present. Mental Status: She is alert. Mental status is at baseline.    Psychiatric:         Mood and Affect: Mood normal.         DIFFERENTIAL  DIAGNOSIS     DDX: Exacerbation of left leg arthritis, fracture, muscle strain    PLAN (LABS / IMAGING / EKG):  Orders Placed This Encounter   Procedures    XR KNEE LEFT (3 VIEWS)    [employer] Hinged Knee Support, Left; XXL       MEDICATIONS ORDERED:  No orders of the defined types were placed in this encounter. DIAGNOSTIC RESULTS / EMERGENCY DEPARTMENT COURSE / MDM     LABS:  No results found for this visit on 04/28/21. IMPRESSION/MDM/ED COURSE:  64 y.o. female presented with pain in the left knee associated with a fall over her car. Patient is obese and given body habitus patient will have x-rays obtained to ensure there is no fracture. Patient is appearing nontoxic, talking in full sentences and is generally borderline polite. On examination the left knee. There is no erythema, tenderness palpation, or difficulties with mobility. X-rays returned normal.  Patient will be given a left knee brace to help with her mobilization and ambulation. Patient will follow up with her primary care physician in the outpatient setting for any new or worsening symptoms. Patient/Guardian requesting discharge. Patient/Guardian was given written and verbal instructions prior to discharge. Patient/Guardian understood and agreed. Patient/Guardian had no further questions. RADIOLOGY:  XR KNEE LEFT (3 VIEWS)   Final Result   No acute abnormality no change in alignment compared to prior study               EKG  None    All EKG's are interpreted by the Emergency Department Physician who either signs or Co-signs this chart in the absence of a cardiologist.      PROCEDURES:  None    CONSULTS:  None        FINAL IMPRESSION      1.  Strain of left knee, initial encounter          DISPOSITION / PLAN     DISPOSITION        PATIENT REFERREDTO:  Latrice Oliva MD  [address] [place] [address]  [phone]    Schedule an appointment as soon as possible for a visit       [facility] ED  [address] [place] [address]  [phone]    As needed, If symptoms worsen      DISCHARGE

## 2021-04-28 NOTE — ED PROVIDER NOTES
[facility] ED     Emergency Department     Faculty Attestation    I performed a history and physical examination of the patient and discussed management with the resident. I reviewed the residents note and agree with the documented findings and plan of care. Any areas of disagreement are noted on the chart. I was personally present for the key portions of any procedures. I have documented in the chart those procedures where I was not present during the key portions. I have reviewed the emergency nurses triage note. I agree with the chief complaint, past medical history, past surgical history, allergies, medications, social and family history as documented unless otherwise noted below. For Physician Assistant/ Nurse Practitioner cases/documentation I have personally evaluated this patient and have completed at least one if not all key elements of the E/M (history, physical exam, and MDM). Additional findings are as noted. Patient presents with pain in her bilateral knees with the left worse than the right. 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 She denies any actual fall onto her knees. She says she does have bad arthritis to the knees. She says the pain is worse with weightbearing. She denies fever, chest pain, shortness breath, abdominal pain, nausea or vomiting. On exam, patient is resting comfortably in the bed. There is mild tenderness palpation of the left medial knee. No erythema, warmth, swelling. There is no tenderness to palpation of the right knee. There is no calf tenderness. Will get x-ray and treat patient's pain.       Jan Perez MD  Attending Emergency  Physician              Orestes Ty MD  04/28/21 [id number]

## 2021-05-01 PROBLEM — Z12.31 ENCOUNTER FOR SCREENING MAMMOGRAM FOR BREAST CANCER: Status: RESOLVED | Noted: 2021-04-01 | Resolved: 2021-05-01

## 2021-06-04 ENCOUNTER — OFFICE VISIT (OUTPATIENT)
Dept: ORTHOPEDIC SURGERY | Age: 61
End: 2021-06-04
Payer: MEDICARE

## 2021-06-04 VITALS — WEIGHT: 278 LBS | RESPIRATION RATE: 12 BRPM | HEIGHT: 63 IN | BODY MASS INDEX: 49.26 KG/M2

## 2021-06-04 DIAGNOSIS — R73.03 PREDIABETES: ICD-10-CM

## 2021-06-04 DIAGNOSIS — M23.307 DEGENERATIVE TEAR OF MENISCUS OF LEFT KNEE: Primary | ICD-10-CM

## 2021-06-04 DIAGNOSIS — M17.0 PRIMARY OSTEOARTHRITIS OF BOTH KNEES: ICD-10-CM

## 2021-06-04 PROCEDURE — G8428 CUR MEDS NOT DOCUMENT: HCPCS | Performed by: ORTHOPAEDIC SURGERY

## 2021-06-04 PROCEDURE — 99213 OFFICE O/P EST LOW 20 MIN: CPT | Performed by: ORTHOPAEDIC SURGERY

## 2021-06-04 PROCEDURE — 1036F TOBACCO NON-USER: CPT | Performed by: ORTHOPAEDIC SURGERY

## 2021-06-04 PROCEDURE — 3017F COLORECTAL CA SCREEN DOC REV: CPT | Performed by: ORTHOPAEDIC SURGERY

## 2021-06-04 PROCEDURE — G8417 CALC BMI ABV UP PARAM F/U: HCPCS | Performed by: ORTHOPAEDIC SURGERY

## 2021-06-04 NOTE — PROGRESS NOTES
[facility]  [facility] Marianela Matthews  [address] [address]  Dept: [phone]    Ambulatory Orthopedic Consult      CHIEF COMPLAINT:    Chief Complaint   Patient presents with    Knee Pain     Bilateral       HISTORY OF PRESENT ILLNESS:      The patient is a left-hand dominant 64 y.o. female who is being seen for evaluation of pain at the above location (left anterior knee greater than left dorsal midfoot and lateral/medial hindfoot, as well as left ring finger), which began in the beginning of November 2020 atraumatically. The pain is described mainly with mechanical terms (dull/sharp/throbbing). The pain is worse with activity and better with rest. The patient reports a static course. The patient has tried:      [x]  rest/activity modification          [x]  NSAIDs      []  opiates      []  orthotics        []  change in shoes   [x]  home exercises  []  physical therapy      []  CAM boot     []  brace:    []  injection:       []  surgery:      The patient also reports that her left ring finger pain is associated with intermittent locking. INTERVAL HISTORY 2/5/2021:  She is seen again today in the office for follow up of a previous issue (as above--left knee and left ring finger pain). Since being seen last, the patient is doing worse, particularly in terms of her left knee. At today's visit, she is not using a brace or assistive device. The location and quality of the pain have not significantly changed since the last visit. INTERVAL HISTORY 4/6/2021:  She is seen again today in the office for follow up after an injection. Since being seen last, the patient is doing better. At today's visit, she is using no brace or assistive device. She received an injection of the left knee, and reports the injection helped approximately 100% in terms of pain.   She also reports that her left ring finger trigger finger is not bothering her, and reports that her foot pain has significantly improved. INTERVAL HISTORY 6/4/2021:  She is seen again today in the office for follow up of a previous issue (as above--bilateral knees). Since being seen last, the patient is doing worse. At today's visit, she is not using a brace or assistive device. History is obtained today from:   [x]  the patient     []  EMR     []  one family member/friend    []  multiple family members/friends    []  other:           REVIEW OF SYSTEMS:  Constitutional: Negative for fever. HENT: Negative for tinnitus. Eyes: Negative for pain. Respiratory: Negative for shortness of breath. Cardiovascular: Negative for chest pain. Gastrointestinal: Negative for abdominal pain. Genitourinary: Negative for dysuria. Skin: Negative for rash. Neurological: Negative for headaches. Hematological: Does not bruise/bleed easily. Musculoskeletal: See HPI for pertinent positives     Past Medical History:    She  has a past medical history of Diverticulitis, Former smoker, Former smoker, GERD (gastroesophageal reflux disease), Hyperlipidemia, Hypertension, Morbid obesity with BMI of 45.0-49.9, adult ([facility] [address] SINDI on CPAP, Sleep apnea, and Type II or unspecified type diabetes mellitus without mention of complication, not stated as uncontrolled. Past Surgical History:    She  has a past surgical history that includes Hysterectomy; Cholecystectomy; Colonoscopy; pr egd transoral biopsy single/multiple (7/19/2017); Upper gastrointestinal endoscopy (N/A, 10/18/2019); and Colonoscopy (N/A, 10/18/2019).      Current Medications:     Current Outpatient Medications:     metFORMIN (GLUCOPHAGE) 500 MG tablet, take 1 tablet by mouth every morning with breakfast, Disp: 30 tablet, Rfl: 0    naproxen (NAPROSYN) 500 MG tablet, take 1 tablet by mouth twice a day if needed for pain, Disp: 60 tablet, Rfl: 0    diclofenac sodium (VOLTAREN) 1 % GEL, Apply 2 g topically 2 times daily, Disp: 350 g, Rfl: 0   lisinopril (PRINIVIL;ZESTRIL) 10 MG tablet, take 1 tablet by mouth once daily, Disp: 30 tablet, Rfl: 3    hydroCHLOROthiazide (HYDRODIURIL) 25 MG tablet, take 1 tablet by mouth once daily, Disp: 30 tablet, Rfl: 3    FEROSUL 325 (65 Fe) MG tablet, take 1 tablet by mouth twice a day before meals, Disp: , Rfl:     atorvastatin (LIPITOR) 40 MG tablet, Take 1 tablet by mouth daily, Disp: 30 tablet, Rfl: 3    ferrous sulfate (FE TABS 325) 325 (65 Fe) MG EC tablet, Take 1 tablet by mouth 2 times daily (before meals), Disp: 90 tablet, Rfl: 3    melatonin 1 MG tablet, Take 1 tablet by mouth nightly as needed for Sleep, Disp: 30 tablet, Rfl: 2    Elastic Bandages & Supports (ACE ELBOW BRACE LARGE/X-LARGE) MISC, 1 Brace as need for elbow pain, Disp: 1 each, Rfl: 0    aspirin 81 MG tablet, Take 81 mg by mouth daily, Disp: , Rfl:      Allergies:    Patient has no known allergies. Family History:  family history includes Cancer in her father; Diabetes in her mother; High Blood Pressure in her mother.     Social History:   Social History     Occupational History    Not on file   Tobacco Use    Smoking status: Former Smoker     Packs/day: 0.25     Years: 7.00     Pack years: 1.75     Start date: 1984     Quit date: 1991     Years since quittin.4    Smokeless tobacco: Never Used   Vaping Use    Vaping Use: Never used   Substance and Sexual Activity    Alcohol use: Not Currently    Drug use: No    Sexual activity: Not on file     Occupation: Works as a      OBJECTIVE:  Resp 12   Ht 5' 3\" (1.6 m)   Wt 278 lb (126.1 kg)   BMI 49.25 kg/m²    Psych: alert and oriented to person, time, and place  Cardio:  well perfused extremities  Resp:  normal respiratory effort  Skin:  no cyanosis  Hem/lymph:  no lymphedema  Neuro:  sensation to light touch grossly intact throughout all nerve distributions in the bilateral feet and hands  Musculoskeletal:    RLE:    Grossly normal range of motion of hip, slightly decreased flexion/extension of the knee  Painless range of motion of hip, but pain on extremes of knee motion  Stable exam of hip/knee  Skin intact without erythema/warmth  Grossly neurovascularly intact distally in the lower extremity  Appropriate strength on manual muscle testing of the iliopsoas, hamstrings, quadriceps, tibialis anterior, gastroc-soleus complex  Tenderness:  anteromedial and anterolateral knee joint line --mild  -Kirk's test positive for pain      LLE:    Grossly normal range of motion of hip, slightly decreased flexion extension of the knee  Painless range of motion of hip, but pain on extremes of knee motion  Stable exam of hip/knee  Skin intact without erythema/warmth  Grossly neurovascularly intact distally in the lower extremity  Appropriate strength on manual muscle testing of the iliopsoas, hamstrings, quadriceps, tibialis anterior, gastroc-soleus complex  Tenderness:  anteromedial greater than anterolateral knee joint line  -Kirk's test positive for pain     -Previously exam: Tenderness diffusely throughout the midfoot, sinus Tarsi, and along the course the posterior tibial tendon      (Previous exam)  Affected upper extremity (left):  Grossly normal range of motion of wrist/fingers  Painless range of motion of wrist/fingers except for ring finger  Stable exam of wrist/hand  Skin intact without erythema/warmth   Grossly neurovascularly intact distally throughout able to fire EPL/FPL/IO/EDC/FDP  Tenderness to palpation:  A1 pulley of the ring finger--improved  -Durkan's compression test negative  -triggering elicited at the Ring finger       Unaffected upper extremity (right):  Grossly normal range of motion of wrist/fingers  Painless range of motion of above  Stable exam of wrist/hand  Skin intact without erythema/warmth  Neurovascularly intact distally, able to fire EPL/FPL/IO/EDC/FDP  Tenderness to palpation:  none   -no triggering elicited, no nodules palpated        RADIOLOGY:   6/4/2021 FINDINGS:  Four weightbearing views (AP, Tunnel, Lateral, and Sage) of the bilateral knee were obtained in the office today and reviewed, revealing no acute fracture, dislocation, or radioopaque foreign body/tumor. Overall alignment is satisfactory. Tricompartmental degenerative changes of the knee with joint narrowing, sclerosis, and osteophytes. IMPRESSION: No acute fracture/dislocation. Degenerative changes as above. Electronically signed by Zarina Gaming MD        FINDINGS:  Three weightbearing views (AP, Mortise, and Lateral) of the left ankle and three weightbearing views (AP, Oblique, Lateral) of the left foot and four weightbearing views (AP, Lateral, Tunnel and Sunrise) of the left knee and 3 views (AP, oblique, lateral) of the left hand were obtained in the office today and reviewed, revealing no acute fracture, dislocation, or radioopaque foreign body/tumor. The ankle mortise is maintained with no widening of the clear spaces. Overall alignment is satisfactory. Tricompartmental degenerative changes of the knee with joint space narrowing, sclerosis, and osteophytes. Degenerative changes at the subtalar joint, as well as mildly diffusely throughout the midfoot. IMPRESSION:  No acute fracture/dislocation. Generative changes as above. Electronically signed by Zarina Gaming MD        Xr Knee Left (3 Views)    Result Date: 11/25/2020  EXAMINATION: THREE X-RAY VIEWS OF THE LEFT KNEE 11/25/2020 10:31 am COMPARISON: 03/03/2010 HISTORY: ORDERING SYSTEM PROVIDED HISTORY: Pain TECHNOLOGIST PROVIDED HISTORY: Pain Acuity: Acute Type of Exam: Unknown FINDINGS: No evidence of acute fracture or dislocation. Similar small benign-appearing soft tissue calcifications in the proximal and posterolateral leg. Radiographically there is no significant joint effusion.   Progression of mild to moderate degenerative changes involving primarily the medial compartment with joint space loss and osteophytes. Lateral view is obtained in a slight degree of obliquity. No acute osseous abnormality. Mild-to-moderate degenerative changes have progressed since the prior study. ASSESSMENT AND PLAN:  Body mass index is 49.25 kg/m². She has left greater than right knee pain, likely secondary to degenerative meniscal tearing as well as some underlying arthritis (she reports that a corticosteroid injection on 2/5/2021 helped her left knee pain 100%). She had previously been doing well with conservative management, but now reports that her pain has been getting worse with time. She also has left foot pain secondary multiple issues--she has a component of posterior tibial tendinitis along with some degenerative changes of her subtalar joint and mildly diffusely throughout the midfoot. She is doing well with conservative management. She also has a trigger finger of her left ring finger. She is doing well with conservative management. Notably, she has a somewhat complex past medical history. She has a history of non-insulin-dependent diabetes. We had a discussion today about the likely diagnosis and its natural history, physical exam and imaging findings, as well as various treatment options in detail. Surgically, we discussed the possible future left knee arthroscopic meniscal debridement, depending on the results of her MRI. She does report some painful popping in her left knee, but denies any locking. Orders/referrals were placed as below at today's visit. She may continue to take her prescribed oral NSAIDs as needed, as well as Voltaren gel as needed. In order to know exactly how to proceed with treatment (surgical versus nonsurgical, as well as how), an MRI was ordered today to evaluate for the left knee for meniscal tear. This is medically necessary to evaluate the structures in this area, for both diagnosis and treatment.     She has previously been provided information had obtained over-the-counter flatfoot style orthotic but has not obtained this. All questions were answered and the above plan was agreed upon. The patient will return to clinic after the MRI has been performed. At her next visit, we will review her MRI, and possibly discuss surgery as above, versus conservative management (she previously has not yet attended physical therapy); we have also discussed the possible future left ring finger trigger finger injection. At the patient's next visit, depending on how the patient is doing and/or new imaging/labs results, we may consider the following options:    []  Orthotic (OTC)     []  Orthotic (custom)          []  Rocker bottom shoes     []  Brace (OTC)        []  Brace (custom)             []  CAM boot        []  Night splint         []  Heel cups        []  Strap      []  Toe sleeves/splints    []  PT:                     []  Wean out of immobilization   []  Advance activity       []  Topical               []  NSAIDs          []  [place]         []  Referral:         []  Stress xrays       []  CT         []  MRI        []  Injection:         []  Consider OR      []  Pick OR date    No follow-ups on file. No orders of the defined types were placed in this encounter.     Orders Placed This Encounter   Procedures    XR KNEE RIGHT (MIN 4 VIEWS)     Weightbearing 4 views: AP (bilateral on single cassette), Tunnel view (bilateral on single cassette), [place] affected knee, Lateral affected knee     Standing Status:   Future     Number of Occurrences:   1     Standing Expiration Date:   7/4/2021     Order Specific Question:   Reason for exam:     Answer:   Knee Pain    MRI KNEE LEFT WO CONTRAST     Standing Status:   Future     Standing Expiration Date:   6/4/2022     Order Specific Question:   Reason for exam:     Answer:   eval meniscus         Srini Holland MD  Orthopedic Surgery        Please excuse any

## 2021-06-04 NOTE — TELEPHONE ENCOUNTER
E-scribe request for metformin. Please review and e-scribe if applicable. Last Visit Date: 4/1/2021  Next Visit Date:  Visit date not found    Hemoglobin A1C (%)   Date Value   04/01/2021 6.5   12/20/2019 6.3   01/15/2019 6.5             ( goal A1C is < 7)   Microalb/Crt.  Ratio (mcg/mg creat)   Date Value   11/01/2019 CANNOT BE CALCULATED     LDL Cholesterol (mg/dL)   Date Value   05/19/2020 70       (goal LDL is <100)   AST (U/L)   Date Value   05/19/2020 17     ALT (U/L)   Date Value   05/19/2020 17     BUN (mg/dL)   Date Value   04/01/2021 19     BP Readings from Last 3 Encounters:   04/28/21 (!) 149/100   04/01/21 121/80   12/23/20 132/85          (goal 120/80)        Patient Active Problem List:     Gastroesophageal reflux disease     Hyperlipidemia with target LDL less than 100     HTN (hypertension)     Numbness and tingling     Diverticula, colon     Diverticulitis, acute     Eczema     Lump of left breast     Shoulder pain     Right groin pain     Tinea corporis     Hyperlipidemia     Morbid obesity with body mass index (BMI) of 40.0 to 49.9 (HCC)     SINDI (obstructive sleep apnea)     Vitamin D deficiency     Melena     Chest pain     Abnormal stress test     Contact blepharoconjunctivitis of left eye     Prediabetes     Need for prophylactic vaccination and inoculation against varicella     Acute cystitis without hematuria

## 2021-06-10 ENCOUNTER — TELEPHONE (OUTPATIENT)
Dept: ADMINISTRATIVE | Age: 61
End: 2021-06-10

## 2021-06-10 ENCOUNTER — NURSE TRIAGE (OUTPATIENT)
Dept: OTHER | Facility: CLINIC | Age: 61
End: 2021-06-10

## 2021-06-10 NOTE — TELEPHONE ENCOUNTER
Reason for Disposition   MODERATE pain (e.g., symptoms interfere with work or school, limping) and present > 3 days    Answer Assessment - Initial Assessment Questions  1. LOCATION and RADIATION: \"Where is the pain located? \"       Both knees     2. QUALITY: \"What does the pain feel like? \"  (e.g., sharp, dull, aching, burning)      Aching     3. SEVERITY: \"How bad is the pain? \" \"What does it keep you from doing? \"   (Scale 1-10; or mild, moderate, severe)    -  MILD (1-3): doesn't interfere with normal activities     -  MODERATE (4-7): interferes with normal activities (e.g., work or school) or awakens from sleep, limping     -  SEVERE (8-10): excruciating pain, unable to do any normal activities, unable to walk      Last night a 8/10 , now a 6/10    4. ONSET: \"When did the pain start? \" \"Does it come and go, or is it there all the time? \"      About 3 mos ago    5. RECURRENT: \"Have you had this pain before? \" If so, ask: \"When, and what happened then? \"      This is continuous    6. SETTING: \"Has there been any recent work, exercise or other activity that involved that part of the body? \"       No    7. AGGRAVATING FACTORS: \"What makes the knee pain worse? 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 ASSOCIATED SYMPTOMS: \"Is there any swelling or redness of the knee? \"      No swelling or redness to the knees. Only swelling in both ankles     9. OTHER SYMPTOMS: \"Do you have any other symptoms? \" (e.g., chest pain, difficulty breathing, fever, calf pain)      She gets \"tenderness\" in the calf - denies constant pain in the calf - feels like muscles \"tighten up\" like a \"shawn horse\" - this is to both sides. States feels like her knees are \"going out\" on her     10. PREGNANCY: \"Is there any chance you are pregnant? \" \"When was your last menstrual period? \"        Not asked.     Protocols used: KNEE PAIN-ADULT-OH    Received call from Derek at pre-service center [facility])

## 2021-07-07 ENCOUNTER — TELEPHONE (OUTPATIENT)
Dept: ORTHOPEDIC SURGERY | Age: 61
End: 2021-07-07

## 2021-07-07 NOTE — TELEPHONE ENCOUNTER
Patient LM on Kristyn's VM asking why her appointment this week was canceled. I checked Kristyn's VM since she is out and called patient back. The patient informed me she was never told her appointment from this Friday was rescheduled to the following Friday. I told her from what I could tell it was more than likely moved because of the MRI, but I was unsure of the details since I was not the one to cancel and apologized she was not informed of the change. Patient was still interested in a knee injection, so I did offer to put her back on for this Friday but also let her know I wanted to reach out to Dr. Caren Gosselin if he would even do an injection prior to MRI or if the injection may effect the MRI. After speaking with Dr. Caren Gosselin, he would like to see the patient after the MRI and would not do an injection prior. Patient was disappointed when I called her back, and again I apologized, stating this is not something our office does, but I would look into it. Patient agreed to see us next Friday, 7/16, following the MRI.

## 2021-07-13 ENCOUNTER — HOSPITAL ENCOUNTER (OUTPATIENT)
Dept: MRI IMAGING | Age: 61
Discharge: HOME | End: 2021-07-15
Payer: MEDICARE

## 2021-07-13 DIAGNOSIS — M17.0 PRIMARY OSTEOARTHRITIS OF BOTH KNEES: ICD-10-CM

## 2021-07-13 DIAGNOSIS — M23.307 DEGENERATIVE TEAR OF MENISCUS OF LEFT KNEE: ICD-10-CM

## 2021-07-13 PROCEDURE — 73721 MRI JNT OF LWR EXTRE W/O DYE: CPT

## 2021-07-14 ENCOUNTER — TELEPHONE (OUTPATIENT)
Dept: ORTHOPEDIC SURGERY | Age: 61
End: 2021-07-14

## 2021-07-14 NOTE — TELEPHONE ENCOUNTER
Returned patient's voicemail to move her appointment from 7/16/21 to go over MRI results to today 7/14/21 her original appointment. I advised her that Dr Cristiane Sadler is in surgery today and tomorrow that he is not in clinic. She asked why her original appointment was moved to the 14th then. I advised her that her original appointment was on 7/9/21 to go over her MRI and her MRI was scheduled for the 13th of July so her appointment was moved to 7/16/21 that way she didn't waste her trip. Well she didn't like that answer and says she cant keep missing work and getting points by going to appointments. I explained to the patient that these are the days that he is in clinic and when he is not in clinic I cant schedule an appointment for you. She asked if she could come in early Monday Am I advised her that he is in [facility] office and this seemed to make her happy. I gave her an 8:15am appointment, gave her the address and location of our office. She was happy and said, \"great see you then. \"

## 2021-07-19 ENCOUNTER — OFFICE VISIT (OUTPATIENT)
Dept: ORTHOPEDIC SURGERY | Age: 61
End: 2021-07-19
Payer: MEDICARE

## 2021-07-19 VITALS — WEIGHT: 278 LBS | HEIGHT: 63 IN | RESPIRATION RATE: 16 BRPM | BODY MASS INDEX: 49.26 KG/M2

## 2021-07-19 DIAGNOSIS — M23.307 DEGENERATIVE TEAR OF MENISCUS OF LEFT KNEE: ICD-10-CM

## 2021-07-19 DIAGNOSIS — M17.0 PRIMARY OSTEOARTHRITIS OF BOTH KNEES: Primary | ICD-10-CM

## 2021-07-19 PROCEDURE — 20610 DRAIN/INJ JOINT/BURSA W/O US: CPT | Performed by: ORTHOPAEDIC SURGERY

## 2021-07-19 PROCEDURE — 1036F TOBACCO NON-USER: CPT | Performed by: ORTHOPAEDIC SURGERY

## 2021-07-19 PROCEDURE — 3017F COLORECTAL CA SCREEN DOC REV: CPT | Performed by: ORTHOPAEDIC SURGERY

## 2021-07-19 PROCEDURE — G8427 DOCREV CUR MEDS BY ELIG CLIN: HCPCS | Performed by: ORTHOPAEDIC SURGERY

## 2021-07-19 PROCEDURE — G8417 CALC BMI ABV UP PARAM F/U: HCPCS | Performed by: ORTHOPAEDIC SURGERY

## 2021-07-19 PROCEDURE — 99214 OFFICE O/P EST MOD 30 MIN: CPT | Performed by: ORTHOPAEDIC SURGERY

## 2021-07-19 RX ORDER — LIDOCAINE HYDROCHLORIDE 10 MG/ML
4 INJECTION, SOLUTION INFILTRATION; PERINEURAL ONCE
Status: COMPLETED | OUTPATIENT
Start: 2021-07-19 | End: 2021-07-19

## 2021-07-19 RX ORDER — TRIAMCINOLONE ACETONIDE 40 MG/ML
40 INJECTION, SUSPENSION INTRA-ARTICULAR; INTRAMUSCULAR ONCE
Status: COMPLETED | OUTPATIENT
Start: 2021-07-19 | End: 2021-07-19

## 2021-07-19 RX ADMIN — LIDOCAINE HYDROCHLORIDE 4 ML: 10 INJECTION, SOLUTION INFILTRATION; PERINEURAL at 09:17

## 2021-07-19 RX ADMIN — TRIAMCINOLONE ACETONIDE 40 MG: 40 INJECTION, SUSPENSION INTRA-ARTICULAR; INTRAMUSCULAR at 09:17

## 2021-07-19 NOTE — PROGRESS NOTES
[facility] [address] [facility]  [address]  [address] [address]  Dept: [phone]    Ambulatory Orthopedic Consult      CHIEF COMPLAINT:    Chief Complaint   Patient presents with    Knee Pain     Bilateral       HISTORY OF PRESENT ILLNESS:      The patient is a left-hand dominant 64 y.o. female who is being seen for evaluation of pain at the above location (left anterior knee greater than left dorsal midfoot and lateral/medial hindfoot, as well as left ring finger), which began in the beginning of November 2020 atraumatically. The pain is described mainly with mechanical terms (dull/sharp/throbbing). The pain is worse with activity and better with rest. The patient reports a static course. The patient has tried:      [x]  rest/activity modification          [x]  NSAIDs      []  opiates      []  orthotics        []  change in shoes   [x]  home exercises  []  physical therapy      []  CAM boot     []  brace:    []  injection:       []  surgery:      The patient also reports that her left ring finger pain is associated with intermittent locking. INTERVAL HISTORY 2/5/2021:  She is seen again today in the office for follow up of a previous issue (as above--left knee and left ring finger pain). Since being seen last, the patient is doing worse, particularly in terms of her left knee. At today's visit, she is not using a brace or assistive device. The location and quality of the pain have not significantly changed since the last visit. INTERVAL HISTORY 4/6/2021:  She is seen again today in the office for follow up after an injection. Since being seen last, the patient is doing better. At today's visit, she is using no brace or assistive device. She received an injection of the left knee, and reports the injection helped approximately 100% in terms of pain.   She also reports that her left ring finger trigger finger is not bothering her, and reports that her foot pain has significantly improved. INTERVAL HISTORY 6/4/2021:  She is seen again today in the office for follow up of a previous issue (as above--bilateral knees). Since being seen last, the patient is doing worse. At today's visit, she is not using a brace or assistive device. History is obtained today from:   [x]  the patient     []  EMR     []  one family member/friend    []  multiple family members/friends    []  other:      INTERVAL HISTORY 7/19/2021:  She is seen again today in the office for follow up of imaging as below. Since being seen last, the patient is doing about the same overall. At today's visit, she is using no brace/assistive device. History is obtained today from:   [x]  the patient     [x]  EMR     []  one family member/friend    []  multiple family members/friends    []  other:         REVIEW OF SYSTEMS:  Constitutional: Negative for fever. HENT: Negative for tinnitus. Eyes: Negative for pain. Respiratory: Negative for shortness of breath. Cardiovascular: Negative for chest pain. Gastrointestinal: Negative for abdominal pain. Genitourinary: Negative for dysuria. Skin: Negative for rash. Neurological: Negative for headaches. Hematological: Does not bruise/bleed easily. Musculoskeletal: See HPI for pertinent positives     Past Medical History:    She  has a past medical history of Diverticulitis, Former smoker, Former smoker, GERD (gastroesophageal reflux disease), Hyperlipidemia, Hypertension, Morbid obesity with BMI of 45.0-49.9, adult ([facility] [address] SINDI on CPAP, Sleep apnea, and Type II or unspecified type diabetes mellitus without mention of complication, not stated as uncontrolled. Past Surgical History:    She  has a past surgical history that includes Hysterectomy; Cholecystectomy; Colonoscopy; pr egd transoral biopsy single/multiple (7/19/2017); Upper gastrointestinal endoscopy (N/A, 10/18/2019); and Colonoscopy (N/A, 10/18/2019).      Current Medications:     Current Outpatient Medications:     metFORMIN (GLUCOPHAGE) 500 MG tablet, take 1 tablet by mouth every morning with breakfast, Disp: 30 tablet, Rfl: 0    naproxen (NAPROSYN) 500 MG tablet, take 1 tablet by mouth twice a day if needed for pain, Disp: 60 tablet, Rfl: 0    diclofenac sodium (VOLTAREN) 1 % GEL, Apply 2 g topically 2 times daily, Disp: 350 g, Rfl: 0    lisinopril (PRINIVIL;ZESTRIL) 10 MG tablet, take 1 tablet by mouth once daily, Disp: 30 tablet, Rfl: 3    hydroCHLOROthiazide (HYDRODIURIL) 25 MG tablet, take 1 tablet by mouth once daily, Disp: 30 tablet, Rfl: 3    FEROSUL 325 (65 Fe) MG tablet, take 1 tablet by mouth twice a day before meals, Disp: , Rfl:     atorvastatin (LIPITOR) 40 MG tablet, Take 1 tablet by mouth daily, Disp: 30 tablet, Rfl: 3    ferrous sulfate (FE TABS 325) 325 (65 Fe) MG EC tablet, Take 1 tablet by mouth 2 times daily (before meals), Disp: 90 tablet, Rfl: 3    melatonin 1 MG tablet, Take 1 tablet by mouth nightly as needed for Sleep, Disp: 30 tablet, Rfl: 2    Elastic Bandages & Supports (ACE ELBOW BRACE LARGE/X-LARGE) MISC, 1 Brace as need for elbow pain, Disp: 1 each, Rfl: 0    aspirin 81 MG tablet, Take 81 mg by mouth daily, Disp: , Rfl:      Allergies:    Patient has no known allergies. Family History:  family history includes Cancer in her father; Diabetes in her mother; High Blood Pressure in her mother.     Social History:   Social History     Occupational History    Not on file   Tobacco Use    Smoking status: Former Smoker     Packs/day: 0.25     Years: 7.00     Pack years: 1.75     Start date: 1984     Quit date: 1991     Years since quittin.5    Smokeless tobacco: Never Used   Vaping Use    Vaping Use: Never used   Substance and Sexual Activity    Alcohol use: Not Currently    Drug use: No    Sexual activity: Not on file     Occupation: Works as a      OBJECTIVE:  Resp 16   Ht 5' 3\" (1.6 m)   Wt 278 lb (126.1 kg)   BMI 49.25 kg/m² Psych: alert and oriented to person, time, and place  Cardio:  well perfused extremities  Resp:  normal respiratory effort  Skin:  no cyanosis  Hem/lymph:  no lymphedema  Neuro:  sensation to light touch grossly intact throughout all nerve distributions in the bilateral feet and hands  Musculoskeletal:    RLE:    Grossly normal range of motion of hip, slightly decreased flexion/extension of the knee  Painless range of motion of hip, but pain on extremes of knee motion  Stable exam of hip/knee  Skin intact without erythema/warmth  Grossly neurovascularly intact distally in the lower extremity  Appropriate strength on manual muscle testing of the iliopsoas, hamstrings, quadriceps, tibialis anterior, gastroc-soleus complex  Tenderness:  anteromedial and anterolateral knee joint line --mild  -Kirk's test positive for pain      LLE:    Grossly normal range of motion of hip, slightly decreased flexion extension of the knee  Painless range of motion of hip, but pain on extremes of knee motion  Stable exam of hip/knee  Skin intact without erythema/warmth  Grossly neurovascularly intact distally in the lower extremity  Appropriate strength on manual muscle testing of the iliopsoas, hamstrings, quadriceps, tibialis anterior, gastroc-soleus complex  Tenderness:  anteromedial greater than anterolateral knee joint line  -Kirk's test positive for pain     -Previous exam: Tenderness diffusely throughout the midfoot, sinus Tarsi, and along the course the posterior tibial tendon      (Previous exam)  Affected upper extremity (left):  Grossly normal range of motion of wrist/fingers  Painless range of motion of wrist/fingers except for ring finger  Stable exam of wrist/hand  Skin intact without erythema/warmth   Grossly neurovascularly intact distally throughout able to fire EPL/FPL/IO/EDC/FDP  Tenderness to palpation:  A1 pulley of the ring finger--improved  -Durkan's compression test negative  -triggering elicited at the Ring finger       Unaffected upper extremity (right):  Grossly normal range of motion of wrist/fingers  Painless range of motion of above  Stable exam of wrist/hand  Skin intact without erythema/warmth  Neurovascularly intact distally, able to fire EPL/FPL/IO/EDC/FDP  Tenderness to palpation:  none   -no triggering elicited, no nodules palpated        RADIOLOGY:   7/19/2021 Prior images reviewed for reference. MRI images and radiology report reviewed, as below:    1. Tearing of the root ligament of the posterior horn medial meniscus. Outward extrusion of the medial meniscus body.  Degeneration of the posterior   horn and body of the medial meniscus.  Reactive marrow edema at the outer   medial tibial plateau. 2. Possible grade 1 MCL sprain. 3. Small joint effusion. 4. Tricompartmental osteoarthrosis.  Severe medial chondromalacia.  Focal   severe chondromalacia at the posterior weight-bearing lateral tibial plateau. 5. Mild-to-moderate lateral femoral trochlear chondromalacia with underlying   subcortical cystic change. 6. Small Baker's cyst.  Mild-to-moderate edema in the subcutaneous fat about   the knee. FINDINGS:  Four weightbearing views (AP, Tunnel, Lateral, and Auxier) of the bilateral knee were obtained in the office today and reviewed, revealing no acute fracture, dislocation, or radioopaque foreign body/tumor. Overall alignment is satisfactory. Tricompartmental degenerative changes of the knee with joint narrowing, sclerosis, and osteophytes. IMPRESSION: No acute fracture/dislocation. Degenerative changes as above.      Electronically signed by Nadeem Venegas MD        FINDINGS:  Three weightbearing views (AP, Mortise, and Lateral) of the left ankle and three weightbearing views (AP, Oblique, Lateral) of the left foot and four weightbearing views (AP, Lateral, Tunnel and Sunrise) of the left knee and 3 views (AP, oblique, lateral) of the left hand were obtained in the office today and reviewed, revealing no acute fracture, dislocation, or radioopaque foreign body/tumor. The ankle mortise is maintained with no widening of the clear spaces. Overall alignment is satisfactory. Tricompartmental degenerative changes of the knee with joint space narrowing, sclerosis, and osteophytes. Degenerative changes at the subtalar joint, as well as mildly diffusely throughout the midfoot. IMPRESSION:  No acute fracture/dislocation. Generative changes as above. Electronically signed by Jarvis Medrano MD        Xr Knee Left (3 Views)    Result Date: 11/25/2020  EXAMINATION: THREE X-RAY VIEWS OF THE LEFT KNEE 11/25/2020 10:31 am COMPARISON: 03/03/2010 HISTORY: ORDERING SYSTEM PROVIDED HISTORY: Pain TECHNOLOGIST PROVIDED HISTORY: Pain Acuity: Acute Type of Exam: Unknown FINDINGS: No evidence of acute fracture or dislocation. Similar small benign-appearing soft tissue calcifications in the proximal and posterolateral leg. Radiographically there is no significant joint effusion. Progression of mild to moderate degenerative changes involving primarily the medial compartment with joint space loss and osteophytes. Lateral view is obtained in a slight degree of obliquity. No acute osseous abnormality. Mild-to-moderate degenerative changes have progressed since the prior study. ASSESSMENT AND PLAN:  Body mass index is 49.25 kg/m². She has left greater than right knee pain, secondary to bilateral tricompartmental osteoarthritis, on the left with severe medial chondromalacia and a medial meniscal posterior root tear (a left knee corticosteroid injection on 2/5/2021 helped her pain approximately 100%). She also has left foot pain secondary multiple issues--she has a component of posterior tibial tendinitis along with some degenerative changes of her subtalar joint and mildly diffusely throughout the midfoot. She is doing well with conservative management.     She also has a trigger finger of her left ring finger. She is doing well with conservative management. Notably, she has a somewhat complex past medical history. She has a history of non-insulin-dependent diabetes. We had a discussion today about the likely diagnosis and its natural history, physical exam and imaging findings, as well as various treatment options in detail. Surgically, we discussed a possible left knee arthroscopic meniscal root repair and/or meniscal debridement. We discussed the possible anticipated postoperative course and risk/benefits/alternatives to surgery. We did discuss the risks of progression of her arthritis and meniscal tear, however, at this point she does wish to avoid surgery for the time being, and states she is not interested in surgery. After a thorough discussion, patient was able to make an informed decision, and wishes to proceed with nonoperative management as below. Orders/referrals were placed as below at today's visit. She reports that she was taking ibuprofen, which caused bleeding, and we discussed immediately stopping this medication, and she reports that she has not been taking it recently. I provided a prescription for Voltaren (4g TOPL q QID PRN pain). I recommend this over the use of oral NSAIDs because of her history of GI bleeding. The patient was again referred to physical therapy for her bilateral knees, and strongly encouraged to attend. After a thorough discussion of her treatment options, the patient wished to proceed with a left knee injection at today's visit. She has previously been provided information had obtained over-the-counter flatfoot style orthotic but has not obtained this. All questions were answered and the above plan was agreed upon. The patient will return to clinic in 2 weeks without x-rays.   At her next visit, we have discussed proceeding with a right knee injection (we discussed avoiding 2 injections at one visit, due to the risk of hyperglycemia, given her diabetes). In the future, we may consider a left ring trigger finger injection, and/or gel injections for her knee after she has attended physical therapy. KNEE INJECTION PROCEDURE NOTE: After discussing the risks/benefits/alternatives to injection, an informed consent was obtained verbally. The left knee was verified as the correct location and allergies were reviewed. The skin overlying the injection site was cleaned with an alcohol swab followed by a local sterile prep. A 22 gauge needle was introduced into the above location under sterile conditions. A mixture of 40 mg of Kenalog and 4 mL of 1% Lidocaine without epinephrine was injected. The patient was noted to tolerate the procedure well without immediate complication. A dressing was applied and verbal instruction/education was provided. At the patient's next visit, depending on how the patient is doing and/or new imaging/labs results, we may consider the following options:    []  Orthotic (OTC)     []  Orthotic (custom)          []  Rocker bottom shoes     []  Brace (OTC)        []  Brace (custom)             []  CAM boot        []  Night splint         []  Heel cups        []  Strap      []  Toe sleeves/splints    []  PT:                     []  Wean out of immobilization   []  Advance activity       []  Topical               []  NSAIDs          []  [place]nd[place]         []  Referral:         []  Stress xrays       []  CT         []  MRI        []  Injection:         []  Consider OR      []  Pick OR date    No follow-ups on file. No orders of the defined types were placed in this encounter. No orders of the defined types were placed in this encounter. John Ashby MD  Orthopedic Surgery        Please excuse any typos/errors, as this note was created with the assistance of voice recognition software.  While intending to generate a document that actually reflects the content of the visit, the document can still have some errors including those of syntax and sound-a-like substitutions which may escape proof reading. In such instances, actual meaning can be extrapolated by context.

## 2021-07-31 DIAGNOSIS — R73.03 PREDIABETES: ICD-10-CM

## 2021-08-02 NOTE — TELEPHONE ENCOUNTER
Kyree Request for pending medication. Last Visit Date: 4/1/21  Next Visit Date:  Future Appointments   Date Time Provider Rico Lucy   8/16/2021  9:15 AM Nadeem Venegas MD [address] [place] [address]   Topic Date Due    Hepatitis B vaccine (1 of 3 - Risk 3-dose series) Never done    Cervical cancer screen  Never done    Shingles Vaccine (1 of 2) Never done    Breast cancer screen  01/12/2021    COVID-19 Vaccine (2 - Pfizer 2-dose series) 02/10/2021    Lipid screen  05/19/2021    Diabetic foot exam  04/01/2022 (Originally 12/16/2017)    Diabetic microalbuminuria test  04/01/2022 (Originally 11/1/2020)    Diabetic retinal exam  04/11/2022 (Originally 10/24/1970)    Flu vaccine (1) 09/01/2021    A1C test (Diabetic or Prediabetic)  04/01/2022    Potassium monitoring  04/01/2022    Creatinine monitoring  04/01/2022    DTaP/Tdap/Td vaccine (2 - Td or Tdap) 03/07/2024    Pneumococcal 0-64 years Vaccine (2 of 2 - PPSV23) 10/24/2027    Colon cancer screen colonoscopy  10/18/2029    Hepatitis C screen  Completed    HIV screen  Completed    Hepatitis A vaccine  Aged Out    Hib vaccine  Aged Out    Meningococcal (ACWY) vaccine  Aged Out       Hemoglobin A1C (%)   Date Value   04/01/2021 6.5   12/20/2019 6.3   01/15/2019 6.5             ( goal A1C is < 7)   Microalb/Crt.  Ratio (mcg/mg creat)   Date Value   11/01/2019 CANNOT BE CALCULATED     LDL Cholesterol (mg/dL)   Date Value   05/19/2020 70       (goal LDL is <100)   AST (U/L)   Date Value   05/19/2020 17     ALT (U/L)   Date Value   05/19/2020 17     BUN (mg/dL)   Date Value   04/01/2021 19     BP Readings from Last 3 Encounters:   04/28/21 (!) 149/100   04/01/21 121/80   12/23/20 132/85          (goal 120/80)    All Future Testing planned in [facility]  Lab Frequency Next Occurrence   GERTRUDIS Digital Screen Bilateral [[id number]] Once 04/01/2022   Culture, Urine Once 04/01/2022       Next Visit Date:  Future Appointments   Date Time

## 2021-08-12 ENCOUNTER — HOSPITAL ENCOUNTER (OUTPATIENT)
Age: 61
Setting detail: SPECIMEN
Discharge: HOME | End: 2021-08-12
Payer: MEDICARE

## 2021-08-12 ENCOUNTER — OFFICE VISIT (OUTPATIENT)
Dept: PRIMARY CARE CLINIC | Age: 61
End: 2021-08-12
Payer: MEDICARE

## 2021-08-12 VITALS
HEART RATE: 97 BPM | WEIGHT: 278 LBS | OXYGEN SATURATION: 95 % | DIASTOLIC BLOOD PRESSURE: 77 MMHG | TEMPERATURE: 97.5 F | SYSTOLIC BLOOD PRESSURE: 118 MMHG | HEIGHT: 63 IN | BODY MASS INDEX: 49.26 KG/M2

## 2021-08-12 DIAGNOSIS — R68.89 FLU-LIKE SYMPTOMS: ICD-10-CM

## 2021-08-12 DIAGNOSIS — R68.89 FLU-LIKE SYMPTOMS: Primary | ICD-10-CM

## 2021-08-12 PROCEDURE — G8417 CALC BMI ABV UP PARAM F/U: HCPCS | Performed by: INTERNAL MEDICINE

## 2021-08-12 PROCEDURE — 99213 OFFICE O/P EST LOW 20 MIN: CPT | Performed by: INTERNAL MEDICINE

## 2021-08-12 PROCEDURE — 1036F TOBACCO NON-USER: CPT | Performed by: INTERNAL MEDICINE

## 2021-08-12 PROCEDURE — 3017F COLORECTAL CA SCREEN DOC REV: CPT | Performed by: INTERNAL MEDICINE

## 2021-08-12 PROCEDURE — G8427 DOCREV CUR MEDS BY ELIG CLIN: HCPCS | Performed by: INTERNAL MEDICINE

## 2021-08-12 ASSESSMENT — PATIENT HEALTH QUESTIONNAIRE - PHQ9
SUM OF ALL RESPONSES TO PHQ QUESTIONS 1-9: 0
SUM OF ALL RESPONSES TO PHQ9 QUESTIONS 1 & 2: 0
1. LITTLE INTEREST OR PLEASURE IN DOING THINGS: 0
SUM OF ALL RESPONSES TO PHQ QUESTIONS 1-9: 0
2. FEELING DOWN, DEPRESSED OR HOPELESS: 0
SUM OF ALL RESPONSES TO PHQ QUESTIONS 1-9: 0
DEPRESSION UNABLE TO ASSESS: FUNCTIONAL CAPACITY MOTIVATION LIMITS ACCURACY

## 2021-08-12 ASSESSMENT — ENCOUNTER SYMPTOMS
FLU SYMPTOMS: 1
SORE THROAT: 1
COUGH: 1

## 2021-08-12 NOTE — PROGRESS NOTES
[facility]  [facility]  [address] [address]  Dept: [phone]  Dept Fax: [phone]    Juana Armas is a 64 y.o. female who presents to the urgent care today for her medicalconditions/complaints as noted below. Juana Armas is c/o of Cough (onset this morning) and Pharyngitis (itchy thorat)      HPI:     Influenza  This is a new problem. The current episode started in the past 7 days. The problem occurs constantly. The problem has been unchanged. Associated symptoms include coughing and a sore throat. Nothing aggravates the symptoms. She has tried nothing for the symptoms. The treatment provided no relief. Has not had full COVID vaccine.     Past Medical History:   Diagnosis Date    Diverticulitis     2 times    Former smoker     Former smoker     GERD (gastroesophageal reflux disease)     Hyperlipidemia     Hypertension     Morbid obesity with BMI of 45.0-49.9, adult ([facility] [address]     SINDI on CPAP     Sleep apnea     SINDI on CPAP    Type II or unspecified type diabetes mellitus without mention of complication, not stated as uncontrolled         Current Outpatient Medications   Medication Sig Dispense Refill    betamethasone valerate (VALISONE) 0.1 % cream apply to affected area twice a day      metFORMIN (GLUCOPHAGE) 500 MG tablet take 1 tablet by mouth every morning with breakfast 30 tablet 0    diclofenac sodium (VOLTAREN) 1 % GEL Apply 2 g topically 2 times daily 100 g 0    Handicap Placard MISC by Does not apply route DISABLED PARKING PERMIT AUTHORIZATION  Routine     Qty-1    The patient has the following condition(s) which qualifies him/her for disabled parking: Walking severely limited due to orthopedic condition     Privilege Duration: Temporary for 3 months Authorization 1 each 0    naproxen (NAPROSYN) 500 MG tablet take 1 tablet by mouth twice a day if needed for pain 60 tablet 0    diclofenac sodium (VOLTAREN) 1 % GEL Apply 2 g topically 2 times daily 350 g 0    lisinopril (PRINIVIL;ZESTRIL) 10 MG tablet take 1 tablet by mouth once daily 30 tablet 3    hydroCHLOROthiazide (HYDRODIURIL) 25 MG tablet take 1 tablet by mouth once daily 30 tablet 3    FEROSUL 325 (65 Fe) MG tablet take 1 tablet by mouth twice a day before meals      atorvastatin (LIPITOR) 40 MG tablet Take 1 tablet by mouth daily 30 tablet 3    ferrous sulfate (FE TABS 325) 325 (65 Fe) MG EC tablet Take 1 tablet by mouth 2 times daily (before meals) 90 tablet 3    melatonin 1 MG tablet Take 1 tablet by mouth nightly as needed for Sleep 30 tablet 2    Elastic Bandages & Supports (ACE ELBOW BRACE LARGE/X-LARGE) MISC 1 Brace as need for elbow pain 1 each 0    aspirin 81 MG tablet Take 81 mg by mouth daily       No current facility-administered medications for this visit. No Known Allergies    Health Maintenance   Topic Date Due    Hepatitis B vaccine (1 of 3 - Risk 3-dose series) Never done    Cervical cancer screen  Never done    Shingles Vaccine (1 of 2) Never done    Breast cancer screen  01/12/2021    COVID-19 Vaccine (2 - Pfizer 2-dose series) 02/10/2021    Lipid screen  05/19/2021    Diabetic foot exam  04/01/2022 (Originally 12/16/2017)    Diabetic microalbuminuria test  04/01/2022 (Originally 11/1/2020)    Diabetic retinal exam  04/11/2022 (Originally 10/24/1972)    Flu vaccine (1) 09/01/2021    A1C test (Diabetic or Prediabetic)  04/01/2022    Potassium monitoring  04/01/2022    Creatinine monitoring  04/01/2022    DTaP/Tdap/Td vaccine (2 - Td or Tdap) 03/07/2024    Pneumococcal 0-64 years Vaccine (2 of 2 - PPSV23) 10/24/2027    Colon cancer screen colonoscopy  10/18/2029    Hepatitis C screen  Completed    HIV screen  Completed    Hepatitis A vaccine  Aged Out    Hib vaccine  Aged Out    Meningococcal (ACWY) vaccine  Aged Out       Subjective:      Review of Systems   HENT: Positive for sore throat. Respiratory: Positive for cough.     All other systems reviewed and are negative. Objective:     Physical Exam  Vitals reviewed. Constitutional:       Appearance: Normal appearance. HENT:      Head: Normocephalic and atraumatic. Skin:     General: Skin is warm and dry. Neurological:      General: No focal deficit present. Mental Status: She is alert and oriented to person, place, and time. Psychiatric:         Mood and Affect: Mood normal.         Behavior: Behavior normal.       /77 (Site: Left Upper Arm, Position: Sitting, Cuff Size: Large Adult)   Pulse 97   Temp 97.5 °F (36.4 °C) (Infrared)   Ht 5' 3\" (1.6 m)   Wt 278 lb (126.1 kg)   SpO2 95%   BMI 49.25 kg/m²       Assessment:       Diagnosis Orders   1. Flu-like symptoms  COVID-19       Plan:      No follow-ups on file. No orders of the defined types were placed in this encounter. Orders Placed This Encounter   Procedures    COVID-19     Standing Status:   Future     Standing Expiration Date:   8/12/2022     Scheduling Instructions:      1) Due to current limited availability of the COVID-19 test, tests will be prioritized based on responses to questions above. Testing may be delayed due to volume. 2) Print and instruct patient to adhere to CDC home isolation program. (Link Above)              3) Set up or refer patient for a monitoring program.              4) Have patient sign up for and leverage Delta IDhart (if not previously done). Order Specific Question:   Is this test for diagnosis or screening? Answer:   Diagnosis of ill patient     Order Specific Question:   Symptomatic for COVID-19 as defined by CDC? Answer:   Yes     Order Specific Question:   Date of Symptom Onset     Answer:   8/10/2021     Order Specific Question:   Hospitalized for COVID-19? Answer:   No     Order Specific Question:   Admitted to ICU for COVID-19? Answer:   No     Order Specific Question:   Employed in healthcare setting?      Answer:   Yes     Order Specific Question:   Resident in a congregate (group) care setting? Answer:   No     Order Specific Question:   Pregnant? Answer:   No     Order Specific Question:   Previously tested for COVID-19? Answer: Yes            Patient given educational materials - see patient instructions. Discussed use, benefit, and side effects of prescribed medications. All patientquestions answered. Pt voiced understanding.     Electronically signed by Brandy Tabares MD on 8/12/2021at 10:49 AM

## 2021-08-13 LAB
SARS-COV-2: NORMAL
SARS-COV-2: NOT DETECTED
SOURCE: NORMAL

## 2021-08-30 ENCOUNTER — OFFICE VISIT (OUTPATIENT)
Dept: ORTHOPEDIC SURGERY | Age: 61
End: 2021-08-30
Payer: MEDICARE

## 2021-08-30 VITALS — HEIGHT: 62 IN | WEIGHT: 278 LBS | RESPIRATION RATE: 18 BRPM | BODY MASS INDEX: 51.16 KG/M2

## 2021-08-30 DIAGNOSIS — M23.307 DEGENERATIVE TEAR OF MENISCUS OF LEFT KNEE: ICD-10-CM

## 2021-08-30 DIAGNOSIS — M17.0 PRIMARY OSTEOARTHRITIS OF BOTH KNEES: Primary | ICD-10-CM

## 2021-08-30 DIAGNOSIS — R73.03 PREDIABETES: ICD-10-CM

## 2021-08-30 PROCEDURE — G8427 DOCREV CUR MEDS BY ELIG CLIN: HCPCS | Performed by: ORTHOPAEDIC SURGERY

## 2021-08-30 PROCEDURE — G8417 CALC BMI ABV UP PARAM F/U: HCPCS | Performed by: ORTHOPAEDIC SURGERY

## 2021-08-30 PROCEDURE — 99213 OFFICE O/P EST LOW 20 MIN: CPT | Performed by: ORTHOPAEDIC SURGERY

## 2021-08-30 PROCEDURE — 20610 DRAIN/INJ JOINT/BURSA W/O US: CPT | Performed by: ORTHOPAEDIC SURGERY

## 2021-08-30 PROCEDURE — 3017F COLORECTAL CA SCREEN DOC REV: CPT | Performed by: ORTHOPAEDIC SURGERY

## 2021-08-30 PROCEDURE — 1036F TOBACCO NON-USER: CPT | Performed by: ORTHOPAEDIC SURGERY

## 2021-08-30 RX ORDER — LIDOCAINE HYDROCHLORIDE 10 MG/ML
4 INJECTION, SOLUTION INFILTRATION; PERINEURAL ONCE
Status: COMPLETED | OUTPATIENT
Start: 2021-08-30 | End: 2021-08-30

## 2021-08-30 RX ORDER — TRIAMCINOLONE ACETONIDE 40 MG/ML
40 INJECTION, SUSPENSION INTRA-ARTICULAR; INTRAMUSCULAR ONCE
Status: COMPLETED | OUTPATIENT
Start: 2021-08-30 | End: 2021-08-30

## 2021-08-30 RX ADMIN — LIDOCAINE HYDROCHLORIDE 4 ML: 10 INJECTION, SOLUTION INFILTRATION; PERINEURAL at 10:56

## 2021-08-30 RX ADMIN — TRIAMCINOLONE ACETONIDE 40 MG: 40 INJECTION, SUSPENSION INTRA-ARTICULAR; INTRAMUSCULAR at 10:57

## 2021-08-30 NOTE — PROGRESS NOTES
[facility] [id number] [facility]  [address]  [address]  Dept: [phone]    Ambulatory Orthopedic Consult      CHIEF COMPLAINT:    Chief Complaint   Patient presents with    Knee Pain     B/L        HISTORY OF PRESENT ILLNESS:      The patient is a left-hand dominant 64 y.o. female who is being seen for evaluation of pain at the above location (left anterior knee greater than left dorsal midfoot and lateral/medial hindfoot, as well as left ring finger), which began in the beginning of November 2020 atraumatically. The pain is described mainly with mechanical terms (dull/sharp/throbbing). The pain is worse with activity and better with rest. The patient reports a static course. The patient has tried:      [x]  rest/activity modification          [x]  NSAIDs      []  opiates      []  orthotics        []  change in shoes   [x]  home exercises  []  physical therapy      []  CAM boot     []  brace:    []  injection:       []  surgery:      The patient also reports that her left ring finger pain is associated with intermittent locking. INTERVAL HISTORY 2/5/2021:  She is seen again today in the office for follow up of a previous issue (as above--left knee and left ring finger pain). Since being seen last, the patient is doing worse, particularly in terms of her left knee. At today's visit, she is not using a brace or assistive device. The location and quality of the pain have not significantly changed since the last visit. INTERVAL HISTORY 4/6/2021:  She is seen again today in the office for follow up after an injection. Since being seen last, the patient is doing better. At today's visit, she is using no brace or assistive device. She received an injection of the left knee, and reports the injection helped approximately 100% in terms of pain.   She also reports that her left ring finger trigger finger is not bothering her, and reports that her foot pain has significantly improved. INTERVAL HISTORY 6/4/2021:  She is seen again today in the office for follow up of a previous issue (as above--bilateral knees). Since being seen last, the patient is doing worse. At today's visit, she is not using a brace or assistive device. History is obtained today from:   [x]  the patient     []  EMR     []  one family member/friend    []  multiple family members/friends    []  other:      INTERVAL HISTORY 7/19/2021:  She is seen again today in the office for follow up of imaging as below. Since being seen last, the patient is doing about the same overall. At today's visit, she is using no brace/assistive device. History is obtained today from:   [x]  the patient     [x]  EMR     []  one family member/friend    []  multiple family members/friends    []  other:      INTERVAL HISTORY 8/30/2021:  She is seen again today in the office for follow up of a previous issue (as above). Since being seen last, the patient is doing better. At today's visit, she is not using a brace or assistive device. History is obtained today from:   [x]  the patient     []  EMR     []  one family member/friend    []  multiple family members/friends    []  other:           REVIEW OF SYSTEMS:  Constitutional: Negative for fever. HENT: Negative for tinnitus. Eyes: Negative for pain. Respiratory: Negative for shortness of breath. Cardiovascular: Negative for chest pain. Gastrointestinal: Negative for abdominal pain. Genitourinary: Negative for dysuria. Skin: Negative for rash. Neurological: Negative for headaches. Hematological: Does not bruise/bleed easily.    Musculoskeletal: See HPI for pertinent positives     Past Medical History:    She  has a past medical history of Diverticulitis, Former smoker, Former smoker, GERD (gastroesophageal reflux disease), Hyperlipidemia, Hypertension, Morbid obesity with BMI of 45.0-49.9, adult ([address] SINDI on CPAP, Sleep apnea, and Type II or unspecified type diabetes mellitus without mention of complication, not stated as uncontrolled. Past Surgical History:    She  has a past surgical history that includes Hysterectomy; Cholecystectomy; Colonoscopy; pr egd transoral biopsy single/multiple (7/19/2017); Upper gastrointestinal endoscopy (N/A, 10/18/2019); and Colonoscopy (N/A, 10/18/2019).      Current Medications:     Current Outpatient Medications:     betamethasone valerate (VALISONE) 0.1 % cream, apply to affected area twice a day, Disp: , Rfl:     metFORMIN (GLUCOPHAGE) 500 MG tablet, take 1 tablet by mouth every morning with breakfast, Disp: 30 tablet, Rfl: 0    diclofenac sodium (VOLTAREN) 1 % GEL, Apply 2 g topically 2 times daily, Disp: 100 g, Rfl: 0    Handicap Placard MISC, by Does not apply route DISABLED PARKING PERMIT AUTHORIZATION Routine   Qty-1  The patient has the following condition(s) which qualifies him/her for disabled parking: Walking severely limited due to orthopedic condition   Privilege Duration: Temporary for 3 months Authorization, Disp: 1 each, Rfl: 0    naproxen (NAPROSYN) 500 MG tablet, take 1 tablet by mouth twice a day if needed for pain, Disp: 60 tablet, Rfl: 0    diclofenac sodium (VOLTAREN) 1 % GEL, Apply 2 g topically 2 times daily, Disp: 350 g, Rfl: 0    lisinopril (PRINIVIL;ZESTRIL) 10 MG tablet, take 1 tablet by mouth once daily, Disp: 30 tablet, Rfl: 3    hydroCHLOROthiazide (HYDRODIURIL) 25 MG tablet, take 1 tablet by mouth once daily, Disp: 30 tablet, Rfl: 3    FEROSUL 325 (65 Fe) MG tablet, take 1 tablet by mouth twice a day before meals, Disp: , Rfl:     atorvastatin (LIPITOR) 40 MG tablet, Take 1 tablet by mouth daily, Disp: 30 tablet, Rfl: 3    ferrous sulfate (FE TABS 325) 325 (65 Fe) MG EC tablet, Take 1 tablet by mouth 2 times daily (before meals), Disp: 90 tablet, Rfl: 3    melatonin 1 MG tablet, Take 1 tablet by mouth nightly as needed for Sleep, Disp: 30 tablet, Rfl: 2    Elastic Bandages & Supports (ACE ELBOW BRACE LARGE/X-LARGE) MISC, 1 Brace as need for elbow pain, Disp: 1 each, Rfl: 0    aspirin 81 MG tablet, Take 81 mg by mouth daily, Disp: , Rfl:      Allergies:    Patient has no known allergies. Family History:  family history includes Cancer in her father; Diabetes in her mother; High Blood Pressure in her mother.     Social History:   Social History     Occupational History    Not on file   Tobacco Use    Smoking status: Former Smoker     Packs/day: 0.25     Years: 7.00     Pack years: 1.75     Start date: 1984     Quit date: 1991     Years since quittin.6    Smokeless tobacco: Never Used   Vaping Use    Vaping Use: Never used   Substance and Sexual Activity    Alcohol use: Not Currently    Drug use: No    Sexual activity: Not on file     Occupation: Works as a      OBJECTIVE:  Resp 18   Ht 5' 2\" (1.575 m)   Wt 278 lb (126.1 kg)   BMI 50.85 kg/m²    Psych: alert and oriented to person, time, and place  Cardio:  well perfused extremities  Resp:  normal respiratory effort  Skin:  no cyanosis  Hem/lymph:  no lymphedema  Neuro:  sensation to light touch grossly intact throughout all nerve distributions in the bilateral feet and hands  Musculoskeletal:    RLE:    Grossly normal range of motion of hip, slightly decreased flexion/extension of the knee  Painless range of motion of hip, but pain on extremes of knee motion  Stable exam of hip/knee  Skin intact without erythema/warmth  Grossly neurovascularly intact distally in the lower extremity  Appropriate strength on manual muscle testing of the iliopsoas, hamstrings, quadriceps, tibialis anterior, gastroc-soleus complex  Tenderness:  anteromedial and anterolateral knee joint line --mild  -Kirk's test positive for pain      LLE:    Grossly normal range of motion of hip, slightly decreased flexion extension of the knee  Painless range of motion of hip, but pain on extremes of knee motion  Stable exam of hip/knee  Skin intact without erythema/warmth  Grossly neurovascularly intact distally in the lower extremity  Appropriate strength on manual muscle testing of the iliopsoas, hamstrings, quadriceps, tibialis anterior, gastroc-soleus complex  Tenderness:  anteromedial greater than anterolateral knee joint line--mild  -Kirk's test positive for pain     -Previous exam: Tenderness diffusely throughout the midfoot, sinus Tarsi, and along the course the posterior tibial tendon      (Previous exam)  Affected upper extremity (left):  Grossly normal range of motion of wrist/fingers  Painless range of motion of wrist/fingers except for ring finger  Stable exam of wrist/hand  Skin intact without erythema/warmth   Grossly neurovascularly intact distally throughout able to fire EPL/FPL/IO/EDC/FDP  Tenderness to palpation:  A1 pulley of the ring finger--improved  -Durkan's compression test negative  -triggering elicited at the Ring finger       RADIOLOGY:   8/30/2021 No new radiology images today. Prior images reviewed for reference. MRI images and radiology report reviewed, as below:    1. Tearing of the root ligament of the posterior horn medial meniscus. Outward extrusion of the medial meniscus body.  Degeneration of the posterior   horn and body of the medial meniscus.  Reactive marrow edema at the outer   medial tibial plateau. 2. Possible grade 1 MCL sprain. 3. Small joint effusion. 4. Tricompartmental osteoarthrosis.  Severe medial chondromalacia.  Focal   severe chondromalacia at the posterior weight-bearing lateral tibial plateau. 5. Mild-to-moderate lateral femoral trochlear chondromalacia with underlying   subcortical cystic change. 6. Small Baker's cyst.  Mild-to-moderate edema in the subcutaneous fat about   the knee.            FINDINGS:  Four weightbearing views (AP, Tunnel, Lateral, and [place]) of the bilateral knee were obtained in the office today and reviewed, revealing no acute fracture, dislocation, or radioopaque foreign body/tumor. Overall alignment is satisfactory. Tricompartmental degenerative changes of the knee with joint narrowing, sclerosis, and osteophytes. IMPRESSION: No acute fracture/dislocation. Degenerative changes as above. Electronically signed by Anastasia Lee MD        FINDINGS:  Three weightbearing views (AP, [place], and Lateral) of the left ankle and three weightbearing views (AP, Oblique, Lateral) of the left foot and four weightbearing views (AP, Lateral, Tunnel and [place]) of the left knee and 3 views (AP, oblique, lateral) of the left hand were obtained in the office today and reviewed, revealing no acute fracture, dislocation, or radioopaque foreign body/tumor. The ankle mortise is maintained with no widening of the clear spaces. Overall alignment is satisfactory. Tricompartmental degenerative changes of the knee with joint space narrowing, sclerosis, and osteophytes. Degenerative changes at the subtalar joint, as well as mildly diffusely throughout the midfoot. IMPRESSION:  No acute fracture/dislocation. Generative changes as above. Electronically signed by Anastasia Lee MD        Xr Knee Left (3 Views)    Result Date: 11/25/2020  EXAMINATION: THREE X-RAY VIEWS OF THE LEFT KNEE 11/25/2020 10:31 am COMPARISON: 03/03/2010 HISTORY: ORDERING SYSTEM PROVIDED HISTORY: Pain TECHNOLOGIST PROVIDED HISTORY: Pain Acuity: Acute Type of Exam: Unknown FINDINGS: No evidence of acute fracture or dislocation. Similar small benign-appearing soft tissue calcifications in the proximal and posterolateral leg. Radiographically there is no significant joint effusion. Progression of mild to moderate degenerative changes involving primarily the medial compartment with joint space loss and osteophytes. Lateral view is obtained in a slight degree of obliquity. No acute osseous abnormality.  Mild-to-moderate degenerative changes have progressed since the prior study.        ASSESSMENT AND PLAN:  Body mass index is 50.85 kg/m². She has left greater than right knee pain, secondary to bilateral tricompartmental osteoarthritis, on the left with severe medial chondromalacia and a medial meniscal posterior root tear (a left knee corticosteroid injection on 2/5/2021 helped her pain approximately 100%; a left knee injection on 7/19/2021 helped her pain approximately 75%). She also has left foot pain secondary multiple issues--she has a component of posterior tibial tendinitis along with some degenerative changes of her subtalar joint and mildly diffusely throughout the midfoot. She is doing well with conservative management. She also has a trigger finger of her left ring finger. She is doing well with conservative management. Notably, she has a somewhat complex past medical history. She has a history of non-insulin-dependent diabetes. We had a discussion today about the likely diagnosis and its natural history, physical exam and imaging findings, as well as various treatment options in detail. Surgically, we have discussed a possible left knee arthroscopic meniscal root repair and/or meniscal debridement. At today's visit, she again wishes to proceed with conservative management for the time being. Orders/referrals were placed as below at today's visit. She will continue to take her prescribed topical Voltaren gel, and avoid oral NSAIDs due to her history of GI bleeding. The patient was again referred to physical therapy for her bilateral knees, as she reports that she still has not attended any therapy; we again discussed the importance of this, and I do believe that it will help her. After discussing her treatment options, the patient wished to proceed with a right knee injection as below. She has previously been provided information had obtained over-the-counter flatfoot style orthotic but has not obtained this.     All questions were the assistance of voice recognition software. While intending to generate a document that actually reflects the content of the visit, the document can still have some errors including those of syntax and sound-a-like substitutions which may escape proof reading. In such instances, actual meaning can be extrapolated by context.

## 2021-08-30 NOTE — TELEPHONE ENCOUNTER
Pt is [facility] 9-2-21        Please address the medication refill and close the encounter. If I can be of assistance, please route to the applicable pool. Thank you. Last visit: 4-1-2021  Last Med refill: 8-2-2021  Does patient have enough medication for 72 hours: No:     Next Visit Date:  No future appointments. Health Maintenance   Topic Date Due    Hepatitis B vaccine (1 of 3 - Risk 3-dose series) Never done    Cervical cancer screen  Never done    Shingles Vaccine (1 of 2) Never done    Breast cancer screen  01/12/2021    COVID-19 Vaccine (2 - Pfizer 2-dose series) 02/10/2021    Lipid screen  05/19/2021    Diabetic foot exam  04/01/2022 (Originally 12/16/2017)    Diabetic microalbuminuria test  04/01/2022 (Originally 11/1/2020)    Diabetic retinal exam  04/11/2022 (Originally 10/24/1970)    Flu vaccine (1) 09/01/2021    A1C test (Diabetic or Prediabetic)  04/01/2022    Potassium monitoring  04/01/2022    Creatinine monitoring  04/01/2022    DTaP/Tdap/Td vaccine (2 - Td or Tdap) 03/07/2024    Pneumococcal 0-64 years Vaccine (2 of 2 - PPSV23) 10/24/2027    Colon cancer screen colonoscopy  10/18/2029    Hepatitis C screen  Completed    HIV screen  Completed    Hepatitis A vaccine  Aged Out    Hib vaccine  Aged Out    Meningococcal (ACWY) vaccine  Aged Out       Hemoglobin A1C (%)   Date Value   04/01/2021 6.5   12/20/2019 6.3   01/15/2019 6.5             ( goal A1C is < 7)   Microalb/Crt.  Ratio (mcg/mg creat)   Date Value   11/01/2019 CANNOT BE CALCULATED     LDL Cholesterol (mg/dL)   Date Value   05/19/2020 70   11/01/2019 97       (goal LDL is <100)   AST (U/L)   Date Value   05/19/2020 17     ALT (U/L)   Date Value   05/19/2020 17     BUN (mg/dL)   Date Value   04/01/2021 19     BP Readings from Last 3 Encounters:   08/12/21 118/77   04/28/21 (!) 149/100   04/01/21 121/80          (goal 120/80)    All Future Testing planned in [facility]  Lab Frequency Next Occurrence   [facility] Digital Screen Bilateral [ASC2414] Once 04/01/2022   Culture, Urine Once 04/01/2022               Patient Active Problem List:     Gastroesophageal reflux disease     Hyperlipidemia with target LDL less than 100     HTN (hypertension)     Numbness and tingling     Diverticula, colon     Diverticulitis, acute     Eczema     Lump of left breast     Shoulder pain     Right groin pain     Tinea corporis     Hyperlipidemia     Morbid obesity with body mass index (BMI) of 40.0 to 49.9 (HCC)     SINDI (obstructive sleep apnea)     Vitamin D deficiency     Melena     Chest pain     Abnormal stress test     Contact blepharoconjunctivitis of left eye     Prediabetes     Need for prophylactic vaccination and inoculation against varicella     Acute cystitis without hematuria

## 2021-09-02 ENCOUNTER — OFFICE VISIT (OUTPATIENT)
Dept: FAMILY MEDICINE CLINIC | Age: 61
End: 2021-09-02
Payer: MEDICARE

## 2021-09-02 VITALS
DIASTOLIC BLOOD PRESSURE: 76 MMHG | HEIGHT: 62 IN | HEART RATE: 90 BPM | BODY MASS INDEX: 51.89 KG/M2 | SYSTOLIC BLOOD PRESSURE: 139 MMHG | WEIGHT: 282 LBS

## 2021-09-02 DIAGNOSIS — R10.30 LOWER ABDOMINAL PAIN: Primary | ICD-10-CM

## 2021-09-02 DIAGNOSIS — M79.672 LEFT FOOT PAIN: ICD-10-CM

## 2021-09-02 DIAGNOSIS — M19.079 ARTHRITIS OF MIDFOOT: ICD-10-CM

## 2021-09-02 DIAGNOSIS — M23.307 DEGENERATIVE TEAR OF MENISCUS OF LEFT KNEE: ICD-10-CM

## 2021-09-02 DIAGNOSIS — E03.9 HYPOTHYROIDISM, UNSPECIFIED TYPE: ICD-10-CM

## 2021-09-02 DIAGNOSIS — R35.0 URINARY FREQUENCY: ICD-10-CM

## 2021-09-02 DIAGNOSIS — E11.9 TYPE 2 DIABETES MELLITUS WITHOUT COMPLICATION, WITHOUT LONG-TERM CURRENT USE OF INSULIN (HCC): ICD-10-CM

## 2021-09-02 DIAGNOSIS — M19.079 ARTHRITIS OF SUBTALAR JOINT: ICD-10-CM

## 2021-09-02 LAB
BILIRUBIN, POC: NORMAL
BLOOD URINE, POC: NORMAL
CLARITY, POC: CLEAR
COLOR, POC: YELLOW
GLUCOSE URINE, POC: NORMAL
HBA1C MFR BLD: 6.8 %
KETONES, POC: NORMAL
LEUKOCYTE EST, POC: NORMAL
NITRITE, POC: NORMAL
PH, POC: 5.5
PROTEIN, POC: NORMAL
SPECIFIC GRAVITY, POC: 1.01
UROBILINOGEN, POC: 0.2

## 2021-09-02 PROCEDURE — 3017F COLORECTAL CA SCREEN DOC REV: CPT

## 2021-09-02 PROCEDURE — 99214 OFFICE O/P EST MOD 30 MIN: CPT

## 2021-09-02 PROCEDURE — G8417 CALC BMI ABV UP PARAM F/U: HCPCS

## 2021-09-02 PROCEDURE — 3044F HG A1C LEVEL LT 7.0%: CPT

## 2021-09-02 PROCEDURE — G8427 DOCREV CUR MEDS BY ELIG CLIN: HCPCS

## 2021-09-02 PROCEDURE — 81002 URINALYSIS NONAUTO W/O SCOPE: CPT

## 2021-09-02 PROCEDURE — 83036 HEMOGLOBIN GLYCOSYLATED A1C: CPT

## 2021-09-02 PROCEDURE — 2022F DILAT RTA XM EVC RTNOPTHY: CPT

## 2021-09-02 PROCEDURE — 1036F TOBACCO NON-USER: CPT

## 2021-09-02 RX ORDER — CIPROFLOXACIN 500 MG/1
500 TABLET, FILM COATED ORAL 2 TIMES DAILY
Qty: 14 TABLET | Refills: 0 | Status: SHIPPED | OUTPATIENT
Start: 2021-09-02 | End: 2021-09-09

## 2021-09-02 RX ORDER — HYDROCHLOROTHIAZIDE 25 MG/1
TABLET ORAL
Qty: 30 TABLET | Refills: 3 | Status: SHIPPED | OUTPATIENT
Start: 2021-09-02 | End: 2022-02-15

## 2021-09-02 RX ORDER — ATORVASTATIN CALCIUM 40 MG/1
40 TABLET, FILM COATED ORAL DAILY
Qty: 30 TABLET | Refills: 3 | Status: SHIPPED | OUTPATIENT
Start: 2021-09-02

## 2021-09-02 RX ORDER — METRONIDAZOLE 500 MG/1
500 TABLET ORAL 2 TIMES DAILY
Qty: 14 TABLET | Refills: 0 | Status: SHIPPED | OUTPATIENT
Start: 2021-09-02 | End: 2021-09-09

## 2021-09-02 ASSESSMENT — ENCOUNTER SYMPTOMS
DIARRHEA: 0
COUGH: 0
CONSTIPATION: 0
BLOOD IN STOOL: 0
VOMITING: 0
NAUSEA: 0
SHORTNESS OF BREATH: 0
BACK PAIN: 1
ABDOMINAL PAIN: 1
ABDOMINAL DISTENTION: 0

## 2021-09-02 NOTE — PROGRESS NOTES
HYPERTENSION visit     BP Readings from Last 3 Encounters:   09/02/21 139/76   08/12/21 118/77   04/28/21 (!) 149/100       LDL Cholesterol (mg/dL)   Date Value   05/19/2020 70     HDL (mg/dL)   Date Value   05/19/2020 33 (L)     BUN (mg/dL)   Date Value   04/01/2021 19     CREATININE (mg/dL)   Date Value   04/01/2021 0.91 (H)     Glucose (mg/dL)   Date Value   04/01/2021 117 (H)   10/09/2011 136 (H)              Have you changed or started any medications since your last visit including any over-the-counter medicines, vitamins, or herbal medicines? no   Have you stopped taking any of your medications? Is so, why? -  no  Are you having any side effects from any of your medications? - no  How often do you miss doses of your medication? rare      Have you seen any other physician or provider since your last visit? yes - Orthopedic, Walk in,    Have you had any other diagnostic tests since your last visit? yes - MRI, Lab, XR   Have you been seen in the emergency room and/or had an admission in a hospital since we last saw you?  yes - [facility]   Have you had your routine dental cleaning in the past 6 months?  no     Do you have an active MyChart account? If no, what is the barrier?   Yes    Patient Care Team:  Ale Hardy MD as PCP - General (Family Medicine)  Aletha Aceves MD as PCP - [facility] Provider  Steven Cornell MD as Consulting Physician (Pulmonology)    Medical History Review  Past Medical, Family, and Social History reviewed and does contribute to the patient presenting condition    Health Maintenance   Topic Date Due    Hepatitis B vaccine (1 of 3 - Risk 3-dose series) Never done    Cervical cancer screen  Never done    Shingles Vaccine (1 of 2) Never done    Breast cancer screen  01/12/2021    COVID-19 Vaccine (2 - Pfizer 2-dose series) 02/10/2021    Lipid screen  05/19/2021    Flu vaccine (1) 09/01/2021    Diabetic foot exam  04/01/2022 (Originally 12/16/2017)    Diabetic microalbuminuria test  04/01/2022 (Originally 11/1/2020)    Diabetic retinal exam  04/11/2022 (Originally 10/24/1970)    A1C test (Diabetic or Prediabetic)  04/01/2022    Potassium monitoring  04/01/2022    Creatinine monitoring  04/01/2022    DTaP/Tdap/Td vaccine (2 - Td or Tdap) 03/07/2024    Pneumococcal 0-64 years Vaccine (2 of 2 - PPSV23) 10/24/2027    Colon cancer screen colonoscopy  10/18/2029    Hepatitis C screen  Completed    HIV screen  Completed    Hepatitis A vaccine  Aged Out    Hib vaccine  Aged Out    Meningococcal (ACWY) vaccine  Aged Out

## 2021-09-02 NOTE — PROGRESS NOTES
Cardiovascular: Negative for chest pain, palpitations and leg swelling. Gastrointestinal: Positive for abdominal pain. Negative for abdominal distention, blood in stool, constipation, diarrhea, nausea and vomiting. Genitourinary: Positive for frequency and pelvic pain. Negative for dysuria, flank pain and hematuria. Musculoskeletal: Positive for arthralgias and back pain. Neurological: Negative for dizziness, facial asymmetry, weakness and numbness. Psychiatric/Behavioral: Negative for agitation and confusion. Objective   Physical Exam  Constitutional:       Appearance: Normal appearance. HENT:      Head: Normocephalic and atraumatic. Eyes:      Extraocular Movements: Extraocular movements intact. Conjunctiva/sclera: Conjunctivae normal.      Pupils: Pupils are equal, round, and reactive to light. Cardiovascular:      Rate and Rhythm: Normal rate and regular rhythm. Pulses: Normal pulses. Heart sounds: Normal heart sounds. No murmur heard. Pulmonary:      Effort: Pulmonary effort is normal. No respiratory distress. Breath sounds: No wheezing or rales. Abdominal:      General: Abdomen is flat. Bowel sounds are normal. There is no distension. Palpations: Abdomen is soft. Tenderness: There is abdominal tenderness. There is no right CVA tenderness or left CVA tenderness. Comments: Left lower quadrant tenderness   Skin:     General: Skin is warm. Neurological:      General: No focal deficit present. Mental Status: She is alert and oriented to person, place, and time. Motor: No weakness. Psychiatric:         Mood and Affect: Mood normal.            On this date 9/2/2021 I have spent 30 minutes reviewing previous notes, test results and face to face with the patient discussing the diagnosis and importance of compliance with the treatment plan as well as documenting on the day of the visit.       An electronic signature was used to authenticate this note.     --Angel Cabral MD

## 2021-09-02 NOTE — PATIENT INSTRUCTIONS
Thank you for letting us take care of you today. We hope all your questions were addressed. If a question was overlooked or something else comes to mind after you return home, please contact a member of your Care Team listed below. Your Care Team at [place] [address] is Team #5  Lino Forrester MD (Faculty)  Yayo Sharp MD (Resident)  Stu Casas MD (Resident)  Zeinab Molina MD (Resident)  Melanie Marie MD (Resident)  Hanane Quintanilla., RUBI Schmitz., JAQUELINE Lemus., Maritza Abbasi., Fabiola [facility] office)  Ruba Ibrahim, [address] [facility] [place][note 2][address] (Clinical Practice Manager)  Marc Frazier [facility] (Clinical Pharmacist)       Office phone number: [phone]    If you need to get in right away due to illness, please be advised we have \"Same Day\" appointments available Monday-Friday. Please call us at [phone] option #3 to schedule your \"Same Day\" appointment. Patient Education        Diverticulitis: Care Instructions  Overview     Diverticulitis occurs when pouches form in the wall of the colon and become inflamed or infected. It can be very painful. Doctors aren't sure what causes diverticulitis. There is no proof that foods such as nuts, seeds, or berries cause it or make it worse. A low-fiber diet may cause the colon to work harder to push stool forward. Pouches may form because of this extra work. It may be hard to think about healthy eating while you're in pain. But as you recover, you might think about how you can use healthy eating for overall better health. Healthy eating may help you avoid future attacks. Follow-up care is a key part of your treatment and safety. Be sure to make and go to all appointments, and call your doctor if you are having problems. It's also a good idea to know your test results and keep a list of the medicines you take. How can you care for yourself at home? · Drink plenty of fluids.  If you have kidney, heart, or liver disease and have to limit fluids, talk with your doctor before you increase the amount of fluids you drink. · Stay with liquids or a bland diet (plain rice, bananas, dry toast or crackers, applesauce) until you are feeling better. Then you can return to regular foods and slowly increase the amount of fiber in your diet. · Use a heating pad set on low on your belly to relieve mild cramps and pain. · Get extra rest until you are feeling better. · Be safe with medicines. Read and follow all instructions on the label. ? If the doctor gave you a prescription medicine for pain, take it as prescribed. ? If you are not taking a prescription pain medicine, ask your doctor if you can take an over-the-counter medicine. · If your doctor prescribed antibiotics, take them as directed. Do not stop taking them just because you feel better. You need to take the full course of antibiotics. · Do not use laxatives or enemas unless your doctor tells you to use them. When should you call for help? Call your doctor now or seek immediate medical care if:    · You have a fever.     · You are vomiting.     · You have new or worse belly pain.     · You cannot pass stools or gas. Watch closely for changes in your health, and be sure to contact your doctor if you have any problems. Where can you learn more? Go to https://Aktana.ConXtech. org and sign in to your [facility] account. Enter H901 in the [facility] box to learn more about \"Diverticulitis: Care Instructions. \"     If you do not have an account, please click on the \"Sign Up Now\" link. Current as of: February 10, 2021               Content Version: 12.9  © 3910-6482 Healthwise, Incorporated. Care instructions adapted under license by [facility] ([facility]). If you have questions about a medical condition or this instruction, always ask your healthcare professional. [place] [address] any warranty or liability for your use of this information.          Patient Education        Learning About Diverticulosis and Diverticulitis  What are diverticulosis and diverticulitis? In diverticulosis and diverticulitis, pouches called diverticula form in the wall of the large intestine, or colon. · In diverticulosis, the pouches do not cause any pain or other symptoms. · In diverticulitis, the pouches get inflamed or infected and cause symptoms. Doctors aren't sure what causes these pouches in the colon. But they think that a low-fiber diet may play a role. Without fiber to add bulk to the stool, the colon has to work harder than normal to push the stool forward. The pressure from this may cause pouches to form in weak spots along the colon. Some people with diverticulosis get diverticulitis. But experts don't know why this happens. What are the symptoms? · In diverticulosis, most people don't have symptoms. But pouches sometimes bleed. · In diverticulitis, symptoms may last from a few hours to a week or more. They include:  ? Belly pain. This is usually in the lower left side. It is sometimes worse when you move. This is the most common symptom. ? Fever and chills. ? Bloating and gas. ? Diarrhea or constipation. ? Nausea and sometimes vomiting.  ? Not feeling like eating. How can you prevent diverticulitis? You may be able to lower your chance of getting diverticulitis. You can do this by taking steps to prevent constipation. · Eat fruits, vegetables, beans, and whole grains every day. These foods are high in fiber. · Drink plenty of fluids. If you have kidney, heart, or liver disease and have to limit fluids, talk with your doctor before you increase the amount of fluids you drink. · Get at least 30 minutes of exercise on most days of the week. Walking is a good choice. You also may want to do other activities, such as running, swimming, cycling, or playing tennis or team sports. · Take a fiber supplement, such as Citrucel or Metamucil, every day if needed.  Read and follow all instructions on the label.  · Schedule time each day for a bowel movement. Having a daily routine may help. Take your time and do not strain when having a bowel movement. Some people avoid nuts, seeds, berries, and popcorn. They believe that these foods might get trapped in the diverticula and cause pain. But there is no proof that these foods cause diverticulitis or make it worse. How are these problems treated? · The best way to treat diverticulosis is to avoid constipation. · Treatment for diverticulitis includes antibiotics. It often includes a change in your diet. You may need only liquids at first. Your doctor may suggest pain medicines for pain or belly cramps. In some cases, surgery may be needed. Follow-up care is a key part of your treatment and safety. Be sure to make and go to all appointments, and call your doctor if you are having problems. It's also a good idea to know your test results and keep a list of the medicines you take. Where can you learn more? Go to https://JFrog.Ecolibrium. org and sign in to your [employer] account. Enter Z111 in the [employer] box to learn more about \"Learning About Diverticulosis and Diverticulitis. \"     If you do not have an account, please click on the \"Sign Up Now\" link. Current as of: February 10, 2021               Content Version: 12.9  © 7394-8053 Healthwise, Incorporated. Care instructions adapted under license by [facility] ([facility]). If you have questions about a medical condition or this instruction, always ask your healthcare professional. [place] [address] any warranty or liability for your use of this information.

## 2021-09-03 NOTE — PROGRESS NOTES
i have reviewed key elements of Alden Sharpe history and exam. And I examined Alden Sharpe  . I have discussed the treatment plan with the resident and agree with the plan. Pt has known H/O diverticulosis. Has lower left pina which she states is consistent with previous flares. Denies fever or chills. Is still taking oral fluids but not eating since last night due to discomfort. \"just don't feel like eating\". No Emesis. Is passing gas, some soft stool. Abdomen is soft, mild LLQ tenderness. No guarding or rebound. Normal Bowel sounds. Careful discussion with patient about conservative treatment at this time for presumed diverticulitis. Careful review of symptoms requiring immediate emergency medical attention. Patient was able to \"teach back\" to me the points of our discussion. Close follow up in five days.

## 2021-09-16 ENCOUNTER — HOSPITAL ENCOUNTER (OUTPATIENT)
Dept: PHYSICAL THERAPY | Age: 61
Setting detail: THERAPIES SERIES
Discharge: HOME | End: 2021-09-16
Payer: MEDICARE

## 2021-09-16 PROCEDURE — 97110 THERAPEUTIC EXERCISES: CPT

## 2021-09-16 PROCEDURE — 97161 PT EVAL LOW COMPLEX 20 MIN: CPT

## 2021-09-16 NOTE — CONSULTS
[x] Elijah English        Outpatient Physical                Therapy       [address]       Phone: [phone]       Fax: [phone] [] [facility]       Promotion at [address] [facility]       Phone: [phone]       Fax: [phone] [] [facility]      for [facility] Promotion    [address]     Phone: [phone]     Fax:  [phone]     Physical Therapy Lower Extremity Evaluation    Date:  2021  Patient: Vinod Blakely  : 1960  MRN: [id number]  Physician: Dr. Killian Mohs: Carlos ([id number])  Medical Diagnosis: Primary osteoarthritis of both knees  Rehab Codes: M25.561, M25.562, M25.662, M25.661, R26.9, R29.3, R27.9  Onset date: 2020  Next 's appt.: TBD        Subjective:   CC: Pt arrives for physical therapy evaluation of B knee pain (L>R) since 2020. Insidious onset, never has received PT for them. Noted to have a tear in posterior horn of medial meniscus in the L knee with significant tricompartmental arthritis. Did get injections in B knees that was helpful, most recently ~2 weeks ago per pt. Notes some N/T in B feet, intermittent. Does report knees give out, lock up when sleeping, pain-less popping/clicking in B knees. Intermittent use of straight cane in house when first getting up in the morning, and if knees are very stiff.          Prior Level of Function: only minimal pain with prolonged ADL/IADL completion such as walking at work     Current Level of Function: difficulty walking/standing longer, squatting; pain/slow completion with bathing/dressing especially tying shoes, throwing laundry down stairs instead of carrying, balancing on wall with climbing stairs d/t no railings      Red Flags:      Yes  No Comments   Fatigue [] [x]     Fever/chills/sweats [] [x]    Malaise  [] [x]    Mental status change [] [x]     Paresthesias/numbness/weakness [x] []     Unexplained weight changes [] [x]     Lightheaded/dizziness [] [x]    Shortness of breath [] [x]         Home Environment: Lives with foster child (15yo)  in [address] with [address] and right railings at entry. 2nd-floor bathroom with 2nd-floor bedroom. No railings up to second floor, pt \"balances on the wall\". Laundry in basement. Available assistive devices: cane        PMHx: [] Unremarkable [x] Diabetes [x] HTN  [] Pacemaker   [] MI/Heart Problems [] Cancer [] Arthritis [] Other:              [] Refer to full medical chart  In EPIC   Tests: [] X-Ray: [x] MRI:  7/19/2021 Prior images reviewed for reference. MRI images and radiology report reviewed, as below:    1. Tearing of the root ligament of the posterior horn medial meniscus. Outward extrusion of the medial meniscus body.  Degeneration of the posterior   horn and body of the medial meniscus.  Reactive marrow edema at the outer   medial tibial plateau. 2. Possible grade 1 MCL sprain. 3. Small joint effusion. 4. Tricompartmental osteoarthrosis.  Severe medial chondromalacia.  Focal   severe chondromalacia at the posterior weight-bearing lateral tibial plateau. 5. Mild-to-moderate lateral femoral trochlear chondromalacia with underlying   subcortical cystic change.    6. Small Baker's cyst.  Mild-to-moderate edema in the subcutaneous fat about   the knee.       [] Other:       Comorbidities:   [x] Obesity [] Dialysis  [] Other:   [] Asthma/COPD [] Dementia [] Other:   [] Stroke [x] Sleep apnea [] Other:   [] Vascular disease [] Rheumatic disease [] Other:       Medications: [x] Refer to full medical record [] None [] Other:  Allergies:      [x] Refer to full medical record [] None [] Other:    Working:  [employer]  Job/ADL Description: , part time      Pain:  [x] Yes  [] No Location: B anterior knees Pain Rating: (0-10 scale) 8/10  Pain altered Tx:  [] Yes  [x] No  Action:   Symptoms:  [x] Improving with injections, but has now normalized [] Worsening [] Same  Better:  Voltaren, motrin/tylenol   Worse: longer weightbearing, different positions  Sleep: [x] OK    [] Disturbed    Patient Goals: \"get better and lose weight\"         OBJECTIVE:    Observation:  OBSERVATION No Deficit Deficit Not Tested Comments   Posture    x   Excessive genurecurvatum bilat   Palpation [] [x] [] TTP in bilat inferomedial patellar structures, MCL/LCL and joint line bilat   Sensation [x] [] []    Edema [] [x] []  Difficult to appreciate d/t larger body habitus, but potentially mild edema in B kenes (L>R)         Neurological [x] [] []     Patellar Mobility [x] [] [] Mobile all planes bilat   Patellar Orientation [x] [] []     Gait [] [x] []  Analysis: antalgic LLE, genuvarus bilat (L>R), narrow JOSE ELIAS, R trunk lean with compensated trendelenburg noted, decreased step length and speed noted             ROM  ° A/P STRENGTH    Left Right Left Right   Hip Flex   5- 5-   Ext   4+ 4+   ER       IR       ABD   4 4   ADD Tight, 50% limited Tight, 50% limited* 4 4   Knee Flx 100/115* 98/108* 5- 5-   Ext Hyper 3* Hyper 3 5- 5   Ankle DF 10deg* 5deg 5 5   PF WNL WNL 3+ 4+   INV       EVER               - mild quad lag appreciated on LLE w/ 5x SLR  * = pain with testing    Special Tests: Positive: Painful with popping on varus testing on L knee with laxity noted,       Negative: B movie goers sign, Kirk on R,        Functional Tests:   - Squatting: very minimal   - SLS: 3s L, 6s R      Functional Assessment    FUNCTION Normal Difficult Unable Comments   Sitting [x] [] []     Standing [] [x] []     Ambulation [] [x] []     Groom/Dress [x] [] []     Lift/Carry [] [x] []     Stairs [] [x] []     Bending [] [x] []     Squat [] [x] []     Kneel [] [x] []           Assessment: Africa Hernandez is a 63 yo female who presents with B knee pain, L>R, insidious onset with L knee MRI positive for various impairments including tricompartmental osteoarthritis, severe medial chondromalacia, small Baker's cyst, possible grade I MCL sprain, and tearing of posterior horn of medial meiscus with outward extrusion of medial meniscus body. Tricompartmental degenerative changes of knee with joint narrowing, sclerosis, and osteophytes present in L knee as well. Pt with limited knee flexion PROM bilat, decreased B knee/hip strength, soft tissue flexibility impairments, and reduced tolerance to standing and positions in closed-chain requiring knee flexion. Pt will benefit from skilled physical therapy to address these impairments and progress to maximal function, improving quality of life and reducing knee pain bilaterally. Problems:    [x] ? Pain:     [x] ? ROM:    [x] ? Strength:    [x] ? Function:    [x] ? Balance  [x] Edema:  [x] Postural Deviations  [x] Gait Deviations  [] Other:        STG: (to be met in 8 treatments)  1. ? Pain: No more than 6/10 max pain in B knees  2. ? ROM: At least 110deg AROM B knees for improved squatting, stooping  3. ? Strength:   a. At least 5/5 B knee ext/flex strength to further improve joint stability and tolerance increased joint load with stair climbing  b. At least 4+/5 B gross hip strength to further improve pelvic stability in standing with gait, stair climbing  4. ? Function:   a. Pt able stand for 30 min consecutively without more than mild increase in knee pain to allow improved completion of ADLs/IADLs  b. Pt will report ability to ambulate at least 15 min consecutively without more than mild increase in knee pain to allow for improved tolerance to regular walking program for exercise  5. Independent with Home Exercise Programs    LTG: (to be met in 12 treatments)  1. No instances of locking up or spasming in B knees at night, per pt  2. At least 5-/5 gross hip strength for further improved pelvic stability in gait/stair climbing and allow half kneel to stand transfer with reduced pain  3.  Pt able to stand consecutively for at least 60 min without more than mild increase in knee pain  4. Pt able to complete regular aerobic exercise program 3x/wk without more than mild increase in B knee pain   5. At least 54% fxn reported on LEFS per pt to indicate improved subjective improvement in B knees                   Patient goals: \"get better and lose weight\"      Outcome Measure on Initial Eval:  LEFI: 35/80, 44% function     Additional Outcome Measures Used: None        Rehab Potential:  [x] Good  [] Fair  [] Poor   Suggested Professional Referral:  [x] No  [] Yes:  Barriers to Goal Achievement[de-identified]  [] No  [x] Yes: significant impairments on MRI for L knee  Domestic Concerns:  [x] No  [] Yes:    Pt. Education:  [x] Plans/Goals, Risks/Benefits discussed  [x] Home exercise program    Method of Education: [x] Verbal  [x] Demo  [x] Written  Comprehension of Education:  [x] Verbalizes understanding. [x] Demonstrates understanding. [] Needs Review. [] Demonstrates/verbalizes understanding of HEP/Ed previously given. Treatment Plan:  [x] Therapeutic Exercise   60041  [] Iontophoresis: 4 mg/mL Dexamethasone Sodium Phosphate  mAmin  05257   [x] Therapeutic Activity  30702 [x] Vasopneumatic cold with compression  78646    [x] Gait Training   49040 [] Ultrasound   95424   [x] Neuromuscular Re-education  63258 [] Electrical Stimulation Unattended  59598   [x] Manual Therapy  97742 [] Electrical Stimulation Attended  60470   [x] Instruction in HEP  [] Dry Needling   [] Aquatic Therapy   74584 [x] Cold/hotpack    [] Massage   83019      [] Lumbar/Cervical Traction  59218     []  Medication allergies reviewed for use of    Dexamethasone Sodium Phosphate 4mg/ml     with iontophoresis treatments. Pt is not allergic.     Frequency:  2 x/week for 12 visits        Todays Treatment:  Modalities:   Precautions:  Exercises:  Exercise Reps/ Time Weight/ Level Comments   Mini squats 2x10  BUE assist at elevated mat   Hip abd 10x ea  Cues to remain upright   SLS 3x20\" ea     SLR x  Demo'd for HEP Other: Pt education provided re: pathology, PT POC, HEP - billed with therex    Specific Instructions for next treatment:   - B quad/hamstring/calf stretching, hip adduction stretching  - standing knee strengthening, OKC hip/knee strengthening, leg press, leg ext/curl machine  - consider total gym for deeper squats if too painful          Evaluation Complexity:  History (Personal factors, comorbidities) [] 0 [x] 1-2 [] 3+   Exam (limitations, restrictions) [] 1-2 [] 3 [x] 4+   Clinical presentation (progression) [x] Stable [] Evolving  [] Unstable   Decision Making [x] Low [] Moderate [] High    [x] Low Complexity [] Moderate Complexity [] High Complexity       Treatment Charges: Mins Units   [x] Evaluation       [x]  Low       []  Moderate       []  High 34 1   []  Modalities     [x]  Ther Exercise 12 1   []  Manual Therapy     []  Ther Activities     []  Aquatics     []  Vasocompression     []  Other       TOTAL TREATMENT TIME: 46 min    Time in: 4:50 pm   Time Out: 5:36 pm     Electronically signed by: Sofia Anaya PT        Physician Signature:________________________________Date:__________________  By signing above or cosigning this note, I have reviewed this plan of care and certify a need for medically necessary rehabilitation services.      *PLEASE SIGN ABOVE AND FAX BACK ALL PAGES*

## 2021-09-23 ENCOUNTER — HOSPITAL ENCOUNTER (OUTPATIENT)
Dept: PHYSICAL THERAPY | Age: 61
Setting detail: THERAPIES SERIES
Discharge: HOME | End: 2021-09-23
Payer: MEDICARE

## 2021-09-23 PROCEDURE — 97016 VASOPNEUMATIC DEVICE THERAPY: CPT

## 2021-09-23 PROCEDURE — 97110 THERAPEUTIC EXERCISES: CPT

## 2021-09-23 NOTE — FLOWSHEET NOTE
[x] [facility]) - [facility] &  Therapy  [address]  [phone]  F: [phone] [] [address]  [place] [address]   [address]  P: [phone]  F: [phone] [] [address] &  Therapy  [address]  P: [phone]  F: [phone] [] [address]  P: [phone]  F: [phone] [] [address]  [facility]   [address]   [place]: [phone]  F: [phone]      Physical Therapy Daily Treatment Note    Date:  2021  Patient Name:  Essence Pritchett    :  1960  MRN: [id number]  Physician: Dr. Ellis Moreno: Carlos ([id number])  Medical Diagnosis: Primary osteoarthritis of both knees  Rehab Codes: M25.561, M25.562, M25.662, M25.661, R26.9, R29.3, R27.9  Onset date: 2020                      Next 's appt.: TBD    Visit# / total visits:      Cancels/No Shows: 0/1    Subjective:    Pain:  [x]? Yes  []? No   Location: B anterior knees      Pain Rating: (0-10 scale) 5/10  Pain altered Tx:  []? Yes  [x]? No  Action:   Comments: Patient arrives stating some pain in the knees this date, 5/10. Reports adherence to HEP.     Objective:  Modalities:   Precautions:  Exercises:    Exercise Reps/ Time Weight/ Level Comments   Scifit 5m L2          Gastroc stretch 3x20\"  Wedge   Hip abd 2x10 ea     Hip ext 2x10     Mini squats 2x10   BUE assist at elevated mat   SLS 3x20\" ea             Seated      LAQ 15x2\"     HS stretch 3x20\"     HS curls 15x Blue          Supine      Bridge 2x10     SLR 2x10                Other: Pt education provided re: pathology, PT POC, HEP - billed with therex     Specific Instructions for next treatment:   - B quad/hamstring/calf stretching, hip adduction stretching  - standing knee strengthening, OKC hip/knee strengthening, leg press, leg ext/curl machine  - consider total gym for deeper squats if too painful        Treatment Charges: Mins Units   []  Modalities     [x]  Ther Exercise 30 2   []  Manual Therapy     []  Ther Activities     []  Aquatics     [x]  Vasocompression 15 1   []  Other     Total Treatment time 45 3       Assessment: [x] Progressing toward goals. Initiated therapeutic exercises as noted above with overall good tolerance. Patient with slight increased discomfort with seated exs, noted fatigue through bilateral quad, L>R. Fatigue noted subsided with short rest breaks. Initiated vasocompression following treatment for fatigue and soreness relief with patient stating positive result. [] No change. [] Other:  [x] Patient would continue to benefit from skilled physical therapy services in order to: address these impairments and progress to maximal function, improving quality of life and reducing knee pain bilaterally. STG: (to be met in 8 treatments)  1. ? Pain: No more than 6/10 max pain in B knees  2. ? ROM: At least 110deg AROM B knees for improved squatting, stooping  3. ? Strength:   a. At least 5/5 B knee ext/flex strength to further improve joint stability and tolerance increased joint load with stair climbing  b. At least 4+/5 B gross hip strength to further improve pelvic stability in standing with gait, stair climbing  4. ? Function:   a. Pt able stand for 30 min consecutively without more than mild increase in knee pain to allow improved completion of ADLs/IADLs  b. Pt will report ability to ambulate at least 15 min consecutively without more than mild increase in knee pain to allow for improved tolerance to regular walking program for exercise  5. Independent with Home Exercise Programs     LTG: (to be met in 12 treatments)  1. No instances of locking up or spasming in B knees at night, per pt  2.  At least 5-/5 gross hip strength for further improved pelvic stability in gait/stair climbing and allow half kneel to stand transfer with reduced pain  3. Pt able to stand consecutively for at least 60 min without more than mild increase in knee pain  4. Pt able to complete regular aerobic exercise program 3x/wk without more than mild increase in B knee pain   5. At least 54% fxn reported on LEFS per pt to indicate improved subjective improvement in B knees                    Patient goals: \"get better and lose weight\"       Pt. Education:  [x] Yes  [] No  [x] Reviewed Prior HEP/Ed  Method of Education: [x] Verbal  [x] Demo  [] Written  Comprehension of Education:  [x] Verbalizes understanding. [x] Demonstrates understanding. [] Needs review. [x] Demonstrates/verbalizes HEP/Ed previously given. Plan: [x] Continue current frequency toward long and short term goals.     [x] Specific Instructions for subsequent treatments: TKE, clamshells    Frequency:  2 x/week for 12 visits      Time In: 8573 am            Time Out: 1155 pm    Electronically signed by:  Ivan Martinez PTA

## 2021-09-24 RX ORDER — LISINOPRIL 10 MG/1
TABLET ORAL
Qty: 30 TABLET | Refills: 3 | Status: SHIPPED | OUTPATIENT
Start: 2021-09-24 | End: 2022-05-26

## 2021-09-24 NOTE — TELEPHONE ENCOUNTER
E-scribe request for lisinopril. Please review and e-scribe if applicable. Last Visit Date:  09/02/2021  Next Visit Date:  Visit date not found    Hemoglobin A1C (%)   Date Value   09/02/2021 6.8   04/01/2021 6.5   12/20/2019 6.3             ( goal A1C is < 7)   Microalb/Crt.  Ratio (mcg/mg creat)   Date Value   11/01/2019 CANNOT BE CALCULATED     LDL Cholesterol (mg/dL)   Date Value   05/19/2020 70       (goal LDL is <100)   AST (U/L)   Date Value   05/19/2020 17     ALT (U/L)   Date Value   05/19/2020 17     BUN (mg/dL)   Date Value   04/01/2021 19     BP Readings from Last 3 Encounters:   09/02/21 139/76   08/12/21 118/77   04/28/21 (!) 149/100          (goal 120/80)        Patient Active Problem List:     Gastroesophageal reflux disease     Hyperlipidemia with target LDL less than 100     HTN (hypertension)     Numbness and tingling     Diverticula, colon     Diverticulitis, acute     Eczema     Lump of left breast     Shoulder pain     Right groin pain     Tinea corporis     Hyperlipidemia     Morbid obesity with body mass index (BMI) of 40.0 to 49.9 (HCC)     SINDI (obstructive sleep apnea)     Vitamin D deficiency     Melena     Chest pain     Abnormal stress test     Contact blepharoconjunctivitis of left eye     Prediabetes     Need for prophylactic vaccination and inoculation against varicella     Acute cystitis without hematuria      ----Allison Marilyn

## 2021-09-27 ENCOUNTER — HOSPITAL ENCOUNTER (OUTPATIENT)
Dept: PHYSICAL THERAPY | Age: 61
Setting detail: THERAPIES SERIES
Discharge: HOME | End: 2021-09-27
Payer: MEDICARE

## 2021-09-27 PROCEDURE — 97110 THERAPEUTIC EXERCISES: CPT

## 2021-09-27 PROCEDURE — 97016 VASOPNEUMATIC DEVICE THERAPY: CPT

## 2021-09-27 NOTE — FLOWSHEET NOTE
[x] [facility]  [facility]  [address]  [phone]  F: [phone] [] [address]  [place] [address]   [address]  P: [phone]  F: [phone] [] [address] &  Therapy  [address]  P: [phone]  F: [phone] [] [address]  P: [phone]  F: [phone] [] [address]  [facility]   [address]   [place]: [phone]  F: [phone]      Physical Therapy Daily Treatment Note    Date:  2021  Patient Name:  Raina Severin    :  1960  MRN: [id number]  Physician: Dr. Pacheco Neat: [place] ([id number])  Medical Diagnosis: Primary osteoarthritis of both knees  Rehab Codes: M25.561, M25.562, M25.662, M25.661, R26.9, R29.3, R27.9  Onset date: 2020                      Next 's appt.: TBD    Visit# / total visits: 3/12     Cancels/No Shows: 0/1    Subjective:    Pain:  [x]? Yes  []? No   Location: B anterior knees      Pain Rating: (0-10 scale) 2/10  Pain altered Tx:  []? Yes  [x]? No  Action:   Comments: Patient arrives reporting some bilateral knee pain this date. States she has been compliant with HEP.      Objective:  Modalities:   Treatment Location  Left      Right                          Position    [x]          [x]  [x] Supine    [] Prone   [] Side lying  [] Sitting          Treatment Modality    Vasocompression   42° temp    Low pressure     15 min    Other:           Precautions:  Exercises:    Exercise Reps/ Time Weight/ Level Comments   Scifit 5m L2          Gastroc stretch 3x30\"  Wedge   Hip abd 2x10 ea     Hip ext 2x10     Mini squats 2x10   BUE assist at elevated mat   SLS 3x20\" ea   UE support with fingertips         Seated      LAQ 15x2\"  painful   HS stretch 3x20\"     HS curls 15x Blue          Supine      Bridge 2x10     SLR 2x10                Other: Pt education provided re: pathology, PT POC, HEP - billed with therex     Specific Instructions for next treatment:   - B quad/hamstring/calf stretching, hip adduction stretching  - standing knee strengthening, OKC hip/knee strengthening, leg press, leg ext/curl machine  - consider total gym for deeper squats if too painful        Treatment Charges: Mins Units   []  Modalities     [x]  Ther Exercise 39 2   []  Manual Therapy     []  Ther Activities     []  Aquatics     [x]  Vasocompression 10 1   []  Other     Total Treatment time 49 3       Assessment: [x] Progressing toward goals. Continued with exercise log with overall good tolerance of all exercises and progressions. Pt used fingertip support during during SLS to avoid losing balance. During SLS on L LE, pt began to experience a muscle spasm in L hamstring. Performed a standing hamstring stretch and continued with exercise log after spasm resolved. Provided verbal cues for quad activation during LAQ with some pain/discomfort and fatigue noted. Encouraged pt to continue with all HEP as tolerated to progress LE strength and ROM, verbalizes understanding. Ended session with vaso to bilateral knees to reduce pain with some relief noted at end of treatment. [] No change. [] Other:  [x] Patient would continue to benefit from skilled physical therapy services in order to: address these impairments and progress to maximal function, improving quality of life and reducing knee pain bilaterally. STG: (to be met in 8 treatments)  1. ? Pain: No more than 6/10 max pain in B knees  2. ? ROM: At least 110deg AROM B knees for improved squatting, stooping  3. ? Strength:   a. At least 5/5 B knee ext/flex strength to further improve joint stability and tolerance increased joint load with stair climbing  b.  At least 4+/5 B gross hip strength to further improve pelvic stability in standing with gait, stair climbing  4. ? Function:   a. Pt able stand for 30 min consecutively without more than mild increase in knee pain to allow improved completion of ADLs/IADLs  b. Pt will report ability to ambulate at least 15 min consecutively without more than mild increase in knee pain to allow for improved tolerance to regular walking program for exercise  5. Independent with Home Exercise Programs     LTG: (to be met in 12 treatments)  1. No instances of locking up or spasming in B knees at night, per pt  2. At least 5-/5 gross hip strength for further improved pelvic stability in gait/stair climbing and allow half kneel to stand transfer with reduced pain  3. Pt able to stand consecutively for at least 60 min without more than mild increase in knee pain  4. Pt able to complete regular aerobic exercise program 3x/wk without more than mild increase in B knee pain   5. At least 54% fxn reported on LEFS per pt to indicate improved subjective improvement in B knees                    Patient goals: \"get better and lose weight\"       Pt. Education:  [x] Yes  [] No  [x] Reviewed Prior HEP/Ed  Method of Education: [x] Verbal  [x] Demo  [] Written  Comprehension of Education:  [x] Verbalizes understanding. [x] Demonstrates understanding. [] Needs review. [x] Demonstrates/verbalizes HEP/Ed previously given. Plan: [x] Continue current frequency toward long and short term goals.     [x] Specific Instructions for subsequent treatments: TKE, clamshells    Frequency:  2 x/week for 12 visits      Time In: 11:06am            Time Out: 12:00pm    Electronically signed by:  Mary Turcios PTA

## 2021-09-30 ENCOUNTER — HOSPITAL ENCOUNTER (OUTPATIENT)
Dept: PHYSICAL THERAPY | Age: 61
Setting detail: THERAPIES SERIES
End: 2021-09-30
Payer: MEDICARE

## 2021-10-07 ENCOUNTER — HOSPITAL ENCOUNTER (OUTPATIENT)
Dept: PHYSICAL THERAPY | Age: 61
Setting detail: THERAPIES SERIES
Discharge: HOME | End: 2021-10-07
Payer: MEDICARE

## 2021-10-07 NOTE — FLOWSHEET NOTE
[] [facility]) - [facility] &  Therapy  [address]    [phone]  F: [phone]   [] [address]  [facility]   [address]  P: [phone]  F: [phone]  [] [place]  [address]  P: [phone]  F: [phone] [] [address]  P: [phone]  F: [phone]  [] [address]  [facility]   [address]   [place]: [phone]  F: [phone]   [] [place] [address] [facility] [address]  [place]: [phone]   F: [phone]     Physical Therapy Cancel/No Show note    Date: 10/7/2021  Patient: Laverne Tillman  : 1960  MRN: [id number]    Cancels/No Shows to date:     For today's appointment patient:    [x]  Cancelled    [] Rescheduled appointment    [] No-show     Reason given by patient:    []  Patient ill    []  Conflicting appointment    [] No transportation      [] Conflict with work    [] No reason given    [] Weather related    [] [id number]    [x] Other:      Comments:  10/7/21 Cx making arrangements for a  will all next week to be put back on the schedule       [] Next appointment was confirmed     Electronically signed by: Jose Miguel Johnson PTA

## 2021-10-19 ENCOUNTER — HOSPITAL ENCOUNTER (OUTPATIENT)
Dept: PHYSICAL THERAPY | Age: 61
Setting detail: THERAPIES SERIES
Discharge: HOME | End: 2021-10-19
Payer: MEDICARE

## 2021-10-19 PROCEDURE — 97035 APP MDLTY 1+ULTRASOUND EA 15: CPT

## 2021-10-19 PROCEDURE — 97140 MANUAL THERAPY 1/> REGIONS: CPT

## 2021-10-19 NOTE — FLOWSHEET NOTE
[x] [facility]) [facility] &  Therapy  [address]  [phone]  F: [phone] [] [address]  [email or web address] [address]   [address]  P: [phone]  F: [phone] [] [address] &  Therapy  [address]  P: [phone]  F: [phone] [] [address]  P: [phone]  F: [phone] [] [address]  [facility]   [address]   [place]: [phone]  F: [phone]      Physical Therapy Daily Treatment Note    Date:  10/19/2021  Patient Name:  Brennan Jose    :  1960  MRN: [id number]  Physician: Dr. Gila Monsivais: Carlos (19CM)  Medical Diagnosis: Primary osteoarthritis of both knees  Rehab Codes: M25.561, M25.562, M25.662, M25.661, R26.9, R29.3, R27.9  Onset date: 2020                      Next 's appt.: TBD    Visit# / total visits:      Cancels/No Shows: 1/3    Subjective:    Pain:  [x]? Yes  []? No   Location: B anterior knees        Pain Rating: (0-10 scale) 5/10 right knee; 2/10 left knee  Pain altered Tx:  []? Yes  [x]? No  Action:   Comments:  Right has been in pain for the last week - almost a 10/10 - almost where she wanted to take her own knee off. Was using Voltaren and aspercream for her right knee. Some bengay. Tried arnica last night. Using ice - that was helping. Feels like a muscle on the upper lateral right knee is stopping her knee from bending. Right calf also hurting.        Objective:  Modalities:   Treatment Location  Left      Right                          Position    [x]          [x]  [x] Supine    [] Prone   [] Side lying  [] Sitting          Treatment Modality    Vasocompression   42° temp    Low pressure     15 min    Other:        Ultrasound 4 min to lateral superior patella; 4 min medial superior patella.  50%. 1.2 W/cm2. 3.3mHz     Precautions:  Exercises:    Exercise Reps/ Time Weight/ Level Completed 10/19/21  Comments   Scifit 5m L2 x           Gastroc stretch 1 x 1 min x Wedge   Hip abd 2x10 ea      Hip ext 2x10      Mini squats 2x10    BUE assist at elevated mat   SLS 3x20\" ea    UE support with fingertips          Seated       LAQ 15x2\"   painful   HS stretch 3x20\"      HS curls 15x Blue     Calf stretch/Hamstring stretch with belt 2 x 30 sec x    Manual 10 min  x Distal quad, calf, along fibula, anterior tibia, lateral patella                 Supine       Bridge 2x10      SLR 2x10       Other:          Specific Instructions for next treatment:   - B quad/hamstring/calf stretching, hip adduction stretching  - standing knee strengthening, OKC hip/knee strengthening, leg press, leg ext/curl machine  - consider total gym for deeper squats if too painful        Treatment Charges: Mins Units   [x]  Modalities - US 8 1   [x]  Ther Exercise 5 0   [x]  Manual Therapy 10 1   [x]  Ther Activities 10 0   []  Aquatics     []  Vasocompression     []  Other     Total Treatment time 33        Assessment: [] Progressing toward goals. [x] No change. Patient with increased right knee pain today. Opted to start on SCIFIT, then initiated ultrasound to painful area on lateral right knee. Manual therapy (trigger point release) to thigh, calf, lateral patella. Patient asking what she can use when she goes to the gym - educated to not do elliptical or treadmill - but rather do non-weight bearing stationary bike or recumbent bike; OKC leg exercises; OK for UE and core exercises. Patient understood. Educated to increase water intake after therapy.      [] Other:  [x] Patient would continue to benefit from skilled physical therapy services in order to: address these impairments and progress to maximal function, improving quality of life and reducing knee pain Time Out: 4642    Electronically signed by:  Jared Love, PT

## 2021-10-20 ENCOUNTER — HOSPITAL ENCOUNTER (OUTPATIENT)
Dept: PHYSICAL THERAPY | Age: 61
Setting detail: THERAPIES SERIES
Discharge: HOME | End: 2021-10-20
Payer: MEDICARE

## 2021-10-20 PROCEDURE — 97110 THERAPEUTIC EXERCISES: CPT

## 2021-10-20 PROCEDURE — 97016 VASOPNEUMATIC DEVICE THERAPY: CPT

## 2021-10-20 NOTE — FLOWSHEET NOTE
[x] [facility]) [facility] &  Therapy  [address]  [phone]  F: [phone] [] [address]  [place] [address]   [address]  P: [phone]  F: [phone] [] [address]  Therapy  [address]  P: [phone]  F: [phone] [] [address]  P: [phone]  F: [phone] [] [address]  [facility]   [address]   [place]: [phone]  F: [phone]      Physical Therapy Daily Treatment Note    Date:  10/20/2021  Patient Name:  Anuj Bush    :  1960  MRN: [id number]  Physician: Dr. Lisa Goff: [place] ([id number])  Medical Diagnosis: Primary osteoarthritis of both knees  Rehab Codes: M25.561, M25.562, M25.662, M25.661, R26.9, R29.3, R27.9  Onset date: 2020                      Next 's appt.: TBD    Visit# / total visits:      Cancels/No Shows: 1/3    Subjective:    Pain:  [x]? Yes  []? No   Location:  anterior B knees       Pain Rating: (0-10 scale) 4/10  Pain altered Tx:  []? Yes  [x]? No  Action:   Comments:  Describes pain as achy. Objective:  Modalities:   Treatment Location  Left      Right                          Position    [x]          [x]  [x] Supine    [] Prone   [] Side lying  [] Sitting          Treatment Modality    Vasocompression   42° temp    Low pressure     15 min    Other:        Ultrasound 4 min to lateral superior patella; 4 min medial superior patella.  50%.   1.2 W/cm2. 3.3mHz     Precautions:  Exercises:    Exercise Reps/ Time Weight/ Level Completed 10/20/21  Comments   Scifit  L2            Gastroc stretch 1  1 min x Wedge   Hip abd 2x10 ea  x    Hip ext 2x10  x    Mini squats 2x10    BUE assist at elevated mat   SLS 29\" L; 19\" R   Seated       LAQ 15x2\"   painful   HS stretch 3x20\"  x    HS curls 15x 2 Blue x    Calf stretch/Hamstring stretch with belt 2 x 30 sec     Manual 10 min   Distal quad, calf, along fibula, anterior tibia, lateral patella                 Supine       Bridge 2x10      SLR 2x10       Other:          Specific Instructions for next treatment:   - B quad/hamstring/calf stretching, hip adduction stretching  - standing knee strengthening, OKC hip/knee strengthening, leg press, leg ext/curl machine  - consider total gym for deeper squats if too painful        Treatment Charges: Mins Units   []  Modalities - US     [x]  Ther Exercise 26 2   []  Manual Therapy     []  Ther Activities     []  Aquatics     [x]  Vasocompression 10 1   []  Other     Total Treatment time 36 3       Assessment: [x] Progressing toward goals. Held warmup this date due to pt tardiness. Struggled to perform 20x of hip abduction on LLE evidenced by squinting, grunts, and audible breaths by pt. Trial SLS for time on each LE. Pt able to SLS on LLE for 29 seconds but only able to stand on RLE for 19 seconds. Required parallel bars for stability during both. Ended with game ready to R knee to reduce pain and swelling due to pt using a topical gel prior to tx.      [] No change. [] Other:  [x] Patient would continue to benefit from skilled physical therapy services in order to: address these impairments and progress to maximal function, improving quality of life and reducing knee pain bilaterally. STG: (to be met in 8 treatments)  1. ? Pain: No more than 6/10 max pain in B knees  2. ? ROM: At least 110deg AROM B knees for improved squatting, stooping  3. ? Strength:   a. At least 5/5 B knee ext/flex strength to further improve joint stability and tolerance increased joint load with stair climbing  b. At least 4+/5 B gross hip strength to further improve pelvic stability in standing with gait, stair climbing  4. ? Function:   a.  Pt able stand for 30

## 2021-10-25 ENCOUNTER — HOSPITAL ENCOUNTER (OUTPATIENT)
Dept: PHYSICAL THERAPY | Age: 61
Setting detail: THERAPIES SERIES
Discharge: HOME | End: 2021-10-25
Payer: MEDICARE

## 2021-10-25 PROCEDURE — 97016 VASOPNEUMATIC DEVICE THERAPY: CPT

## 2021-10-25 PROCEDURE — 97110 THERAPEUTIC EXERCISES: CPT

## 2021-10-25 NOTE — FLOWSHEET NOTE
[x] [facility]) - [facility] &  Therapy  [address]  [phone]  F: [phone] [] [address]  [address]   [address]  P: [phone]  F: [phone] [] [address] &  Therapy  [address]  P: [phone]  F: [phone] [] [address]  P: [phone]  F: [phone] [] [address]  [facility]   [address]   [place]: [phone]  F: [phone]      Physical Therapy Daily Treatment Note    Date:  10/25/2021  Patient Name:  John Giraldo    :  1960  MRN: [id number]  Physician: Dr. Darrell Mc: Carlos ([id number])  Medical Diagnosis: Primary osteoarthritis of both knees  Rehab Codes: M25.561, M25.562, M25.662, M25.661, R26.9, R29.3, R27.9  Onset date: 2020                      Next 's appt.: TBD    Visit# / total visits:      Cancels/No Shows: 1/3    Subjective:    Pain:  [x]? Yes  []? No   Location:  anterior B knees       Pain Rating: (0-10 scale) 4/10  Pain altered Tx:  []? Yes  [x]? No  Action:   Comments:  Pt arrives noting burning, aching, and stiffness present in B knees. Reports high pain levels last night which reduced with ice and medication. Objective:  Modalities:   Treatment Location  Left      Right                          Position    [x]          [x]  [x] Supine    [] Prone   [] Side lying  [] Sitting          Treatment Modality    Vasocompression   42° temp    Low pressure     15 min    Other:        Ultrasound 4 min to lateral superior patella; 4 min medial superior patella.  50%.   1.2 W/cm2. 3.3mHz -HELD    Precautions:  Exercises:    Exercise bilat  Reps/ Time Weight/ Level Completed 10/25/21  Comments   Scifit 5' L2 x           Gastroc stretch 1x 1 min x Wedge   Hip abd 2x10 ea  x    Hip ext 2x10  x    Mini squats 2x10   x BUE assist at elevated mat   Hs curls        SLS 2x30\" L 2x20\" R   x Increased reps/time 10/25   TKE 15x Lime  x Added 10/25   Total gym squats               Seated       LAQ 15x2\"  x Painful-no pain 10/25   HS stretch 3x20\"  x    HS curls 15x 2 Blue x    Calf stretch/HS stretch with belt 2 x 30 sec x    Manual 10 min   Distal quad, calf, along fibula, anterior tibia, lateral patella                 Supine       Bridge 2x10  x    SLR 2x10   x    Clamshells  2x10 Lime  x Added 10/25   Butterfly stretch  3x15\"  x                         side lying        Clamshells        Other:          Specific Instructions for next treatment:    - B quad/hamstring/calf stretching, hip adduction stretching  - standing knee strengthening, OKC hip/knee strengthening, leg press, leg ext/curl machine  - consider total gym for deeper squats if too painful        Treatment Charges: Mins Units   []  Modalities - US     [x]  Ther Exercise 46 3   []  Manual Therapy     []  Ther Activities     []  Aquatics     [x]  Vasocompression 15 1   []  Other     Total Treatment time 61 4       Assessment: [x] Progressing toward goals. Increased reps and time for SLS with UE support as needed to maintain balance. Addition of resisted TKE's this date. Implemented resisted clamshells in hooklying and will progress to side lying as able. Pt with good tolerance to progressions. Ended with vaso to B knees to reduce pain and muscle soreness. Will continue to progress as pt tolerates. [] No change. [] Other:  [x] Patient would continue to benefit from skilled physical therapy services in order to: address these impairments and progress to maximal function, improving quality of life and reducing knee pain bilaterally.      STG: (to be met in 8 treatments)  1. ? Pain: No more than 6/10 max pain in B knees  2. ? ROM: At least 110deg AROM B knees for improved squatting, stooping  3. ? Strength:   a. At least 5/5 B knee ext/flex strength to further improve joint stability and tolerance increased joint load with stair climbing  b. At least 4+/5 B gross hip strength to further improve pelvic stability in standing with gait, stair climbing  4. ? Function:   a. Pt able stand for 30 min consecutively without more than mild increase in knee pain to allow improved completion of ADLs/IADLs  b. Pt will report ability to ambulate at least 15 min consecutively without more than mild increase in knee pain to allow for improved tolerance to regular walking program for exercise  5. Independent with Home Exercise Programs     LTG: (to be met in 12 treatments)  1. No instances of locking up or spasming in B knees at night, per pt  2. At least 5-/5 gross hip strength for further improved pelvic stability in gait/stair climbing and allow half kneel to stand transfer with reduced pain  3. Pt able to stand consecutively for at least 60 min without more than mild increase in knee pain  4. Pt able to complete regular aerobic exercise program 3x/wk without more than mild increase in B knee pain   5. At least 54% fxn reported on LEFS per pt to indicate improved subjective improvement in B knees                    Patient goals: \"get better and lose weight\"       Pt. Education:  [x] Yes  [] No  [x] Reviewed Prior HEP/Ed  Method of Education: [x] Verbal  [x] Demo  [] Written  Comprehension of Education:  [x] Verbalizes understanding. [x] Demonstrates understanding. [] Needs review. [x] Demonstrates/verbalizes HEP/Ed previously given. Plan: [x] Continue current frequency toward long and short term goals.     [x] Specific Instructions for subsequent treatments:     Frequency:  2 x/week for 12 visits      Time In: 2:04 pm         Time Out: 3:10  pm    Electronically signed by:  Carmencita Grubbs PTA

## 2021-11-15 ENCOUNTER — TELEPHONE (OUTPATIENT)
Dept: ADMINISTRATIVE | Age: 61
End: 2021-11-15

## 2021-11-15 NOTE — TELEPHONE ENCOUNTER
Pt is calling stating shes had an increase in knee pain and is asking how she can help it, pt is scheduled for 11/18, pt is asking for a call at phone number [phone]

## 2021-11-15 NOTE — TELEPHONE ENCOUNTER
Spoke with patient. She states she is taking tylenol and motrin, ice and heat and doing her HEP from physical therapy. She is in a lot of pain and does not see any benefit from the aforementioned therapies. I advised her that in her chart that she does have GI issues and its contraindicated to take motrin and she can continue the tylenol for pain. She has f/u with Dr Dipti Robertson on 11/18 and she would like something more for the pain. I advised to her that she can discuss that with him during her office visit.

## 2021-11-18 ENCOUNTER — OFFICE VISIT (OUTPATIENT)
Dept: ORTHOPEDIC SURGERY | Age: 61
End: 2021-11-18
Payer: MEDICARE

## 2021-11-18 ENCOUNTER — TELEPHONE (OUTPATIENT)
Dept: ORTHOPEDIC SURGERY | Age: 61
End: 2021-11-18

## 2021-11-18 VITALS — RESPIRATION RATE: 16 BRPM | WEIGHT: 282 LBS | BODY MASS INDEX: 51.89 KG/M2 | HEIGHT: 62 IN

## 2021-11-18 DIAGNOSIS — M17.0 PRIMARY OSTEOARTHRITIS OF BOTH KNEES: Primary | ICD-10-CM

## 2021-11-18 DIAGNOSIS — M17.0 PRIMARY OSTEOARTHRITIS OF BOTH KNEES: ICD-10-CM

## 2021-11-18 DIAGNOSIS — M19.079 ARTHRITIS OF MIDFOOT: Primary | ICD-10-CM

## 2021-11-18 DIAGNOSIS — M23.307 DEGENERATIVE TEAR OF MENISCUS OF LEFT KNEE: ICD-10-CM

## 2021-11-18 PROCEDURE — 3017F COLORECTAL CA SCREEN DOC REV: CPT | Performed by: ORTHOPAEDIC SURGERY

## 2021-11-18 PROCEDURE — 99213 OFFICE O/P EST LOW 20 MIN: CPT | Performed by: ORTHOPAEDIC SURGERY

## 2021-11-18 PROCEDURE — G8484 FLU IMMUNIZE NO ADMIN: HCPCS | Performed by: ORTHOPAEDIC SURGERY

## 2021-11-18 PROCEDURE — G8417 CALC BMI ABV UP PARAM F/U: HCPCS | Performed by: ORTHOPAEDIC SURGERY

## 2021-11-18 PROCEDURE — G8427 DOCREV CUR MEDS BY ELIG CLIN: HCPCS | Performed by: ORTHOPAEDIC SURGERY

## 2021-11-18 PROCEDURE — 1036F TOBACCO NON-USER: CPT | Performed by: ORTHOPAEDIC SURGERY

## 2021-11-18 NOTE — PROGRESS NOTES
[facility]  [facility]  [address] [address]  Dept: [phone]    Ambulatory Orthopedic Consult      CHIEF COMPLAINT:    Chief Complaint   Patient presents with    Knee Pain     Bilateral       HISTORY OF PRESENT ILLNESS:      The patient is a left-hand dominant 63 y.o. female who is being seen for evaluation of pain at the above location (left anterior knee greater than left dorsal midfoot and lateral/medial hindfoot, as well as left ring finger), which began in the beginning of November 2020 atraumatically. The pain is described mainly with mechanical terms (dull/sharp/throbbing). The pain is worse with activity and better with rest. The patient reports a static course. The patient has tried:      [x]  rest/activity modification          [x]  NSAIDs      []  opiates      []  orthotics        []  change in shoes   [x]  home exercises  []  physical therapy      []  CAM boot     []  brace:    []  injection:       []  surgery:      The patient also reports that her left ring finger pain is associated with intermittent locking. INTERVAL HISTORY 2/5/2021:  She is seen again today in the office for follow up of a previous issue (as above--left knee and left ring finger pain). Since being seen last, the patient is doing worse, particularly in terms of her left knee. At today's visit, she is not using a brace or assistive device. The location and quality of the pain have not significantly changed since the last visit. INTERVAL HISTORY 4/6/2021:  She is seen again today in the office for follow up after an injection. Since being seen last, the patient is doing better. At today's visit, she is using no brace or assistive device. She received an injection of the left knee, and reports the injection helped approximately 100% in terms of pain.   She also reports that her left ring finger trigger finger is not bothering her, and reports that her foot pain has significantly improved. INTERVAL HISTORY 6/4/2021:  She is seen again today in the office for follow up of a previous issue (as above--bilateral knees). Since being seen last, the patient is doing worse. At today's visit, she is not using a brace or assistive device. History is obtained today from:   [x]  the patient     []  EMR     []  one family member/friend    []  multiple family members/friends    []  other:      INTERVAL HISTORY 7/19/2021:  She is seen again today in the office for follow up of imaging as below. Since being seen last, the patient is doing about the same overall. At today's visit, she is using no brace/assistive device. History is obtained today from:   [x]  the patient     [x]  EMR     []  one family member/friend    []  multiple family members/friends    []  other:      INTERVAL HISTORY 8/30/2021:  She is seen again today in the office for follow up of a previous issue (as above). Since being seen last, the patient is doing better. At today's visit, she is not using a brace or assistive device. History is obtained today from:   [x]  the patient     []  EMR     []  one family member/friend    []  multiple family members/friends    []  other:      INTERVAL HISTORY 11/18/2021:  She is seen again today in the office for follow up of a previous issue (as above). Since being seen last, the patient is doing about the same overall. At today's visit, she is using a removable brace.      History is obtained today from:   [x]  the patient     []  EMR     []  one family member/friend    []  multiple family members/friends    []  other:           REVIEW OF SYSTEMS:  Musculoskeletal: See HPI for pertinent positives     Past Medical History:    She  has a past medical history of Diverticulitis, Former smoker, Former smoker, GERD (gastroesophageal reflux disease), Hyperlipidemia, Hypertension, Morbid obesity with BMI of 45.0-49.9, adult ([address] SINDI on CPAP, Sleep apnea, and Type II or unspecified type diabetes mellitus without mention of complication, not stated as uncontrolled. Past Surgical History:    She  has a past surgical history that includes Hysterectomy; Cholecystectomy; Colonoscopy; pr egd transoral biopsy single/multiple (7/19/2017); Upper gastrointestinal endoscopy (N/A, 10/18/2019); and Colonoscopy (N/A, 10/18/2019).      Current Medications:     Current Outpatient Medications:     Handicap Placard MISC, by Does not apply route Temporary 3 months Starting 11/22/2021 Expires 02/22/2022, Disp: 1 each, Rfl: 0    lisinopril (PRINIVIL;ZESTRIL) 10 MG tablet, take 1 tablet by mouth once daily, Disp: 30 tablet, Rfl: 3    atorvastatin (LIPITOR) 40 MG tablet, Take 1 tablet by mouth daily, Disp: 30 tablet, Rfl: 3    diclofenac sodium (VOLTAREN) 1 % GEL, Apply 2 g topically 2 times daily, Disp: 350 g, Rfl: 0    hydroCHLOROthiazide (HYDRODIURIL) 25 MG tablet, take 1 tablet by mouth once daily, Disp: 30 tablet, Rfl: 3    metFORMIN (GLUCOPHAGE) 500 MG tablet, take 1 tablet by mouth every morning with breakfast, Disp: 30 tablet, Rfl: 2    betamethasone valerate (VALISONE) 0.1 % cream, apply to affected area twice a day, Disp: , Rfl:     diclofenac sodium (VOLTAREN) 1 % GEL, Apply 2 g topically 2 times daily, Disp: 100 g, Rfl: 0    naproxen (NAPROSYN) 500 MG tablet, take 1 tablet by mouth twice a day if needed for pain, Disp: 60 tablet, Rfl: 0    FEROSUL 325 (65 Fe) MG tablet, take 1 tablet by mouth twice a day before meals, Disp: , Rfl:     ferrous sulfate (FE TABS 325) 325 (65 Fe) MG EC tablet, Take 1 tablet by mouth 2 times daily (before meals), Disp: 90 tablet, Rfl: 3    melatonin 1 MG tablet, Take 1 tablet by mouth nightly as needed for Sleep, Disp: 30 tablet, Rfl: 2    Elastic Bandages & Supports (ACE ELBOW BRACE LARGE/X-LARGE) MISC, 1 Brace as need for elbow pain, Disp: 1 each, Rfl: 0    aspirin 81 MG tablet, Take 81 mg by mouth daily, Disp: , Rfl:      Allergies: Patient has no known allergies. Family History:  family history includes Cancer in her father; Diabetes in her mother; High Blood Pressure in her mother.     Social History:   Social History     Occupational History    Not on file   Tobacco Use    Smoking status: Former Smoker     Packs/day: 0.25     Years: 7.00     Pack years: 1.75     Start date: 1984     Quit date: 1991     Years since quittin.9    Smokeless tobacco: Never Used   Vaping Use    Vaping Use: Never used   Substance and Sexual Activity    Alcohol use: Not Currently    Drug use: No    Sexual activity: Not on file     Occupation: Works as a      OBJECTIVE:  Resp 16   Ht 5' 2\" (1.575 m)   Wt 282 lb (127.9 kg)   BMI 51.58 kg/m²    Psych: alert and oriented to person, time, and place  Cardio:  well perfused extremities  Resp:  normal respiratory effort  Skin:  no cyanosis  Hem/lymph:  no lymphedema  Neuro:  sensation to light touch grossly intact throughout all nerve distributions in the bilateral feet and hands  Musculoskeletal:    RLE:    Grossly normal range of motion of hip, slightly decreased flexion/extension of the knee  Painless range of motion of hip, but pain on extremes of knee motion  Stable exam of hip/knee  Skin intact without erythema/warmth  Grossly neurovascularly intact distally in the lower extremity  Appropriate strength on manual muscle testing of the iliopsoas, hamstrings, quadriceps, tibialis anterior, gastroc-soleus complex  Tenderness:  anteromedial and anterolateral knee joint line   -Kirk's test positive for pain      LLE:    Grossly normal range of motion of hip, slightly decreased flexion extension of the knee  Painless range of motion of hip, but pain on extremes of knee motion  Stable exam of hip/knee  Skin intact without erythema/warmth  Grossly neurovascularly intact distally in the lower extremity  Appropriate strength on manual muscle testing of the iliopsoas, hamstrings, quadriceps, tibialis anterior, gastroc-soleus complex  Tenderness:  anteromedial greater than anterolateral knee joint line  -Kirk's test positive for pain     -Previous exam: Tenderness diffusely throughout the midfoot, sinus Tarsi, and along the course the posterior tibial tendon      (Previous exam)  Affected upper extremity (left):  Grossly normal range of motion of wrist/fingers  Painless range of motion of wrist/fingers except for ring finger  Stable exam of wrist/hand  Skin intact without erythema/warmth   Grossly neurovascularly intact distally throughout able to fire EPL/FPL/IO/EDC/FDP  Tenderness to palpation:  A1 pulley of the ring finger--improved  -Durkan's compression test negative  -triggering elicited at the Ring finger       RADIOLOGY:   11/18/2021 No new radiology images today. Prior images reviewed for reference. MRI images and radiology report reviewed, as below:    1. Tearing of the root ligament of the posterior horn medial meniscus. Outward extrusion of the medial meniscus body.  Degeneration of the posterior   horn and body of the medial meniscus.  Reactive marrow edema at the outer   medial tibial plateau. 2. Possible grade 1 MCL sprain. 3. Small joint effusion. 4. Tricompartmental osteoarthrosis.  Severe medial chondromalacia.  Focal   severe chondromalacia at the posterior weight-bearing lateral tibial plateau. 5. Mild-to-moderate lateral femoral trochlear chondromalacia with underlying   subcortical cystic change. 6. Small Baker's cyst.  Mild-to-moderate edema in the subcutaneous fat about   the knee. FINDINGS:  Four weightbearing views (AP, Tunnel, Lateral, and [place]) of the bilateral knee were obtained in the office today and reviewed, revealing no acute fracture, dislocation, or radioopaque foreign body/tumor. Overall alignment is satisfactory. Tricompartmental degenerative changes of the knee with joint narrowing, sclerosis, and osteophytes. IMPRESSION: No acute fracture/dislocation. Degenerative changes as above. Electronically signed by Eduardo Ayala MD        FINDINGS:  Three weightbearing views (AP, [place], and Lateral) of the left ankle and three weightbearing views (AP, Oblique, Lateral) of the left foot and four weightbearing views (AP, Lateral, Tunnel and [place]) of the left knee and 3 views (AP, oblique, lateral) of the left hand were obtained in the office today and reviewed, revealing no acute fracture, dislocation, or radioopaque foreign body/tumor. The ankle mortise is maintained with no widening of the clear spaces. Overall alignment is satisfactory. Tricompartmental degenerative changes of the knee with joint space narrowing, sclerosis, and osteophytes. Degenerative changes at the subtalar joint, as well as mildly diffusely throughout the midfoot. IMPRESSION:  No acute fracture/dislocation. Generative changes as above. Electronically signed by Eduardo Ayala MD        Xr Knee Left (3 Views)    Result Date: 11/25/2020  EXAMINATION: THREE X-RAY VIEWS OF THE LEFT KNEE 11/25/2020 10:31 am COMPARISON: 03/03/2010 HISTORY: ORDERING SYSTEM PROVIDED HISTORY: Pain TECHNOLOGIST PROVIDED HISTORY: Pain Acuity: Acute Type of Exam: Unknown FINDINGS: No evidence of acute fracture or dislocation. Similar small benign-appearing soft tissue calcifications in the proximal and posterolateral leg. Radiographically there is no significant joint effusion. Progression of mild to moderate degenerative changes involving primarily the medial compartment with joint space loss and osteophytes. Lateral view is obtained in a slight degree of obliquity. No acute osseous abnormality. Mild-to-moderate degenerative changes have progressed since the prior study. ASSESSMENT AND PLAN:  Body mass index is 51.58 kg/m².        She has a history of bilateral knee pain, secondary to bilateral tricompartmental osteoarthritis, on the left with severe medial chondromalacia and a medial meniscal posterior root tear (a left knee corticosteroid injection on 2/5/2021 helped her pain approximately 100%; a left knee injection on 7/19/2021 helped her pain approximately 75%). On the right side, she received a corticosteroid injection on 8/30/2021, which she reports only helped approximately 25% for about 1 week. At today's visit,1 she reports that her right knee is hurting her worse than the left knee at this point. She also has left foot pain secondary multiple issues--she has a component of posterior tibial tendinitis along with some degenerative changes of her subtalar joint and mildly diffusely throughout the midfoot. She is doing well with conservative management. She also has a trigger finger of her left ring finger. She is doing well with conservative management. Notably, she has a somewhat complex past medical history. She has a history of non-insulin-dependent diabetes. We had a discussion today about the likely diagnosis and its natural history, physical exam and imaging findings, as well as various treatment options in detail. Surgically, we have discussed a possible knee arthroscopic debridement, on whichever side is hurting her the most.  At this time, she would like to continue to think over her options, and would like to proceed with an MRI of her right knee as below. Orders/referrals were placed as below at today's visit. She may continue to use her prescribed topical Voltaren gel, and will avoid oral NSAIDs due to her history of GI bleeding. She may continue home exercises/physical therapy, and she was again referred to physical therapy. The patient also requested a referral to pain management, which was provided today. In order to know exactly how to proceed with treatment, an MRI was ordered today to evaluate the meniscus.  This is medically necessary to evaluate the structures in this area, for both diagnosis and treatment. We discussed a repeat knee injection, however, at this time she has declined this option. We did discuss a possible gel injection, and she is interested in this. She has previously been provided information had obtained over-the-counter flatfoot style orthotic but has not obtained this. All questions were answered and the above plan was agreed upon. The patient will return to clinic after the MRI has been obtained. At her next visit, we will review her right knee MRI, and possibly consider a right knee gel injection, possible repeat left knee corticosteroid injection versus gel injection, and/or surgery as above, depending on how she wishes to proceed. At the patient's next visit, depending on how the patient is doing and/or new imaging/labs results, we may consider the following options:    []  Orthotic (OTC)     []  Orthotic (custom)          []  Rocker bottom shoes     []  Brace (OTC)        []  Brace (custom)             []  CAM boot        []  Night splint         []  Heel cups        []  Strap      []  Toe sleeves/splints    []  PT:                     []  Wean out of immobilization   []  Advance activity       []  Topical               []  NSAIDs          []  [place]         []  Referral:         []  Stress xrays       []  CT         []  MRI        []  Injection:         []  Consider OR      []  Pick OR date    No follow-ups on file. No orders of the defined types were placed in this encounter. No orders of the defined types were placed in this encounter. Eric Bonilla MD  Orthopedic Surgery        Please excuse any typos/errors, as this note was created with the assistance of voice recognition software. While intending to generate a document that actually reflects the content of the visit, the document can still have some errors including those of syntax and sound-a-like substitutions which may escape proof reading.  In such instances, actual meaning can be extrapolated by context.

## 2021-11-18 NOTE — TELEPHONE ENCOUNTER
Pt is calling asking if the provider will order a new handicap placard for her.  Please call pt at phone number [phone]

## 2021-11-24 ENCOUNTER — TELEPHONE (OUTPATIENT)
Dept: ORTHOPEDIC SURGERY | Age: 61
End: 2021-11-24

## 2021-11-24 NOTE — TELEPHONE ENCOUNTER
Called patient to schedule MRI review and gel injection with Dr. Zafar Avilez. No answer, I did leave a voicemail with the number to call and schedule.

## 2021-12-06 ENCOUNTER — HOSPITAL ENCOUNTER (OUTPATIENT)
Dept: MRI IMAGING | Age: 61
Discharge: HOME | End: 2021-12-08
Payer: MEDICARE

## 2021-12-06 DIAGNOSIS — M17.0 PRIMARY OSTEOARTHRITIS OF BOTH KNEES: ICD-10-CM

## 2021-12-06 PROCEDURE — 73721 MRI JNT OF LWR EXTRE W/O DYE: CPT

## 2021-12-07 ENCOUNTER — OFFICE VISIT (OUTPATIENT)
Dept: ORTHOPEDIC SURGERY | Age: 61
End: 2021-12-07
Payer: MEDICARE

## 2021-12-07 VITALS — HEIGHT: 62 IN | BODY MASS INDEX: 51.89 KG/M2 | WEIGHT: 282 LBS | RESPIRATION RATE: 12 BRPM

## 2021-12-07 DIAGNOSIS — M94.261 CHONDROMALACIA OF RIGHT KNEE: ICD-10-CM

## 2021-12-07 DIAGNOSIS — M17.0 PRIMARY OSTEOARTHRITIS OF BOTH KNEES: Primary | ICD-10-CM

## 2021-12-07 PROCEDURE — 99213 OFFICE O/P EST LOW 20 MIN: CPT | Performed by: ORTHOPAEDIC SURGERY

## 2021-12-07 PROCEDURE — G8427 DOCREV CUR MEDS BY ELIG CLIN: HCPCS | Performed by: ORTHOPAEDIC SURGERY

## 2021-12-07 PROCEDURE — G8484 FLU IMMUNIZE NO ADMIN: HCPCS | Performed by: ORTHOPAEDIC SURGERY

## 2021-12-07 PROCEDURE — G8417 CALC BMI ABV UP PARAM F/U: HCPCS | Performed by: ORTHOPAEDIC SURGERY

## 2021-12-07 PROCEDURE — 1036F TOBACCO NON-USER: CPT | Performed by: ORTHOPAEDIC SURGERY

## 2021-12-07 PROCEDURE — 3017F COLORECTAL CA SCREEN DOC REV: CPT | Performed by: ORTHOPAEDIC SURGERY

## 2021-12-07 PROCEDURE — 20610 DRAIN/INJ JOINT/BURSA W/O US: CPT | Performed by: ORTHOPAEDIC SURGERY

## 2021-12-07 NOTE — PROGRESS NOTES
[facility]  [facility] Jasson Zepeda  [facility] [facility] [address]  Dept: [phone]    Ambulatory Orthopedic Consult      CHIEF COMPLAINT:    Chief Complaint   Patient presents with    Knee Pain     right       HISTORY OF PRESENT ILLNESS:      The patient is a left-hand dominant 63 y.o. female who is being seen for evaluation of pain at the above location (left anterior knee greater than left dorsal midfoot and lateral/medial hindfoot, as well as left ring finger), which began in the beginning of November 2020 atraumatically. The pain is described mainly with mechanical terms (dull/sharp/throbbing). The pain is worse with activity and better with rest. The patient reports a static course. The patient has tried:      [x]  rest/activity modification          [x]  NSAIDs      []  opiates      []  orthotics        []  change in shoes   [x]  home exercises  []  physical therapy      []  CAM boot     []  brace:    []  injection:       []  surgery:      The patient also reports that her left ring finger pain is associated with intermittent locking. INTERVAL HISTORY 2/5/2021:  She is seen again today in the office for follow up of a previous issue (as above--left knee and left ring finger pain). Since being seen last, the patient is doing worse, particularly in terms of her left knee. At today's visit, she is not using a brace or assistive device. The location and quality of the pain have not significantly changed since the last visit. INTERVAL HISTORY 4/6/2021:  She is seen again today in the office for follow up after an injection. Since being seen last, the patient is doing better. At today's visit, she is using no brace or assistive device. She received an injection of the left knee, and reports the injection helped approximately 100% in terms of pain.   She also reports that her left ring finger trigger finger is not bothering her, and reports that her foot pain has significantly improved. INTERVAL HISTORY 6/4/2021:  She is seen again today in the office for follow up of a previous issue (as above--bilateral knees). Since being seen last, the patient is doing worse. At today's visit, she is not using a brace or assistive device. History is obtained today from:   [x]  the patient     []  EMR     []  one family member/friend    []  multiple family members/friends    []  other:      INTERVAL HISTORY 7/19/2021:  She is seen again today in the office for follow up of imaging as below. Since being seen last, the patient is doing about the same overall. At today's visit, she is using no brace/assistive device. History is obtained today from:   [x]  the patient     [x]  EMR     []  one family member/friend    []  multiple family members/friends    []  other:      INTERVAL HISTORY 8/30/2021:  She is seen again today in the office for follow up of a previous issue (as above). Since being seen last, the patient is doing better. At today's visit, she is not using a brace or assistive device. History is obtained today from:   [x]  the patient     []  EMR     []  one family member/friend    []  multiple family members/friends    []  other:      INTERVAL HISTORY 11/18/2021:  She is seen again today in the office for follow up of a previous issue (as above). Since being seen last, the patient is doing about the same overall. At today's visit, she is using a removable brace. History is obtained today from:   [x]  the patient     []  EMR     []  one family member/friend    []  multiple family members/friends    []  other:      INTERVAL HISTORY 12/7/2021:  She is seen again today in the office for follow up of imaging as below. Since being seen last, the patient is doing about the same overall. At today's visit, she is using a brace/boot.     History is obtained today from:   [x]  the patient     [x]  EMR     []  one family member/friend    []  multiple family members/friends    []  other:           REVIEW OF SYSTEMS:  Musculoskeletal: See HPI for pertinent positives     Past Medical History:    She  has a past medical history of Diverticulitis, Former smoker, Former smoker, GERD (gastroesophageal reflux disease), Hyperlipidemia, Hypertension, Morbid obesity with BMI of 45.0-49.9, adult ([address] SINDI on CPAP, Sleep apnea, and Type II or unspecified type diabetes mellitus without mention of complication, not stated as uncontrolled. Past Surgical History:    She  has a past surgical history that includes Hysterectomy; Cholecystectomy; Colonoscopy; pr egd transoral biopsy single/multiple (7/19/2017); Upper gastrointestinal endoscopy (N/A, 10/18/2019); and Colonoscopy (N/A, 10/18/2019).      Current Medications:     Current Outpatient Medications:     Handicap Placard MISC, by Does not apply route Temporary 3 months Starting 11/22/2021 Expires 02/22/2022, Disp: 1 each, Rfl: 0    lisinopril (PRINIVIL;ZESTRIL) 10 MG tablet, take 1 tablet by mouth once daily, Disp: 30 tablet, Rfl: 3    atorvastatin (LIPITOR) 40 MG tablet, Take 1 tablet by mouth daily, Disp: 30 tablet, Rfl: 3    diclofenac sodium (VOLTAREN) 1 % GEL, Apply 2 g topically 2 times daily, Disp: 350 g, Rfl: 0    hydroCHLOROthiazide (HYDRODIURIL) 25 MG tablet, take 1 tablet by mouth once daily, Disp: 30 tablet, Rfl: 3    metFORMIN (GLUCOPHAGE) 500 MG tablet, take 1 tablet by mouth every morning with breakfast, Disp: 30 tablet, Rfl: 2    betamethasone valerate (VALISONE) 0.1 % cream, apply to affected area twice a day, Disp: , Rfl:     diclofenac sodium (VOLTAREN) 1 % GEL, Apply 2 g topically 2 times daily, Disp: 100 g, Rfl: 0    naproxen (NAPROSYN) 500 MG tablet, take 1 tablet by mouth twice a day if needed for pain, Disp: 60 tablet, Rfl: 0    FEROSUL 325 (65 Fe) MG tablet, take 1 tablet by mouth twice a day before meals, Disp: , Rfl:     ferrous sulfate (FE TABS 325) 325 (65 Fe) MG EC tablet, Take 1 tablet by mouth 2 times daily (before meals), Disp: 90 tablet, Rfl: 3    melatonin 1 MG tablet, Take 1 tablet by mouth nightly as needed for Sleep, Disp: 30 tablet, Rfl: 2    Elastic Bandages & Supports (ACE ELBOW BRACE LARGE/X-LARGE) MISC, 1 Brace as need for elbow pain, Disp: 1 each, Rfl: 0    aspirin 81 MG tablet, Take 81 mg by mouth daily, Disp: , Rfl:      Allergies:    Patient has no known allergies. Family History:  family history includes Cancer in her father; Diabetes in her mother; High Blood Pressure in her mother.     Social History:   Social History     Occupational History    Not on file   Tobacco Use    Smoking status: Former Smoker     Packs/day: 0.25     Years: 7.00     Pack years: 1.75     Start date: 1984     Quit date: 1991     Years since quittin.9    Smokeless tobacco: Never Used   Vaping Use    Vaping Use: Never used   Substance and Sexual Activity    Alcohol use: Not Currently    Drug use: No    Sexual activity: Not on file     Occupation: Works as a      OBJECTIVE:  Resp 12   Ht 5' 2\" (1.575 m)   Wt 282 lb (127.9 kg)   BMI 51.58 kg/m²    Psych: alert and oriented to person, time, and place  Cardio:  well perfused extremities  Resp:  normal respiratory effort  Skin:  no cyanosis  Hem/lymph:  no lymphedema  Neuro:  sensation to light touch grossly intact throughout all nerve distributions in the bilateral feet and hands  Musculoskeletal:    RLE:    Grossly normal range of motion of hip, slightly decreased flexion/extension of the knee  Painless range of motion of hip, but pain on extremes of knee motion  Stable exam of hip/knee  Skin intact without erythema/warmth  Grossly neurovascularly intact distally in the lower extremity  Appropriate strength on manual muscle testing of the iliopsoas, hamstrings, quadriceps, tibialis anterior, gastroc-soleus complex  Tenderness:  anteromedial and anterolateral knee joint line   -Kirk's test positive for pain      LLE:    Grossly normal range of motion of hip, slightly decreased flexion extension of the knee  Painless range of motion of hip, but pain on extremes of knee motion  Stable exam of hip/knee  Skin intact without erythema/warmth  Grossly neurovascularly intact distally in the lower extremity  Appropriate strength on manual muscle testing of the iliopsoas, hamstrings, quadriceps, tibialis anterior, gastroc-soleus complex  Tenderness:  anteromedial greater than anterolateral knee joint line  -Kirk's test positive for pain     -Previous exam: Tenderness diffusely throughout the midfoot, sinus Tarsi, and along the course the posterior tibial tendon      (Previous exam)  Affected upper extremity (left):  Grossly normal range of motion of wrist/fingers  Painless range of motion of wrist/fingers except for ring finger  Stable exam of wrist/hand  Skin intact without erythema/warmth   Grossly neurovascularly intact distally throughout able to fire EPL/FPL/IO/EDC/FDP  Tenderness to palpation:  A1 pulley of the ring finger--improved  -Durkan's compression test negative  -triggering elicited at the Ring finger       RADIOLOGY:   12/7/2021 Prior images reviewed for reference. MRI images and radiology report reviewed, as below:     -Right knee MRI  1. Degeneration and slight outward subluxation of the medial meniscus body. No evidence of meniscal tearing. 2. Mild to moderate tricompartmental osteoarthrosis.  Mild-to-moderate medial   and patellofemoral chondromalacia.  Subchondral irregularity of the lateral   femoral trochlea.  Grade 3 chondromalacia of the posterior nonweightbearing   femoral condyles with subcortical cystic changes and subchondral reactive   marrow edema at the posterior nonweightbearing medial femoral condyle. 3. Small joint effusion.  Trace Tovar's cyst.           MRI images and radiology report reviewed, as below:     -Left knee MRI  1.  Tearing of the root ligament of the posterior horn medial meniscus. Outward extrusion of the medial meniscus body.  Degeneration of the posterior   horn and body of the medial meniscus.  Reactive marrow edema at the outer   medial tibial plateau. 2. Possible grade 1 MCL sprain. 3. Small joint effusion. 4. Tricompartmental osteoarthrosis.  Severe medial chondromalacia.  Focal   severe chondromalacia at the posterior weight-bearing lateral tibial plateau. 5. Mild-to-moderate lateral femoral trochlear chondromalacia with underlying   subcortical cystic change. 6. Small Baker's cyst.  Mild-to-moderate edema in the subcutaneous fat about   the knee. FINDINGS:  Four weightbearing views (AP, Tunnel, Lateral, and [place]) of the bilateral knee were obtained in the office today and reviewed, revealing no acute fracture, dislocation, or radioopaque foreign body/tumor. Overall alignment is satisfactory. Tricompartmental degenerative changes of the knee with joint narrowing, sclerosis, and osteophytes. IMPRESSION: No acute fracture/dislocation. Degenerative changes as above. Electronically signed by Sebas Mccoy MD        FINDINGS:  Three weightbearing views (AP, [place], and Lateral) of the left ankle and three weightbearing views (AP, Oblique, Lateral) of the left foot and four weightbearing views (AP, Lateral, Tunnel and [place]) of the left knee and 3 views (AP, oblique, lateral) of the left hand were obtained in the office today and reviewed, revealing no acute fracture, dislocation, or radioopaque foreign body/tumor. The ankle mortise is maintained with no widening of the clear spaces. Overall alignment is satisfactory. Tricompartmental degenerative changes of the knee with joint space narrowing, sclerosis, and osteophytes. Degenerative changes at the subtalar joint, as well as mildly diffusely throughout the midfoot. IMPRESSION:  No acute fracture/dislocation. Generative changes as above.     Electronically signed by Brittney Rucker Kaya Castorena MD        Xr Knee Left (3 Views)    Result Date: 11/25/2020  EXAMINATION: THREE X-RAY VIEWS OF THE LEFT KNEE 11/25/2020 10:31 am COMPARISON: 03/03/2010 HISTORY: ORDERING SYSTEM PROVIDED HISTORY: Pain TECHNOLOGIST PROVIDED HISTORY: Pain Acuity: Acute Type of Exam: Unknown FINDINGS: No evidence of acute fracture or dislocation. Similar small benign-appearing soft tissue calcifications in the proximal and posterolateral leg. Radiographically there is no significant joint effusion. Progression of mild to moderate degenerative changes involving primarily the medial compartment with joint space loss and osteophytes. Lateral view is obtained in a slight degree of obliquity. No acute osseous abnormality. Mild-to-moderate degenerative changes have progressed since the prior study. ASSESSMENT AND PLAN:  Body mass index is 51.58 kg/m². She has a history of bilateral knee pain, secondary to bilateral tricompartmental osteoarthritis, on the left with severe medial chondromalacia and a medial meniscal posterior root tear (a left knee corticosteroid injection on 2/5/2021 helped her pain approximately 100%; a left knee injection on 7/19/2021 helped her pain approximately 75%). On the right side, she received a corticosteroid injection on 8/30/2021, which she reports only helped approximately 25% for about 1 week. At today's visit, she reports that her bilateral knees are hurting her. She also has a history of left foot pain secondary multiple issues--she has a component of posterior tibial tendinitis along with some degenerative changes of her subtalar joint and mildly diffusely throughout the midfoot. She is doing well with conservative management. She also has a trigger finger of her left ring finger. She is doing well with conservative management. Notably, she has a somewhat complex past medical history. She has a history of non-insulin-dependent diabetes.     We had a discussion today about the likely diagnosis and its natural history, physical exam and imaging findings, as well as various treatment options in detail. Surgically, we have discussed that he did not recommend any surgery at this time given her MRI findings, and we have decided to continue with conservative management. Orders/referrals were placed as below at today's visit. She may continue to use her prescribed topical Voltaren gel, and will avoid oral NSAIDs due to her history of GI bleeding. She may continue home exercises/physical therapy. She is also previously been referred to pain management per her request, and she may continue to follow-up on this.    -After discussing her treatment options, she wished to proceed with bilateral Monovisc knee injections as below. She has previously been provided information had obtained over-the-counter flatfoot style orthotic but has not obtained this. All questions were answered and the above plan was agreed upon. The patient will return to clinic in the future as needed without x-rays. At her next visit, we may consider repeat bilateral knee gel injections. KNEE INJECTION PROCEDURE NOTE: After discussing the risks/benefits/alternatives to injection, an informed consent was obtained. The bilateral knee was verified as the correct location and allergies were reviewed. The skin overlying the injection site was cleaned with an alcohol swab followed by a local sterile prep. A 22 gauge needle was introduced into the above location under sterile conditions. 4 mL of Monovisc was injected bilaterally. The patient was noted to tolerate the procedure well without immediate complication. A dressing was applied and verbal instruction/education was provided.                            At the patient's next visit, depending on how the patient is doing and/or new imaging/labs results, we may consider the following options:    []  Orthotic (OTC)     []  Orthotic (custom) []  Rocker bottom shoes     []  Brace (OTC)        []  Brace (custom)             []  CAM boot        []  Night splint         []  Heel cups        []  Strap      []  Toe sleeves/splints    []  PT:                     []  Wean out of immobilization   []  Advance activity       []  Topical               []  NSAIDs          []  [place]         []  Referral:         []  Stress xrays       []  CT         []  MRI        []  Injection:         []  Consider OR      []  Pick OR date    No follow-ups on file. No orders of the defined types were placed in this encounter. No orders of the defined types were placed in this encounter. Dinae Locke MD  Orthopedic Surgery        Please excuse any typos/errors, as this note was created with the assistance of voice recognition software. While intending to generate a document that actually reflects the content of the visit, the document can still have some errors including those of syntax and sound-a-like substitutions which may escape proof reading. In such instances, actual meaning can be extrapolated by context.

## 2021-12-09 ENCOUNTER — TELEPHONE (OUTPATIENT)
Dept: PAIN MANAGEMENT | Age: 61
End: 2021-12-09

## 2021-12-09 NOTE — TELEPHONE ENCOUNTER
Called and spoke with patient about rescheduling her appt from 12/9/2021. Patient stated she will call back once she has her schedule in front of her.

## 2021-12-20 ENCOUNTER — HOSPITAL ENCOUNTER (OUTPATIENT)
Dept: PHYSICAL THERAPY | Age: 61
Setting detail: THERAPIES SERIES
Discharge: HOME | End: 2021-12-20
Payer: MEDICARE

## 2021-12-20 PROCEDURE — 97161 PT EVAL LOW COMPLEX 20 MIN: CPT

## 2021-12-20 PROCEDURE — 97110 THERAPEUTIC EXERCISES: CPT

## 2021-12-20 NOTE — CONSULTS
[x] Isaadore Logan  Outpatient Physical Therapy  [address]  Phone: [phone]  Fax: [phone] [] [facility] at [address] [place] [address]  Phone: [phone]   Fax: [phone]     Physical Therapy Evaluation    Date:  2021  Patient: Selina Neville  : 1960  MRN: [id number]  Physician: Yong Chambers MD   Insurance: [employer] 24 visits  Medical Diagnosis: Primary osteoarthritis of both knees M17.0    Rehab Codes: M25.561, M25.562, M25.662, M25.661, R26.9  Onset Date:  21                               Next 's appt:     Subjective:   CC: Patient reports bilateral knee pain since . Patient has difficulty ambulating or walking greater than one hour. She works as a  at [employer]. V's and has difficulty tolerating the long hours of WB activity. Patient also reports difficulty squatting and climbing stairs. Patient has on knee brace that she wears on each knee depending on her pain level. R knee is typically worse than her left. HPI: She has a history of bilateral knee pain, secondary to bilateral tricompartmental osteoarthritis, on the left with severe medial chondromalacia and a medial meniscal posterior root tear (a left knee corticosteroid injection on 2021 helped her pain approximately 100%; a left knee injection on 2021 helped her pain approximately 75%). On the right side, she received a corticosteroid injection on 2021, which she reports only helped approximately 25% for about 1 week. Patient completed 6 PT treatments earlier this year. 21 KNEE INJECTION PROCEDURE NOTE: After discussing the risks/benefits/alternatives to injection, an informed consent was obtained. The bilateral knee was verified as the correct location and allergies were reviewed. The skin overlying the injection site was cleaned with an alcohol swab followed by a local sterile prep.  A 22 gauge needle was introduced into the above location under sterile conditions. 4 mL of Monovisc was injected bilaterally. PMHx: [] Unremarkable [x] Diabetes [x] HTN  [] Pacemaker   [] MI/Heart Problems [] Cancer [x] Arthritis [] Asthma                         [x] refer to full medical chart  In [facility]  [] Other:         Comorbidities:   [x] Obesity [] Dialysis  [] Other:   [] Asthma/COPD [] Dementia [] Other:   [] Stroke [] Sleep apnea [] Other:   [] Vascular disease [] Rheumatic disease [] Other:     Tests: [] X-Ray: [x] MRI:  [] Other:   R Knee 11/18/21  1. Degeneration and slight outward subluxation of the medial meniscus body. No evidence of meniscal tearing. 2. Mild to moderate tricompartmental osteoarthrosis.  Mild-to-moderate medial   and patellofemoral chondromalacia.  Subchondral irregularity of the lateral   femoral trochlea.  Grade 3 chondromalacia of the posterior nonweightbearing   femoral condyles with subcortical cystic changes and subchondral reactive   marrow edema at the posterior nonweightbearing medial femoral condyle. 3. Small joint effusion.  Trace Tovar's cyst.     L knee 7/13/21  1. Tearing of the root ligament of the posterior horn medial meniscus. Outward extrusion of the medial meniscus body.  Degeneration of the posterior   horn and body of the medial meniscus.  Reactive marrow edema at the outer   medial tibial plateau. 2. Possible grade 1 MCL sprain. 3. Small joint effusion. 4. Tricompartmental osteoarthrosis.  Severe medial chondromalacia.  Focal   severe chondromalacia at the posterior weight-bearing lateral tibial plateau. 5. Mild-to-moderate lateral femoral trochlear chondromalacia with underlying   subcortical cystic change. 6. Small Baker's cyst.  Mild-to-moderate edema in the subcutaneous fat about   the knee.        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    Pain:  [x] Yes  [] No Location: B knees  Pain Rating: (0-10 scale 4/10  Pain altered Tx:  [] Yes  [x] No  Action:    Objective: p = pain, L = Lacks      ROM  ° A/P STRENGTH    Left Right Left Right   Hip Flexion    4/5 4/5   Ext   4/5 4/5   Abd   4/5 4/5   Add       ER       IR       Knee Flex 115 95 4+/5 4+/5   Ext  0 5 4+/5 4+/5   Ankle DF   5/5 5/5   PF    5/5 5/5   Inversion       Eversion            OBSERVATION Comments   Posture Fwd head   Joint Alignment No Deficit    Gait No Deficit    Palpation No Deficit    Edema No Deficit    Neurological No Deficit      Assessment: Patient demonstrates bilateral knee pain, ROM and strength deficits associated with severe degeneration and muscle weakness. Patient will benefit form physical therapy to improve B knee ROM, strength, and tolerance to WB activity. Problems:    [x] ? Pain: 4/10 B knees  [x] ? ROM: R knee flexion   [x] ? Flexibility: hamstrings, quadriceps   [x] ? Strength: 4/5 L knee  Flexion and extension  [x] ? Function: LEFS 29% functional   [] Other:    STG: (to be met in 8 treatments)  1. ? Pain: 2/10 B knees with standing activity. 2. ? ROM: B knee flexion 0-120 degrees to improve function. 3. ? Strength: 4+/5 B knee flexion and extension to improve knee stability. 4. ? Function:LEFS to 50% functional to improve ADLs. 5. Independent with Home Exercise Programs    LTG: (to be met in 12 treatments)  1. Patient is able to ambulate > one hour without significant knee pain. 2. Patient reports decreased knee pain when working.      Patient goals: Reduce B knee pain, improve standing tolerance.e     Rehab Potential:  [x] Good  [] Fair  [] Poor   Suggested Professional Referral:  [x] No  [] Yes:  Barriers to Goal Achievement[de-identified]  [x] No  [] Yes:  Domestic Concerns:  [x] No  [] Yes:    Pt. Education:  [x] Plans/Goals, Risks/Benefits discussed  [x] Home exercise program: See chart below  Method of Education: [x] Verbal  [x] Demo  [x] Written  Comprehension of Education:  [x] Verbalizes understanding. [x] Demonstrates understanding. [x] Needs Review. [] Demonstrates/verbalizes understanding of HEP/Ed previously given. Treatment Plan:  [x] Therapeutic Exercise   34147  [x] Vasopneumatic cold with compression  32900    [x] Therapeutic Activity  42896 [x] Cold/hotpack    [x] Gait Training   21698 [] Lumbar/Cervical Traction  [id number]   [x] Neuromuscular Re-education  36013 [x] Electrical Stimulation Unattended  27964   [x] Manual Therapy    69040 [x] Electrical Stimulation Attended  [id number]   [] Iontophoresis: 4 mg/mL Dexamethasone Sodium Phosphate  mAmin  48408 []  Medication allergies reviewed for use of   Dexamethasone Sodium Phosphate 4mg/ml with iontophoresis treatments. Pt is not allergic. Frequency:  2 x/week for 12 visits    Todays Treatment:  Precautions:  Modalities:   Exercises:  Access Code: [email or web address]  URL: [email or web address]  Date: 12/20/2021  Prepared by: Patricia Titus    Exercises  Supine Active Straight Leg Raise - 1 x daily - 7 x weekly - 3 sets - 10 reps  Seated Long Arc Quad - 1 x daily - 7 x weekly - 3 sets - 10 reps  Standing Heel Raise - 1 x daily - 7 x weekly - 3 sets - 10 reps  Standing Knee Flexion AROM with Chair Support - 1 x daily - 7 x weekly - 3 sets - 10 reps  Standing Hip Abduction with Counter Support - 1 x daily - 7 x weekly - 3 sets - 10 reps    Specific Instructions for next treatment[de-identified] B knee and hip strengthening       Evaluation Complexity:  History (Personal factors, comorbidities) [] 0 [x] 1-2 [] 3+   Exam (limitations, restrictions) [] 1-2 [x] 3 [] 4+   Clinical presentation (progression) [x] Stable [] Evolving  [] Unstable   Decision Making [x] Low [] Moderate [] High    [x] Low Complexity [] Moderate Complexity [] High Complexity       Treatment Charges: Mins Units   [x] Evaluation       [x]  Low       [] Moderate       []  High 25 1   []  Modalities     [x]  Ther Exercise 15 1   []  Manual Therapy     []  Ther Activities     []  Aquatics     []  Vasocompression     []  Other       TOTAL TREATMENT TIME: 40      Time in:1310     Time out:1350    Electronically signed by: Lucila Kessler PT

## 2021-12-26 ENCOUNTER — HOSPITAL ENCOUNTER (EMERGENCY)
Age: 61
Discharge: HOME | End: 2021-12-26
Attending: EMERGENCY MEDICINE
Payer: MEDICARE

## 2021-12-26 VITALS
OXYGEN SATURATION: 100 % | HEART RATE: 77 BPM | DIASTOLIC BLOOD PRESSURE: 88 MMHG | BODY MASS INDEX: 51.34 KG/M2 | SYSTOLIC BLOOD PRESSURE: 145 MMHG | WEIGHT: 279 LBS | TEMPERATURE: 98.3 F | RESPIRATION RATE: 20 BRPM | HEIGHT: 62 IN

## 2021-12-26 DIAGNOSIS — M25.561 CHRONIC PAIN OF BOTH KNEES: Primary | ICD-10-CM

## 2021-12-26 DIAGNOSIS — G89.29 CHRONIC PAIN OF BOTH KNEES: Primary | ICD-10-CM

## 2021-12-26 DIAGNOSIS — M25.562 CHRONIC PAIN OF BOTH KNEES: Primary | ICD-10-CM

## 2021-12-26 PROCEDURE — 96372 THER/PROPH/DIAG INJ SC/IM: CPT

## 2021-12-26 PROCEDURE — 6360000002 HC RX W HCPCS: Performed by: NURSE PRACTITIONER

## 2021-12-26 PROCEDURE — 99283 EMERGENCY DEPT VISIT LOW MDM: CPT

## 2021-12-26 RX ORDER — DEXAMETHASONE SODIUM PHOSPHATE 10 MG/ML
8 INJECTION, SOLUTION INTRAMUSCULAR; INTRAVENOUS ONCE
Status: COMPLETED | OUTPATIENT
Start: 2021-12-26 | End: 2021-12-26

## 2021-12-26 RX ORDER — HYDROCODONE BITARTRATE AND ACETAMINOPHEN 5; 325 MG/1; MG/1
1 TABLET ORAL EVERY 8 HOURS PRN
Qty: 20 TABLET | Refills: 0 | Status: SHIPPED | OUTPATIENT
Start: 2021-12-26 | End: 2022-01-02

## 2021-12-26 RX ORDER — KETOROLAC TROMETHAMINE 30 MG/ML
30 INJECTION, SOLUTION INTRAMUSCULAR; INTRAVENOUS ONCE
Status: COMPLETED | OUTPATIENT
Start: 2021-12-26 | End: 2021-12-26

## 2021-12-26 RX ADMIN — DEXAMETHASONE SODIUM PHOSPHATE 8 MG: 10 INJECTION, SOLUTION INTRAMUSCULAR; INTRAVENOUS at 14:05

## 2021-12-26 RX ADMIN — KETOROLAC TROMETHAMINE 30 MG: 30 INJECTION, SOLUTION INTRAMUSCULAR; INTRAVENOUS at 14:04

## 2021-12-26 ASSESSMENT — PAIN DESCRIPTION - PAIN TYPE: TYPE: CHRONIC PAIN

## 2021-12-26 ASSESSMENT — PAIN DESCRIPTION - LOCATION: LOCATION: KNEE

## 2021-12-26 ASSESSMENT — PAIN SCALES - GENERAL
PAINLEVEL_OUTOF10: 10
PAINLEVEL_OUTOF10: 10

## 2021-12-26 ASSESSMENT — PAIN DESCRIPTION - ORIENTATION: ORIENTATION: RIGHT;LEFT

## 2021-12-26 ASSESSMENT — ENCOUNTER SYMPTOMS
COLOR CHANGE: 0
BACK PAIN: 0

## 2021-12-26 NOTE — ED PROVIDER NOTES
[address] ED  eMERGENCY dEPARTMENT eNCOUnter      Pt Name: Priscilla Delong  MRN: [id number]  [place] 1960  Date of evaluation: 12/26/2021  Provider: DENA Mota CNP    CHIEF COMPLAINT       Chief Complaint   Patient presents with    Knee Pain     Bilateral knees. PT reportss having a fall one year ago and the pain began one year ago. HISTORY OF PRESENT ILLNESS  (Location/Symptom, Timing/Onset, Context/Setting, Quality, Duration, Modifying Factors, Severity.)   Priscilla Delong is a 63 y.o. female who presents to the emergency department via private auto for bilateral knee pain, R>L. Onset was one year ago. She had MRIs completed of the knees completed this year. She sees an orthopedist for this issue. She had injections done 2 weeks ago. Denies recent injury, fever, chills, weakness. Rates her pain 10/10 at this time. She is here for pain control.        EXAMINATION:   MRI OF THE RIGHT KNEE WITHOUT CONTRAST, 12/6/2021 8:49 am       TECHNIQUE:   Multiplanar multisequence MRI of the right knee was performed without the   administration of intravenous contrast.       COMPARISON:   Right knee plain radiographs from 06/04/2021.       HISTORY:   ORDERING SYSTEM PROVIDED HISTORY: Primary osteoarthritis of both knees   TECHNOLOGIST PROVIDED HISTORY:   evaluation of meniscus and cartilage   Reason for Exam: evaluation of meniscus and cartilage   Acuity: Unknown   Type of Exam: Unknown       82-year-old female with osteoarthritis of the right knee.       FINDINGS:   MENISCI: Lateral meniscus demonstrates normal morphology and signal   characteristics.  No lateral meniscus tear.       Degeneration and slight outward subluxation of the medial meniscus body.  No   discrete medial meniscus tear.       CRUCIATE LIGAMENTS: Anterior and posterior cruciate ligaments appear intact.       EXTENSOR MECHANISM: Distal quadriceps tendon, patellar tendon, and patellar   retinacula appear intact.     LATERAL COLLATERAL LIGAMENT COMPLEX: Popliteus muscle/tendon, iliotibial   band, lateral collateral ligament, and biceps femoris appear intact.       MEDIAL COLLATERAL LIGAMENT COMPLEX: Medial collateral ligament complex   appears intact.       KNEE JOINT: Small joint effusion.       Osseous alignment is normal.       No acute fracture or dislocation.       Mild to moderate tricompartmental osteophyte spurring.       Diffuse grade 2-3 medial compartment chondromalacia.       Articular cartilage of the lateral compartment appears grossly intact without   focal chondral defect.       Diffuse grade 2-3 patellofemoral chondromalacia.       Subchondral irregularity at the lateral femoral trochlea.       Grade 3 chondromalacia at the posterior nonweightbearing femoral condyles   with subcortical cystic changes and subchondral reactive marrow edema at the   posterior aspect of the nonweightbearing medial femoral condyle.       BONE MARROW: Bone marrow signal intensity within the visualized osseous   structures is within normal limits.       SOFT TISSUES: Visualized popliteal neurovascular bundle grossly unremarkable. Trace Tovar's cyst.  Mild edema in the subcutaneous fat about the knee.           Impression   1. Degeneration and slight outward subluxation of the medial meniscus body. No evidence of meniscal tearing. 2. Mild to moderate tricompartmental osteoarthrosis.  Mild-to-moderate medial   and patellofemoral chondromalacia.  Subchondral irregularity of the lateral   femoral trochlea.  Grade 3 chondromalacia of the posterior nonweightbearing   femoral condyles with subcortical cystic changes and subchondral reactive   marrow edema at the posterior nonweightbearing medial femoral condyle.    3. Small joint effusion.  Trace Tovar's cyst.         EXAMINATION:   MRI OF THE LEFT KNEE WITHOUT CONTRAST, 7/13/2021 1:04 pm       TECHNIQUE:   Multiplanar multisequence MRI of the left knee was performed without the administration of intravenous contrast.       COMPARISON:   Left knee plain radiographs from 06/04/2021.       HISTORY:   ORDERING SYSTEM PROVIDED HISTORY: Primary osteoarthritis of both knees   TECHNOLOGIST PROVIDED HISTORY:   eval meniscus   Acuity: Chronic   Type of Exam: Initial       61-year-old female with primary osteoarthritis of both knees; evaluate the   meniscus.       FINDINGS:   MENISCI: Lateral meniscus demonstrates normal morphology and signal   characteristics.  No lateral meniscus tear.       Tearing and ghosting of the root ligament of the posterior horn of the medial   meniscus.  Degeneration of the posterior horn and body of the medial meniscus   with partial outward extrusion of the medial meniscus body.       CRUCIATE LIGAMENTS: Anterior and posterior cruciate ligaments appear intact.       EXTENSOR MECHANISM: Distal quadriceps tendon, patellar tendon, and patellar   retinacula appear intact.       LATERAL COLLATERAL LIGAMENT COMPLEX: Popliteus muscle/tendon, iliotibial   band, lateral collateral ligament, and biceps femoris appear intact.       MEDIAL COLLATERAL LIGAMENT COMPLEX: Periligamentous edema surrounding the   medial collateral ligament which can be seen with grade 1 MCL sprain.       KNEE JOINT: Small joint effusion.       Mild tricompartmental osteophyte spurring.       Osseous alignment is normal.       No acute fracture or dislocation.       Diffuse grade 4 medial compartment chondromalacia.       Focal grade 4 chondromalacia at the posterior weight-bearing lateral tibial   plateau on image 21, series 7.       Diffuse grade 2-3 patellofemoral chondromalacia most notably in the lateral   femoral trochlea with underlying subcortical cystic change.       BONE MARROW: Reactive marrow edema at the outer medial tibial plateau related   to meniscal tearing and chondromalacia.  Bone marrow signal intensity within   the visualized osseous structures otherwise within normal limits.     SOFT TISSUES: Mild to moderate edema in the subcutaneous fat about the knee. Small Baker's cyst.  Visualized popliteal neurovascular bundle grossly   unremarkable.           Impression   1. Tearing of the root ligament of the posterior horn medial meniscus. Outward extrusion of the medial meniscus body.  Degeneration of the posterior   horn and body of the medial meniscus.  Reactive marrow edema at the outer   medial tibial plateau. 2. Possible grade 1 MCL sprain. 3. Small joint effusion. 4. Tricompartmental osteoarthrosis.  Severe medial chondromalacia.  Focal   severe chondromalacia at the posterior weight-bearing lateral tibial plateau. 5. Mild-to-moderate lateral femoral trochlear chondromalacia with underlying   subcortical cystic change. 6. Small Baker's cyst.  Mild-to-moderate edema in the subcutaneous fat about   the knee. Nursing Notes were reviewed. ALLERGIES     Patient has no known allergies.     CURRENT MEDICATIONS       Discharge Medication List as of 12/26/2021  2:31 PM      CONTINUE these medications which have NOT CHANGED    Details   Handicap Placard MISC Starting Fri 11/19/2021, Disp-1 each, R-0, PrintTemporary 3 months Starting 11/22/2021 Expires 02/22/2022      lisinopril (PRINIVIL;ZESTRIL) 10 MG tablet take 1 tablet by mouth once daily, Disp-30 tablet, R-3Normal      atorvastatin (LIPITOR) 40 MG tablet Take 1 tablet by mouth daily, Disp-30 tablet, R-3Normal      !! diclofenac sodium (VOLTAREN) 1 % GEL Apply 2 g topically 2 times daily, Topical, 2 TIMES DAILY Starting Thu 9/2/2021, Disp-350 g, R-0, Normal      hydroCHLOROthiazide (HYDRODIURIL) 25 MG tablet take 1 tablet by mouth once daily, Disp-30 tablet, R-3Normal      metFORMIN (GLUCOPHAGE) 500 MG tablet take 1 tablet by mouth every morning with breakfast, Disp-30 tablet, R-2Normal      betamethasone valerate (VALISONE) 0.1 % cream apply to affected area twice a day, Historical Med      !! diclofenac sodium (VOLTAREN) 1 % GEL Apply 2 g topically 2 times daily, Topical, 2 TIMES DAILY Starting Mon 2021, Disp-100 g, R-0, Normal      naproxen (NAPROSYN) 500 MG tablet take 1 tablet by mouth twice a day if needed for pain, Disp-60 tablet, R-0Normal      FEROSUL 325 (65 Fe) MG tablet take 1 tablet by mouth twice a day before meals, DAWHistorical Med      ferrous sulfate (FE TABS 325) 325 (65 Fe) MG EC tablet Take 1 tablet by mouth 2 times daily (before meals), Disp-90 tablet, R-3Normal      melatonin 1 MG tablet Take 1 tablet by mouth nightly as needed for Sleep, Disp-30 tablet, R-2Normal      Elastic Bandages & Supports (ACE ELBOW BRACE LARGE/X-LARGE) MISC Disp-1 each, R-0, Print1 Brace as need for elbow pain      aspirin 81 MG tablet Take 81 mg by mouth dailyHistorical Med       !! - Potential duplicate medications found. Please discuss with provider.           PAST MEDICAL HISTORY         Diagnosis Date    Diverticulitis     2 times    Former smoker     Former smoker     GERD (gastroesophageal reflux disease)     Hyperlipidemia     Hypertension     Morbid obesity with BMI of 45.0-49.9, adult ([address]     SINDI on CPAP     Sleep apnea     SINDI on CPAP    Type II or unspecified type diabetes mellitus without mention of complication, not stated as uncontrolled        SURGICAL HISTORY           Procedure Laterality Date    CHOLECYSTECTOMY      COLONOSCOPY      COLONOSCOPY N/A 10/18/2019    COLONOSCOPY WITH BIOPSY performed by Nishi Craig MD at [address] [place] [address] EGD TRANSORAL BIOPSY SINGLE/MULTIPLE  2017    EGD BIOPSY performed by Steven Bee MD at [address] [place] [address] N/A 10/18/2019    EGD DIAGNOSTIC ONLY performed by Nishi Craig MD at [facility]         FAMILY HISTORY           Problem Relation Age of Onset    High Blood Pressure Mother     Diabetes Mother     Cancer Father      Family Status   Relation Name Status    Mother    Father          SOCIAL HISTORY      reports that she quit smoking about 31 years ago. She started smoking about 38 years ago. She has a 1.75 pack-year smoking history. She has never used smokeless tobacco. She reports previous alcohol use. She reports that she does not use drugs. REVIEW OF SYSTEMS    (2-9 systems for level 4, 10 or more for level 5)     Review of Systems   Constitutional: Negative for chills, diaphoresis, fatigue and fever. Musculoskeletal: Positive for arthralgias and myalgias. Negative for back pain, gait problem and neck pain. Skin: Negative for color change, rash and wound. Neurological: Negative for weakness and numbness. Except as noted above the remainder of the review of systems was reviewed and negative. PHYSICAL EXAM    (up to 7 for level 4, 8 or more for level 5)     ED Triage Vitals [21 1258]   BP Temp Temp Source Pulse Resp SpO2 Height Weight   (!) 145/88 98.3 °F (36.8 °C) Oral 77 20 100 % 5' 2\" (1.575 m) 279 lb (126.6 kg)     Physical Exam  Vitals reviewed. Constitutional:       General: She is not in acute distress. Appearance: She is well-developed. She is not diaphoretic. Eyes:      General: No scleral icterus. Conjunctiva/sclera: Conjunctivae normal.   Cardiovascular:      Rate and Rhythm: Normal rate. Pulses: Normal pulses. Pulmonary:      Effort: Pulmonary effort is normal. No respiratory distress. Breath sounds: No stridor. No rales. Musculoskeletal:      Cervical back: Neck supple. Right knee: No swelling or deformity. Tenderness present. Left knee: No swelling or deformity. Tenderness present. Comments: Moves extremities. Skin:     General: Skin is warm and dry. Capillary Refill: Capillary refill takes less than 2 seconds. Findings: No rash. Neurological:      Mental Status: She is alert and oriented to person, place, and time.    Psychiatric:         Behavior: Behavior normal. EMERGENCY DEPARTMENT COURSE and DIFFERENTIAL DIAGNOSIS/MDM:   Vitals:    Vitals:    12/26/21 1258   BP: (!) 145/88   Pulse: 77   Resp: 20   Temp: 98.3 °F (36.8 °C)   TempSrc: Oral   SpO2: 100%   Weight: 279 lb (126.6 kg)   Height: 5' 2\" (1.575 m)       MEDICATIONS GIVEN IN THE ED:  Medications   ketorolac (TORADOL) injection 30 mg (30 mg IntraMUSCular Given 12/26/21 1404)   dexamethasone (PF) (DECADRON) injection 8 mg (8 mg IntraMUSCular Given 12/26/21 1405)       CLINICAL DECISION MAKING:  The patient presented alert with a nontoxic appearance and was seen in conjunction with Dr. Kirt Campbell. The patient had MRIs of her knees completed in 2021. She had injections in the knees 2 weeks ago. OARRS was reviewed. A prescription was written for norco. The patient was advised to not drink alcohol, drive, or operate heavy machinery while taking the norco. Follow up with pcp and ortho for a recheck, further evaluation and treatment. She was in the ED by herself today which limited her pain tx options here. Evaluation and treatment course in the ED, and plan of care upon discharge was discussed in length with the patient. Patient had no further questions prior to being discharged and was instructed to return to the ED for new or worsening symptoms. Care was provided during an unprecedented national emergency due to the novel coronavirus, Covid-19. FINAL IMPRESSION      1.  Chronic pain of both knees            Problem List  Patient Active Problem List   Diagnosis Code    Gastroesophageal reflux disease K21.9    Hyperlipidemia with target LDL less than 100 E78.5    HTN (hypertension) I10    Numbness and tingling R20.0, R20.2    Diverticula, colon K57.30    Diverticulitis, acute K57.92    Eczema L30.9    Lump of left breast N63.20    Shoulder pain M25.519    Right groin pain R10.31    Tinea corporis B35.4    Hyperlipidemia E78.5    Morbid obesity with body mass index (BMI) of 40.0 to 49.9 ([facility]) E66.01  SINDI (obstructive sleep apnea) G47.33    Vitamin D deficiency E55.9    Melena K92.1    Chest pain R07.9    Abnormal stress test R94.39    Contact blepharoconjunctivitis of left eye H10.532    Prediabetes R73.03    Need for prophylactic vaccination and inoculation against varicella Z23    Acute cystitis without hematuria N30.00         DISPOSITION/PLAN   DISPOSITION Decision To Discharge 12/26/2021 01:27:02 PM      PATIENT REFERRED TO:   Vince Chaney MD  [facility] [address] [address]  [phone]    Schedule an appointment as soon as possible for a visit       [address] [facility]  [phone]    Schedule an appointment as soon as possible for a visit         DISCHARGE MEDICATIONS:     Discharge Medication List as of 12/26/2021  2:31 PM      START taking these medications    Details   HYDROcodone-acetaminophen (NORCO) 5-325 MG per tablet Take 1 tablet by mouth every 8 hours as needed for Pain for up to 7 days. , Disp-20 tablet, R-0Print                 (Please note that portions of this note were completed with a voice recognition program.  Efforts were made to edit the dictations but occasionally words are mis-transcribed.)    Erica Aguilar, [address] [facility], APRN - CNP  12/26/21 3238

## 2021-12-26 NOTE — ED PROVIDER NOTES
eMERGENCY dEPARTMENT eNCOUnter   Independent Attestation     Pt Name: Geetha Rollins  MRN: [id number]  [place] 1960  Date of evaluation: 12/26/21     Geetha Rollins is a 63 y.o. female with CC: Knee Pain (Bilateral knees. PT reportss having a fall one year ago and the pain began one year ago. )      This visit was performed by both a physician and an APC. I performed all aspects of the MDM as documented. I was personally available for consultation. Per review of the medical chart care appears to be appropriate. The care is provided during an unprecedented national emergency due to the novel coronavirus, COVID 19.     Melinda Alexis DO  Attending Emergency Physician                    Harriet Bonilla 1721, DO  12/26/21 2042

## 2021-12-26 NOTE — LETTER
[facility] ED  [address] [id number]  Phone: [phone]             December 26, 2021    Patient: Bala Daniel   YOB: 1960   Date of Visit: 12/26/2021       To Whom It May Concern:    Bala Daniel was seen and treated in our emergency department on 12/26/2021. Please excuse her from work 12/27/21 through 12/29/21.      Sincerely,             Signature:__________________________________

## 2021-12-28 ENCOUNTER — HOSPITAL ENCOUNTER (OUTPATIENT)
Dept: PHYSICAL THERAPY | Age: 61
Setting detail: THERAPIES SERIES
Discharge: HOME | End: 2021-12-28
Payer: MEDICARE

## 2021-12-28 PROCEDURE — 97110 THERAPEUTIC EXERCISES: CPT

## 2021-12-28 NOTE — FLOWSHEET NOTE
tolerance with standing exercises as well, specifically with R LE weightbearing. [] Other:  [x] Patient will benefit form physical therapy to improve B knee ROM, strength, and tolerance to WB activity    STG: (to be met in 8 treatments)  1. ? Pain: 2/10 B knees with standing activity. 2. ? ROM: B knee flexion 0-120 degrees to improve function. 3. ? Strength: 4+/5 B knee flexion and extension to improve knee stability. 4. ? Function:LEFS to 50% functional to improve ADLs. 5. Independent with Home Exercise Programs     LTG: (to be met in 12 treatments)  1. Patient is able to ambulate > one hour without significant knee pain. 2. Patient reports decreased knee pain when working.        Pt. Education:  [x] Yes  [] No  [x] Reviewed Prior HEP/Ed  Method of Education: [x] Verbal  [x] Demo  [] Written  Comprehension of Education:  [] Verbalizes understanding. [] Demonstrates understanding. [x] Needs review. [] Demonstrates/verbalizes HEP/Ed previously given. Plan: [x] Continue current frequency toward long and short term goals.    [x]  Frequency:  2 x/week for 12 visits    [] Specific Instructions for subsequent treatments:  B knee and hip strengthening and ROM        Time In: 8972             Time Out: 1616      Electronically signed by:  Saray Gonzalez PTA

## 2021-12-29 ENCOUNTER — HOSPITAL ENCOUNTER (OUTPATIENT)
Dept: PHYSICAL THERAPY | Age: 61
Setting detail: THERAPIES SERIES
Discharge: HOME | End: 2021-12-29
Payer: MEDICARE

## 2021-12-29 PROCEDURE — 97110 THERAPEUTIC EXERCISES: CPT

## 2021-12-29 NOTE — FLOWSHEET NOTE
fair carryover for increased R knee flexion during swing phase throughout ambulation in clinic. Required rest breaks throughout seated program due to fatigue. Encouraged to continue completing exercises at home and during rest breaks at work. Applied HP to R knee post exercises per patient request with positive result. [] No change:      [] Other:  [x] Patient will benefit form physical therapy to improve B knee ROM, strength, and tolerance to WB activity    STG: (to be met in 8 treatments)  1. ? Pain: 2/10 B knees with standing activity. 2. ? ROM: B knee flexion 0-120 degrees to improve function. 3. ? Strength: 4+/5 B knee flexion and extension to improve knee stability. 4. ? Function:LEFS to 50% functional to improve ADLs. 5. Independent with Home Exercise Programs     LTG: (to be met in 12 treatments)  1. Patient is able to ambulate > one hour without significant knee pain. 2. Patient reports decreased knee pain when working.        Pt. Education:  [x] Yes  [] No  [x] Reviewed Prior HEP/Ed  Method of Education: [x] Verbal  [x] Demo  [] Written  Comprehension of Education:  [] Verbalizes understanding. [] Demonstrates understanding. [x] Needs review. [] Demonstrates/verbalizes HEP/Ed previously given. Plan: [x] Continue current frequency toward long and short term goals.    [x]  Frequency:  2 x/week for 12 visits    [x] Specific Instructions for subsequent treatments:  B knee and hip strengthening and ROM        Time In: 110 pm             Time Out: 200 pm      Electronically signed by:  Funmilayo Navas PTA

## 2022-01-05 ENCOUNTER — OFFICE VISIT (OUTPATIENT)
Dept: ORTHOPEDIC SURGERY | Age: 62
End: 2022-01-05
Payer: MEDICARE

## 2022-01-05 VITALS — WEIGHT: 279 LBS | BODY MASS INDEX: 51.34 KG/M2 | HEIGHT: 62 IN | RESPIRATION RATE: 16 BRPM

## 2022-01-05 DIAGNOSIS — M17.11 PRIMARY OSTEOARTHRITIS OF RIGHT KNEE: Primary | ICD-10-CM

## 2022-01-05 PROCEDURE — 3017F COLORECTAL CA SCREEN DOC REV: CPT | Performed by: ORTHOPAEDIC SURGERY

## 2022-01-05 PROCEDURE — 1036F TOBACCO NON-USER: CPT | Performed by: ORTHOPAEDIC SURGERY

## 2022-01-05 PROCEDURE — G8417 CALC BMI ABV UP PARAM F/U: HCPCS | Performed by: ORTHOPAEDIC SURGERY

## 2022-01-05 PROCEDURE — G8427 DOCREV CUR MEDS BY ELIG CLIN: HCPCS | Performed by: ORTHOPAEDIC SURGERY

## 2022-01-05 PROCEDURE — G8484 FLU IMMUNIZE NO ADMIN: HCPCS | Performed by: ORTHOPAEDIC SURGERY

## 2022-01-05 PROCEDURE — 99213 OFFICE O/P EST LOW 20 MIN: CPT | Performed by: ORTHOPAEDIC SURGERY

## 2022-01-05 ASSESSMENT — ENCOUNTER SYMPTOMS
COUGH: 0
CONSTIPATION: 0
NAUSEA: 0
DIARRHEA: 0

## 2022-01-05 NOTE — PROGRESS NOTES
[address] [place] [address] AND SPORTS MEDICINE  [address] [address]  Dept: [phone]  Dept Fax: [phone]        Ambulatory Follow Up/Referral from [address]      Subjective:   Alisha Jones is a 63y.o. year old female who presents to our office today for routine followup regarding her   1. Primary osteoarthritis of right knee    2. BMI 50.0-59.9, adult ([address]    . Chief Complaint   Patient presents with    Knee Pain     Right       HPI- Patient is here today for follow up for right knee pain. Patient was last seen by  on 12/7/221 for right knee pain. She underwent treatment in the form of physical therapy. Patient says she has only been to two therapy sessions since her last referral. She continues to have pain to his her right knee. Any weightbearing activity especially being on her feet for 12 hours a shift causes increased pain. Patient said she did have a weight management consult a couple years ago but \"they never called her back\". Patient denies all other complaints. Right knee 2/5/2021, 7/19/2021,8/30/2021 -right knee corticosteroid injection. Review of Systems   Constitutional: Negative for chills and fever. Respiratory: Negative for cough. Gastrointestinal: Negative for constipation, diarrhea and nausea. Musculoskeletal: Positive for arthralgias (right knee). Negative for gait problem, joint swelling and myalgias. Neurological: Negative for dizziness, weakness and numbness. I have reviewed the CC, HPI, ROS, PMH, FHX, Social History, and if not present in this note, I have reviewed in the patient's chart. I agree with the documentation provided by other staff and have reviewed their documentation prior to providing my signature indicating agreement.     Objective :   Resp 16   Ht 5' 2\" (1.575 m)   Wt 279 lb (126.6 kg)   BMI 51.03 kg/m²  Body mass index is 51.03 kg/m². General: Ladarius Vaz is a 63 y.o. female who is alert and oriented and sitting comfortably in our office. Ortho Exam  MS:  Evaluation of the Right knee shows no erythema, warmth, skin lesions, signs of infection. mild Knee effusion is appreciated. Patient has full range of motion of the knee. Tenderness over the medial joint line is appreciated. Patient has a negative patellar grind sign and a negative patellar apprehension sign. There is no instability with varus and valgus stress applied at 0 and 30° of flexion. A negative anterior drawer and Lachman's test is appreciated. There is increased pain with a medialMcMurray's test but no palpable click. There is no calf tenderness. There is a negative hip log-roll and Stinchfield test.  Motor, sensory, vascular examination to the Right lower extremity is intact without focal deficits. Neuro: alert and oriented to person and place. Eyes: Extra-ocular muscles intact  Mouth: Oral mucosa moist. No perioral lesions  Pulm: Respirations unlabored and regular. Symmetric chest excursion without outward deformity is noted. Skin: warm, well perfused  Psych:   Patient has good fund of knowledge and displays understanging of exam, diagnosis, and plan. Radiology:     No results found. Assessment:      1. Primary osteoarthritis of right knee    2. BMI 50.0-59.9, adult [facility])       Plan:      Discussed etiology and natural history of right knee arthritis. The treatment options may include oral anti-inflammatories, bracing, injections, advanced imaging, activity modification, physical therapy and/or surgical intervention. The patient would like to proceed with a weight management referral to try to get her BMI to 40 or under for a right knee replacement as she has exhausted all other measures. Discussed with her that we can put her on restrictions for work if they will allow it but we will not take her off of work due to her arthritis.   The patient will follow up as needed. We discussed that the patient should call us with any concerns or questions. Follow up:Return if symptoms worsen or fail to improve. No orders of the defined types were placed in this encounter. Orders Placed This Encounter   Procedures    External Referral To Bariatrics     Referral Priority:   Routine     Referral Type:   Eval and Treat     Referral Reason:   Specialty Services Required     Requested Specialty:   Bariatric Surgery     Number of Visits Requested:   1     I, Jackie Guzman RN am scribing for and in the presence of Dr. Rena Sherman  1/6/2022 1:36 PM      I have reviewed and made changes accordingly to the work scribed by Jackie Guzman RN. The documentation accurately reflects work and decisions made by me. I have also reviewed documentation completed by clinical staff.     Rena Sherman DO, [address]  1/6/2022 1:37 PM    This note is created with the assistance of a speech recognition program.  While intending to generate a document that actually reflects the content of the visit, the document can still have some errors including those of syntax and sound a like substitutions which may escape proof reading.  In such instances, actual meaning can be extrapolated by contextual diversion      Electronically signed by Brittnee Holley on 1/6/2022 at 1:36 PM

## 2022-01-06 ENCOUNTER — HOSPITAL ENCOUNTER (OUTPATIENT)
Dept: PHYSICAL THERAPY | Age: 62
Setting detail: THERAPIES SERIES
Discharge: HOME | End: 2022-01-06
Payer: MEDICARE

## 2022-01-06 PROCEDURE — 97110 THERAPEUTIC EXERCISES: CPT

## 2022-01-06 NOTE — FLOWSHEET NOTE
[x] [facility] &  Therapy  [address]  [phone]  F: [phone]     Physical Therapy Daily Treatment Note    Date:  2022  Patient Name:  Deloris Guerra      :  1960    MRN: [id number]  Physician: Ayse Rodriguez MD                 Insurance: [employer] 24 visits remaining   Medical Diagnosis: Primary osteoarthritis of both knees M17.0                   Rehab Codes: M25.561, M25.562, M25.662, M25.661, R26.9  Onset Date:  21                                Next 's appt: 21  Visit# / total visits:    Cancels/No Shows: 0/1    Subjective:    Pain:  [x] Yes  [] No Location: B knees   Pain Rating: (0-10 scale) 7/10 L knee; 8/10 R knee  Pain altered Tx:  [] No  [x] Yes  Action: did not progress exercise this date    Comments: Patient had consult with Dr. Filipe Kasper yesterday, she will attempt to get BMI under 40 to have R TKA. Pain still severe and limiting ambulation. Objective:  Modalities:   Precautions:  Exercises:  Exercise Reps/ Time Weight/ Level Comments   Seated      Heel Slides on Towel 10x ea  5x    Last 5x with OP   LAQ 15x ea     Iso hip adduction 15x3\"  Added    Clamshells 2x10  Lime  Added    Calf raise             Supine   Head of bed elevated    Quad Isometric 10x ea  Soft bolster under R knee   SLR     Try next    Heel slides  10x  L only, R too painful                     Standing      Gastroc stretch  3x30\"     Heel Raises 10x      Hip Abduction 10x   R LE OKC   Hip Extension 10x  R LE OKC   Mini March 10x  R LE OKC   HS curls 10x  RLE OKC Added    Other:      Treatment Charges: Mins Units   [x]  Modalities - HP 10 0   [x]  Ther Exercise 40 3   []  Manual Therapy     []  Ther Activities     []  Aquatics     []  Vasocompression     []  Other     Total Treatment time 40 3       Assessment: [] Progressing toward goals. [x] No change: Patient does not tolerate exercise well.  R knee is very painful and limits her participation in treatment. R knee does not tolerate WB activity for extended periods of time. [] Other:  [x] Patient will benefit form physical therapy to improve B knee ROM, strength, and tolerance to WB activity    STG: (to be met in 8 treatments)  1. ? Pain: 2/10 B knees with standing activity. 2. ? ROM: B knee flexion 0-120 degrees to improve function. 3. ? Strength: 4+/5 B knee flexion and extension to improve knee stability. 4. ? Function:LEFS to 50% functional to improve ADLs. 5. Independent with Home Exercise Programs     LTG: (to be met in 12 treatments)  1. Patient is able to ambulate > one hour without significant knee pain. 2. Patient reports decreased knee pain when working.        Pt. Education:  [x] Yes  [] No  [x] Reviewed Prior HEP/Ed  Method of Education: [x] Verbal  [x] Demo  [] Written  Comprehension of Education:  [] Verbalizes understanding. [] Demonstrates understanding. [x] Needs review. [] Demonstrates/verbalizes HEP/Ed previously given. Plan: [x] Continue current frequency toward long and short term goals.    [x]  Frequency:  2 x/week for 12 visits    [x] Specific Instructions for subsequent treatments:  B knee and hip strengthening and ROM      Time In: 1050 pm             Time Out: 6405      Electronically signed by:  Aramis Casper PT

## 2022-01-07 ENCOUNTER — OFFICE VISIT (OUTPATIENT)
Dept: FAMILY MEDICINE CLINIC | Age: 62
End: 2022-01-07
Payer: MEDICARE

## 2022-01-07 VITALS
BODY MASS INDEX: 51.76 KG/M2 | HEART RATE: 119 BPM | DIASTOLIC BLOOD PRESSURE: 86 MMHG | WEIGHT: 283 LBS | SYSTOLIC BLOOD PRESSURE: 133 MMHG

## 2022-01-07 DIAGNOSIS — M17.11 PRIMARY OSTEOARTHRITIS OF RIGHT KNEE: Primary | ICD-10-CM

## 2022-01-07 DIAGNOSIS — E66.01 MORBID OBESITY WITH BMI OF 50.0-59.9, ADULT (HCC): ICD-10-CM

## 2022-01-07 PROCEDURE — 1036F TOBACCO NON-USER: CPT | Performed by: STUDENT IN AN ORGANIZED HEALTH CARE EDUCATION/TRAINING PROGRAM

## 2022-01-07 PROCEDURE — G8417 CALC BMI ABV UP PARAM F/U: HCPCS | Performed by: STUDENT IN AN ORGANIZED HEALTH CARE EDUCATION/TRAINING PROGRAM

## 2022-01-07 PROCEDURE — G8484 FLU IMMUNIZE NO ADMIN: HCPCS | Performed by: STUDENT IN AN ORGANIZED HEALTH CARE EDUCATION/TRAINING PROGRAM

## 2022-01-07 PROCEDURE — G8427 DOCREV CUR MEDS BY ELIG CLIN: HCPCS | Performed by: STUDENT IN AN ORGANIZED HEALTH CARE EDUCATION/TRAINING PROGRAM

## 2022-01-07 PROCEDURE — 3017F COLORECTAL CA SCREEN DOC REV: CPT | Performed by: STUDENT IN AN ORGANIZED HEALTH CARE EDUCATION/TRAINING PROGRAM

## 2022-01-07 PROCEDURE — 99213 OFFICE O/P EST LOW 20 MIN: CPT | Performed by: STUDENT IN AN ORGANIZED HEALTH CARE EDUCATION/TRAINING PROGRAM

## 2022-01-07 ASSESSMENT — ENCOUNTER SYMPTOMS: SHORTNESS OF BREATH: 0

## 2022-01-07 NOTE — PATIENT INSTRUCTIONS
Thank you for letting us take care of you today. We hope all your questions were addressed. If a question was overlooked or something else comes to mind after you return home, please contact a member of your Care Team listed below. Your Care Team at [address] [place] [address] is Team #3  Emmanuel Whitney MD (Faculty)  Marcie Alexander MD (Faculty  Aidan Vaughn MD (Resident)  Charmaine Patel (Resident)   Rob Hunter MD (Resident)  Jerson Menjivar MD (Resident)  Khurram Salinas., RUBI Mancini., RUBI Collazo., Pricila Gutierrez., Sandrine De La Fuente ([facility])  Becky Walton, [facility] [facility] [address] (Clinical Practice Manager)  Yg Hopper [facility] (Clinical Pharmacist)     Office phone number: [phone]    If you need to get in right away due to illness, please be advised we have \"Same Day\" appointments available Monday-Friday. Please call us at [phone] option #3 to schedule your \"Same Day\" appointment.

## 2022-01-07 NOTE — PROGRESS NOTES
Visit Information    Have you changed or started any medications since your last visit including any over-the-counter medicines, vitamins, or herbal medicines? no   Have you stopped taking any of your medications? Is so, why? -  no  Are you having any side effects from any of your medications? - no    Have you seen any other physician or provider since your last visit?  no   Have you had any other diagnostic tests since your last visit? yes - MRI and Xray   Have you been seen in the emergency room and/or had an admission in a hospital since we last saw you?  yes - at [facility]   Have you had your routine dental cleaning in the past 6 months?  no     Do you have an active MyChart account? If no, what is the barrier?   Yes    Patient Care Team:  Claudia Antoine MD as PCP - General (Family Medicine)  Lindalee Hodgkins, MD as Consulting Physician (Pulmonology)    Medical History Review  Past Medical, Family, and Social History reviewed and does not contribute to the patient presenting condition    Health Maintenance   Topic Date Due    Hepatitis B vaccine (1 of 3 - Risk 3-dose series) Never done    Shingles Vaccine (1 of 2) Never done    Breast cancer screen  01/12/2021    COVID-19 Vaccine (2 - Pfizer 3-dose series) 02/10/2021    Lipid screen  05/19/2021    Flu vaccine (1) 09/01/2021    Diabetic foot exam  04/01/2022 (Originally 12/16/2017)    Diabetic microalbuminuria test  04/01/2022 (Originally 11/1/2020)    Diabetic retinal exam  04/11/2022 (Originally 10/24/1980)    Potassium monitoring  04/01/2022    Creatinine monitoring  04/01/2022    Depression Screen  08/12/2022    A1C test (Diabetic or Prediabetic)  09/02/2022    DTaP/Tdap/Td vaccine (2 - Td or Tdap) 03/07/2024    Pneumococcal 0-64 years Vaccine (2 of 2 - PPSV23) 10/24/2027    Colon cancer screen colonoscopy  10/18/2029    Hepatitis C screen  Completed    HIV screen  Completed    Hepatitis A vaccine  Aged Out    Hib vaccine  Aged C/ [address]

## 2022-01-07 NOTE — PROGRESS NOTES
Subjective:    Teodoro Maurer is a 63 y.o. female with  has a past medical history of Diverticulitis, Former smoker, Former smoker, GERD (gastroesophageal reflux disease), Hyperlipidemia, Hypertension, Morbid obesity with BMI of 45.0-49.9, adult ([address] SINDI on CPAP, Sleep apnea, and Type II or unspecified type diabetes mellitus without mention of complication, not stated as uncontrolled. Family History   Problem Relation Age of Onset    High Blood Pressure Mother     Diabetes Mother    Db Leyva Cancer Father        Presented tot office today for:  Chief Complaint   Patient presents with   Db Leyva Letter for School/Work     for light duty       HPI 63year old female with past medical history of type 2 diabetes and rhonic arthritis of bilateral knees who is here for letter for work. Right knee pain  - Has primary OA  - MRI's have been completed  - Does PT although has many no shows  - Saw Dr. Jd Faustin surgery 2 days ago who recommends conservative management for now and weight loss to bring patient BMI under 40 before they can proceed right right knee replacement  - She was referred to weight management     Morbid Obesity   - Will give referral to bariatric surgery to consult with them for gastric bypass surgery    Review of Systems   Respiratory: Negative for shortness of breath. Cardiovascular: Negative for chest pain. Musculoskeletal: Positive for joint swelling. Neurological: Negative for weakness. All other systems reviewed and are negative. Objective:    /86 (Site: Right Upper Arm, Position: Sitting, Cuff Size: Large Adult)   Pulse 119   Wt 283 lb (128.4 kg)   BMI 51.76 kg/m²    BP Readings from Last 3 Encounters:   01/07/22 133/86   12/26/21 (!) 145/88   09/02/21 139/76     Physical Exam  Vitals reviewed. Constitutional:       Appearance: She is morbidly obese. Comments: Has a cane   Cardiovascular:      Rate and Rhythm: Normal rate and regular rhythm.       Heart sounds: Normal heart sounds. Pulmonary:      Effort: Pulmonary effort is normal.      Breath sounds: Normal air entry. Musculoskeletal:      Right knee: Crepitus present. Tenderness present over the patellar tendon. Left knee: Crepitus present. Tenderness present over the patellar tendon. Neurological:      Mental Status: She is alert. Psychiatric:         Behavior: Behavior is cooperative. Lab Results   Component Value Date    WBC 6.8 05/19/2020    HGB 12.4 05/19/2020    HCT 40.3 05/19/2020     05/19/2020    CHOL 124 05/19/2020    TRIG 103 05/19/2020    HDL 33 (L) 05/19/2020    ALT 17 05/19/2020    AST 17 05/19/2020     04/01/2021    K 4.2 04/01/2021     04/01/2021    CREATININE 0.91 (H) 04/01/2021    BUN 19 04/01/2021    CO2 25 04/01/2021    TSH 2.25 04/18/2018    INR 1.2 10/18/2019    LABA1C 6.8 09/02/2021    LABMICR CANNOT BE CALCULATED 11/01/2019     Lab Results   Component Value Date    CALCIUM 9.2 04/01/2021    PHOS 3.1 09/12/2011     Lab Results   Component Value Date    LDLCHOLESTEROL 70 05/19/2020       Assessment and Plan:    1. Primary osteoarthritis of right knee  - DJO Hinged Knee Brace  - Continue Naproxen as needed  - Following with Orthopedics Dr. Lazaro Zapata  - Needs to continue weight loss and reach BMI <40 for knee replacement  - Continue with PT    2. Morbid obesity with BMI of 50.0-59.9, adult [facility])  - [address] [place] [address] [place], , Weight Management and [facility]          Requested Prescriptions      No prescriptions requested or ordered in this encounter       There are no discontinued medications. Return in about 3 months (around 4/7/2022) for FU Knee pain.

## 2022-01-10 ENCOUNTER — TELEPHONE (OUTPATIENT)
Dept: BARIATRICS/WEIGHT MGMT | Age: 62
End: 2022-01-10

## 2022-01-10 ENCOUNTER — TELEPHONE (OUTPATIENT)
Dept: FAMILY MEDICINE CLINIC | Age: 62
End: 2022-01-10

## 2022-01-10 NOTE — TELEPHONE ENCOUNTER
Called patient (referral) and they would like to restart the surgical pathway. Last seen 2/26/19. Please verify insurance ([employer]).   Thank you

## 2022-01-10 NOTE — TELEPHONE ENCOUNTER
Patient called regarding her knee brace, office notes not signed, explain once completed will fax over to [facility] home medical supplies. Could you please sign office notes?

## 2022-01-11 ENCOUNTER — HOSPITAL ENCOUNTER (OUTPATIENT)
Dept: PHYSICAL THERAPY | Age: 62
Setting detail: THERAPIES SERIES
Discharge: HOME | End: 2022-01-11
Payer: MEDICARE

## 2022-01-11 PROCEDURE — 97110 THERAPEUTIC EXERCISES: CPT

## 2022-01-11 NOTE — TELEPHONE ENCOUNTER
Per one source   Patient is [place] [facility]. Eligible  3 mo con visits. LVM for patient to call and make a re-start appt.

## 2022-01-12 ENCOUNTER — HOSPITAL ENCOUNTER (OUTPATIENT)
Dept: PHYSICAL THERAPY | Age: 62
Setting detail: THERAPIES SERIES
Discharge: HOME | End: 2022-01-12
Payer: MEDICARE

## 2022-01-12 NOTE — TELEPHONE ENCOUNTER
Patient calling stating that the place given stated the script was never sent over to them - please resend information for patient

## 2022-01-12 NOTE — FLOWSHEET NOTE
[x] [address]  [address]    [phone]  F: [phone]     Physical Therapy Cancel/No Show note    Date: 2022  Patient: Rufina Tomlinson  : 1960  MRN: [id number]    Cancels/No Shows to date:     For today's appointment patient:    [x]  Cancelled    [] Rescheduled appointment    [] No-show     Reason given by patient:    []  Patient ill    []  Conflicting appointment    [x] No transportation      [] Conflict with work    [] No reason given    [] Weather related    [] P-    [] Other:      Comments:  Car trouble       [x] Next appointment was confirmed previously     Electronically signed by: Ning Durham PT

## 2022-01-18 ENCOUNTER — OFFICE VISIT (OUTPATIENT)
Dept: PAIN MANAGEMENT | Age: 62
End: 2022-01-18
Payer: MEDICARE

## 2022-01-18 ENCOUNTER — OFFICE VISIT (OUTPATIENT)
Dept: ORTHOPEDIC SURGERY | Age: 62
End: 2022-01-18
Payer: MEDICARE

## 2022-01-18 VITALS
HEIGHT: 63 IN | OXYGEN SATURATION: 98 % | HEART RATE: 83 BPM | SYSTOLIC BLOOD PRESSURE: 119 MMHG | WEIGHT: 282 LBS | DIASTOLIC BLOOD PRESSURE: 78 MMHG | BODY MASS INDEX: 49.96 KG/M2

## 2022-01-18 VITALS — BODY MASS INDEX: 52.08 KG/M2 | RESPIRATION RATE: 12 BRPM | HEIGHT: 62 IN | WEIGHT: 283 LBS

## 2022-01-18 DIAGNOSIS — M17.11 PRIMARY OSTEOARTHRITIS OF RIGHT KNEE: Primary | ICD-10-CM

## 2022-01-18 DIAGNOSIS — E66.01 MORBID OBESITY WITH BMI OF 45.0-49.9, ADULT (HCC): ICD-10-CM

## 2022-01-18 DIAGNOSIS — M17.0 PRIMARY OSTEOARTHRITIS OF BOTH KNEES: Primary | ICD-10-CM

## 2022-01-18 DIAGNOSIS — M25.561 CHRONIC PAIN OF RIGHT KNEE: ICD-10-CM

## 2022-01-18 DIAGNOSIS — G89.29 CHRONIC PAIN OF RIGHT KNEE: ICD-10-CM

## 2022-01-18 PROCEDURE — G8484 FLU IMMUNIZE NO ADMIN: HCPCS | Performed by: ANESTHESIOLOGY

## 2022-01-18 PROCEDURE — 1036F TOBACCO NON-USER: CPT | Performed by: ORTHOPAEDIC SURGERY

## 2022-01-18 PROCEDURE — G8417 CALC BMI ABV UP PARAM F/U: HCPCS | Performed by: ANESTHESIOLOGY

## 2022-01-18 PROCEDURE — 99212 OFFICE O/P EST SF 10 MIN: CPT | Performed by: ORTHOPAEDIC SURGERY

## 2022-01-18 PROCEDURE — G8427 DOCREV CUR MEDS BY ELIG CLIN: HCPCS | Performed by: ANESTHESIOLOGY

## 2022-01-18 PROCEDURE — G8427 DOCREV CUR MEDS BY ELIG CLIN: HCPCS | Performed by: ORTHOPAEDIC SURGERY

## 2022-01-18 PROCEDURE — 99244 OFF/OP CNSLTJ NEW/EST MOD 40: CPT | Performed by: ANESTHESIOLOGY

## 2022-01-18 PROCEDURE — 20610 DRAIN/INJ JOINT/BURSA W/O US: CPT | Performed by: ORTHOPAEDIC SURGERY

## 2022-01-18 PROCEDURE — G8417 CALC BMI ABV UP PARAM F/U: HCPCS | Performed by: ORTHOPAEDIC SURGERY

## 2022-01-18 PROCEDURE — G8484 FLU IMMUNIZE NO ADMIN: HCPCS | Performed by: ORTHOPAEDIC SURGERY

## 2022-01-18 PROCEDURE — 3017F COLORECTAL CA SCREEN DOC REV: CPT | Performed by: ORTHOPAEDIC SURGERY

## 2022-01-18 RX ORDER — LIDOCAINE HYDROCHLORIDE 10 MG/ML
4 INJECTION, SOLUTION INFILTRATION; PERINEURAL ONCE
Status: COMPLETED | OUTPATIENT
Start: 2022-01-18 | End: 2022-01-18

## 2022-01-18 RX ORDER — TRIAMCINOLONE ACETONIDE 40 MG/ML
40 INJECTION, SUSPENSION INTRA-ARTICULAR; INTRAMUSCULAR ONCE
Status: COMPLETED | OUTPATIENT
Start: 2022-01-18 | End: 2022-01-18

## 2022-01-18 RX ADMIN — LIDOCAINE HYDROCHLORIDE 4 ML: 10 INJECTION, SOLUTION INFILTRATION; PERINEURAL at 10:33

## 2022-01-18 RX ADMIN — TRIAMCINOLONE ACETONIDE 40 MG: 40 INJECTION, SUSPENSION INTRA-ARTICULAR; INTRAMUSCULAR at 10:34

## 2022-01-18 ASSESSMENT — ENCOUNTER SYMPTOMS
ALLERGIC/IMMUNOLOGIC NEGATIVE: 1
RESPIRATORY NEGATIVE: 1

## 2022-01-18 NOTE — PROGRESS NOTES
[address] [place] [address] AND SPORTS MEDICINE  [facility] Raina Eckert  [address] [id number]  Dept: [phone]    Ambulatory Orthopedic Consult      CHIEF COMPLAINT:    Chief Complaint   Patient presents with    Knee Pain     Bilateral       HISTORY OF PRESENT ILLNESS:      The patient is a left-hand dominant 63 y.o. female who is being seen for evaluation of pain at the above location (left anterior knee greater than left dorsal midfoot and lateral/medial hindfoot, as well as left ring finger), which began in the beginning of November 2020 atraumatically. The pain is described mainly with mechanical terms (dull/sharp/throbbing). The pain is worse with activity and better with rest. The patient reports a static course. The patient has tried:      [x]  rest/activity modification          [x]  NSAIDs      []  opiates      []  orthotics        []  change in shoes   [x]  home exercises  []  physical therapy      []  CAM boot     []  brace:    []  injection:       []  surgery:      The patient also reports that her left ring finger pain is associated with intermittent locking. INTERVAL HISTORY 2/5/2021:  She is seen again today in the office for follow up of a previous issue (as above--left knee and left ring finger pain). Since being seen last, the patient is doing worse, particularly in terms of her left knee. At today's visit, she is not using a brace or assistive device. The location and quality of the pain have not significantly changed since the last visit. INTERVAL HISTORY 4/6/2021:  She is seen again today in the office for follow up after an injection. Since being seen last, the patient is doing better. At today's visit, she is using no brace or assistive device. She received an injection of the left knee, and reports the injection helped approximately 100% in terms of pain.   She also reports that her left ring finger trigger finger is not bothering her, and reports that her foot pain has significantly improved. INTERVAL HISTORY 6/4/2021:  She is seen again today in the office for follow up of a previous issue (as above--bilateral knees). Since being seen last, the patient is doing worse. At today's visit, she is not using a brace or assistive device. History is obtained today from:   [x]  the patient     []  EMR     []  one family member/friend    []  multiple family members/friends    []  other:      INTERVAL HISTORY 7/19/2021:  She is seen again today in the office for follow up of imaging as below. Since being seen last, the patient is doing about the same overall. At today's visit, she is using no brace/assistive device. History is obtained today from:   [x]  the patient     [x]  EMR     []  one family member/friend    []  multiple family members/friends    []  other:      INTERVAL HISTORY 8/30/2021:  She is seen again today in the office for follow up of a previous issue (as above). Since being seen last, the patient is doing better. At today's visit, she is not using a brace or assistive device. History is obtained today from:   [x]  the patient     []  EMR     []  one family member/friend    []  multiple family members/friends    []  other:      INTERVAL HISTORY 11/18/2021:  She is seen again today in the office for follow up of a previous issue (as above). Since being seen last, the patient is doing about the same overall. At today's visit, she is using a removable brace. History is obtained today from:   [x]  the patient     []  EMR     []  one family member/friend    []  multiple family members/friends    []  other:      INTERVAL HISTORY 12/7/2021:  She is seen again today in the office for follow up of imaging as below. Since being seen last, the patient is doing about the same overall. At today's visit, she is using a brace/boot.     History is obtained today from:   [x]  the patient     [x]  EMR     []  one family member/friend    []  multiple family members/friends    []  other:      INTERVAL HISTORY 1/18/2022:  She is seen again today in the office for follow up of a previous issue (as above). Since being seen last, the patient is doing worse. At today's visit, she is using a removable brace. History is obtained today from:   [x]  the patient     []  EMR     []  one family member/friend    []  multiple family members/friends    []  other:      Since her last visit, the patient has seen Dr. Shawn Shannon, who referred her to weight management to decrease her BMI prior to proceeding with surgery. REVIEW OF SYSTEMS:  Musculoskeletal: See HPI for pertinent positives     Past Medical History:    She  has a past medical history of Diverticulitis, Former smoker, Former smoker, GERD (gastroesophageal reflux disease), Hyperlipidemia, Hypertension, Morbid obesity with BMI of 45.0-49.9, adult ([facility] [address] SINDI on CPAP, Sleep apnea, and Type II or unspecified type diabetes mellitus without mention of complication, not stated as uncontrolled. Past Surgical History:    She  has a past surgical history that includes Hysterectomy; Cholecystectomy; Colonoscopy; pr egd transoral biopsy single/multiple (7/19/2017); Upper gastrointestinal endoscopy (N/A, 10/18/2019); and Colonoscopy (N/A, 10/18/2019).      Current Medications:     Current Outpatient Medications:     Handicap Placard MISC, by Does not apply route Temporary 3 months Starting 11/22/2021 Expires 02/22/2022, Disp: 1 each, Rfl: 0    lisinopril (PRINIVIL;ZESTRIL) 10 MG tablet, take 1 tablet by mouth once daily, Disp: 30 tablet, Rfl: 3    atorvastatin (LIPITOR) 40 MG tablet, Take 1 tablet by mouth daily, Disp: 30 tablet, Rfl: 3    diclofenac sodium (VOLTAREN) 1 % GEL, Apply 2 g topically 2 times daily, Disp: 350 g, Rfl: 0    hydroCHLOROthiazide (HYDRODIURIL) 25 MG tablet, take 1 tablet by mouth once daily, Disp: 30 tablet, Rfl: 3    metFORMIN (GLUCOPHAGE) 500 MG tablet, take 1 tablet by mouth every morning with breakfast, Disp: 30 tablet, Rfl: 2    betamethasone valerate (VALISONE) 0.1 % cream, apply to affected area twice a day, Disp: , Rfl:     diclofenac sodium (VOLTAREN) 1 % GEL, Apply 2 g topically 2 times daily, Disp: 100 g, Rfl: 0    naproxen (NAPROSYN) 500 MG tablet, take 1 tablet by mouth twice a day if needed for pain, Disp: 60 tablet, Rfl: 0    FEROSUL 325 (65 Fe) MG tablet, take 1 tablet by mouth twice a day before meals, Disp: , Rfl:     ferrous sulfate (FE TABS 325) 325 (65 Fe) MG EC tablet, Take 1 tablet by mouth 2 times daily (before meals), Disp: 90 tablet, Rfl: 3    melatonin 1 MG tablet, Take 1 tablet by mouth nightly as needed for Sleep, Disp: 30 tablet, Rfl: 2    Elastic Bandages & Supports (ACE ELBOW BRACE LARGE/X-LARGE) MISC, 1 Brace as need for elbow pain, Disp: 1 each, Rfl: 0    aspirin 81 MG tablet, Take 81 mg by mouth daily, Disp: , Rfl:      Allergies:    Patient has no known allergies. Family History:  family history includes Cancer in her father; Diabetes in her mother; High Blood Pressure in her mother.     Social History:   Social History     Occupational History    Not on file   Tobacco Use    Smoking status: Former Smoker     Packs/day: 0.25     Years: 7.00     Pack years: 1.75     Start date: 1984     Quit date: 1991     Years since quittin.0    Smokeless tobacco: Never Used   Vaping Use    Vaping Use: Never used   Substance and Sexual Activity    Alcohol use: Not Currently    Drug use: No    Sexual activity: Not on file     Occupation: Works as a      OBJECTIVE:  Resp 12   Ht 5' 2\" (1.575 m)   Wt 283 lb (128.4 kg)   BMI 51.76 kg/m²    Psych: alert and oriented to person, time, and place  Cardio:  well perfused extremities  Resp:  normal respiratory effort    Musculoskeletal:    RLE:    Grossly normal range of motion of hip, slightly decreased flexion/extension of the knee  Painless range of motion of hip, but pain on extremes of knee motion  Stable exam of hip/knee  Skin intact without erythema/warmth  Grossly neurovascularly intact distally in the lower extremity  Appropriate strength on manual muscle testing of the iliopsoas, hamstrings, quadriceps, tibialis anterior, gastroc-soleus complex  Tenderness:  anteromedial and anterolateral knee joint line   -Kirk's test positive for pain      LLE:    Grossly normal range of motion of hip, slightly decreased flexion extension of the knee  Painless range of motion of hip, but pain on extremes of knee motion  Stable exam of hip/knee  Skin intact without erythema/warmth  Grossly neurovascularly intact distally in the lower extremity  Appropriate strength on manual muscle testing of the iliopsoas, hamstrings, quadriceps, tibialis anterior, gastroc-soleus complex  Tenderness:  anteromedial greater than anterolateral knee joint line  -Kirk's test positive for pain     -Previous exam: Tenderness diffusely throughout the midfoot, sinus Tarsi, and along the course the posterior tibial tendon      (Previous exam)  Affected upper extremity (left):  Grossly normal range of motion of wrist/fingers  Painless range of motion of wrist/fingers except for ring finger  Stable exam of wrist/hand  Skin intact without erythema/warmth   Grossly neurovascularly intact distally throughout able to fire EPL/FPL/IO/EDC/FDP  Tenderness to palpation:  A1 pulley of the ring finger--improved  -Durkan's compression test negative  -triggering elicited at the Ring finger       RADIOLOGY:   1/18/2022 No new radiology images today. Prior images reviewed for reference. MRI images and radiology report reviewed, as below:     -Right knee MRI  1. Degeneration and slight outward subluxation of the medial meniscus body. No evidence of meniscal tearing.    2. Mild to moderate tricompartmental osteoarthrosis.  Mild-to-moderate medial   and patellofemoral chondromalacia.  Subchondral irregularity of the lateral   femoral trochlea.  Grade 3 chondromalacia of the posterior nonweightbearing   femoral condyles with subcortical cystic changes and subchondral reactive   marrow edema at the posterior nonweightbearing medial femoral condyle. 3. Small joint effusion.  Trace Tovar's cyst.           MRI images and radiology report reviewed, as below:     -Left knee MRI  1. Tearing of the root ligament of the posterior horn medial meniscus. Outward extrusion of the medial meniscus body.  Degeneration of the posterior   horn and body of the medial meniscus.  Reactive marrow edema at the outer   medial tibial plateau. 2. Possible grade 1 MCL sprain. 3. Small joint effusion. 4. Tricompartmental osteoarthrosis.  Severe medial chondromalacia.  Focal   severe chondromalacia at the posterior weight-bearing lateral tibial plateau. 5. Mild-to-moderate lateral femoral trochlear chondromalacia with underlying   subcortical cystic change. 6. Small Baker's cyst.  Mild-to-moderate edema in the subcutaneous fat about   the knee. FINDINGS:  Four weightbearing views (AP, Tunnel, Lateral, and Banquete) of the bilateral knee were obtained in the office today and reviewed, revealing no acute fracture, dislocation, or radioopaque foreign body/tumor. Overall alignment is satisfactory. Tricompartmental degenerative changes of the knee with joint narrowing, sclerosis, and osteophytes. IMPRESSION: No acute fracture/dislocation. Degenerative changes as above.      Electronically signed by Mini Camacho MD        FINDINGS:  Three weightbearing views (AP, Mortise, and Lateral) of the left ankle and three weightbearing views (AP, Oblique, Lateral) of the left foot and four weightbearing views (AP, Lateral, Tunnel and Sunrise) of the left knee and 3 views (AP, oblique, lateral) of the left hand were obtained in the office today and reviewed, revealing no acute fracture, dislocation, or radioopaque foreign body/tumor. The ankle mortise is maintained with no widening of the clear spaces. Overall alignment is satisfactory. Tricompartmental degenerative changes of the knee with joint space narrowing, sclerosis, and osteophytes. Degenerative changes at the subtalar joint, as well as mildly diffusely throughout the midfoot. IMPRESSION:  No acute fracture/dislocation. Generative changes as above. Electronically signed by Lio Mccall MD        Xr Knee Left (3 Views)    Result Date: 11/25/2020  EXAMINATION: THREE X-RAY VIEWS OF THE LEFT KNEE 11/25/2020 10:31 am COMPARISON: 03/03/2010 HISTORY: ORDERING SYSTEM PROVIDED HISTORY: Pain TECHNOLOGIST PROVIDED HISTORY: Pain Acuity: Acute Type of Exam: Unknown FINDINGS: No evidence of acute fracture or dislocation. Similar small benign-appearing soft tissue calcifications in the proximal and posterolateral leg. Radiographically there is no significant joint effusion. Progression of mild to moderate degenerative changes involving primarily the medial compartment with joint space loss and osteophytes. Lateral view is obtained in a slight degree of obliquity. No acute osseous abnormality. Mild-to-moderate degenerative changes have progressed since the prior study. ASSESSMENT AND PLAN:  Body mass index is 51.76 kg/m². She has a history of bilateral knee pain, secondary to bilateral tricompartmental osteoarthritis, on the left with severe medial chondromalacia and a medial meniscal posterior root tear. For the left knee, a corticosteroid injection on 2/5/2021 helped her pain approximately 100%, a corticosteroid injection on 7/19/2021 helped her pain approximately 75%, a Monovisc injection on 12/7/2021 helped her pain approximately 50%. For the right knee, a corticosteroid injection on 8/30/2021 helped her pain approximately 25%, a Monovisc injection on 12/7/2021 helped her pain approximately 25%.   She is here today reporting that her right knee pain is significantly worse than her left, and is bothering her more. She also has a history of left foot pain secondary multiple issues--she has a component of posterior tibial tendinitis along with some degenerative changes of her subtalar joint and mildly diffusely throughout the midfoot. She is doing well with conservative management. She also has a trigger finger of her left ring finger. She is doing well with conservative management. Notably, she has a somewhat complex past medical history. She has a history of non-insulin-dependent diabetes. We had a discussion today about the likely diagnosis and its natural history, physical exam and imaging findings, as well as various treatment options in detail. Surgically, we have discussed a possible total knee replacement; she has met with Dr. Sy Foley, and he is recommended that her BMI get below 40 prior to proceeding with any surgical, and we discussed this at today's visit. Orders/referrals were placed as below at today's visit. She may continue to use her prescribed topical Voltaren gel, and will avoid oral NSAIDs due to her history of GI bleeding. She may continue home exercises/physical therapy. She also will follow-up with pain management, and reports that she has an appointment today. I also recommended that she continue to follow-up with weight management, but she reports that she is waiting on a call back. -After discussing her treatment options, she requested to proceed with a right knee corticosteroid injection as below. She has previously been provided information had obtained over-the-counter flatfoot style orthotic but has not obtained this. All questions were answered and the above plan was agreed upon. The patient will return to clinic in the future as needed without x-rays. At her next visit, we may consider repeat bilateral knee Monovisc versus corticosteroid injections.             KNEE INJECTION PROCEDURE NOTE: After discussing the risks/benefits/alternatives to injection, an informed consent was obtained. The right knee was verified as the correct location and allergies were reviewed. The skin overlying the injection site was cleaned with an alcohol swab followed by a local sterile prep. A 22 gauge needle was introduced into the above location under sterile conditions. A mixture of 40 mg of Kenalog and 4 mL of 1% Lidocaine without epinephrine was injected. The patient was noted to tolerate the procedure well without immediate complication. A dressing was applied and verbal instruction/education was provided. At the patient's next visit, depending on how the patient is doing and/or new imaging/labs results, we may consider the following options:    []  Orthotic (OTC)     []  Orthotic (custom)          []  Rocker bottom shoes     []  Brace (OTC)        []  Brace (custom)             []  CAM boot        []  Night splint         []  Heel cups        []  Strap      []  Toe sleeves/splints    []  PT:                     []  Wean out of immobilization   []  Advance activity       []  Topical               []  NSAIDs          []  [place]         []  Referral:         []  Stress xrays       []  CT         []  MRI        []  Injection:         []  Consider OR      []  Pick OR date    No follow-ups on file. No orders of the defined types were placed in this encounter. No orders of the defined types were placed in this encounter. Dianne Smiley MD  Orthopedic Surgery        Please excuse any typos/errors, as this note was created with the assistance of voice recognition software. While intending to generate a document that actually reflects the content of the visit, the document can still have some errors including those of syntax and sound-a-like substitutions which may escape proof reading. In such instances, actual meaning can be extrapolated by context.

## 2022-01-18 NOTE — PROGRESS NOTES
The patient is a 63 y. o. Non- / non  female. Chief Complaint   Patient presents with    Consultation    Knee Pain        HPI   Requesting physician for the evaluation of William Shields 1960: Hai Toribio MD    Pain History  72-year-old woman with past medical history significant for morbid obesity BMI 50  Referred for chronic knee pain  Pain involved both knees right more than left  She describes the pain as constant aching sharp sensation  Pain aggravated the standing and walking and she has been ambulating with a cane  Nothing seems to alleviate the pain  Pain is constant intensity fluctuate between 6-9 over 10  No associated dermatomal numbness or paresthesia  No previous knee surgical history    She has been seeing orthopedic surgeon Dr. Latonia Robles and Dr. Grayson Gómez  Reported history of viscosupplementation that did not help  Recently had knee cortisone injection and have not noticed any relief yet    Have been doing physical therapy  She is offered knee arthroplasty surgery once she go through weight management to reduce her BMI    She has tried NSAIDs in past  Pain is affecting quality of life  Pain score today 6  1. Location: B/L knees  2. Radiation:no  3. Character:aching stabbing burning  5. Duration:1 yr  10. Onset: 10/21  7. Did an injury cause pain:no  8. Aggravating factors: sitting standing & walking   9. Alleviating factors:no  10. Associated symptoms (numbness / tingling / weakness):  yes  -Where at: fingers  -Down into finger tips or toes (specify which finger or toes):left   -constant or intermitting: intermittent  11. Red Flags: (weight loss / chills / loss of bladder or bowel control):no    Previous management history  1. Previous diagnostic workup: (Imaging/EMG)   CT, MRI, or Xray: MRI b/l knees   What part of the body b/l knees   [facility]   What year or specific date:2 mths ago & 6 mths   EMG:  no    2.  Previous non interventional treatments tried:  chiropractor or physical therapy:physical therapy b/l knees   What part of the body: knees   What facility was it done at: [facility]  How long ago was it last tried:in pt the past 5 months   Did it work: yes   Did they complete it:no     3. Previous Medications tried  NSAID's:yes   Neurontin: no   Lyrica: no  Trycyclic antidepressant (Ellavil / Pamelor ):  No  Cymbalta:no  Opioids (Ultram / Vicodin / Percocet / Morphine / Dilaudid / Oramorph/ Fentanyl etc.):Norco  Last Pain medication taken Norco- 1/17/22    4. Previous Interventional pain procedures tried:  What kind of injection:yes -1/18/22  Who did the injection: Dr Edmar Fields  did the injection help: no  Last time injection was done:1/18/22    5.  Previous surgeries for pain  What part of the body did they have the surgery:no  What physician did the surgery:N/A  What Facility did they have the surgery done:N/A  Date of Surgery:N/A  Social History    Marital status:single   Employment History: [employer]  Working  Yes    Full time Or Part time: part time   Disability yes   Legal Issues related to pain complaint:No     Pain Disability Index score : 6    Lab Results   Component Value Date    LABA1C 6.8 09/02/2021     Lab Results   Component Value Date     04/18/2018         Informant: patient        Past Medical History:   Diagnosis Date    Diverticulitis     2 times    Former smoker     Former smoker     GERD (gastroesophageal reflux disease)     Hyperlipidemia     Hypertension     Morbid obesity with BMI of 45.0-49.9, adult ([address]     SINDI on CPAP     Sleep apnea     SINDI on CPAP    Type II or unspecified type diabetes mellitus without mention of complication, not stated as uncontrolled         Past Surgical History:   Procedure Laterality Date    CHOLECYSTECTOMY      COLONOSCOPY      COLONOSCOPY N/A 10/18/2019    COLONOSCOPY WITH BIOPSY performed by Sameer Sheikh MD at [address] [place] [address] EGD TRANSORAL BIOPSY SINGLE/MULTIPLE  2017    EGD BIOPSY performed by Gregor Kingston MD at [facility] N/A 10/18/2019    EGD DIAGNOSTIC ONLY performed by Mary Ellis MD at [facility]       Social History     Socioeconomic History    Marital status: Single     Spouse name: None    Number of children: None    Years of education: None    Highest education level: None   Occupational History    None   Tobacco Use    Smoking status: Former Smoker     Packs/day: 0.25     Years: 7.00     Pack years: 1.75     Start date: 1984     Quit date: 1991     Years since quittin.0    Smokeless tobacco: Never Used   Vaping Use    Vaping Use: Never used   Substance and Sexual Activity    Alcohol use: Not Currently    Drug use: No    Sexual activity: None   Other Topics Concern    None   Social History Narrative    None     Social Determinants of Health     Financial Resource Strain:     Difficulty of Paying Living Expenses: Not on file   Food Insecurity:     Worried About Running Out of Food in the Last Year: Not on file    Torri of Food in the Last Year: Not on file   Transportation Needs:     Lack of Transportation (Medical): Not on file    Lack of Transportation (Non-Medical):  Not on file   Physical Activity:     Days of Exercise per Week: Not on file    Minutes of Exercise per Session: Not on file   Stress:     Feeling of Stress : Not on file   Social Connections:     Frequency of Communication with Friends and Family: Not on file    Frequency of Social Gatherings with Friends and Family: Not on file    Attends Mosque Services: Not on file    Active Member of Clubs or Organizations: Not on file    Attends Club or Organization Meetings: Not on file    Marital Status: Not on file   Intimate Partner Violence:     Fear of Current or Ex-Partner: Not on file    Emotionally Abused: Not on file    Physically Abused: Not on file    Sexually Abused: Not on file   Housing Stability:     Unable to Pay for Housing in the Last Year: Not on file    Number of Places Lived in the Last Year: Not on file    Unstable Housing in the Last Year: Not on file       Family History   Problem Relation Age of Onset    High Blood Pressure Mother     Diabetes Mother     Cancer Father        No Known Allergies    Vitals:    01/18/22 1118   BP: 119/78   Pulse: 83   SpO2: 98%       Current Outpatient Medications   Medication Sig Dispense Refill    Tens Unit MISC by Does not apply route 1 each 0    Handicap Placard MISC by Does not apply route Temporary 3 months Starting 11/22/2021 Expires 02/22/2022 1 each 0    lisinopril (PRINIVIL;ZESTRIL) 10 MG tablet take 1 tablet by mouth once daily 30 tablet 3    atorvastatin (LIPITOR) 40 MG tablet Take 1 tablet by mouth daily 30 tablet 3    diclofenac sodium (VOLTAREN) 1 % GEL Apply 2 g topically 2 times daily 350 g 0    hydroCHLOROthiazide (HYDRODIURIL) 25 MG tablet take 1 tablet by mouth once daily 30 tablet 3    metFORMIN (GLUCOPHAGE) 500 MG tablet take 1 tablet by mouth every morning with breakfast 30 tablet 2    betamethasone valerate (VALISONE) 0.1 % cream apply to affected area twice a day      diclofenac sodium (VOLTAREN) 1 % GEL Apply 2 g topically 2 times daily 100 g 0    naproxen (NAPROSYN) 500 MG tablet take 1 tablet by mouth twice a day if needed for pain 60 tablet 0    FEROSUL 325 (65 Fe) MG tablet take 1 tablet by mouth twice a day before meals      ferrous sulfate (FE TABS 325) 325 (65 Fe) MG EC tablet Take 1 tablet by mouth 2 times daily (before meals) 90 tablet 3    melatonin 1 MG tablet Take 1 tablet by mouth nightly as needed for Sleep 30 tablet 2    Elastic Bandages & Supports (ACE ELBOW BRACE LARGE/X-LARGE) MISC 1 Brace as need for elbow pain 1 each 0    aspirin 81 MG tablet Take 81 mg by mouth daily       No current facility-administered medications for this visit.        Review of Systems   Constitutional: Positive for activity change. Negative for fever. HENT: Negative. Respiratory: Negative. Cardiovascular: Negative. Genitourinary: Negative. Musculoskeletal: Positive for arthralgias and joint swelling. Allergic/Immunologic: Negative. Neurological: Negative. Hematological: Negative. Psychiatric/Behavioral: Negative. Objective:  General Appearance:  Uncomfortable, in pain, well-appearing and in no acute distress. Vital signs: (most recent): Blood pressure 119/78, pulse 83, height 5' 3\" (1.6 m), weight 282 lb (127.9 kg), SpO2 98 %. Vital signs are normal.  No fever. Output: Producing urine and producing stool. HEENT: Normal HEENT exam.    Lungs:  Normal effort and normal respiratory rate. She is not in respiratory distress. Heart: Normal rate. Extremities: Decreased range of motion. There is deformity. Neurological: Patient is alert and oriented to person, place and time. Normal strength. Patient has normal reflexes, normal muscle tone and normal coordination. Pupils:  Pupils are equal, round, and reactive to light. Pupils are equal.   Skin:  Warm and dry. No rash or cyanosis.      Examination of the knee joint  Tenderness to palpation along the joint margin on both knees right more than left  Right knee is in a brace  Ambulating with a cane  Range of motion is decreased  Gait is antalgic  Assessment & Plan   Pain History  74-year-old woman with past medical history significant for morbid obesity BMI 50  Referred for chronic knee pain  Pain involved both knees right more than left  She describes the pain as constant aching sharp sensation  Pain aggravated the standing and walking and she has been ambulating with a cane  Nothing seems to alleviate the pain  Pain is constant intensity fluctuate between 6-9 over 10  No associated dermatomal numbness or paresthesia  No previous knee surgical history    She has been seeing orthopedic surgeon Dr. Sy Foley and

## 2022-01-19 ENCOUNTER — HOSPITAL ENCOUNTER (OUTPATIENT)
Dept: PHYSICAL THERAPY | Age: 62
Setting detail: THERAPIES SERIES
Discharge: HOME | End: 2022-01-19
Payer: MEDICARE

## 2022-01-19 DIAGNOSIS — R73.03 PREDIABETES: ICD-10-CM

## 2022-01-19 PROCEDURE — 97110 THERAPEUTIC EXERCISES: CPT

## 2022-01-19 NOTE — TELEPHONE ENCOUNTER
Last visit: 01/07/22  Last Med refill: 08/30/21  Does patient have enough medication for 72 hours: No:     Next Visit Date:  Future Appointments   Date Time Provider Rico Ayala   1/19/2022  2:00 PM Ned Goldmann, PTA [facility]   1/21/2022  3:00 PM Tabitha Church, PTA [facility] Maintenance   Topic Date Due    Hepatitis B vaccine (1 of 3 - Risk 3-dose series) Never done    Shingles Vaccine (1 of 2) Never done    Breast cancer screen  01/12/2021    COVID-19 Vaccine (2 - Pfizer 3-dose series) 02/10/2021    Lipid screen  05/19/2021    Flu vaccine (1) 09/01/2021    Diabetic foot exam  04/01/2022 (Originally 12/16/2017)    Diabetic microalbuminuria test  04/01/2022 (Originally 11/1/2020)    Diabetic retinal exam  04/11/2022 (Originally 10/24/1980)    Potassium monitoring  04/01/2022    Creatinine monitoring  04/01/2022    Depression Screen  08/12/2022    A1C test (Diabetic or Prediabetic)  09/02/2022    DTaP/Tdap/Td vaccine (2 - Td or Tdap) 03/07/2024    Pneumococcal 0-64 years Vaccine (2 of 2 - PPSV23) 10/24/2027    Colon cancer screen colonoscopy  10/18/2029    Hepatitis C screen  Completed    HIV screen  Completed    Hepatitis A vaccine  Aged Out    Hib vaccine  Aged Out    Meningococcal (ACWY) vaccine  Aged Out       Hemoglobin A1C (%)   Date Value   09/02/2021 6.8   04/01/2021 6.5   12/20/2019 6.3             ( goal A1C is < 7)   Microalb/Crt.  Ratio (mcg/mg creat)   Date Value   11/01/2019 CANNOT BE CALCULATED     LDL Cholesterol (mg/dL)   Date Value   05/19/2020 70   11/01/2019 97       (goal LDL is <100)   AST (U/L)   Date Value   05/19/2020 17     ALT (U/L)   Date Value   05/19/2020 17     BUN (mg/dL)   Date Value   04/01/2021 19     BP Readings from Last 3 Encounters:   01/18/22 119/78   01/07/22 133/86   12/26/21 (!) 145/88          (goal 120/80)    All Future Testing planned in [facility]  Lab Frequency Next Occurrence   GERTRUDIS Digital Screen Bilateral [[id number]] Once 04/01/2022   Culture, Urine Once 04/01/2022               Patient Active Problem List:     Gastroesophageal reflux disease     Hyperlipidemia with target LDL less than 100     HTN (hypertension)     Numbness and tingling     Diverticula, colon     Diverticulitis, acute     Eczema     Lump of left breast     Shoulder pain     Right groin pain     Tinea corporis     Hyperlipidemia     Morbid obesity with BMI of 45.0-49.9, adult (HCC)     SINDI (obstructive sleep apnea)     Vitamin D deficiency     Melena     Chest pain     Abnormal stress test     Contact blepharoconjunctivitis of left eye     Prediabetes     Need for prophylactic vaccination and inoculation against varicella     Acute cystitis without hematuria     Primary osteoarthritis of right knee         Please address the medication refill and close the encounter. If I can be of assistance, please route to the applicable pool. Thank you.

## 2022-01-19 NOTE — FLOWSHEET NOTE
[x] [facility]  [address]  [phone]  F: [phone]     Physical Therapy Daily Treatment Note    Date:  2022  Patient Name:  Freddie Todd      :  1960    MRN: [id number]  Physician: Mikel Lucero MD                 Insurance: [employer] 24 visits remaining   Medical Diagnosis: Primary osteoarthritis of both knees M17.0                   Rehab Codes: M25.561, M25.562, M25.662, M25.661, R26.9  Onset Date:  21                                Next 's appt: ?  Visit# / total visits:    Cancels/No Shows:     Subjective:    Pain:  [x] Yes  [] No Location: B knees   Pain Rating: (0-10 scale) 4/10 L knee; 5-6/10 R knee  Pain altered Tx:  [x] No  [] Yes  Action:     Comments: Patient arrives noting she received gel injection in the R knee yesterday. Notes a lot of stiffness and tightness in both knees. Objective:  Modalities: hot pack (cervical) to right knee seated with stool under legs for 10 minutes after exercises.   Precautions:  Exercises:  Exercise Reps/ Time Weight/ Level Comments   Seated      Heel Slides on Towel 10x ea  5x    Last 5x with OP   LAQ   15x ea     Iso hip adduction 15x3\"  Added    Clamshells  2x10  Lime  Added    Calf raise             Supine   Head of bed elevated    Quad Isometric 20x ea  Soft bolster under R knee   SLR 7xL  Unable to complete   10x  added   Heel slides    Patient declined   SAQs 10x ea  added               Standing      Gastroc stretch  3x30\"     Heel Raises   10x      Hip Abduction  10x  B    Hip Extension  10x2 B Inc reps 1.19   Mini March   10x2 B Inc reps 1.19   HS curls 10x B    Other:      Treatment Charges: Mins Units   [x]  Modalities - HP 10 0   [x]  Ther Exercise 55 4   []  Manual Therapy     []  Ther Activities     []  Aquatics     []  Vasocompression     []  Other     Total Treatment time  55  mins 4       Assessment: [x] Progressing toward goals. Patient arrived using SPC with no knee flexion during swing thru phase. Increased reps of marches and hip extension for muscular endurance and LE strengthening. Fair tolerance of standing exercises with sit down breaks to fatigue and pain. [] No change:      [x] Other:  Discussed the benefits of aquatic therapy due to poor tolerance of land therapy with need to decrease BMI without limits of knee pain. [x] Patient will benefit form physical therapy to improve B knee ROM, strength, and tolerance to WB activity    STG: (to be met in 8 treatments)  1. ? Pain: 2/10 B knees with standing activity. 2. ? ROM: B knee flexion 0-120 degrees to improve function. 3. ? Strength: 4+/5 B knee flexion and extension to improve knee stability. 4. ? Function:LEFS to 50% functional to improve ADLs. 5. Independent with Home Exercise Programs     LTG: (to be met in 12 treatments)  1. Patient is able to ambulate > one hour without significant knee pain. 2. Patient reports decreased knee pain when working.        Pt. Education:  [x] Yes  [] No  [x] Reviewed Prior HEP/Ed  Method of Education: [x] Verbal  [x] Demo  [] Written  Comprehension of Education:  [x] Verbalizes understanding. [] Demonstrates understanding. [x] Needs review. [] Demonstrates/verbalizes HEP/Ed previously given. Plan: [x] Continue current frequency toward long and short term goals.    [x]  Frequency:  2 x/week for 12 visits    [x] Specific Instructions for subsequent treatments: B knee and hip strengthening and ROM      Time In: 1350              Time Out: 1500      Electronically signed by:  Mike Hilliard PTA

## 2022-01-21 ENCOUNTER — HOSPITAL ENCOUNTER (OUTPATIENT)
Dept: PHYSICAL THERAPY | Age: 62
Setting detail: THERAPIES SERIES
Discharge: HOME | End: 2022-01-21
Payer: MEDICARE

## 2022-01-21 ENCOUNTER — TELEPHONE (OUTPATIENT)
Dept: ORTHOPEDIC SURGERY | Age: 62
End: 2022-01-21

## 2022-01-21 DIAGNOSIS — M17.0 PRIMARY OSTEOARTHRITIS OF BOTH KNEES: Primary | ICD-10-CM

## 2022-01-21 NOTE — FLOWSHEET NOTE
[x] [facility]  [address]    [phone]  F: [phone]     Physical Therapy Cancel/No Show note    Date: 2022  Patient: Ladarius Vaz  : 1960  MRN: [id number]    Cancels/No Shows to date:     For today's appointment patient:    [x]  Cancelled    [] Rescheduled appointment    [] No-show     Reason given by patient:    []  Patient ill    []  Conflicting appointment    [] No transportation      [] Conflict with work    [] No reason given    [] Weather related    [] COVID-19    [] Other:      Comments:  Patient states in too much pain and is inquiring about aquatics.       [x] Next appointment was confirmed PREVIOUSLY      Electronically signed by: Samuel Castillo PTA

## 2022-01-28 ENCOUNTER — HOSPITAL ENCOUNTER (OUTPATIENT)
Dept: PHYSICAL THERAPY | Age: 62
Setting detail: THERAPIES SERIES
Discharge: HOME | End: 2022-01-28
Payer: MEDICARE

## 2022-01-28 PROCEDURE — 97110 THERAPEUTIC EXERCISES: CPT

## 2022-01-28 NOTE — FLOWSHEET NOTE
[x] [facility]  [address]  [phone]  F: [phone]     Physical Therapy Daily Treatment Note    Date:  2022  Patient Name:  Rakesh Orellana      :  1960    MRN: [id number]  Physician: Ani Maher MD                 Insurance: [employer] 24 visits remaining   Medical Diagnosis: Primary osteoarthritis of both knees M17.0                   Rehab Codes: M25.561, M25.562, M25.662, M25.661, R26.9  Onset Date:  21                                Next 's appt: ?  Visit# / total visits:    Cancels/No Shows: 3/2    Subjective:    Pain:  [x] Yes  [] No Location: B knees   Pain Rating: (0-10 scale) stiff/10 L knee; 6/10 R knee  Pain altered Tx:  [x] No  [] Yes  Action:     Comments:   Patient reports some improvement since coming to therapy. Plans to transfer to [facility] for next week's juan't. Objective:  Modalities: hot pack (cervical) to right knee seated with stool under legs for 10 minutes after exercises.   Precautions:  Exercises:  Exercise Reps/ Time Weight/ Level Comments   Seated      Heel Slides on Towel 10x ea  5x    Last 5x with OP   LAQ   15x ea     Iso hip adduction 15x3\"     Clamshells  2x10  Lime     Toe raise  20x           Supine   Head of bed elevated    Quad Isometric 20x ea  Soft bolster under R knee   SLR 7xL  Unable to complete R    March  10x  added   Heel slides    Patient declined   [facility] 12 reps 1.28   Walk ins 5x L  1x R AROM  AA R leg locked up         Standing      Gastroc stretch  3x30\"     Heel Raises   10x      Hip Abduction  3x  L L only inc pain   Hip Extension  10x2 B Inc reps 1.19   Mini March   10x2 B Inc reps 1.19   HS curls 10x B    Other:      Treatment Charges: Mins Units   [x]  Modalities - HP     [x]  Ther Exercise 47 3   []  Manual Therapy     []  Ther Activities     []  Aquatics     [x]  Vasocompression 3 0   []  Other     Total Treatment time   mins Assessment: [x] Progressing toward goals. Held NuStep due to late arrival.  Patient arrived with R knee brace improperly placed locking the knee into extension. Doffed brace for exercises. Poor tolerance of standing exercises due to pain and instability. Vaso compression ended earlier due to patient's request.       [] No change:      [x] Other: To be transferred to [facility]  [x] Patient will benefit form physical therapy to improve B knee ROM, strength, and tolerance to WB activity    STG: (to be met in 8 treatments)  1. ? Pain: 2/10 B knees with standing activity. 2. ? ROM: B knee flexion 0-120 degrees to improve function. 3. ? Strength: 4+/5 B knee flexion and extension to improve knee stability. 4. ? Function:LEFS to 50% functional to improve ADLs. 5. Independent with Home Exercise Programs     LTG: (to be met in 12 treatments)  1. Patient is able to ambulate > one hour without significant knee pain. 2. Patient reports decreased knee pain when working.        Pt. Education:  [x] Yes  [] No  [x] Reviewed Prior HEP/Ed  Method of Education: [x] Verbal  [x] Demo  [] Written  Comprehension of Education:  [x] Verbalizes understanding. [] Demonstrates understanding. [x] Needs review. Limited due to pain  [] Demonstrates/verbalizes HEP/Ed previously given. Plan: [x] Continue current frequency toward long and short term goals.    [x]  Frequency:  2 x/week for 12 visits    [x] Specific Instructions for subsequent treatments: B knee and hip strengthening and ROM      Time In: 8843              Time Out: 8191      Electronically signed by:  Ritu Noble PTA

## 2022-01-31 NOTE — CARE COORDINATION
[x] [facility]  [address]  [phone]  F: [phone] [] [address] [facility] [address]   [address]  P: [phone]  F: [phone] [] [address] [facility]  Therapy  [address] [facility]  P: [phone]  F: [phone] [] [address]  [facility]   [address]   [place]: [phone]  F: [phone]     THERAPY RESPONSIBILITY OF CARE TRANSFER FORM       PATIENT NAME: Colin Parikh  MRN: [id number]   : 1960      TRANSFERRING FACILITY:    [] [facility]   [] [facility]   []  [facility]   [] [facility]   [] [facility]   [] Gen[facility] Beltran   [x] [facility]  [] S[facility] Smith   [] Other:       ACCEPTING FACILITY   [] [facility]   [x] [facility]   []  [facility]   [] [facility]   [] [facility]   [] Gay[facility] Beltran   [] [facility]  [] S[facility] Smith   [] Other:          REASON FOR TRANSFER: Aquatics      TRANSFER OF CARE:    I am transferring the care of the above patient to: Damian Weir, FERNANDO Lucero, PT  2022      ACCEPTANCE OF CARE:     I am accepting the care of the above patient.  Damian Weir, PT

## 2022-02-11 ENCOUNTER — OFFICE VISIT (OUTPATIENT)
Dept: FAMILY MEDICINE CLINIC | Age: 62
End: 2022-02-11
Payer: MEDICARE

## 2022-02-11 ENCOUNTER — HOSPITAL ENCOUNTER (OUTPATIENT)
Age: 62
Setting detail: SPECIMEN
Discharge: HOME | End: 2022-02-11

## 2022-02-11 VITALS
SYSTOLIC BLOOD PRESSURE: 111 MMHG | TEMPERATURE: 96.6 F | WEIGHT: 277.4 LBS | HEIGHT: 63 IN | DIASTOLIC BLOOD PRESSURE: 72 MMHG | BODY MASS INDEX: 49.15 KG/M2 | HEART RATE: 104 BPM

## 2022-02-11 DIAGNOSIS — E61.1 IRON DEFICIENCY: ICD-10-CM

## 2022-02-11 DIAGNOSIS — Z13.220 SCREENING FOR HYPERLIPIDEMIA: ICD-10-CM

## 2022-02-11 DIAGNOSIS — Z12.31 ENCOUNTER FOR SCREENING MAMMOGRAM FOR BREAST CANCER: ICD-10-CM

## 2022-02-11 DIAGNOSIS — E66.01 MORBID OBESITY WITH BMI OF 45.0-49.9, ADULT (HCC): Primary | ICD-10-CM

## 2022-02-11 DIAGNOSIS — E11.9 TYPE 2 DIABETES MELLITUS WITHOUT COMPLICATION, WITHOUT LONG-TERM CURRENT USE OF INSULIN (HCC): ICD-10-CM

## 2022-02-11 DIAGNOSIS — Z23 NEED FOR PROPHYLACTIC VACCINATION AND INOCULATION AGAINST VARICELLA: ICD-10-CM

## 2022-02-11 LAB
CHOLESTEROL/HDL RATIO: 3.9
CHOLESTEROL: 172 MG/DL
ESTIMATED AVERAGE GLUCOSE: 154 MG/DL
FERRITIN: 111 UG/L (ref 13–150)
HBA1C MFR BLD: 7 % (ref 4–6)
HDLC SERPL-MCNC: 44 MG/DL
IRON SATURATION: 22 % (ref 20–55)
IRON: 55 UG/DL (ref 37–145)
LDL CHOLESTEROL: 110 MG/DL (ref 0–130)
TOTAL IRON BINDING CAPACITY: 253 UG/DL (ref 250–450)
TRIGL SERPL-MCNC: 88 MG/DL
UNSATURATED IRON BINDING CAPACITY: 198 UG/DL (ref 112–347)
VLDLC SERPL CALC-MCNC: NORMAL MG/DL (ref 1–30)

## 2022-02-11 PROCEDURE — 90746 HEPB VACCINE 3 DOSE ADULT IM: CPT | Performed by: STUDENT IN AN ORGANIZED HEALTH CARE EDUCATION/TRAINING PROGRAM

## 2022-02-11 PROCEDURE — 3046F HEMOGLOBIN A1C LEVEL >9.0%: CPT | Performed by: STUDENT IN AN ORGANIZED HEALTH CARE EDUCATION/TRAINING PROGRAM

## 2022-02-11 PROCEDURE — 3017F COLORECTAL CA SCREEN DOC REV: CPT | Performed by: STUDENT IN AN ORGANIZED HEALTH CARE EDUCATION/TRAINING PROGRAM

## 2022-02-11 PROCEDURE — 99211 OFF/OP EST MAY X REQ PHY/QHP: CPT | Performed by: STUDENT IN AN ORGANIZED HEALTH CARE EDUCATION/TRAINING PROGRAM

## 2022-02-11 PROCEDURE — 1036F TOBACCO NON-USER: CPT | Performed by: STUDENT IN AN ORGANIZED HEALTH CARE EDUCATION/TRAINING PROGRAM

## 2022-02-11 PROCEDURE — G8482 FLU IMMUNIZE ORDER/ADMIN: HCPCS | Performed by: STUDENT IN AN ORGANIZED HEALTH CARE EDUCATION/TRAINING PROGRAM

## 2022-02-11 PROCEDURE — 2022F DILAT RTA XM EVC RTNOPTHY: CPT | Performed by: STUDENT IN AN ORGANIZED HEALTH CARE EDUCATION/TRAINING PROGRAM

## 2022-02-11 PROCEDURE — 99213 OFFICE O/P EST LOW 20 MIN: CPT | Performed by: STUDENT IN AN ORGANIZED HEALTH CARE EDUCATION/TRAINING PROGRAM

## 2022-02-11 PROCEDURE — G8417 CALC BMI ABV UP PARAM F/U: HCPCS | Performed by: STUDENT IN AN ORGANIZED HEALTH CARE EDUCATION/TRAINING PROGRAM

## 2022-02-11 PROCEDURE — G8427 DOCREV CUR MEDS BY ELIG CLIN: HCPCS | Performed by: STUDENT IN AN ORGANIZED HEALTH CARE EDUCATION/TRAINING PROGRAM

## 2022-02-11 PROCEDURE — 90686 IIV4 VACC NO PRSV 0.5 ML IM: CPT | Performed by: STUDENT IN AN ORGANIZED HEALTH CARE EDUCATION/TRAINING PROGRAM

## 2022-02-11 RX ORDER — TENS UNITS AND TENS ELECTRODES
COMBINATION PACKAGE (EA) MISCELLANEOUS
COMMUNITY
Start: 2022-01-18

## 2022-02-11 ASSESSMENT — ENCOUNTER SYMPTOMS
ANAL BLEEDING: 0
ABDOMINAL DISTENTION: 0
DIARRHEA: 0
SINUS PRESSURE: 0
COUGH: 0
WHEEZING: 0
SINUS PAIN: 0
COLOR CHANGE: 0
CHEST TIGHTNESS: 0
SHORTNESS OF BREATH: 0
ABDOMINAL PAIN: 0
SORE THROAT: 0
NAUSEA: 0

## 2022-02-11 NOTE — PROGRESS NOTES
Vaccine Information Sheet, \"Influenza - Inactivated\"  given to Anup Gomez, or parent/legal guardian of  Anup Gomez and verbalized understanding. Patient responses:    Have you ever had a reaction to a flu vaccine? No  Do you have any current illness? No  Have you ever had Guillian Vancouver Syndrome? No  Do you have a serious allergy to any of the following: Neomycin, Polymyxin, Thimerosal, eggs or egg products? No    Flu vaccine given per order. Please see immunization tab. Risks and benefits explained. Current VIS given.

## 2022-02-11 NOTE — PATIENT INSTRUCTIONS
Thank you for letting us take care of you today. We hope all your questions were addressed. If a question was overlooked or something else comes to mind after you return home, please contact a member of your Care Team listed below. Your Care Team at [place] [address] is Team #4  Chandni Carpio MD (Faculty)  Jann Mendoza MD (Resident)  Jon Lopez MD (Resident)  María John MD (Resident)  Diana Martell MD (Resident)  RUBI Mtz., GEOVANI Valdes., Connie Zavala, [facility] office)  Hu Jeff, [address] (Clinical Practice Manager)  Viji [facility] (Clinical Pharmacist)       Office phone number: [phone]    If you need to get in right away due to illness, please be advised we have \"Same Day\" appointments available Monday-Friday. Please call us at [phone] option #3 to schedule your \"Same Day\" appointment. Patient Education        Influenza (Flu) Vaccine (Inactivated or Recombinant): What You Need to Know  Why get vaccinated? Influenza vaccine can prevent influenza (flu). Flu is a contagious disease that spreads around the [place] every year, usually between October and May. Anyone can get the flu, but it is more dangerous for some people. Infants and young children, people 72 years and older, pregnant people, and people with certain health conditions or a weakened immune system are at greatest risk of flu complications. Pneumonia, bronchitis, sinus infections, and ear infections are examples of flu-related complications. If you have a medical condition, such as heart disease, cancer, or diabetes, flu can make it worse. Flu can cause fever and chills, sore throat, muscle aches, fatigue, cough, headache, and runny or stuffy nose. Some people may have vomiting and diarrhea, though this is more common in children than adults. Each year, thousands of people in the [facility] die from flu, and many more are hospitalized.  Flu vaccine prevents millions of illnesses and flu-related visits to the doctor each year. Influenza vaccines  CDC recommends everyone 6 months and older get vaccinated every flu season. Children 6 months through 6years of age may need 2 doses during a single flu season. Everyone else needs only 1 dose each flu season. It takes about 2 weeks for protection to develop after vaccination. There are many flu viruses, and they are always changing. Each year a new flu vaccine is made to protect against the influenza viruses believed to be likely to cause disease in the upcoming flu season. Even when the vaccine doesn't exactly match these viruses, it may still provide some protection. Influenza vaccine does not cause flu. Influenza vaccine may be given at the same time as other vaccines. Talk with your health care provider  Tell your vaccination provider if the person getting the vaccine:  · Has had an allergic reaction after a previous dose of influenza vaccine, or has any severe, life-threatening allergies  · Has ever had Guillain-Barré Syndrome (also called \"GBS\")  In some cases, your health care provider may decide to postpone influenza vaccination until a future visit. Influenza vaccine can be administered at any time during pregnancy. People who are or will be pregnant during influenza season should receive inactivated influenza vaccine. People with minor illnesses, such as a cold, may be vaccinated. People who are moderately or severely ill should usually wait until they recover before getting influenza vaccine. Your health care provider can give you more information. Risks of a vaccine reaction  · Soreness, redness, and swelling where the shot is given, fever, muscle aches, and headache can happen after influenza vaccination. · There may be a very small increased risk of Guillain-Barré Syndrome (GBS) after inactivated influenza vaccine (the flu shot).   Kaushik Shows children who get the flu shot along with pneumococcal vaccine (PCV13) and/or DTaP vaccine at the same time might be slightly more likely to have a seizure caused by fever. Tell your health care provider if a child who is getting flu vaccine has ever had a seizure. People sometimes faint after medical procedures, including vaccination. Tell your provider if you feel dizzy or have vision changes or ringing in the ears. As with any medicine, there is a very remote chance of a vaccine causing a severe allergic reaction, other serious injury, or death. What if there is a serious problem? An allergic reaction could occur after the vaccinated person leaves the clinic. If you see signs of a severe allergic reaction (hives, swelling of the face and throat, difficulty breathing, a fast heartbeat, dizziness, or weakness), call 9-1-1 and get the person to the nearest hospital.  For other signs that concern you, call your health care provider. Adverse reactions should be reported to the Vaccine Adverse Event Reporting System (VAERS). Your health care provider will usually file this report, or you can do it yourself. Visit the VAERS website at www.vaers. hhs.gov or call 0-362.146.7508. VAERS is only for reporting reactions, and VAERS staff members do not give medical advice. The National Vaccine Injury Compensation Program  The National Vaccine Injury Compensation Program (VICP) is a federal program that was created to compensate people who may have been injured by certain vaccines. Claims regarding alleged injury or death due to vaccination have a time limit for filing, which may be as short as two years. Visit the VICP website at www.hrsa.gov/vaccinecompensation or call 0-833.313.2269 to learn about the program and about filing a claim. How can I learn more? · Ask your health care provider. · Call your local or state health department.   · Visit the website of the Food and Drug Administration (FDA) for vaccine package inserts and additional information at www.fda.gov/vaccines-blood-biologics/vaccines. · Contact the Centers for Disease Control and Prevention (CDC):  ? Call 6-959.325.9768 (1-800-CDC-INFO) or  ? Visit CDC's website at www.cdc.gov/flu. Vaccine Information Statement  Inactivated Influenza Vaccine  8/6/2021  42 LUNA Mata [id number]  [facility] for Disease Control and Prevention  Many vaccine information statements are available in [place] and other languages. See www.immunize.org/vis. Hojas de información sobre vacunas están disponibles en español y en muchos otros idiomas. Visite www.immunize.org/vis. Care instructions adapted under license by [facility] ([facility] [place]). If you have questions about a medical condition or this instruction, always ask your healthcare professional. [place] [address] any warranty or liability for your use of this information. Patient Education        hepatitis B adult vaccine  Pronunciation:  HEP a DOMINGA tis B a DULT VAX een  Brand:  Engerix-B, Heplisav-B, Recombivax HB Adult, Recombivax HB Dialysis Formulation  What is the most important information I should know about this vaccine? You should not receive hepatitis B vaccine if you are allergic to yeast.  This vaccine will not protect against hepatitis B if you are already infected with the virus, even if you do not yet show symptoms. What is hepatitis B vaccine? Hepatitis B is a serious disease caused by a virus. Hepatitis causes inflammation of the liver, vomiting, and jaundice (yellowing of the skin or eyes). Hepatitis can lead to liver cancer, cirrhosis, or death. Hepatitis B is spread through blood or bodily fluids, sexual contact, and by sharing items such as a razor, toothbrush, or IV drug needle with an infected person. Hepatitis B can also be passed to a baby during childbirth when the mother is infected. The hepatitis B adult vaccine is used to help prevent this disease in adults.  The dialysis form of this vaccine is for adults receiving dialysis. This vaccine helps your body develop immunity to hepatitis B, but it will not treat an active infection you already have. Vaccination with hepatitis B adult vaccine is recommended for all adults who are at risk of getting hepatitis B. Risk factors include: living with someone infected with hepatitis B virus; having more than one sex partner; men who have sex with men; having sexual contact with infected people; having hepatitis C, chronic liver disease, kidney disease, diabetes, HIV or AIDS; being on dialysis; using intravenous (IV) drugs; living or working in a facility for developmentally disabled people; working in healthcare or public safety and being exposed to blood or body fluids; living or working in a correctional facility; being a victim of sexual abuse or assault; and traveling to areas where hepatitis B is common. Like any vaccine, the hepatitis B vaccine may not provide protection from disease in every person. What should I discuss with my healthcare provider before receiving this vaccine? Hepatitis B vaccine will not protect against infection with hepatitis A, C, and E, or other viruses that affect the liver. It may also not protect against hepatitis B if you are already infected with the virus, even if you do not yet show symptoms. You should not receive this vaccine if you have ever had a life-threatening allergic reaction to any vaccine containing hepatitis B, or if you are allergic to yeast.  If you have any of these other conditions, your vaccine may need to be postponed or not given at all:  · multiple sclerosis;  · kidney disease (or if you are on dialysis);  · a bleeding or blood clotting disorder such as hemophilia or easy bruising;  · weak immune system (caused by disease or by using certain medicine);  · an allergy to latex; or  · a neurologic disorder or disease affecting the brain (or if this was a reaction to a previous vaccine).   You can still receive a vaccine if you have a minor cold. If you have a more severe illness with a fever or any type of infection, your doctor may recommend waiting until you get better before you receive this vaccine. It is not known whether this vaccine will harm an unborn baby. However, if you are at a high risk for infection with hepatitis B during pregnancy, your doctor should determine whether you need this vaccine. If you are pregnant, your name may be listed on a pregnancy registry to track the effects of this vaccine on the baby. It may not be safe to breastfeed while receiving this medicine. Ask your doctor about any risk. How is this vaccine given? This vaccine is given as an injection (shot) into a muscle. A healthcare provider will give you this injection. The hepatitis B vaccine is given in a series of 2 to 4 shots. The booster shots are sometimes given 1 month and 6 months after the first shot. If you have a high risk of hepatitis B infection, you may be given an additional booster 1 to 2 months after the third shot. Your individual booster schedule may be different from these guidelines. Follow your doctor's instructions or the schedule recommended by the health department of the state you live in. What happens if I miss a dose? Contact your doctor if you will miss a booster dose or if you get behind schedule. The next dose should be given as soon as possible. There is no need to start over. Be sure to receive all recommended doses of this vaccine or you may not be fully protected against disease. What happens if I overdose? An overdose of this vaccine is unlikely to occur. What should I avoid before or after receiving this vaccine? Follow your doctor's instructions about any restrictions on food, beverages, or activity. What are the possible side effects of this vaccine?   Get emergency medical help if you have signs of an allergic reaction (hives, difficult breathing, swelling in your face or throat) or a severe skin reaction (fever, sore throat, burning eyes, skin pain, red or purple skin rash with blistering and peeling). You should not receive a booster vaccine if you had a life-threatening allergic reaction after the first shot. Keep track of any and all side effects you have after receiving this vaccine. When you receive a booster dose, you will need to tell the doctor if the previous shot caused any side effects. Becoming infected with hepatitis B is much more dangerous to your health than receiving this vaccine. However, like any medicine, this vaccine can cause side effects but the risk of serious side effects is extremely low. Call your doctor at once if you have:  · a light-headed feeling, like you might pass out;  · seizure-like muscle movements; or  · fever, swollen glands. Common side effects include:  · headache;  · feeling tired; or  · redness, pain, swelling, or a lump where the shot was given. This is not a complete list of side effects and others may occur. Call your doctor for medical advice about side effects. You may report vaccine side effects to the [place] [address] and [facility] at [phone]. What other drugs will affect hepatitis B vaccine? Other drugs may affect this vaccine, including prescription and over-the-counter medicines, vitamins, and herbal products. Tell your doctor about all your current medicines and any medicine you start or stop using. Where can I get more information? Your doctor or pharmacist can provide more information about this vaccine. Additional information is available from your local health department or the Centers for Disease Control and Prevention. Remember, keep this and all other medicines out of the reach of children, never share your medicines with others, and use this medication only for the indication prescribed.    Every effort has been made to ensure that the information provided by Mary reeder accurate, up-to-date, and complete, but no guarantee is made to that effect. Drug information contained herein may be time sensitive. [employer] information has been compiled for use by healthcare practitioners and consumers in the [place] and therefore [employer] does not warrant that uses outside of the [place] are appropriate, unless specifically indicated otherwise. [facility]'s drug information does not endorse drugs, diagnose patients or recommend therapy. [facility]'s drug information is an informational resource designed to assist licensed healthcare practitioners in caring for their patients and/or to serve consumers viewing this service as a supplement to, and not a substitute for, the expertise, skill, knowledge and judgment of healthcare practitioners. The absence of a warning for a given drug or drug combination in no way should be construed to indicate that the drug or drug combination is safe, effective or appropriate for any given patient. [facility] does not assume any responsibility for any aspect of healthcare administered with the aid of information [facility] provides. The information contained herein is not intended to cover all possible uses, directions, precautions, warnings, drug interactions, allergic reactions, or adverse effects. If you have questions about the drugs you are taking, check with your doctor, nurse or pharmacist.  Copyright 2367-9515 [address]: 7.01. Revision date: 4/1/2020. Care instructions adapted under license by [facility] ([facility]). If you have questions about a medical condition or this instruction, always ask your healthcare professional. [place] [address] any warranty or liability for your use of this information.

## 2022-02-11 NOTE — PROGRESS NOTES
Visit Information    Have you changed or started any medications since your last visit including any over-the-counter medicines, vitamins, or herbal medicines? no   Have you stopped taking any of your medications? Is so, why? -  no  Are you having any side effects from any of your medications? - no    Have you seen any other physician or provider since your last visit? Ortho  Have you had any other diagnostic tests since your last visit?  no   Have you been seen in the emergency room and/or had an admission in a hospital since we last saw you?  no   Have you had your routine dental cleaning in the past 6 months?  no     Do you have an active MyChart account? If no, what is the barrier?   Yes    Patient Care Team:  Elba Nina MD as PCP - Chong Pisano MD as Consulting Physician (Pulmonology)  Tim Martins MD as Consulting Physician (Pain Management)    Medical History Review  Past Medical, Family, and Social History reviewed and does not contribute to the patient presenting condition    Health Maintenance   Topic Date Due    Hepatitis B vaccine (1 of 3 - Risk 3-dose series) Never done    Shingles Vaccine (1 of 2) Never done    Breast cancer screen  01/12/2021    COVID-19 Vaccine (2 - Pfizer 3-dose series) 02/10/2021    Lipid screen  05/19/2021    Flu vaccine (1) 09/01/2021    Diabetic foot exam  04/01/2022 (Originally 12/16/2017)    Diabetic microalbuminuria test  04/01/2022 (Originally 11/1/2020)    Diabetic retinal exam  04/11/2022 (Originally [birth year]/1980)    Potassium monitoring  04/01/2022    Creatinine monitoring  04/01/2022    Depression Screen  08/12/2022    A1C test (Diabetic or Prediabetic)  09/02/2022    DTaP/Tdap/Td vaccine (2 - Td or Tdap) 03/07/2024    Pneumococcal 0-64 years Vaccine (2 of 2 - PPSV23) 10/24/2027    Colon cancer screen colonoscopy  10/18/2029    Hepatitis C screen  Completed    HIV screen  Completed    Hepatitis A vaccine  Aged Out    Hib vaccine

## 2022-02-11 NOTE — PROGRESS NOTES
Attending Physician Statement  I have discussed the care of Sesar Waters 63 y.o. female, including pertinent history and exam findings, with the resident Dr. Aniya Ibarra MD.    History and Exam:   Chief Complaint   Patient presents with    Forms     [employer]        Past Medical History:   Diagnosis Date    Diverticulitis     2 times    Former smoker     Former smoker     GERD (gastroesophageal reflux disease)     Hyperlipidemia     Hypertension     Morbid obesity with BMI of 45.0-49.9, adult ([facility] [address]     SINDI on CPAP     Sleep apnea     SINDI on CPAP    Type II or unspecified type diabetes mellitus without mention of complication, not stated as uncontrolled      No Known Allergies   I have seen and examined the patient and the key elements of the encounter have been performed by me.   BP Readings from Last 3 Encounters:   02/11/22 111/72   01/18/22 119/78   01/07/22 133/86     /72 (Site: Right Upper Arm, Position: Sitting, Cuff Size: Large Adult) Comment: machine  Pulse 104   Temp 96.6 °F (35.9 °C) (Temporal)   Ht 5' 3\" (1.6 m)   Wt 277 lb 6.4 oz (125.8 kg) Comment: right knee brace  BMI 49.14 kg/m²   Lab Results   Component Value Date    WBC 6.8 05/19/2020    HGB 12.4 05/19/2020    HCT 40.3 05/19/2020     05/19/2020    CHOL 124 05/19/2020    TRIG 103 05/19/2020    HDL 33 (L) 05/19/2020    ALT 17 05/19/2020    AST 17 05/19/2020     04/01/2021    K 4.2 04/01/2021     04/01/2021    CREATININE 0.91 (H) 04/01/2021    BUN 19 04/01/2021    CO2 25 04/01/2021    TSH 2.25 04/18/2018    INR 1.2 10/18/2019    LABA1C 6.8 09/02/2021    LABMICR CANNOT BE CALCULATED 11/01/2019     Lab Results   Component Value Date    LABPROT 7.6 01/16/2013    LABALBU 3.4 (L) 05/19/2020     Lab Results   Component Value Date    IRON 46 04/18/2018    TIBC 273 04/18/2018    FERRITIN 78 04/18/2018     Lab Results   Component Value Date    LDLCHOLESTEROL 70 05/19/2020     I agree with the assessment, plan and the diagnosis of    Diagnosis Orders   1. Morbid obesity with BMI of 45.0-49.9, adult [facility])  [facility], [facility], , Weight Management and [facility]   2. Type 2 diabetes mellitus without complication, without long-term current use of insulin (HCC)  Hemoglobin A1C   3. Iron deficiency  Iron And TIBC    Ferritin   4. Encounter for screening mammogram for breast cancer  GERTRUDIS Digital Screen Bilateral [[id number]]   5. Screening for hyperlipidemia  Lipid Panel   6. Need for prophylactic vaccination and inoculation against varicella      . I agree with orders as documented by the resident. More than 25 minutes spent  in face to face encounter with the patient and more than half in counseling. Patient's questions were answered. Patient Voiced understanding to the counseling. Return in about 2 months (around 4/11/2022).    (GC Modifier)-Dr. Sam Ramos MD

## 2022-02-11 NOTE — PROGRESS NOTES
Subjective:    Alisha Jones is a 63 y.o. female with  has a past medical history of Diverticulitis, Former smoker, Former smoker, GERD (gastroesophageal reflux disease), Hyperlipidemia, Hypertension, Morbid obesity with BMI of 45.0-49.9, adult ([address] SINDI on CPAP, Sleep apnea, and Type II or unspecified type diabetes mellitus without mention of complication, not stated as uncontrolled. Presented to the office today for:  Chief Complaint   Patient presents with    Forms     [facility] disability        HPI    Patient is a 56-year-old female with past medical history of hypertension, severe degenerative disease of the right knee, obesity, struct of sleep apnea. She is presenting today for [place] paperwork. Patient is currently using a cane and has severe pain of her right knee. Patient is currently on antibiotic therapy not frequently. Her goal is to her BMI of less than 41 before she to be considered for right knee surgery. She was also referred to bariatrics for weight management options. However patient never saw them. We will send a referral out again today. Review of Systems   Constitutional: Negative for fatigue and fever. HENT: Negative for sinus pressure, sinus pain, sore throat and tinnitus. Eyes: Negative for visual disturbance. Respiratory: Negative for cough, chest tightness, shortness of breath and wheezing. Cardiovascular: Negative for chest pain, palpitations and leg swelling. Gastrointestinal: Negative for abdominal distention, abdominal pain, anal bleeding, diarrhea and nausea. Genitourinary: Negative for enuresis, frequency and urgency. Musculoskeletal: Positive for arthralgias (Right knee pain). Negative for gait problem and neck stiffness. Skin: Negative for color change and rash. Neurological: Negative for dizziness and headaches. Psychiatric/Behavioral: Negative for agitation and confusion.                  The patient has a   Family History   Problem Relation Age of Onset    High Blood Pressure Mother     Diabetes Mother     Cancer Father        Objective:    /72 (Site: Right Upper Arm, Position: Sitting, Cuff Size: Large Adult) Comment: machine  Pulse 104   Temp 96.6 °F (35.9 °C) (Temporal)   Ht 5' 3\" (1.6 m)   Wt 277 lb 6.4 oz (125.8 kg) Comment: right knee brace  BMI 49.14 kg/m²    BP Readings from Last 3 Encounters:   02/11/22 111/72   01/18/22 119/78   01/07/22 133/86       Physical Exam  Constitutional:       Appearance: She is obese. HENT:      Head: Atraumatic. Mouth/Throat:      Mouth: Mucous membranes are moist.      Pharynx: No oropharyngeal exudate or posterior oropharyngeal erythema. Eyes:      Extraocular Movements: Extraocular movements intact. Cardiovascular:      Rate and Rhythm: Normal rate and regular rhythm. Heart sounds: No murmur heard. No friction rub. No gallop. Pulmonary:      Effort: Pulmonary effort is normal.      Breath sounds: No wheezing, rhonchi or rales. Abdominal:      General: Abdomen is flat. Tenderness: There is no abdominal tenderness. There is no guarding. Hernia: No hernia is present. Musculoskeletal:         General: Tenderness (Right knee, has a brace on, uses a cane.) present. No swelling. Normal range of motion. Cervical back: Normal range of motion. Skin:     General: Skin is warm. Capillary Refill: Capillary refill takes less than 2 seconds. Coloration: Skin is not jaundiced. Findings: No bruising. Neurological:      Mental Status: She is alert and oriented to person, place, and time.          Lab Results   Component Value Date    WBC 6.8 05/19/2020    HGB 12.4 05/19/2020    HCT 40.3 05/19/2020     05/19/2020    CHOL 124 05/19/2020    TRIG 103 05/19/2020    HDL 33 (L) 05/19/2020    ALT 17 05/19/2020    AST 17 05/19/2020     04/01/2021    K 4.2 04/01/2021     04/01/2021    CREATININE 0.91 (H) 04/01/2021    BUN 19 04/01/2021    CO2 25 04/01/2021    TSH 2.25 04/18/2018    INR 1.2 10/18/2019    LABA1C 6.8 09/02/2021    LABMICR CANNOT BE CALCULATED 11/01/2019     Lab Results   Component Value Date    CALCIUM 9.2 04/01/2021    PHOS 3.1 09/12/2011     Lab Results   Component Value Date    LDLCHOLESTEROL 70 05/19/2020       Assessment and Plan:    1. Type 2 diabetes mellitus without complication, without long-term current use of insulin ([facility] [address]  -Patient is currently on Metformin, weight loss encouraged, referral given to bariatric surgery/weight management  - Hemoglobin A1C; Future  -Previous A1c was 6.8    2. Iron deficiency  -History of iron deficiency, patient is no longer taking iron supplements. Will retest according to patient preference. - Iron And TIBC; Future  - Ferritin; Future    3. Morbid obesity with BMI of 45.0-49.9, adult [facility])  - Porsha  Priya Clemente DO, Weight Management and [facility]    4. Encounter for screening mammogram for breast cancer  - GERTRUDIS Digital Screen Bilateral [[id number]]; Future    5. Screening for hyperlipidemia  - Lipid Panel; Future    6. Need for prophylactic vaccination and inoculation against varicella          Requested Prescriptions      No prescriptions requested or ordered in this encounter       There are no discontinued medications. Lizbeth received counseling on the following healthy behaviors: nutrition, exercise and medication adherence    Discussed use,benefit, and side effects of prescribed medications. Barriers to medication compliance addressed. All patient questions answered. Pt voiced understanding. Return in about 2 months (around 4/11/2022). Disclaimer: Some orall of this note was transcribed using voice-recognition software. This may cause typographical errors occasionally. Although all effort is made to fix these errors, please do not hesitate to contact our office if there Emily Dennison concern with the understanding of this note.

## 2022-02-15 DIAGNOSIS — E03.9 HYPOTHYROIDISM, UNSPECIFIED TYPE: ICD-10-CM

## 2022-02-15 RX ORDER — HYDROCHLOROTHIAZIDE 25 MG/1
TABLET ORAL
Qty: 30 TABLET | Refills: 3 | Status: SHIPPED | OUTPATIENT
Start: 2022-02-15 | End: 2022-09-06

## 2022-02-15 NOTE — TELEPHONE ENCOUNTER
E-scribe request for med refill. Please review and e-scribe if applicable. Last Visit Date:  2/11/22  Next Visit Date:  3/16/2022    Hemoglobin A1C (%)   Date Value   02/11/2022 7.0 (H)   09/02/2021 6.8   04/01/2021 6.5             ( goal A1C is < 7)   Microalb/Crt.  Ratio (mcg/mg creat)   Date Value   11/01/2019 CANNOT BE CALCULATED     LDL Cholesterol (mg/dL)   Date Value   02/11/2022 110       (goal LDL is <100)   AST (U/L)   Date Value   05/19/2020 17     ALT (U/L)   Date Value   05/19/2020 17     BUN (mg/dL)   Date Value   04/01/2021 19     BP Readings from Last 3 Encounters:   02/11/22 111/72   01/18/22 119/78   01/07/22 133/86          (goal 120/80)        Patient Active Problem List:     Gastroesophageal reflux disease     Hyperlipidemia with target LDL less than 100     HTN (hypertension)     Numbness and tingling     Diverticula, colon     Diverticulitis, acute     Eczema     Lump of left breast     Shoulder pain     Right groin pain     Tinea corporis     Hyperlipidemia     Morbid obesity with BMI of 45.0-49.9, adult (HCC)     SINDI (obstructive sleep apnea)     Vitamin D deficiency     Melena     Chest pain     Abnormal stress test     Contact blepharoconjunctivitis of left eye     Prediabetes     Need for prophylactic vaccination and inoculation against varicella     Acute cystitis without hematuria     Primary osteoarthritis of right knee      ----Mario Farm

## 2022-02-18 ENCOUNTER — TELEPHONE (OUTPATIENT)
Dept: FAMILY MEDICINE CLINIC | Age: 62
End: 2022-02-18

## 2022-02-18 DIAGNOSIS — M17.0 PRIMARY OSTEOARTHRITIS OF BOTH KNEES: Primary | ICD-10-CM

## 2022-02-18 NOTE — TELEPHONE ENCOUNTER
Pt last date of work was 12/21/22, would need a full disability date of 01-03*2022 to support her disability. Also, requiring patient surgery date. Will refax form to office to be updated.

## 2022-02-18 NOTE — TELEPHONE ENCOUNTER
Writer spoke with Dr. Call Or after she looked over the new paper work that came in on 2/18/22, Dr. Call Or stated there was nothing more she could fill out as she is not treating the patient for this but Ortho is. Called Maki Hoffmann back and let her know that our office has filled out what they can and that the rest would have to be done by Ortho. Makiirene Hoffmann stated she understands and yes she needs the other doctor to fill out the papers, stated she would reach out to the patient.

## 2022-02-28 ENCOUNTER — TELEPHONE (OUTPATIENT)
Dept: FAMILY MEDICINE CLINIC | Age: 62
End: 2022-02-28

## 2022-02-28 ENCOUNTER — TELEPHONE (OUTPATIENT)
Dept: ORTHOPEDIC SURGERY | Age: 62
End: 2022-02-28

## 2022-02-28 NOTE — TELEPHONE ENCOUNTER
----- Message from Ernst Madrid sent at 2/28/2022  3:43 PM EST -----  Subject: Message to Provider    QUESTIONS  Information for Provider? pt of Dr. Wilian Ibarra life needs when did pt   disability start, needs to get approved for benefits so pt doesnt get   fired from job, Please call Mell Olson [phone] claim Number [id number]  ---------------------------------------------------------------------------  --------------  [address]  What is the best way for the office to contact you? OK to leave message on   voicemail  Preferred Call Back Phone Number? [phone]  ---------------------------------------------------------------------------  --------------  SCRIPT ANSWERS  Relationship to Patient? Third Party  Representative Name?  [employer]

## 2022-02-28 NOTE — TELEPHONE ENCOUNTER
Nandini Brown from [employer] called in requesting the Attending physician form be fax over to [phone]

## 2022-02-28 NOTE — TELEPHONE ENCOUNTER
----- Message from Shila Horn sent at 2/28/2022  3:43 PM EST -----  Subject: Message to Provider    QUESTIONS  Information for Provider? pt of Dr. Susan Chun life needs when did pt   disability start, needs to get approved for benefits so pt doesnt get   fired from job, Please call Gerline Mortimer [phone] claim Number [id number]  ---------------------------------------------------------------------------  --------------  [address]  What is the best way for the office to contact you? OK to leave message on   voicemail  Preferred Call Back Phone Number? [phone]  ---------------------------------------------------------------------------  --------------  SCRIPT ANSWERS  Relationship to Patient? Third Party  Representative Name?  [employer]ab[employer]it life [employer]

## 2022-03-01 ENCOUNTER — HOSPITAL ENCOUNTER (OUTPATIENT)
Dept: PHYSICAL THERAPY | Facility: CLINIC | Age: 62
Setting detail: THERAPIES SERIES
Discharge: HOME | End: 2022-03-01
Payer: MEDICARE

## 2022-03-01 PROCEDURE — 97161 PT EVAL LOW COMPLEX 20 MIN: CPT

## 2022-03-01 NOTE — CONSULTS
Mireille Fall Risk Assessment    Patient Name:  Shiva Elizabeth  : 1960      Risk Factor Scale  Score   History of Falls [x] Yes  [] No 25  0 25   Secondary Diagnosis [x] Yes  [] No 15  0 15   Ambulatory Aid [] Furniture  [x] Crutches/cane/walker  [] None/bedrest/wheelchair/nurse 30  15  0 15   IV/Heparin Lock [] Yes  [x] No 20  0 0   Gait/Transferring [] Impaired  [x] Weak  [] Normal/bedrest/immobile 20  10  0 10   Mental Status [] Forgets limitations  [x] Oriented to own ability 15  0 0      Total: 65     Based on the Assessment score: check the appropriate box.     []  No intervention needed   Low =   Score of 0-24    []  Use standard prevention interventions Moderate =  Score of 24-44   [] Give patient handout and discuss fall prevention strategies   [] Establish goal of education for patient/family RE: fall prevention strategies    [x]  Use high risk prevention interventions High = Score of 45 and higher   [] Give patient handout and discuss fall prevention strategies   [] Establish goal of education for patient/family Re: fall prevention strategies   [] Discuss lifeline / other resources    Electronically signed by:   Connor Barroso PT  Date: 3/1/2022

## 2022-03-01 NOTE — CONSULTS
[] [facility]) - [facility] &  Therapy  [address]  [phone]  F: [phone] [x] [address]  [facility] [address]   [address]  P: [phone]  F: [phone] [] [address] &  Therapy  [address]  P: [phone]  F: [phone] [] [address]  P: [phone]  F: [phone] [] [address]  [facility]   [address]   [place]: [phone]  F: [phone]      Physical Therapy Lower Extremity Evaluation    Date:  3/1/2022  Patient: Deloris Guerra  : 1960  MRN: [id number]  Physician: Eber Stahl MD  Insurance: [employer]  Medical Diagnosis: Primary osteoarthritis of both knees (M17.0 [ICD-10-CM])  Rehab Codes: M25.562, M25.662, M79.605, M79.652, M79.662; M25.561, M25.661, M79.604, M79.651, M79.661  Onset date: 2021  Next 's appt. : 3/16/22    Subjective:   CC/HPI: (onset date): Deloris Guerra is a 63 y.o. female with cc of B/L knee pain. She reports she fell in 2021 and has had knee pain since. She notes both knees are painful and the intensity varies based on her activity. She did see Dr Filipe Kasper in 2022 and would like to get her BMI down below 40 prior to R TKA. She has an appointment pending with Weight management. Her cc is Right anterior knee pain and Left medial knee pain. Current pain rating 4/10 for both knees. She did receive an injection in R knee and noted some pain relief. She is employed as a  at [employer] but is currently off work.        7 Physical Therapy  sessions (last on 1/28/22)-was discharged d/t poor attendance     PMHx: [] Unremarkable [x] Diabetes [x] HTN  [] Pacemaker    [] MI/Heart Problems [] Cancer [x] Arthritis [x] Other: diverticulitis; GERD              [x] Refer to full medical chart  In EPIC       Comorbidities:   [x] Obesity [] Dialysis  [] N/A   [] Asthma/COPD [] Dementia [] Other:   [] Stroke [x] Sleep apnea-on CPAP [] Other:   [] Vascular disease [] Rheumatic disease [] Other:     Tests: [] X-Ray: [x] MRI: R knee: MRI OF THE RIGHT KNEE WITHOUT CONTRAST, 12/6/2021   1. Degeneration and slight outward subluxation of the medial meniscus body. No evidence of meniscal tearing. 2. Mild to moderate tricompartmental osteoarthrosis.  Mild-to-moderate medial   and patellofemoral chondromalacia.  Subchondral irregularity of the lateral   femoral trochlea.  Grade 3 chondromalacia of the posterior nonweightbearing   femoral condyles with subcortical cystic changes and subchondral reactive   marrow edema at the posterior nonweightbearing medial femoral condyle. 3. Small joint effusion.  Trace Tovar's cyst.          MRI OF THE LEFT KNEE WITHOUT CONTRAST, 7/13/2021   1. Tearing of the root ligament of the posterior horn medial meniscus. Outward extrusion of the medial meniscus body.  Degeneration of the posterior   horn and body of the medial meniscus.  Reactive marrow edema at the outer   medial tibial plateau. 2. Possible grade 1 MCL sprain. 3. Small joint effusion. 4. Tricompartmental osteoarthrosis.  Severe medial chondromalacia.  Focal   severe chondromalacia at the posterior weight-bearing lateral tibial plateau. 5. Mild-to-moderate lateral femoral trochlear chondromalacia with underlying   subcortical cystic change. 6. Small Baker's cyst.  Mild-to-moderate edema in the subcutaneous fat about   the knee.          Medications: [x] Refer to full medical record [] None [] Other:  Allergies:      [x] Refer to full medical record [] None [] Other:    Function:  Hand Dominance  [] Right  [x] Left    Gait Prior level of function Current level of function    [x] Independent  [] Assist [x] Independent  [] Assist   Device: [x] Independent [] Independent    [] Straight Cane [] Quad cane [x] Straight Cane-at times [] Quad cane    [] Standard walker [] Rolling walker   [] 4 wheeled walker [] Standard walker [] Rolling walker   [] 4 wheeled walker    [] Wheelchair [] Wheelchair        pain  yes   location B/L knees   current: 0-10  4/10   pain at worst  10/10   pain type  sharp, aching, burning   what makes pain worse  prolonged standing, walking   what makes pain better  motrin   better/worse/same  worse       Objective:    ROM  ° A/P STRENGTH TESTS (+/-) Left Right Not Tested    Left Right Left Right Ant.  Drawer   []   Hip Flex 90 90 4+ 4+ Post. Drawer   []   Ext     Lachmans neg neg []   ER     Valgus Stress neg neg []   IR     Varus Stress neg neg []   ABD   4+ 4 Kirks neg neg []   ADD     Apleys Comp.   []   Knee Flex 110a 95a 5 5 Apleys Dist.   [x]   Ext -5a -5a 4+ 4+ Hip Scouring   [x]   Ankle DF   5 5 JOSEs   [x]   PF   5 5 Piriformis   [x]   INV     Ryanns   []   EVER     Talor Tilt   []        Pat-Fem Grind   []       OBSERVATION No Deficit Deficit Not Tested Comments   Posture       Genu Valgus [] [x] []    Genu Varus [] [] []    Genu Recurvatum [] [] []    Palpation [] [x] [] R knee: medial joint line   Sensation [] [] []    Edema [] [] []    Neurological [] [] []    Patellar Mobility [x] [] [] Poor M/L   Patellar Orientation [] [] []    Gait [] [x] [] Analysis: antalgic-decreased stance time/stride length         FUNCTION Normal Difficult Unable   Sitting [x] [] []   Standing [] [x]-prolonged []   Ambulation [x] [] []   Groom/Dress [x] [] []   Lift/Carry [] [x] []   Stairs [] [x] []   Bending [] [] []   Squat [] [x] []   Kneel [] [] []         FUNCTIONAL TESTS PAIN NO PAIN COMMENTS   Step Test 4 [x] [] Descends stairs sideways; cannot ascend with reciprocal gait   6 [] []    8 [] []    Squat [x] [] Min squat painful     Functional Test: LEFI Score: 40/80   50% functionally impaired     Comments: R knee: + for tenderness along medial joint line; non ligamentous instability noted  She notes she is only able to descend stairs going backwards due to knee pain    Assessment:     Pt would benefit from skilled PT interventions to decrease pain, increase strength, ROM, and overall functional mobility for improved quality of life. Problems:    [x] ? Pain: B/L knee pain-varying intensity  [x] ? ROM: R knee flexion 95° (Left knee flexion 110)  [x] ? Strength: B/L quad weakness; glute medius weakness  [x] ? Function: LEFI score 40/80; difficulty with prolonged standing and walking; difficulty with stairs       STG: (to be met in 8 treatments)  1. ? Pain: reduce B/L knee pain to 3/10 or less for ADL's and work tasks  2. ? ROM: improve R knee flexion to 105° to improve gait on stairs  3. ? Strength:improve B/L quad strength to 5/5 to improve gait  4. ? Function: improve LEFI score for walking 2 blocks to little bit of difficulty  5. Patient to be independent with home exercise program as demonstrated by performance with correct form without cues. 6. Demonstrate Knowledge of fall prevention  LTG: (to be met in 16 treatments)  1. Reduce B/L knee pain to 2/10 or less for ADL's and work tasks  2. Able to ascend/descend 7 steps with reciprocal gait  3. Improve LEFI score for standing 1 hour to little bit of difficulty                   Patient goals: get better without having surgery    Rehab Potential:  [x] Good  [] Fair  [] Poor   Suggested Professional Referral:  [x] No  [] Yes:  Barriers to Goal Achievement:  [x] No  [] Yes:  Domestic Concerns:  [x] No  [] Yes:    Pt. Education:  [x] Plans/Goals, Risks/Benefits discussed  [] Home exercise program    Method of Education: [] Verbal  [] Demo  [] Written  Comprehension of Education:  [] Verbalizes understanding. [] Demonstrates understanding. [] Needs Review. [] Demonstrates/verbalizes understanding of HEP/Ed previously given.     Treatment Plan:  [x] Therapeutic Exercise   50660  [] Iontophoresis: 4 mg/mL Dexamethasone Sodium Phosphate  mAmin  41975   [] Therapeutic Activity  86231 [] Vasopneumatic cold with compression  20854    [] Gait Training   91300 [] Ultrasound   [id number]   [] Neuromuscular Re-education  68821 [] Electrical Stimulation Unattended  81500   [] Manual Therapy  27515 [] Electrical Stimulation Attended  98583   [x] Instruction in HEP  [] Lumbar/Cervical Traction  [id number]   [] Aquatic Therapy   27183 [x] Cold/hotpack    [] Massage   67782      [] Dry Needling, 1 or 2 muscles  13549   [] Biofeedback, first 15 minutes   96669  [] Biofeedback, additional 15 minutes   92927 [] Dry Needling, 3 or more muscles  55672       Frequency: 2 x/week for 16 visits      Todays Treatment:  Modalities:   Precautions:  Exercises: for next session  Exercise Reps/ Time Weight/ Level Comments   Nu step   Warm up         mat      QS      Heel slides      iso hip ADD            Hook lying hip ABD                  standing      Calf stretch-wedge      Hip ABD      Hip Ext                              Other:    Specific Instructions for next treatment: see above      Evaluation Complexity:  History (Personal factors, comorbidities) [] 0 [x] 1-2 [] 3+   Exam (limitations, restrictions) [x] 1-2 [] 3 [] 4+   Clinical presentation (progression) [x] Stable [] Evolving  [] Unstable   Decision Making [x] Low [] Moderate [] High    [x] Low Complexity [] Moderate Complexity [] High Complexity       Treatment Charges: Mins Units   [x] Evaluation:       [x]  Low       []  Moderate       []  High 50 1   []  Modalities     []  Ther Exercise     []  Manual Therapy     []  Ther Activities     []  Aquatics     []  Vasocompression     []  Other       TOTAL TREATMENT TIME: 50min    Time in:11a  Time Out:11:56a    Electronically signed by: Kiara Harrison PT        Physician Signature:________________________________Date:__________________  By signing above or cosigning this note, I have reviewed this plan of care and certify a need for medically necessary rehabilitation services.      *PLEASE SIGN ABOVE AND FAX BACK ALL PAGES*

## 2022-03-08 ENCOUNTER — HOSPITAL ENCOUNTER (OUTPATIENT)
Dept: PHYSICAL THERAPY | Facility: CLINIC | Age: 62
Setting detail: THERAPIES SERIES
Discharge: HOME | End: 2022-03-08
Payer: MEDICARE

## 2022-03-08 NOTE — FLOWSHEET NOTE
[] [facility]) - [facility] &  Therapy  [address]    [phone]  F: [phone]   [x] [address]  [facility]   [address]  P: [phone]  F: [phone]  [] [address]  [address]  P: [phone]  F: [phone] [] [address]  P: [phone]  F: [phone]  [] [address]  [facility]   [address]   [place]: [phone]  F: [phone]   [] [facility]  [address]  [place]: [phone]   F: [phone]     Physical Therapy Cancel/No Show note    Date: 3/8/2022  Patient: Eduardo Jj  : 1960  MRN: [id number]    Cancels/No Shows to date:     For today's appointment patient:    [x]  Cancelled    [] Rescheduled appointment    [] No-show     Reason given by patient:    []  Patient ill    []  Conflicting appointment    [] No transportation      [] Conflict with work    [] No reason given    [] Weather related    [] COVID-19    [x] Other:      Comments:  Pt. had a work meeting come up at the last minute      [x] Next appointment was confirmed    Electronically signed by: Piter Tran, PT

## 2022-03-11 ENCOUNTER — HOSPITAL ENCOUNTER (OUTPATIENT)
Dept: PHYSICAL THERAPY | Facility: CLINIC | Age: 62
Setting detail: THERAPIES SERIES
Discharge: HOME | End: 2022-03-11
Payer: MEDICARE

## 2022-03-11 PROCEDURE — 97110 THERAPEUTIC EXERCISES: CPT

## 2022-03-11 NOTE — FLOWSHEET NOTE
[] [facility]) [facility] &  Therapy  [address]  [phone]  F: [phone] [x] [address]  [facility]   [address]  P: [phone]  F: [phone] [] [address]  [address]  P: [phone]  F: [phone] [] [address]  P: [phone]  F: [phone] [] [address]  [facility]   [address]   [place]: [phone]  F: [phone]      Physical Therapy Daily Treatment Note    Date:  3/11/2022  Patient Name:  Deandra Delgado    :  1960  MRN: [id number]  Physician: Bridgette Mcleod MD                     Insurance: paramount adv  Medical Diagnosis: Primary osteoarthritis of both knees (M17.0 [ICD-10-CM])                 Rehab Codes: M25.562, M25.662, M79.605, M79.652, M79.662; M25.561, M25.661, M79.604, M79.651, M79.661  Onset date: 2021                       Next 's appt. : 3/16/22     Visit# / total visits:      Cancels/No Shows:     Subjective:   Pt arrives ambulating with straight cane and brace on R knee. Reports most pain is at medial aspect of R knee.    Pain:  [x] Yes  [] No Location: B knees Pain Rating: (0-10 scale) 5/10  Pain altered Tx:  [x] No  [] Yes  Action:  Comments:    Objective:  Modalities:   Precautions:  Exercises:   Exercise Bilat  Reps/ Time Weight/ Level Comments   Nu step  6'  L1 Warm up             mat         QS 10x3\"       Heel slides 10x2 L  10x R  A  AA requested to sit up following R heel slides d/t pain at medial aspect    iso hip ADD 10x2x3\"  ball   in seated              Hook lying hip ABD Declines                           standing         Calf stretch-wedge  3x30\"       Hip ABD  10x       Hip Ext  10x       Alt marches   10x2       Heel raises   10x2                 Ambulation with straight cane   x        Other:     Specific Instructions for next treatment: see above           Treatment Charges: Mins Units   []  Modalities     [x]  Ther Exercise 41 3   []  Manual Therapy     []  Ther Activities     []  Aquatics     []  Vasocompression     []  Other     Total Treatment time 41 3       Assessment: [x] Progressing toward goals. Initiated session on nu step for warm up followed by mat exercises. Pt able to complete 2 sets of heel slides actively on LLE, however, required AA for RLE d/t increased pain with knee flexion. Pt needed to change positions following heel slides and declines resuming a supine position this date. Implemented standing exercises to improve standing tolerance and B LE strength. Cues to avoid trunk movement with hip abductions in standing, pt with improved form following cues. Vc's for increased knee flexion with ambulation. Issued and reviewed HEP handout to ensure pt understanding. Will monitor pt's response to tx and compliance to HEP next session progressing as tolerated. [] No change. [] Other:  [x] Patient would continue to benefit from skilled physical therapy services in order to: decrease pain, increase strength, ROM, and overall functional mobility for improved quality of life. STG/LTG    Problems:    [x]? ? Pain: B/L knee pain-varying intensity  [x]? ? ROM: R knee flexion 95° (Left knee flexion 110)  [x]? ? Strength: B/L quad weakness; glute medius weakness  [x]? ? Function: LEFI score 40/80; difficulty with prolonged standing and walking; difficulty with stairs                   STG: (to be met in 8 treatments)  1. ? Pain: reduce B/L knee pain to 3/10 or less for ADL's and work tasks  2. ? ROM: improve R knee flexion to 105° to improve gait on stairs  3. ? Strength:improve B/L quad strength to 5/5 to improve gait  4. ? Function: improve LEFI score for walking 2 blocks to little bit of difficulty  5.  Patient to be independent with home exercise program as demonstrated by performance with correct form without cues. 6. Demonstrate Knowledge of fall prevention  LTG: (to be met in 16 treatments)  1. Reduce B/L knee pain to 2/10 or less for ADL's and work tasks  2. Able to ascend/descend 7 steps with reciprocal gait  3. Improve LEFI score for standing 1 hour to little bit of difficulty                    Patient goals: get better without having surgery    Pt. Education:  [x] Yes  [] No  [x] Reviewed Prior HEP/Ed  Method of Education: [x] Verbal  [] Demo  [x] Written issued and reviewed HEP on 3/11  Comprehension of Education:  [x] Verbalizes understanding. [] Demonstrates understanding. [] Needs review. [] Demonstrates/verbalizes HEP/Ed previously given. Plan: [x] Continue current frequency toward long and short term goals.     [] Specific Instructions for subsequent treatments:       Time In: 9:03           Time Out: 9:50    Electronically signed by:  Sangita Canada PTA

## 2022-03-15 ENCOUNTER — HOSPITAL ENCOUNTER (OUTPATIENT)
Dept: PHYSICAL THERAPY | Facility: CLINIC | Age: 62
Setting detail: THERAPIES SERIES
Discharge: HOME | End: 2022-03-15
Payer: MEDICARE

## 2022-03-15 PROCEDURE — 97110 THERAPEUTIC EXERCISES: CPT

## 2022-03-15 NOTE — FLOWSHEET NOTE
[] [facility]) [facility] &  Therapy  [address]  [phone]  F: [phone] [x] [address]  [address]   [address]  P: [phone]  F: [phone] [] [place]  [address]  P: [phone]  F: [phone] [] [address]  P: [phone]  F: [phone] [] [address]  [facility]   [address]   [place]: [phone]  F: [phone]      Physical Therapy Daily Treatment Note    Date:  3/15/2022  Patient Name:  Patricia Cope    :  1960  MRN: [id number]  Physician: Chanelle Cowart MD                     Insurance: [employer] adv  Medical Diagnosis: Primary osteoarthritis of both knees (M17.0 [ICD-10-CM])                 Rehab Codes: M25.562, M25.662, M79.605, M79.652, M79.662; M25.561, M25.661, M79.604, M79.651, M79.661  Onset date: 2021                       Next 's appt. : 3/16/22     Visit# / total visits: 3/16     Cancels/No Shows:     Subjective:     Pain:  [x] Yes  [] No Location: B knees Pain Rating: (0-10 scale) 5/10  Pain altered Tx:  [x] No  [] Yes  Action:    Comments: pt enters with a brace on the R knee, she states pain at 5/10 in both knees and the L \"has been acting up all day\".     Objective:  Modalities:   Precautions:  Exercises:   Exercise Bilat  Reps/ Time Weight/ Level Comments   Nu step  6'  L1 Warm up             mat         QS 10x3\"       Heel slides 10x2 L  10x R  A  AA requested to sit up following R heel slides d/t pain at medial aspect-unable today due to pain in the R    iso hip ADD 10x2x3\"  ball   i             Hook lying hip ABD Declines                       SEATED       LAQ 20xea     HS curls  10xea Lime                           standing         Calf stretch-wedge  3x30\"       Hip ABD 2x10ea       Hip Ext  2x10ea       HS curls  2x10ea     Alt marches   10x2       Heel raises   10x2                 Ambulation with straight cane   2laps        Other:              Treatment Charges: Mins Units   []  Modalities     [x]  Ther Exercise 43 3   []  Manual Therapy     []  Ther Activities     []  Aquatics     []  Vasocompression     []  Other     Total Treatment time 43 3       Assessment: [x] Progressing toward goals. Pt with low exercise tolerance. Started on the nuStep for warm up. Able to complete quad sets and hip add sets in supine, pt unable to tolerate anything else supine. Able to add seated LAQ and HS curls, and standing HS curls today. Able to then complete increased reps with standing exercises and able to add standing HS curls. Complete 2 laps with the cane in the L hand with fatigue at the end.      [] No change. [] Other:  [x] Patient would continue to benefit from skilled physical therapy services in order to: decrease pain, increase strength, ROM, and overall functional mobility for improved quality of life. STG/LTG    Problems:    [x]? ? Pain: B/L knee pain-varying intensity  [x]? ? ROM: R knee flexion 95° (Left knee flexion 110)  [x]? ? Strength: B/L quad weakness; glute medius weakness  [x]? ? Function: LEFI score 40/80; difficulty with prolonged standing and walking; difficulty with stairs                   STG: (to be met in 8 treatments)  1. ? Pain: reduce B/L knee pain to 3/10 or less for ADL's and work tasks  2. ? ROM: improve R knee flexion to 105° to improve gait on stairs  3. ? Strength:improve B/L quad strength to 5/5 to improve gait  4. ? Function: improve LEFI score for walking 2 blocks to little bit of difficulty  5. Patient to be independent with home exercise program as demonstrated by performance with correct form without cues. 6. Demonstrate Knowledge of fall prevention  LTG: (to be met in 16 treatments)  1.  Reduce B/L knee pain to 2/10 or less for ADL's and work tasks  2. Able to ascend/descend 7 steps with reciprocal gait  3. Improve LEFI score for standing 1 hour to little bit of difficulty                    Patient goals: get better without having surgery    Pt. Education:  [x] Yes  [] No  [x] Reviewed Prior HEP/Ed  Method of Education: [x] Verbal  [x] Demo for charted exercises  [] Written  Comprehension of Education:  [x] Verbalizes understanding. [x] Demonstrates understanding. [x] Needs review. [x] Demonstrates/verbalizes HEP/Ed previously given. Plan: [x] Continue current frequency toward long and short term goals.     [] Specific Instructions for subsequent treatments:       Time In: 3:03p           Time Out: 3:51     Electronically signed by:  Mariano Harper PTA

## 2022-03-17 ENCOUNTER — HOSPITAL ENCOUNTER (OUTPATIENT)
Dept: PHYSICAL THERAPY | Facility: CLINIC | Age: 62
Setting detail: THERAPIES SERIES
Discharge: HOME | End: 2022-03-17
Payer: MEDICARE

## 2022-03-17 PROCEDURE — 97110 THERAPEUTIC EXERCISES: CPT

## 2022-03-17 NOTE — FLOWSHEET NOTE
[] [facility]) [facility] &  Therapy  [address]  [phone]  F: [phone] [x] [address]  [address]   [address]  P: [phone]  F: [phone] [] [place]  [address]  P: [phone]  F: [phone] [] [address]  P: [phone]  F: [phone] [] [address]  [facility]   [address]   [place]: [phone]  F: [phone]      Physical Therapy Daily Treatment Note    Date:  3/17/2022  Patient Name:  Rocky Souza    :  1960  MRN: [id number]  Physician: Jim Hester MD                     Insurance: paramount adv  Medical Diagnosis: Primary osteoarthritis of both knees (M17.0 [ICD-10-CM])                 Rehab Codes: M25.562, M25.662, M79.605, M79.652, M79.662; M25.561, M25.661, M79.604, M79.651, M79.661  Onset date: 2021                       Next 's appt. : 3/16/22     Visit# / total visits:      Cancels/No Shows:     Subjective:     Pain:  [x] Yes  [] No Location: B knees Pain Rating: (0-10 scale) 6/10  Pain altered Tx:  [x] No  [] Yes  Action:    Comments: pt reports increased pain again today.     Objective:  Modalities:   Precautions:  Exercises:   Exercise Bilat  Reps/ Time Weight/ Level Comments   Nu step  6'  L1 Warm up             mat         QS 10x3\"       Heel slides 10x L  10x R  A  AA requested to sit up following R heel slides d/t pain at medial aspect-   iso hip ADD 10x2x3\"  ball  Not today              Hook lying hip ABD Declines                       SEATED       LAQ 20xea     HS curls  10xea Lime     Hip add sets  2x10 3\" ball                    standing         Calf stretch-wedge  3x30\"       Hip ABD  2x10ea       Hip Ext  2x10ea       HS curls  2x10ea     Alt Education:  [x] Verbalizes understanding. [x] Demonstrates understanding. [x] Needs review. [x] Demonstrates/verbalizes HEP/Ed previously given. Plan: [x] Continue current frequency toward long and short term goals.     [] Specific Instructions for subsequent treatments:       Time In: 11:04a           Time Out: 11:53     Electronically signed by:  Oren Zhong PTA

## 2022-03-24 ENCOUNTER — HOSPITAL ENCOUNTER (OUTPATIENT)
Dept: PHYSICAL THERAPY | Facility: CLINIC | Age: 62
Setting detail: THERAPIES SERIES
Discharge: HOME | End: 2022-03-24
Payer: MEDICARE

## 2022-03-24 NOTE — FLOWSHEET NOTE
[] [facility]) - [facility] &  Therapy  [address]    [phone]  F: [phone]   [x] [address]  [facility]   [address]  P: [phone]  F: [phone]  [] [place]  [address]  P: [phone]  F: [phone] [] [email or web address]  P: [phone]  F: [phone]  [] [address]  [facility]   [address]   [place]: [phone]  F: [phone]   [] [place] [address] [facility] [address]  [place]: [phone]   F: [phone]     Physical Therapy Cancel/No Show note    Date: 3/24/2022  Patient: Nallely Carlisle  : 1960  MRN: [id number]    Cancels/No Shows to date:     For today's appointment patient:    [x]  Cancelled    [] Rescheduled appointment    [] No-show      Reason given by patient:    [x]  Patient ill    []  Conflicting appointment    [] No transportation      [] Conflict with work    [] No reason given    [] Weather related    [] COVID-19    [] Other:      Comments: Pt was briefed on attendance policy      [x] Next appointment was confirmed    Electronically signed by: Radha Coe PT

## 2022-03-30 ENCOUNTER — HOSPITAL ENCOUNTER (OUTPATIENT)
Dept: PHYSICAL THERAPY | Facility: CLINIC | Age: 62
Setting detail: THERAPIES SERIES
Discharge: HOME | End: 2022-03-30
Payer: MEDICARE

## 2022-03-30 PROCEDURE — 97110 THERAPEUTIC EXERCISES: CPT

## 2022-03-30 NOTE — FLOWSHEET NOTE
[] [facility]) [facility] C[facility] &  Therapy  [address]  [phone]  F: [phone] [x] [address]  [facility]   [address]  P: [phone]  F: [phone] [] [address]  [address]  P: [phone]  F: [phone] [] [address]  P: [phone]  F: [phone] [] [address]  [facility]   [address]   [place]: [phone]  F: [phone]      Physical Therapy Daily Treatment Note    Date:  3/30/2022  Patient Name:  Monet Jon    :  1960  MRN: [id number]  Physician: Amauri Belle MD                     Insurance: [employer] adv  Medical Diagnosis: Primary osteoarthritis of both knees (M17.0 [ICD-10-CM])                 Rehab Codes: M25.562, M25.662, M79.605, M79.652, M79.662; M25.561, M25.661, M79.604, M79.651, M79.661  Onset date: 2021                       Next 's appt. : 3/16/22     Visit# / total visits:      Cancels/No Shows: 2/2    Subjective:     Pain:  [] Yes  [x] No Location: B knees Pain Rating: (0-10 scale) 0/10  Pain altered Tx:  [] No  [x] Yes  Action: poor tolerance to treatment and attempts at progression    Comments: Pt denies feeling pain upon, c/o knees to feel stiff. During exercises attempts pt often stops to recover secondary to pain complaints, but does not specify a rating intenstity.       Objective:  Modalities:   Precautions:  Exercises:   Exercise Bilat  Reps/ Time Weight/ Level Comments   Nu step  5'  L1 Warm up             mat         QS 10x3\"       Heel slides 10x L  7x R  A  AA    iso hip ADD                Hook lying hip ABD                        SEATED       LAQ 20xea 2# Added weight 3/30/22   HS curls  15xea blue Progressed 3/30   Hip add sets  2x10 3\" ball           standing         Calf stretch-wedge  3x30\"       Hip ABD  2x10ea       Hip Ext  2x10ea       HS curls  2x10ea     Alt marches   10x2       Heel raises   10x2   Held this date d/t calf spasms              Ambulation with straight cane   2 laps        Other: pt completes all exercises at a slow pace              Treatment Charges: Mins Units   []  Modalities     [x]  Ther Exercise 38 3   []  Manual Therapy     []  Ther Activities     []  Aquatics     []  Vasocompression     []  Other     Total Treatment time 38 3       Assessment: [] Progressing toward goals. [x] No change. Following warm up performed exercises in standing. Pt could not complete heel raises d/t cramping in calf muscles. Attempted calf stretch for relief, but pt reported feeling the same. increased weight and resistance in seated with fair tolerance. In supine, following heel slides pt requested to sit up d/t pain and did not want to continue further with treatment. [] Other:  [x] Patient would continue to benefit from skilled physical therapy services in order to: decrease pain, increase strength, ROM, and overall functional mobility for improved quality of life. STG/LTG    Problems:    [x]? ? Pain: B/L knee pain-varying intensity  [x]? ? ROM: R knee flexion 95° (Left knee flexion 110)  [x]? ? Strength: B/L quad weakness; glute medius weakness  [x]? ? Function: LEFI score 40/80; difficulty with prolonged standing and walking; difficulty with stairs                   STG: (to be met in 8 treatments)  1. ? Pain: reduce B/L knee pain to 3/10 or less for ADL's and work tasks  2. ? ROM: improve R knee flexion to 105° to improve gait on stairs  3. ? Strength:improve B/L quad strength to 5/5 to improve gait  4. ? Function: improve LEFI score for walking 2 blocks to little bit of difficulty  5. Patient to be independent with home exercise program as demonstrated by performance with correct form without cues.   6. Demonstrate Knowledge of fall prevention  LTG: (to be met in 16 treatments)  1. Reduce B/L knee pain to 2/10 or less for ADL's and work tasks  2. Able to ascend/descend 7 steps with reciprocal gait  3. Improve LEFI score for standing 1 hour to little bit of difficulty                    Patient goals: get better without having surgery    Pt. Education:  [] Yes  [x] No  [] Reviewed Prior HEP/Ed  Method of Education: [] Verbal  [] Demo for charted exercises  [] Written  Comprehension of Education:  [] Verbalizes understanding. [] Demonstrates understanding. [x] Needs review. [] Demonstrates/verbalizes HEP/Ed previously given. Plan: [x] Continue current frequency toward long and short term goals.     [] Specific Instructions for subsequent treatments:       Time In: 4:00pm            Time Out: 4:43pm    Electronically signed by:  Fani Astorga PTA

## 2022-04-05 ENCOUNTER — OFFICE VISIT (OUTPATIENT)
Dept: FAMILY MEDICINE CLINIC | Age: 62
End: 2022-04-05
Payer: MEDICARE

## 2022-04-05 ENCOUNTER — HOSPITAL ENCOUNTER (OUTPATIENT)
Dept: PHYSICAL THERAPY | Facility: CLINIC | Age: 62
Setting detail: THERAPIES SERIES
Discharge: HOME | End: 2022-04-05
Payer: MEDICARE

## 2022-04-05 VITALS
DIASTOLIC BLOOD PRESSURE: 83 MMHG | WEIGHT: 282 LBS | HEART RATE: 104 BPM | SYSTOLIC BLOOD PRESSURE: 124 MMHG | BODY MASS INDEX: 49.95 KG/M2

## 2022-04-05 DIAGNOSIS — Z12.31 ENCOUNTER FOR SCREENING MAMMOGRAM FOR BREAST CANCER: ICD-10-CM

## 2022-04-05 DIAGNOSIS — E11.9 TYPE 2 DIABETES MELLITUS WITHOUT COMPLICATION, WITHOUT LONG-TERM CURRENT USE OF INSULIN (HCC): Primary | ICD-10-CM

## 2022-04-05 DIAGNOSIS — Z12.11 COLON CANCER SCREENING: ICD-10-CM

## 2022-04-05 PROCEDURE — 1036F TOBACCO NON-USER: CPT

## 2022-04-05 PROCEDURE — 2022F DILAT RTA XM EVC RTNOPTHY: CPT

## 2022-04-05 PROCEDURE — G8417 CALC BMI ABV UP PARAM F/U: HCPCS

## 2022-04-05 PROCEDURE — 3017F COLORECTAL CA SCREEN DOC REV: CPT

## 2022-04-05 PROCEDURE — 99213 OFFICE O/P EST LOW 20 MIN: CPT

## 2022-04-05 PROCEDURE — 3051F HG A1C>EQUAL 7.0%<8.0%: CPT

## 2022-04-05 PROCEDURE — G8427 DOCREV CUR MEDS BY ELIG CLIN: HCPCS

## 2022-04-05 PROCEDURE — 97110 THERAPEUTIC EXERCISES: CPT

## 2022-04-05 ASSESSMENT — ENCOUNTER SYMPTOMS
VOMITING: 0
NAUSEA: 0
ABDOMINAL PAIN: 0
CONSTIPATION: 0
COUGH: 0
ABDOMINAL DISTENTION: 0
DIARRHEA: 0
SHORTNESS OF BREATH: 0

## 2022-04-05 ASSESSMENT — PATIENT HEALTH QUESTIONNAIRE - PHQ9
7. TROUBLE CONCENTRATING ON THINGS, SUCH AS READING THE NEWSPAPER OR WATCHING TELEVISION: 0
1. LITTLE INTEREST OR PLEASURE IN DOING THINGS: 0
SUM OF ALL RESPONSES TO PHQ QUESTIONS 1-9: 0
9. THOUGHTS THAT YOU WOULD BE BETTER OFF DEAD, OR OF HURTING YOURSELF: 0
5. POOR APPETITE OR OVEREATING: 0
4. FEELING TIRED OR HAVING LITTLE ENERGY: 0
SUM OF ALL RESPONSES TO PHQ QUESTIONS 1-9: 0
SUM OF ALL RESPONSES TO PHQ QUESTIONS 1-9: 0
2. FEELING DOWN, DEPRESSED OR HOPELESS: 0
SUM OF ALL RESPONSES TO PHQ QUESTIONS 1-9: 0
10. IF YOU CHECKED OFF ANY PROBLEMS, HOW DIFFICULT HAVE THESE PROBLEMS MADE IT FOR YOU TO DO YOUR WORK, TAKE CARE OF THINGS AT HOME, OR GET ALONG WITH OTHER PEOPLE: 0
SUM OF ALL RESPONSES TO PHQ9 QUESTIONS 1 & 2: 0
3. TROUBLE FALLING OR STAYING ASLEEP: 0
6. FEELING BAD ABOUT YOURSELF - OR THAT YOU ARE A FAILURE OR HAVE LET YOURSELF OR YOUR FAMILY DOWN: 0
8. MOVING OR SPEAKING SO SLOWLY THAT OTHER PEOPLE COULD HAVE NOTICED. OR THE OPPOSITE, BEING SO FIGETY OR RESTLESS THAT YOU HAVE BEEN MOVING AROUND A LOT MORE THAN USUAL: 0

## 2022-04-05 NOTE — FLOWSHEET NOTE
[] [facility]) - [facility] &  Therapy  [address]  [phone]  F: [phone] [x] [address]  [facility]   [address]  P: [phone]  F: [phone] [] [address] &  Therapy  [address]  P: [phone]  F: [phone] [] [address]  P: [phone]  F: [phone] [] [address]  [facility]   [address]   [place]: [phone]  F: [phone]      Physical Therapy Daily Treatment Note    Date:  2022  Patient Name:  Johnny Patel    :  1960  MRN: [id number]  Physician: Haydee Shah MD                     Insurance: [employer] adv  Medical Diagnosis: Primary osteoarthritis of both knees (M17.0 [ICD-10-CM])                 Rehab Codes: M25.562, M25.662, M79.605, M79.652, M79.662; M25.561, M25.661, M79.604, M79.651, M79.661  Onset date: 2021                       Next 's appt. : 3/16/22     Visit# / total visits:      Cancels/No Shows: 2/2    Subjective:     Pain:  [] Yes  [x] No Location: B knees Pain Rating: (0-10 scale) 0/10  Pain altered Tx:  [x] No  [] Yes  Action:     Comments: Pt arrives without straight cane and denies any pain symptoms.         Objective:  Modalities:   Precautions:  Exercises:   Exercise Bilat  Reps/ Time Weight/ Level Comments   Nu step  5'  L1 Warm up             mat         QS 10x3\"       Heel slides 15x ea  A            iso hip ADD      Hook lying hip ABD                        SEATED       LAQ 20xea 3# Added weight 3/30/, inc wt 4/5    HS curls  20xea purple Progressed 3/30, /    Hip add sets  2x10 3\" ball                    standing         Calf stretch-wedge 3x30\"       HS stretch on step  3x20\"ea     Hip ABD  2x10ea       Hip Ext  2x10ea       HS curls  2x10ea Alt marches   10x2       Heel raises   10x2            Step ups  10xea 6\" Added 4/5    Side steps  10xea  6\" Added 4/5          Ambulation with straight cane   2 laps    Not today    Other: pt completes all exercises at a slow pace              Treatment Charges: Mins Units   []  Modalities     [x]  Ther Exercise 40 3   []  Manual Therapy     []  Ther Activities     []  Aquatics     []  Vasocompression     []  Other     Total Treatment time 40 3       Assessment: [x] Progressing toward goals. Initiated treatment with Nustep warm up followed by supine exercises. Pt has no complaints pain from heel slides. Able to increase weight and resistance for seated LAQ and HS curls to progress strengthening. Implemented step ups and side steps to progress functional exercises with moderate muscular fatigue noted however, no pain. [] No change. [] Other:  [x] Patient would continue to benefit from skilled physical therapy services in order to: decrease pain, increase strength, ROM, and overall functional mobility for improved quality of life. STG/LTG    Problems:    [x]? ? Pain: B/L knee pain-varying intensity  [x]? ? ROM: R knee flexion 95° (Left knee flexion 110)  [x]? ? Strength: B/L quad weakness; glute medius weakness  [x]? ? Function: LEFI score 40/80; difficulty with prolonged standing and walking; difficulty with stairs                   STG: (to be met in 8 treatments)  1. ? Pain: reduce B/L knee pain to 3/10 or less for ADL's and work tasks  2. ? ROM: improve R knee flexion to 105° to improve gait on stairs  3. ? Strength:improve B/L quad strength to 5/5 to improve gait  4. ? Function: improve LEFI score for walking 2 blocks to little bit of difficulty  5. Patient to be independent with home exercise program as demonstrated by performance with correct form without cues. 6. Demonstrate Knowledge of fall prevention  LTG: (to be met in 16 treatments)  1.  Reduce B/L knee pain to 2/10 or less for ADL's and work tasks  2. Able to ascend/descend 7 steps with reciprocal gait  3. Improve LEFI score for standing 1 hour to little bit of difficulty                    Patient goals: get better without having surgery    Pt. Education:  [] Yes  [x] No  [] Reviewed Prior HEP/Ed  Method of Education: [] Verbal  [] Demo for charted exercises  [] Written  Comprehension of Education:  [] Verbalizes understanding. [] Demonstrates understanding. [x] Needs review. [] Demonstrates/verbalizes HEP/Ed previously given. Plan: [x] Continue current frequency toward long and short term goals.     [] Specific Instructions for subsequent treatments:       Time In: 4:00 pm            Time Out: 4:45 pm    Electronically signed by:  Patito Hong PTA

## 2022-04-05 NOTE — PROGRESS NOTES
Nathan Gipson (:  1960) is a 63 y.o. female,Established patient, here for evaluation of the following chief complaint(s):  Hypertension (follow up)         ASSESSMENT/PLAN:  1. Type 2 diabetes mellitus without complication, without long-term current use of insulin (HCC)  -     metFORMIN (GLUCOPHAGE) 500 MG tablet; take 1 tablet by mouth every morning with breakfast, Disp-30 tablet, R-2Normal  -     HM DIABETES FOOT EXAM  -     Microalbumin, Ur; Future  -     Basic Metabolic Panel; Future  -     [facility]  -     AFL - Meron Porras MD, Ophthalmology, [facility]  2. Encounter for screening mammogram for breast cancer  -     [facility] Digital Screen Bilateral [[id number]]; Future  3. Colon cancer screening  -     Fecal DNA Colorectal cancer screening (Cologuard)      Return in about 2 months (around 2022). Subjective   SUBJECTIVE/OBJECTIVE:  63years old female patient with diabetes came to the office for follow-up. Patient was seen recently in the office for knee pain. Patient is following with physical therapy and said that the pain is better. Patient is following with weight management. Patient last A1c was 7. She takes her medications regularly. Patient asked to get referral to ophthalmology to check her eyes. she noticed some decrease in visual acuity. Weakness, numbness, denies any chest pain, shortness breath, bowel or urinary changes. Review of Systems   Constitutional: Negative for fatigue and fever. Eyes: Positive for visual disturbance. Respiratory: Negative for cough and shortness of breath. Cardiovascular: Negative for chest pain, palpitations and leg swelling. Gastrointestinal: Negative for abdominal distention, abdominal pain, constipation, diarrhea, nausea and vomiting. Genitourinary: Negative for dysuria, flank pain and frequency. Neurological: Negative for dizziness and headaches.    Psychiatric/Behavioral: Negative for agitation and confusion. Objective   Physical Exam  Constitutional:       General: She is not in acute distress. Appearance: Normal appearance. She is obese. HENT:      Head: Normocephalic and atraumatic. Cardiovascular:      Rate and Rhythm: Normal rate and regular rhythm. Pulses: Normal pulses. Heart sounds: Normal heart sounds. No murmur heard. Pulmonary:      Effort: Pulmonary effort is normal.      Breath sounds: Normal breath sounds. No wheezing, rhonchi or rales. Abdominal:      General: Abdomen is flat. Bowel sounds are normal. There is no distension. Palpations: Abdomen is soft. Tenderness: There is no abdominal tenderness. Musculoskeletal:         General: No tenderness. Right lower leg: No edema. Left lower leg: No edema. Skin:     General: Skin is warm. Findings: No lesion or rash. Neurological:      General: No focal deficit present. Mental Status: She is alert and oriented to person, place, and time. Sensory: No sensory deficit. Motor: No weakness. Psychiatric:         Mood and Affect: Mood normal.     Visual inspection:  Deformity/amputation: absent  Skin lesions/pre-ulcerative calluses: absent  Edema: right- negative, left- negative    Sensory exam:  Monofilament sensation: normal  (minimum of 5 random plantar locations tested, avoiding callused areas - > 1 area with absence of sensation is + for neuropathy)    Plus at least one of the following:  Pulses: normal,   Pinprick: Intact  Proprioception: Intact  Vibration (128 Hz): N/A       On this date 4/5/2022 I have spent 25 minutes reviewing previous notes, test results and face to face with the patient discussing the diagnosis and importance of compliance with the treatment plan as well as documenting on the day of the visit. An electronic signature was used to authenticate this note.     --Gianni Ratliff MD

## 2022-04-05 NOTE — PROGRESS NOTES
I have reviewed and discussed key elements of [address] with the resident including plan of care and follow up and agree with the care serenity plan. Follow up DM. Past Medical History:   Diagnosis Date    Diverticulitis     2 times    Former smoker     Former smoker     GERD (gastroesophageal reflux disease)     Hyperlipidemia     Hypertension     Morbid obesity with BMI of 45.0-49.9, adult ([facility]     SINDI on CPAP     Sleep apnea     SINDI on CPAP    Type II or unspecified type diabetes mellitus without mention of complication, not stated as uncontrolled         Diagnosis Orders   1. Type 2 diabetes mellitus without complication, without long-term current use of insulin ([facility]   DIABETES FOOT EXAM    Microalbumin, Ur    Basic Metabolic Panel    [facility]    ALEXANDER - Trav Ramon MD, Ophthalmology, [facility]   2. Prediabetes  metFORMIN (GLUCOPHAGE) 500 MG tablet   3. Encounter for screening mammogram for breast cancer  [facility] Digital Screen Bilateral [[id number]]   4.  Colon cancer screening  Fecal DNA Colorectal cancer screening (Cologuard)

## 2022-04-05 NOTE — PROGRESS NOTES
HYPERTENSION visit     BP Readings from Last 3 Encounters:   02/11/22 111/72   01/18/22 119/78   01/07/22 133/86       LDL Cholesterol (mg/dL)   Date Value   02/11/2022 110     HDL (mg/dL)   Date Value   02/11/2022 44     BUN (mg/dL)   Date Value   04/01/2021 19     CREATININE (mg/dL)   Date Value   04/01/2021 0.91 (H)     Glucose (mg/dL)   Date Value   04/01/2021 117 (H)   10/09/2011 136 (H)              Have you changed or started any medications since your last visit including any over-the-counter medicines, vitamins, or herbal medicines? no   Have you stopped taking any of your medications? Is so, why? -  no  Are you having any side effects from any of your medications? - no  How often do you miss doses of your medication? no      Have you seen any other physician or provider since your last visit?  no   Have you had any other diagnostic tests since your last visit?  no   Have you been seen in the emergency room and/or had an admission in a hospital since we last saw you?  no   Have you had your routine dental cleaning in the past 6 months?  no     Do you have an active MyChart account? If no, what is the barrier?   Yes    Patient Care Team:  Franci Robles MD as PCP - Alisa Sanchez MD as Consulting Physician (Pulmonology)  Dasha Gómez MD as Consulting Physician (Pain Management)    Medical History Review  Past Medical, Family, and Social History reviewed and does not contribute to the patient presenting condition    Health Maintenance   Topic Date Due    Shingles Vaccine (1 of 2) Never done    Diabetic foot exam  12/16/2017    Diabetic microalbuminuria test  11/01/2020    Breast cancer screen  01/12/2021    Hepatitis B vaccine (2 of 3 - Risk 3-dose series) 03/11/2022    Potassium monitoring  04/01/2022    Creatinine monitoring  04/01/2022    Diabetic retinal exam  04/11/2022 (Originally 10/24/1980)    COVID-19 Vaccine (3 - Booster for Pfizer series) 06/20/2022    Depression Screen 08/12/2022    A1C test (Diabetic or Prediabetic)  02/11/2023    Lipid screen  02/11/2023    DTaP/Tdap/Td vaccine (2 - Td or Tdap) 03/07/2024    Pneumococcal 0-64 years Vaccine (2 of 2 - PPSV23) 10/24/2027    Colorectal Cancer Screen  10/18/2029    Flu vaccine  Completed    Hepatitis C screen  Completed    HIV screen  Completed    Hepatitis A vaccine  Aged Out    Hib vaccine  Aged Out    Meningococcal (ACWY) vaccine  Aged Out

## 2022-04-07 ENCOUNTER — HOSPITAL ENCOUNTER (OUTPATIENT)
Dept: PHYSICAL THERAPY | Facility: CLINIC | Age: 62
Setting detail: THERAPIES SERIES
Discharge: HOME | End: 2022-04-07
Payer: MEDICARE

## 2022-04-07 PROCEDURE — 97110 THERAPEUTIC EXERCISES: CPT

## 2022-04-07 NOTE — FLOWSHEET NOTE
Heel raises   10x2            Step ups  10xea 6\" Added 4/5    Side steps  10xea  6\" Added 4/5          Ambulation with straight cane   2 laps    Not today    Other: pt completes all exercises at a slow pace         Treatment Charges: Mins Units   []  Modalities     [x]  Ther Exercise 35 2   []  Manual Therapy     []  Ther Activities     []  Aquatics     []  Vasocompression     []  Other     Total Treatment time 35 2        Assessment: [x] Progressing toward goals. 4/7: R knee AROM: -5- 106; L knee AROM: -3-115; R knee flexion improved 11°; Pt states she is also going to [employer] and working on treadmill, nu step and upper body strengthening. To advance as tolerated    [] No change. [] Other:  [x] Patient would continue to benefit from skilled physical therapy services in order to: decrease pain, increase strength, ROM, and overall functional mobility for improved quality of life. STG/LTG    Problems:    [x]? ? Pain: B/L knee pain-varying intensity  [x]? ? ROM: R knee flexion 95° (Left knee flexion 110)  [x]? ? Strength: B/L quad weakness; glute medius weakness  [x]? ? Function: LEFI score 40/80; difficulty with prolonged standing and walking; difficulty with stairs                   STG: (to be met in 8 treatments)  1. ? Pain: reduce B/L knee pain to 3/10 or less for ADL's and work tasks 4/7 goal met  2. ? ROM: improve R knee flexion to 105° to improve gait on stairs 4/7 goal met  3. ? Strength:improve B/L quad strength to 5/5 to improve gait 4/7 progress made-goal ongoing  4. ? Function: improve LEFI score for walking 2 blocks to little bit of difficulty  5. Patient to be independent with home exercise program as demonstrated by performance with correct form without cues. 6. Demonstrate Knowledge of fall prevention  LTG: (to be met in 16 treatments)  1. Reduce B/L knee pain to 2/10 or less for ADL's and work tasks  2. Able to ascend/descend 7 steps with reciprocal gait  3.  Improve LEFI score for standing 1 hour to little bit of difficulty                    Patient goals: get better without having surgery    Pt. Education:  [] Yes  [x] No  [] Reviewed Prior HEP/Ed  Method of Education: [] Verbal  [] Demo for charted exercises  [] Written  Comprehension of Education:  [] Verbalizes understanding. [] Demonstrates understanding. [x] Needs review. [] Demonstrates/verbalizes HEP/Ed previously given. Plan: [x] Continue current frequency toward long and short term goals.     [] Specific Instructions for subsequent treatments:       Time In: 1 pm            Time Out: 1:41pm    Electronically signed by:  Lupillo Lane, PT

## 2022-04-18 NOTE — FLOWSHEET NOTE
[] [facility]  [facility]  [address]    [phone]  F: [phone]   [] [address] [facility] [address]   [address]  P: [phone]  F: [phone]  [] [address] &  Therapy  [address]  P: [phone]  F: [phone] [] [address]  P: [phone]  F: [phone]  [] [address]  [address] [facility] [address]   [address]   [place]: [phone]  F: [phone]   [] [place] [id number] [facility] [address]  [place]: [phone]   F: [phone]     Physical Therapy Cancel/No Show note    Date: 2022  Patient: Shaista Coronel  : 1960  MRN: [id number]    Cancels/No Shows to date: 2cx/3ns    For 2022 appointment patient:    [x]  Cancelled    [] Rescheduled appointment    [] No-show     Reason given by patient:    []  Patient ill    []  Conflicting appointment    [] No transportation      [] Conflict with work    [] No reason given    [] Weather related    [] AKERD-    [x] Other:  Out of town.       Comments:        [x] Next appointment was confirmed, 22 @ 1pm.    Electronically signed by: Lonnie Cheema

## 2022-04-19 ENCOUNTER — HOSPITAL ENCOUNTER (OUTPATIENT)
Dept: PHYSICAL THERAPY | Facility: CLINIC | Age: 62
Setting detail: THERAPIES SERIES
Discharge: HOME | End: 2022-04-19
Payer: MEDICARE

## 2022-04-21 ENCOUNTER — HOSPITAL ENCOUNTER (OUTPATIENT)
Dept: PHYSICAL THERAPY | Facility: CLINIC | Age: 62
Setting detail: THERAPIES SERIES
Discharge: HOME | End: 2022-04-21
Payer: MEDICARE

## 2022-04-21 ENCOUNTER — HOSPITAL ENCOUNTER (OUTPATIENT)
Age: 62
Setting detail: SPECIMEN
Discharge: HOME | End: 2022-04-21

## 2022-04-21 DIAGNOSIS — E11.9 TYPE 2 DIABETES MELLITUS WITHOUT COMPLICATION, WITHOUT LONG-TERM CURRENT USE OF INSULIN (HCC): ICD-10-CM

## 2022-04-21 LAB
ANION GAP SERPL CALCULATED.3IONS-SCNC: 10 MMOL/L (ref 9–17)
BUN BLDV-MCNC: 17 MG/DL (ref 6–20)
CALCIUM SERPL-MCNC: 9.8 MG/DL (ref 8.6–10.4)
CHLORIDE BLD-SCNC: 104 MMOL/L (ref 98–107)
CO2: 29 MMOL/L (ref 20–31)
CREAT SERPL-MCNC: 0.96 MG/DL (ref 0.5–0.9)
CREATININE URINE: 152.9 MG/DL (ref 28–217)
GFR AFRICAN AMERICAN: >60 ML/MIN
GFR NON-AFRICAN AMERICAN: 60 ML/MIN
GFR SERPL CREATININE-BSD FRML MDRD: ABNORMAL ML/MIN/{1.73_M2}
GLUCOSE BLD-MCNC: 116 MG/DL (ref 70–99)
MICROALBUMIN/CREAT 24H UR: <12 MG/L
MICROALBUMIN/CREAT UR-RTO: NORMAL MCG/MG CREAT
POTASSIUM SERPL-SCNC: 4.6 MMOL/L (ref 3.7–5.3)
SODIUM BLD-SCNC: 143 MMOL/L (ref 135–144)

## 2022-04-21 PROCEDURE — 97110 THERAPEUTIC EXERCISES: CPT

## 2022-04-21 NOTE — FLOWSHEET NOTE
[] [facility]) [facility] CENTER &  Therapy  [address]  [phone]  F: [phone] [x] [address]  [facility]   [address]  P: [phone]  F: [phone] [] [address]  [address]  P: [phone]  F: [phone] [] [address]  P: [phone]  F: [phone] [] [address]  [facility]   [address]   [place]: [phone]  F: [phone]      Physical Therapy Daily Treatment Note    Date:  2022  Patient Name:  Bridgett Jha    :  1960  MRN: [id number]  Physician: Jordan Clarke MD                     Insurance: sendwithus adv  Medical Diagnosis: Primary osteoarthritis of both knees (M17.0 [ICD-10-CM])                 Rehab Codes: M25.562, M25.662, M79.605, M79.652, M79.662; M25.561, M25.661, M79.604, M79.651, M79.661  Onset date: 2021                       Next 's appt. : 22     Visit# / total visits:      Cancels/No Shows: 2/3    Subjective:     Pain:  [x] Yes  [] No Location: B knees Pain Rating: (0-10 scale) 4/10  Pain altered Tx:  [x] No  [] Yes  Action:     Comments: Pt arrives with bilateral knee pain. Notes knees are 'tense' this date. Ambulates with straight cane and has brace donned on R knee. Mentions increased pain is d/t a lot of walking in the airport and on her vacation.         Objective:  Modalities:   Precautions:  Exercises:   Exercise Bilat  Reps/ Time Weight/ Level Comments   Nu step  5'  L1 Warm up             mat         QS 10x3\"       Heel slides 15x ea  A      SLR with quad set  x10 ea  Added    iso hip ADD x20     Hook lying hip ABD                        SEATED       LAQ 20xea 3# Added weight 3/30/22, inc wt     Alt marches  20xea 3# Added    HS curls 20xea purple Progressed 3/30, 4/5    Hip add sets  2x10, 3\" ball                    standing         Calf stretch-wedge 3x30\"       HS stretch on step  3x20\"ea     Hip ABD  2x10ea       Hip Ext 2x10ea       HS curls  2x10ea     Alt marches  10x2       Heel raises  10x2            Step ups  10xea 6\" Added 4/5    Side steps  10xea  6\" Added 4/5    Step downs  10x ea 4\" added 4/21         Ambulation with straight cane   2 laps    Not today    Other: pt completes all exercises at a slow pace       4/7: R knee AROM: -5- 106; L knee AROM: -3-115; R knee flexion improved 11°     4/21: R knee AROM: -2 - 103; L knee ROM: 0-115; R knee extension improved and flexion regressed, L knee ext improved to 0       Treatment Charges: Mins Units   []  Modalities     [x]  Ther Exercise 45 3   []  Manual Therapy     []  Ther Activities     []  Aquatics     []  Vasocompression     []  Other     Total Treatment time 45 3        Assessment: [x] Progressing toward goals. Addition of alternating marches with 3# ankle weights and SLR's with quad set to improve B hip flexor strength. Incorporated step downs off 4\" step without issue. Improvements noted for bilateral knee extension. Pt with good tolerance to exercise program and progressions this date. [] No change. [] Other:  [x] Patient would continue to benefit from skilled physical therapy services in order to: decrease pain, increase strength, ROM, and overall functional mobility for improved quality of life. STG/LTG    Problems:    [x]? ? Pain: B/L knee pain-varying intensity  [x]? ? ROM: R knee flexion 95° (Left knee flexion 110)  [x]? ? Strength: B/L quad weakness; glute medius weakness  [x]? ?  Function: LEFI score 40/80; difficulty with prolonged standing and walking; difficulty with stairs                   STG: (to be met in 8 treatments)  1. ? Pain: reduce B/L knee pain to 3/10 or less for ADL's and work tasks 4/7 goal met  2. ? ROM: improve R knee flexion to 105° to improve gait on stairs 4/7 goal met  3. ? Strength:improve B/L quad strength to 5/5 to improve gait 4/7 progress made-goal ongoing  4. ? Function: improve LEFI score for walking 2 blocks to little bit of difficulty  5. Patient to be independent with home exercise program as demonstrated by performance with correct form without cues. 6. Demonstrate Knowledge of fall prevention  LTG: (to be met in 16 treatments)  1. Reduce B/L knee pain to 2/10 or less for ADL's and work tasks  2. Able to ascend/descend 7 steps with reciprocal gait  3. Improve LEFI score for standing 1 hour to little bit of difficulty                    Patient goals: get better without having surgery    Pt. Education:  [] Yes  [x] No  [] Reviewed Prior HEP/Ed  Method of Education: [] Verbal  [] Demo for charted exercises  [] Written  Comprehension of Education:  [] Verbalizes understanding. [] Demonstrates understanding. [x] Needs review. [] Demonstrates/verbalizes HEP/Ed previously given. Plan: [x] Continue current frequency toward long and short term goals.     [] Specific Instructions for subsequent treatments:       Time In: 1:05 p         Time Out: 1:55 pm    Electronically signed by:  Vicente Wright PTA

## 2022-04-23 LAB — NONINV COLON CA DNA+OCC BLD SCRN STL QL: NEGATIVE

## 2022-04-26 ENCOUNTER — HOSPITAL ENCOUNTER (OUTPATIENT)
Dept: PHYSICAL THERAPY | Facility: CLINIC | Age: 62
Setting detail: THERAPIES SERIES
Discharge: HOME | End: 2022-04-26
Payer: MEDICARE

## 2022-04-26 NOTE — FLOWSHEET NOTE
[] [address]  [address]    [phone]  F: [phone]   [x] [address]  [facility] 36   [address]  P: [phone]  F: [phone]  [] [address] &  Therapy  [address]  P: [phone]  F: [phone] [] [address]  P: [phone]  F: [phone]  [] [address]  [address] [facility] [address]   [address]   [place]: [phone]  F: [phone]   [] [address]  [address] [facility] [address]  [place]: [phone]   F: [phone]     Physical Therapy Cancel/No Show note    Date: 2022  Patient: Nathan Gipson  : 1960  MRN: [id number]    Cancels/No Shows to date: 3cx/3ns      [x]  Cancelled    [x] Rescheduled appointment to Wednesday     [] No-show     Reason given by patient:    []  Patient ill    []  Conflicting appointment    [] No transportation      [] Conflict with work    [] No reason given    [] Weather related    [] COVID-19    [x] Other:      Comments:  CX d/t granddaughters grad dinner.       [x] Next appointment was confirmed    Electronically signed by: Hernesto Austin PTA

## 2022-04-27 ENCOUNTER — HOSPITAL ENCOUNTER (OUTPATIENT)
Dept: PHYSICAL THERAPY | Facility: CLINIC | Age: 62
Setting detail: THERAPIES SERIES
Discharge: HOME | End: 2022-04-27
Payer: MEDICARE

## 2022-04-27 PROCEDURE — 97110 THERAPEUTIC EXERCISES: CPT

## 2022-04-27 NOTE — FLOWSHEET NOTE
[] [facility]) [facility] CENTER &  Therapy  [address]  [phone]  F: [phone] [x] [address]  [facility] 36   [address]  P: [phone]  F: [phone] [] [address]  [address]  P: [phone]  F: [phone] [] [address]  P: [phone]  F: [phone] [] [address] [facility]   [address]   [place]: [phone]  F: [phone]      Physical Therapy Daily Treatment Note    Date:  2022  Patient Name:  Jan Ram    :  1960  MRN: [id number]  Physician: Daniel Livingston MD                     Insurance: [employer] Mondays adv  Medical Diagnosis: Primary osteoarthritis of both knees (M17.0 [ICD-10-CM])                 Rehab Codes: M25.562, M25.662, M79.605, M79.652, M79.662; M25.561, M25.661, M79.604, M79.651, M79.661  Onset date: 2021                       Next 's appt. : 22     Visit# / total visits:      Cancels/No Shows: 2/3    Subjective:     Pain:  [x] Yes  [] No Location: B knees Pain Rating: (0-10 scale) 4/10  Pain altered Tx:  [x] No  [] Yes  Action:     Comments: Pt arrives denying changes. Continues to report 4/10 pain.         Objective:  Modalities:   Precautions:  Exercises:   Exercise Bilat  Reps/ Time Weight/ Level Comments   Nu step  6'  L1 Warm up             mat         QS 10x3\"       Heel slides 15x ea  A      SLR with quad set  2x10 ea  Added    iso hip ADD x20     Hook lying hip ABD 20x  [employer]                    SEATED       LAQ 20xea 3# Added weight 3/30/22, inc wt     Alt marches  20xea 3# Added    HS curls  20xea purple Progressed 3/30,     Hip add sets  2x10, 3\" ball                    standing         Calf stretch-wedge 3x30\"       HS stretch on step  3x20\"ea     Hip ABD  2x10ea  1#  inc 4/27   Hip Ext 2x10ea  1#  inc 4/27   HS curls  2x10ea  1#  inc 4/27   Alt marches  10x2  1#  inc 4/27   Heel raises  10x2            Step ups  10xea 6\" Added 4/5    Side steps  10xea  6\" Added 4/5    Step downs  10x ea 4\" added 4/21         Ambulation with straight cane   2 laps    Not today    Other: pt completes all exercises at a slow pace       4/7: R knee AROM: -5- 106; L knee AROM: -3-115; R knee flexion improved 11°     4/21: R knee AROM: -2 - 103; L knee ROM: 0-115; R knee extension improved and flexion regressed, L knee ext improved to 0       Treatment Charges: Mins Units   []  Modalities     [x]  Ther Exercise 50 3   []  Manual Therapy     []  Ther Activities     []  Aquatics     []  Vasocompression     []  Other     Total Treatment time 50 3        Assessment: [x] Progressing toward goals. Pt was able to complete charted above with out incident. Progressions made with added weight and new interventions. Intermittent cueing to improve form of exercises. Provided patient up HEP to reflect interventions above. [] No change. [] Other:  [x] Patient would continue to benefit from skilled physical therapy services in order to: decrease pain, increase strength, ROM, and overall functional mobility for improved quality of life. Problems:    [x]? ? Pain: B/L knee pain-varying intensity  [x]? ? ROM: R knee flexion 95° (Left knee flexion 110)  [x]? ? Strength: B/L quad weakness; glute medius weakness  [x]? ?  Function: LEFI score 40/80; difficulty with prolonged standing and walking; difficulty with stairs                   STG: (to be met in 8 treatments)  1. ? Pain: reduce B/L knee pain to 3/10 or less for ADL's and work tasks 4/7 goal met  2. ? ROM: improve R knee flexion to 105° to improve gait on stairs 4/7 goal met  3. ? Strength:improve B/L quad strength to 5/5 to improve gait 4/7 progress made-goal ongoing  4. ? Function: improve LEFI score for walking 2 blocks to little bit of difficulty  5. Patient to be independent with home exercise program as demonstrated by performance with correct form without cues. 6. Demonstrate Knowledge of fall prevention  LTG: (to be met in 16 treatments)  1. Reduce B/L knee pain to 2/10 or less for ADL's and work tasks  2. Able to ascend/descend 7 steps with reciprocal gait  3. Improve LEFI score for standing 1 hour to little bit of difficulty                    Patient goals: get better without having surgery    Pt. Education:  [x] Yes  [] No  [x] Reviewed Prior HEP/Ed  Method of Education: [x] Verbal  [] Demo   [] Written  Access Code: [email or web address]  URL: [employer]/  Date: 04/27/2022  [facility]: [facility]    Exercises  Seated Long Arc Quad - 1 x daily - 7 x weekly - 2 sets - 10 reps  Seated Hamstring Stretch - 1 x daily - 7 x weekly - 2 sets - 10 reps  Seated March - 1 x daily - 7 x weekly - 2 sets - 10 reps  Supine Active Straight Leg Raise - 1 x daily - 7 x weekly - 2 sets - 10 reps  Supine Bridge - 1 x daily - 7 x weekly - 2 sets - 10 reps  Hooklying Clamshell with Resistance - 1 x daily - 7 x weekly - 2 sets - 10 reps  Standing Hip Abduction with Counter Support - 1 x daily - 7 x weekly - 2 sets - 10 reps  Standing Hip Extension with Counter Support - 1 x daily - 7 x weekly - 2 sets - 10 reps  Standing Hip Flexion with Counter Support - 1 x daily - 7 x weekly - 2 sets - 10 reps  Heel rises with counter support - 1 x daily - 7 x weekly - 2 sets - 10 reps  Standing March with Counter Support - 1 x daily - 7 x weekly - 2 sets - 10 reps  Step Up - 1 x daily - 7 x weekly - 2 sets - 10 reps  Lateral Step Up - 1 x daily - 7 x weekly - 2 sets - 10 reps  Forward Step Down - 1 x daily - 7 x weekly - 2 sets - 10 reps    Comprehension of Education:  [] Verbalizes understanding. [] Demonstrates understanding. [x] Needs review. [] Demonstrates/verbalizes HEP/Ed previously given.      Plan: [x] Continue current frequency toward long and short term goals.     [x] Specific Instructions for subsequent treatments:  Progress as able      Time In: 1104 p         Time Out: 1200 pm    Electronically signed by:  Johnnie Campos PTA

## 2022-04-28 ENCOUNTER — HOSPITAL ENCOUNTER (OUTPATIENT)
Dept: PHYSICAL THERAPY | Facility: CLINIC | Age: 62
Setting detail: THERAPIES SERIES
Discharge: HOME | End: 2022-04-28
Payer: MEDICARE

## 2022-04-28 PROCEDURE — 97016 VASOPNEUMATIC DEVICE THERAPY: CPT

## 2022-04-28 NOTE — FLOWSHEET NOTE
[] [facility]) [facility] CENTER &  Therapy  [address]  [phone]  F: [phone] [x] [address]  [address]   [address]  P: [phone]  F: [phone] [] [place]  [address]  P: [phone]  F: [phone] [] [address]  P: [phone]  F: [phone] [] [address]  [facility]   [address]   [place]: [phone]  F: [phone]      Physical Therapy Daily Treatment Note    Date:  2022  Patient Name:  Gali Peralta    :  1960  MRN: [id number]  Physician: Quan Zhao MD                     Insurance: [email or web address] adv  Medical Diagnosis: Primary osteoarthritis of both knees (M17.0 [ICD-10-CM])                 Rehab Codes: M25.562, M25.662, M79.605, M79.652, M79.662; M25.561, M25.661, M79.604, M79.651, M79.661  Onset date: 2021                       Next 's appt. : 22     Visit# / total visits: 10/16     Cancels/No Shows: 2/3    Subjective:     Pain:  [] Yes  [x] No Location: B knees Pain Rating: (0-10 scale) 0/10  Pain altered Tx:  [x] No  [] Yes  Action:     Comments: Pt reports she has no pain this morning in her knees.          Objective:  Modalities:   Precautions:  Exercises:   Exercise Bilat  Reps/ Time Weight/ Level Comments   Nu step  6'  L1 Warm up             mat         QS 10x3\"       Heel slides 15x ea  A      SLR with quad set  2x10 ea  Added    iso hip ADD x20     Hook lying hip ABD 20x  [employer]                    SEATED       LAQ 20xea 3# Added weight 3/30/22, inc wt     Alt marches  20xea 3# Added    HS curls  20xea purple Progressed 3/30,     Hip add sets  2x10, 3\" ball                    standing         Calf stretch-wedge 3x30\"       HS stretch on step  3x20\"ea     Hip ABD  2x10ea  1#  inc 4/27   Hip Ext 2x10ea  1#  inc 4/27   HS curls  2x10ea  1#  inc 4/27   Alt marches  10x2  1#  inc 4/27   Heel raises  10x2            Step ups  10xea 6\" Added 4/5    Side steps  10xea  6\" Added 4/5    Step downs  10x ea 4\" added 4/21         Ambulation with straight cane   2 laps    Not today    Other:        4/7: R knee AROM: -5- 106; L knee AROM: -3-115; R knee flexion improved 11°     4/21: R knee AROM: -2 - 103; L knee ROM: 0-115; R knee extension improved and flexion regressed, L knee ext improved to 0       Treatment Charges: Mins Units   []  Modalities     [x]  Ther Exercise 37 2   []  Manual Therapy     []  Ther Activities     []  Aquatics     []  Vasocompression     []  Other     Total Treatment time 37 2        Assessment: [x] Progressing toward goals. Pt did well with all exercises completed today. Pt able to complete all exercises with good form, but needed cueing to slow down a couple times. She had no complaints of pain in B knees, but some muscle fatigue especially with resisted and standing exercises. Pt felt good at end of treatment and denied ice. [] No change. [] Other:  [x] Patient would continue to benefit from skilled physical therapy services in order to: decrease pain, increase strength, ROM, and overall functional mobility for improved quality of life. Problems:    [x]? ? Pain: B/L knee pain-varying intensity  [x]? ? ROM: R knee flexion 95° (Left knee flexion 110)  [x]? ? Strength: B/L quad weakness; glute medius weakness  [x]? ?  Function: LEFI score 40/80; difficulty with prolonged standing and walking; difficulty with stairs                   STG: (to be met in 8 treatments)  1. ? Pain: reduce B/L knee pain to 3/10 or less for ADL's and work tasks 4/7 goal met  2. ? ROM: improve R knee flexion to 105° to improve gait on stairs 4/7 goal met  3. ? Strength:improve B/L quad strength to 5/5 to improve gait 4/7 progress made-goal ongoing  4. ? Function: improve LEFI score for walking 2 blocks to little bit of difficulty  5. Patient to be independent with home exercise program as demonstrated by performance with correct form without cues. 6. Demonstrate Knowledge of fall prevention  LTG: (to be met in 16 treatments)  1. Reduce B/L knee pain to 2/10 or less for ADL's and work tasks  2. Able to ascend/descend 7 steps with reciprocal gait  3. Improve LEFI score for standing 1 hour to little bit of difficulty                    Patient goals: get better without having surgery    Pt. Education:  [x] Yes  [] No  [x] Reviewed Prior HEP/Ed  Method of Education: [x] Verbal  [] Demo   [] Written  Access Code: [email or web address]  URL: [email or web address]  Date: 04/27/2022  Prepared by: [facility]    Exercises  Seated Long Arc Quad - 1 x daily - 7 x weekly - 2 sets - 10 reps  Seated Hamstring Stretch - 1 x daily - 7 x weekly - 2 sets - 10 reps  Seated March - 1 x daily - 7 x weekly - 2 sets - 10 reps  Supine Active Straight Leg Raise - 1 x daily - 7 x weekly - 2 sets - 10 reps  Supine Bridge - 1 x daily - 7 x weekly - 2 sets - 10 reps  Hooklying Clamshell with Resistance - 1 x daily - 7 x weekly - 2 sets - 10 reps  Standing Hip Abduction with Counter Support - 1 x daily - 7 x weekly - 2 sets - 10 reps  Standing Hip Extension with Counter Support - 1 x daily - 7 x weekly - 2 sets - 10 reps  Standing Hip Flexion with Counter Support - 1 x daily - 7 x weekly - 2 sets - 10 reps  Heel rises with counter support - 1 x daily - 7 x weekly - 2 sets - 10 reps  Standing March with Counter Support - 1 x daily - 7 x weekly - 2 sets - 10 reps  Step Up - 1 x daily - 7 x weekly - 2 sets - 10 reps  Lateral Step Up - 1 x daily - 7 x weekly - 2 sets - 10 reps  Forward Step Down - 1 x daily - 7 x weekly - 2 sets - 10 reps    Comprehension of Education:  [] Verbalizes understanding. [] Demonstrates understanding. [x] Needs review.   [] Demonstrates/verbalizes HEP/Ed previously given.     Plan: [x] Continue current frequency toward long and short term goals.     [x] Specific Instructions for subsequent treatments:  Progress as able      Time In: 11:05am         Time Out: 1147 am    Electronically signed by:  Edita Hernandez PTA

## 2022-05-03 ENCOUNTER — HOSPITAL ENCOUNTER (OUTPATIENT)
Dept: PHYSICAL THERAPY | Facility: CLINIC | Age: 62
Setting detail: THERAPIES SERIES
Discharge: HOME | End: 2022-05-03
Payer: MEDICARE

## 2022-05-03 NOTE — FLOWSHEET NOTE
[] [facility]) [facility] &  Therapy  [address]    [phone]  F: [phone]   [x] [address]  [facility]   [address]  P: [phone]  F: [phone]  [] [place]  [address]  P: [phone]  F: [phone] [] [address]  P: [phone]  F: [phone]  [] [address]  [facility]   [address]   [place]: [phone]  F: [phone]   [] [place] [address] [facility] [address]  [place]: [phone]   F: [phone]     Physical Therapy Cancel/No Show note    Date: 5/3/2022  Patient: Violette Glodstein  : 1960  MRN: [id number]    Cancels/No Shows to date: 4cx/3ns      [x]  Cancelled    [x] Rescheduled    [] No-show     Reason given by patient:    []  Patient ill    []  Conflicting appointment    [] No transportation      [] Conflict with work    [] No reason given    [] Weather related    [] COVID-19    [x] Other:      Comments:  CX pt taking son to ER broken wrist      [x] Next appointment was confirmed    Electronically signed by: Clarence Sue PTA
Awake/Alert

## 2022-05-05 ENCOUNTER — HOSPITAL ENCOUNTER (OUTPATIENT)
Dept: PHYSICAL THERAPY | Facility: CLINIC | Age: 62
Setting detail: THERAPIES SERIES
Discharge: HOME | End: 2022-05-05
Payer: MEDICARE

## 2022-05-05 PROCEDURE — 97110 THERAPEUTIC EXERCISES: CPT

## 2022-05-05 NOTE — FLOWSHEET NOTE
[] [facility]) [facility] &  Therapy  [address]  [phone]  F: [phone] [x] [address]  [place] [address]   [address]  P: [phone]  F: [phone] [] [place]  [address]  P: [phone]  F: [phone] [] [address]  P: [phone]  F: [phone] [] [address]  [facility]   [address]   [place]: [phone]  F: [phone]      Physical Therapy Daily Treatment Note    Date:  2022  Patient Name:  Daryle Artis    :  1960  MRN: [id number]  Physician: Gabriela Tineo MD                     Insurance: [employer] adv  Medical Diagnosis: Primary osteoarthritis of both knees (M17.0 [ICD-10-CM])                 Rehab Codes: M25.562, M25.662, M79.605, M79.652, M79.662; M25.561, M25.661, M79.604, M79.651, M79.661  Onset date: 2021                         Next 's appt. : 22     Visit# / total visits:      Cancels/No Shows:  (corrected count 22)    Subjective:     Pain:  [] Yes  [x] No Location: B knees Pain Rating: (0-10 scale) 3/10  Pain altered Tx:  [x] No  [] Yes  Action:     Comments: Pt reports \"achy yesterday\" wonders if \"gel shot wearing off\"    Objective:  Modalities:   Precautions:  Exercises:   Exercise Bilat  Reps/ Time Weight/ Level Comments   Nu step  6'  L1.2 Warm up             mat         QS 10x3\"       Heel slides 15x ea  A      SLR with quad set  2x10 ea  Added    iso hip ADD x20     Hook lying hip ABD 20x  [employer]                    SEATED       LAQ 20xea 3# Added weight 3/30/22, inc wt     Alt marches  20xea 3# Added    HS curls  20xea purple Progressed 3/30,     Hip add sets  2x10, 3\" ball                    standing         Calf stretch-wedge 3x30\"       HS stretch on step  3x20\"ea     Hip ABD  2x10ea  1#  inc 4/27   Hip Ext 2x10ea  1#  inc 4/27   HS curls  2x15ea  1#  inc 4/27, 5/5 increased reps   Alt marches  10x2  1#  inc 4/27   Heel raises  10x2            Step ups  15xea 6\" Added 4/5 , 5/5 increased reps   Side steps  10xea  6\" Added 4/5 ,5/5 increased reps    Step downs  15x ea 4\" added 4/21, 5/5 increased reps         Ambulation with straight cane   2 laps    5/5 left cane in care   Other:        4/7: R knee AROM: -5- 106; L knee AROM: -3-115; R knee flexion improved 11°     4/21: R knee AROM: -2 - 103; L knee ROM: 0-115; R knee extension improved and flexion regressed, L knee ext improved to 0       5/5: R knee AROM 0-102, L knee AROM 0-116, minimal changes      Treatment Charges: Mins Units   []  Modalities     [x]  Ther Exercise 52 3   []  Manual Therapy     []  Ther Activities     []  Aquatics     []  Vasocompression     []  Other     Total Treatment time 52 3        Assessment: [x] Progressing toward goals. Patient able to complete above exercises with minimal cueing for form. Emphasized working on right knee flexion at home and continuing strengthening. Continuing with HEP. Patient reports she thinks she will ultimately need knee replacements but hoping to delay. Discussed goal of PT for ROM and strengthening, low impact exercises, aquatics, stationary bike would be beneficial.     [] No change. [] Other:  [x] Patient would continue to benefit from skilled physical therapy services in order to: decrease pain, increase strength, ROM, and overall functional mobility for improved quality of life. Problems:    [x]? ? Pain: B/L knee pain-varying intensity  [x]? ? ROM: R knee flexion 95° (Left knee flexion 110)  [x]? ? Strength: B/L quad weakness; glute medius weakness  [x]? ?  Function: LEFI score 40/80; difficulty with prolonged standing and walking; difficulty with stairs                   STG: (to be met in 8 treatments)  1. ? Pain: reduce B/L knee pain to 3/10 or less for ADL's and work tasks 4/7 goal met  2. ? ROM: improve R knee flexion to 105° to improve gait on stairs 4/7 goal met  3. ? Strength:improve B/L quad strength to 5/5 to improve gait 4/7 progress made-goal ongoing  4. ? Function: improve LEFI score for walking 2 blocks to little bit of difficulty  5. Patient to be independent with home exercise program as demonstrated by performance with correct form without cues. 6. Demonstrate Knowledge of fall prevention  LTG: (to be met in 16 treatments)  1. Reduce B/L knee pain to 2/10 or less for ADL's and work tasks  2. Able to ascend/descend 7 steps with reciprocal gait  3. Improve LEFI score for standing 1 hour to little bit of difficulty                    Patient goals: get better without having surgery    Pt. Education:  [] Yes  [] No  [x] Reviewed Prior HEP/Ed  Method of Education: [x] Verbal  [x] Demo   [] Written  5-5 encouraged HEP, right knee flexion   Access Code: [email or web address]  URL: [email or web address]/  Date: 04/27/2022  Prepared by: [facility]    Exercises  Seated Long Arc Quad - 1 x daily - 7 x weekly - 2 sets - 10 reps  Seated Hamstring Stretch - 1 x daily - 7 x weekly - 2 sets - 10 reps  Seated March - 1 x daily - 7 x weekly - 2 sets - 10 reps  Supine Active Straight Leg Raise - 1 x daily - 7 x weekly - 2 sets - 10 reps  Supine Bridge - 1 x daily - 7 x weekly - 2 sets - 10 reps  Hooklying Clamshell with Resistance - 1 x daily - 7 x weekly - 2 sets - 10 reps  Standing Hip Abduction with Counter Support - 1 x daily - 7 x weekly - 2 sets - 10 reps  Standing Hip Extension with Counter Support - 1 x daily - 7 x weekly - 2 sets - 10 reps  Standing Hip Flexion with Counter Support - 1 x daily - 7 x weekly - 2 sets - 10 reps  Heel rises with counter support - 1 x daily - 7 x weekly - 2 sets - 10 reps  Standing March with Counter Support - 1 x daily - 7 x weekly - 2 sets - 10 reps  Step Up - 1 x daily - 7 x weekly - 2 sets - 10 reps  Lateral Step Up - 1 x daily - 7 x weekly - 2 sets - 10 reps  Forward Step Down - 1 x daily - 7 x weekly - 2 sets - 10 reps    Comprehension of Education:  [] Verbalizes understanding. [] Demonstrates understanding. [x] Needs review. [] Demonstrates/verbalizes HEP/Ed previously given. Plan: [x] Continue current frequency toward long and short term goals.     [x] Specific Instructions for subsequent treatments:  Progress as able      Time In:  1:03 pm        Time Out: 2:01 pm    Electronically signed by:  Marilu Johnston, PT

## 2022-05-11 ENCOUNTER — HOSPITAL ENCOUNTER (OUTPATIENT)
Dept: PHYSICAL THERAPY | Facility: CLINIC | Age: 62
Setting detail: THERAPIES SERIES
Discharge: HOME | End: 2022-05-11
Payer: MEDICARE

## 2022-05-11 PROCEDURE — 97110 THERAPEUTIC EXERCISES: CPT

## 2022-05-11 NOTE — FLOWSHEET NOTE
[] [facility]) [facility] CENTER &  Therapy  [address]  [phone]  F: [phone] [x] [address]  [address]   [address]  P: [phone]  F: [phone] [] [place]  [address]  P: [phone]  F: [phone] [] [address]  P: [phone]  F: [phone] [] [address]  [facility]   [address]   [place]: [phone]  F: [phone]      Physical Therapy Daily Treatment Note    Date:  2022  Patient Name:  Марина Rothman    :  1960  MRN: [id number]  Physician: Sarah Stout MD                     Insurance: [employer] adv  Medical Diagnosis: Primary osteoarthritis of both knees (M17.0 [ICD-10-CM])                 Rehab Codes: M25.562, M25.662, M79.605, M79.652, M79.662; M25.561, M25.661, M79.604, M79.651, M79.661  Onset date: 2021                         Next 's appt. : 22     Visit# / total visits:      Cancels/No Shows: / (corrected count 22)    Subjective:     Pain:  [] Yes  [x] No Location: B knees Pain Rating: (0-10 scale) 4/10  Pain altered Tx:  [x] No  [] Yes  Action:     Comments: Pt arrives reporting moderate bilateral R>L knee pain this date. Pt arrives without an assistive device however states she is still using a cane for walking longer distances. Pt reports compliance with HEP.      Objective:  Modalities:   Precautions:  Exercises:   Exercise Bilat  Reps/ Time Weight/ Level Comments   Nu step  5'  L1.2 Warm up             mat         QS 10x3\"       Heel slides 15x ea  A      SLR with quad set  2x10 ea  Added    iso hip ADD x20     Hook lying hip ABD 20x  [employer]                    SEATED       LAQ 20xea 3# Added weight 3/30/22, inc wt     Alt marches 20xea 3# Added 4/21   HS curls  20xea purple Progressed 3/30, 4/5    Hip add sets  2x10, 3\" ball                    standing         Calf stretch-wedge 3x30\"       HS stretch on step  3x20\"ea     Hip ABD  2x10ea  1#  inc 4/27   Hip Ext 2x10ea  1#  inc 4/27   HS curls  2x15ea  1#  inc 4/27, 5/5 increased reps   Alt marches  10x2  1#  inc 4/27   Heel raises  10x2  1#          Step ups  20xea 6\" Added 4/5 , 5/11 increased reps   Side steps  15xea  6\" Added 4/5 ,5/11 increased reps    Step downs  15x ea 4\" added 4/21, 5/5 increased reps         Ambulation with straight cane      5/5 left cane in car   Other:        4/7: R knee AROM: -5- 106; L knee AROM: -3-115; R knee flexion improved 11°     4/21: R knee AROM: -2 - 103; L knee ROM: 0-115; R knee extension improved and flexion regressed, L knee ext improved to 0       5/5: R knee AROM 0-102, L knee AROM 0-116, minimal changes      Treatment Charges: Mins Units   []  Modalities     [x]  Ther Exercise 45 3   []  Manual Therapy     []  Ther Activities     []  Aquatics     []  Vasocompression     []  Other     Total Treatment time 45 3        Assessment: [x] Progressing toward goals. Fair tolerance of all exercises and progressions made this date. Pt fatigue quickly with standing program but able to complete with minimal rest breaks. Able to progress reps for step ups and side steps with slight pain noted in R knee. Cues required for eccentric lowering during step downs with poor carryover. Pt does mention steps seem to be less challenging now since starting therapy. Will continues to progress as tolerated. [] No change. [x] Other:  [x] Patient would continue to benefit from skilled physical therapy services in order to: decrease pain, increase strength, ROM, and overall functional mobility for improved quality of life. Problems:    [x]? ? Pain: B/L knee pain-varying intensity  [x]? ? ROM: R knee flexion 95° (Left knee flexion 110)  [x]? ?  Strength: B/L quad weakness; glute medius weakness  [x]? ? Function: LEFI score 40/80; difficulty with prolonged standing and walking; difficulty with stairs                   STG: (to be met in 8 treatments)  1. ? Pain: reduce B/L knee pain to 3/10 or less for ADL's and work tasks 4/7 goal met  2. ? ROM: improve R knee flexion to 105° to improve gait on stairs 4/7 goal met  3. ? Strength:improve B/L quad strength to 5/5 to improve gait 4/7 progress made-goal ongoing  4. ? Function: improve LEFI score for walking 2 blocks to little bit of difficulty  5. Patient to be independent with home exercise program as demonstrated by performance with correct form without cues. 6. Demonstrate Knowledge of fall prevention  LTG: (to be met in 16 treatments)  1. Reduce B/L knee pain to 2/10 or less for ADL's and work tasks  2. Able to ascend/descend 7 steps with reciprocal gait  3. Improve LEFI score for standing 1 hour to little bit of difficulty                    Patient goals: get better without having surgery    Pt. Education:  [x] Yes  [] No  [x] Reviewed Prior HEP/Ed  Method of Education: [x] Verbal  [x] Demo   [] Written  Access Code: [email or web address]  URL: [email or web address]  Date: 04/27/2022  Prepared by: On license of [facility]    Exercises  Seated Long Arc Quad - 1 x daily - 7 x weekly - 2 sets - 10 reps  Seated Hamstring Stretch - 1 x daily - 7 x weekly - 2 sets - 10 reps  Seated March - 1 x daily - 7 x weekly - 2 sets - 10 reps  Supine Active Straight Leg Raise - 1 x daily - 7 x weekly - 2 sets - 10 reps  Supine Bridge - 1 x daily - 7 x weekly - 2 sets - 10 reps  Hooklying Clamshell with Resistance - 1 x daily - 7 x weekly - 2 sets - 10 reps  Standing Hip Abduction with Counter Support - 1 x daily - 7 x weekly - 2 sets - 10 reps  Standing Hip Extension with Counter Support - 1 x daily - 7 x weekly - 2 sets - 10 reps  Standing Hip Flexion with Counter Support - 1 x daily - 7 x weekly - 2 sets - 10 reps  Heel rises with counter support - 1 x daily - 7 x weekly - 2 sets - 10 reps  Standing March with Counter Support - 1 x daily - 7 x weekly - 2 sets - 10 reps  Step Up - 1 x daily - 7 x weekly - 2 sets - 10 reps  Lateral Step Up - 1 x daily - 7 x weekly - 2 sets - 10 reps  Forward Step Down - 1 x daily - 7 x weekly - 2 sets - 10 reps    Comprehension of Education:  [] Verbalizes understanding. [] Demonstrates understanding. [x] Needs review. [] Demonstrates/verbalizes HEP/Ed previously given. Plan: [x] Continue current frequency toward long and short term goals.     [x] Specific Instructions for subsequent treatments:  Progress as able      Time In: 5:00 pm        Time Out: 5:50 pm    Electronically signed by:  Jim Hernandez PTA

## 2022-05-17 ENCOUNTER — HOSPITAL ENCOUNTER (OUTPATIENT)
Dept: PHYSICAL THERAPY | Facility: CLINIC | Age: 62
Setting detail: THERAPIES SERIES
Discharge: HOME | End: 2022-05-17
Payer: MEDICARE

## 2022-05-17 NOTE — FLOWSHEET NOTE
[] [address]  [address]    [phone]  F: [phone]   [x] [address]  [facility] [address]   [address]  P: [phone]  F: [phone]  [] [address] &  Therapy  [address]  P: [phone]  F: [phone] [] [address]  P: [phone]  F: [phone]  [] [address]  [address] [facility] [address]   [address]   [place]: [phone]  F: [phone]   [] [address]  [place]: [phone]   F: [phone]     Physical Therapy Cancel/No Show note    Date: 2022  Patient: Andreas Sevilla  : 1960  MRN: [id number]    Cancels/No Shows to date:     For today's appointment patient:    [x]  Cancelled    [] Rescheduled appointment    [] No-show     Reason given by patient:    []  Patient ill    []  Conflicting appointment    [] No transportation      [x] Conflict with work    [] No reason given    [] Weather related    [] [id number]    [] Other:      Comments:   In work orientation      [] Next appointment was confirmed    Electronically signed by: Anabella Cannon PT

## 2022-05-26 ENCOUNTER — HOSPITAL ENCOUNTER (OUTPATIENT)
Dept: PHYSICAL THERAPY | Facility: CLINIC | Age: 62
Setting detail: THERAPIES SERIES
Discharge: HOME | End: 2022-05-26
Payer: MEDICARE

## 2022-05-26 RX ORDER — LISINOPRIL 10 MG/1
TABLET ORAL
Qty: 120 TABLET | Refills: 0 | Status: SHIPPED | OUTPATIENT
Start: 2022-05-26

## 2022-05-26 NOTE — FLOWSHEET NOTE
[] [address]  [address]    [phone]  F: [phone]   [x] [address] [facility] [address]   [address]  P: [phone]  F: [phone]  [] [address] &  Therapy  [address]  P: [phone]  F: [phone] [] [address]  P: [phone]  F: [phone]  [] [address]  [address] [facility]   [address]   [place]: [phone]  F: [phone]   [] [address]  [place]: [phone]   F: [phone]     Physical Therapy Cancel/No Show note    Date: 2022  Patient: Марина Rothman  : 1960  MRN: [id number]    Cancels/No Shows to date: 7/3    For today's appointment patient:    [x]  Cancelled    [] Rescheduled appointment    [] No-show     Reason given by patient:    []  Patient ill    []  Conflicting appointment    [] No transportation      [] Conflict with work    [] No reason given    [] Weather related    [] [id number]    [x] Other:      Comments:Family emergency; Pt will call back to reschedule.       [] Next appointment was confirmed    Electronically signed by: Lupillo Lane, PT

## 2022-05-26 NOTE — TELEPHONE ENCOUNTER
Please address the medication refill and close the encounter. If I can be of assistance, please route to the applicable pool. Thank you. Last visit: 4-5-22  Last Med refill: 9-24-21  Does patient have enough medication for 72 hours: No:     Next Visit Date:  Future Appointments   Date Time Provider Rico Ayala   5/26/2022  2:00 PM Katie Browning, PT [facility]   6/7/2022  1:30 PM Asuncion Kaufman MD [address] [place] [address] Maintenance   Topic Date Due    Diabetic retinal exam  Never done    Pneumococcal 0-64 years Vaccine (2 - PCV) 01/01/2011    Shingles vaccine (1 of 2) Never done    Breast cancer screen  01/12/2021    Hepatitis B vaccine (2 of 3 - Risk 3-dose series) 03/11/2022    COVID-19 Vaccine (3 - Booster for Pfizer series) 06/20/2022    A1C test (Diabetic or Prediabetic)  02/11/2023    Lipids  02/11/2023    Diabetic foot exam  04/05/2023    Depression Screen  04/05/2023    Diabetic microalbuminuria test  04/21/2023    DTaP/Tdap/Td vaccine (2 - Td or Tdap) 03/07/2024    Colorectal Cancer Screen  10/18/2029    Flu vaccine  Completed    Hepatitis C screen  Completed    HIV screen  Completed    Hepatitis A vaccine  Aged Out    Hib vaccine  Aged Out    Meningococcal (ACWY) vaccine  Aged Out       Hemoglobin A1C (%)   Date Value   02/11/2022 7.0 (H)   09/02/2021 6.8   04/01/2021 6.5             ( goal A1C is < 7)   Microalb/Crt.  Ratio (mcg/mg creat)   Date Value   04/21/2022 Can not be calculated     LDL Cholesterol (mg/dL)   Date Value   02/11/2022 110   05/19/2020 70       (goal LDL is <100)   AST (U/L)   Date Value   05/19/2020 17     ALT (U/L)   Date Value   05/19/2020 17     BUN (mg/dL)   Date Value   04/21/2022 17     BP Readings from Last 3 Encounters:   04/05/22 124/83   02/11/22 111/72   01/18/22 119/78          (goal 120/80)    All Future Testing planned in [facility]  Lab Frequency Next Occurrence   GERTRUDIS Digital Screen Bilateral [[id number]] Once 05/05/2022               Patient Active Problem List:     Gastroesophageal reflux disease     Hyperlipidemia with target LDL less than 100     HTN (hypertension)     Numbness and tingling     Diverticula, colon     Diverticulitis, acute     Eczema     Lump of left breast     Shoulder pain     Right groin pain     Tinea corporis     Hyperlipidemia     Morbid obesity with BMI of 45.0-49.9, adult (HCC)     SINDI (obstructive sleep apnea)     Vitamin D deficiency     Melena     Chest pain     Abnormal stress test     Contact blepharoconjunctivitis of left eye     Prediabetes     Need for prophylactic vaccination and inoculation against varicella     Acute cystitis without hematuria     Primary osteoarthritis of right knee

## 2022-06-02 NOTE — DISCHARGE SUMMARY
[] Maribel Nix        Outpatient Physical                Therapy       [address]       Phone: [phone]       Fax: [phone] [x] [facility]       Promotion at [address] [place] [address]       Phone: [phone]       Fax: [phone] [] [facility]      [facility]     [address] [facility]     Phone: [phone]     Fax:  [phone]     Physical Therapy Discharge Note    Date: 2022      Patient: Karolina Painting  : 1960  MRN: [id number]    [address], [place]                     Insurance: [employer] [facility]  Medical Diagnosis: Primary osteoarthritis of both knees (M17.0 [ICD-10-CM])                 Rehab Codes: M25.562, M25.662, M79.605, M79.652, M79.662; M25.561, M25.661, M79.604, M79.651, M79.661  Onset date: 2021                                    Next 's appt. : 22     Visit# / total visits:       Cancels/No shows: 7/3    Date of initial visit: 3/1/22               Date of final visit: 22       Discharge Status:     Pt failed to make additional appointments for therapy. Pt. Is now discharged. Electronically signed by: Fernando Breen PT    If you have any questions or concerns, please don't hesitate to call.   Thank you for your referral.

## 2022-06-07 ENCOUNTER — OFFICE VISIT (OUTPATIENT)
Dept: FAMILY MEDICINE CLINIC | Age: 62
End: 2022-06-07
Payer: MEDICARE

## 2022-06-07 ENCOUNTER — OFFICE VISIT (OUTPATIENT)
Dept: ORTHOPEDIC SURGERY | Age: 62
End: 2022-06-07
Payer: MEDICARE

## 2022-06-07 VITALS — BODY MASS INDEX: 49.96 KG/M2 | OXYGEN SATURATION: 100 % | RESPIRATION RATE: 16 BRPM | HEIGHT: 63 IN | WEIGHT: 282 LBS

## 2022-06-07 VITALS
BODY MASS INDEX: 49.95 KG/M2 | HEART RATE: 92 BPM | SYSTOLIC BLOOD PRESSURE: 125 MMHG | DIASTOLIC BLOOD PRESSURE: 78 MMHG | WEIGHT: 282 LBS

## 2022-06-07 DIAGNOSIS — M17.0 PRIMARY OSTEOARTHRITIS OF BOTH KNEES: Primary | ICD-10-CM

## 2022-06-07 DIAGNOSIS — E11.9 TYPE 2 DIABETES MELLITUS WITHOUT COMPLICATION, WITHOUT LONG-TERM CURRENT USE OF INSULIN (HCC): Primary | ICD-10-CM

## 2022-06-07 LAB — HBA1C MFR BLD: 6.7 %

## 2022-06-07 PROCEDURE — G8427 DOCREV CUR MEDS BY ELIG CLIN: HCPCS | Performed by: ORTHOPAEDIC SURGERY

## 2022-06-07 PROCEDURE — 83036 HEMOGLOBIN GLYCOSYLATED A1C: CPT

## 2022-06-07 PROCEDURE — 20610 DRAIN/INJ JOINT/BURSA W/O US: CPT | Performed by: ORTHOPAEDIC SURGERY

## 2022-06-07 PROCEDURE — 1036F TOBACCO NON-USER: CPT | Performed by: ORTHOPAEDIC SURGERY

## 2022-06-07 PROCEDURE — 2022F DILAT RTA XM EVC RTNOPTHY: CPT

## 2022-06-07 PROCEDURE — 3044F HG A1C LEVEL LT 7.0%: CPT

## 2022-06-07 PROCEDURE — 1036F TOBACCO NON-USER: CPT

## 2022-06-07 PROCEDURE — G8427 DOCREV CUR MEDS BY ELIG CLIN: HCPCS

## 2022-06-07 PROCEDURE — 3017F COLORECTAL CA SCREEN DOC REV: CPT

## 2022-06-07 PROCEDURE — 99213 OFFICE O/P EST LOW 20 MIN: CPT

## 2022-06-07 PROCEDURE — G8417 CALC BMI ABV UP PARAM F/U: HCPCS | Performed by: ORTHOPAEDIC SURGERY

## 2022-06-07 PROCEDURE — G8417 CALC BMI ABV UP PARAM F/U: HCPCS

## 2022-06-07 PROCEDURE — 3017F COLORECTAL CA SCREEN DOC REV: CPT | Performed by: ORTHOPAEDIC SURGERY

## 2022-06-07 RX ADMIN — TRIAMCINOLONE ACETONIDE 40 MG: 40 INJECTION, SUSPENSION INTRA-ARTICULAR; INTRAMUSCULAR at 12:48

## 2022-06-07 RX ADMIN — TRIAMCINOLONE ACETONIDE 40 MG: 40 INJECTION, SUSPENSION INTRA-ARTICULAR; INTRAMUSCULAR at 12:49

## 2022-06-07 RX ADMIN — LIDOCAINE HYDROCHLORIDE 4 ML: 10 INJECTION, SOLUTION INFILTRATION; PERINEURAL at 12:48

## 2022-06-07 SDOH — ECONOMIC STABILITY: FOOD INSECURITY: WITHIN THE PAST 12 MONTHS, YOU WORRIED THAT YOUR FOOD WOULD RUN OUT BEFORE YOU GOT MONEY TO BUY MORE.: NEVER TRUE

## 2022-06-07 SDOH — ECONOMIC STABILITY: FOOD INSECURITY: WITHIN THE PAST 12 MONTHS, THE FOOD YOU BOUGHT JUST DIDN'T LAST AND YOU DIDN'T HAVE MONEY TO GET MORE.: NEVER TRUE

## 2022-06-07 ASSESSMENT — PATIENT HEALTH QUESTIONNAIRE - PHQ9
SUM OF ALL RESPONSES TO PHQ9 QUESTIONS 1 & 2: 0
1. LITTLE INTEREST OR PLEASURE IN DOING THINGS: 0
SUM OF ALL RESPONSES TO PHQ QUESTIONS 1-9: 0
2. FEELING DOWN, DEPRESSED OR HOPELESS: 0
SUM OF ALL RESPONSES TO PHQ QUESTIONS 1-9: 0

## 2022-06-07 ASSESSMENT — ENCOUNTER SYMPTOMS
SHORTNESS OF BREATH: 0
GASTROINTESTINAL NEGATIVE: 1
COUGH: 0

## 2022-06-07 ASSESSMENT — SOCIAL DETERMINANTS OF HEALTH (SDOH): HOW HARD IS IT FOR YOU TO PAY FOR THE VERY BASICS LIKE FOOD, HOUSING, MEDICAL CARE, AND HEATING?: NOT HARD AT ALL

## 2022-06-07 NOTE — PROGRESS NOTES
Visit Information    Have you changed or started any medications since your last visit including any over-the-counter medicines, vitamins, or herbal medicines? no   Are you having any side effects from any of your medications? -  no  Have you stopped taking any of your medications? Is so, why? -  no    Have you seen any other physician or provider since your last visit? No  Have you had any other diagnostic tests since your last visit? No  Have you been seen in the emergency room and/or had an admission to a hospital since we last saw you? No  Have you had your routine dental cleaning in the past 6 months? no    Have you activated your [email or web address] account? If not, what are your barriers?  Yes     Patient Care Team:  To Forte MD as PCP - Yohana Villa MD as Consulting Physician (Pulmonology)  Lorne Pritchett MD as Consulting Physician (Pain Management)    Medical History Review  Past Medical, Family, and Social History reviewed and does not contribute to the patient presenting condition    Health Maintenance   Topic Date Due    Diabetic retinal exam  Never done    Pneumococcal 0-64 years Vaccine (2 - PCV) 01/01/2011    Shingles vaccine (1 of 2) Never done    Breast cancer screen  01/12/2021    Hepatitis B vaccine (2 of 3 - Risk 3-dose series) 03/11/2022    COVID-19 Vaccine (3 - Booster for Moore Peter series) 06/20/2022    A1C test (Diabetic or Prediabetic)  02/11/2023    Lipids  02/11/2023    Diabetic foot exam  04/05/2023    Depression Screen  04/05/2023    Diabetic microalbuminuria test  04/21/2023    DTaP/Tdap/Td vaccine (2 - Td or Tdap) 03/07/2024    Colorectal Cancer Screen  10/18/2029    Flu vaccine  Completed    Hepatitis C screen  Completed    HIV screen  Completed    Hepatitis A vaccine  Aged Out    Hib vaccine  Aged Out    Meningococcal (ACWY) vaccine  Aged Out

## 2022-06-07 NOTE — PROGRESS NOTES
[address] [place] [address] AND SPORTS MEDICINE  [facility] Viry Greenfield  [address] [address]  Dept: [phone]    Ambulatory Orthopedic Consult      CHIEF COMPLAINT:    Chief Complaint   Patient presents with    Knee Pain     Bilateral       HISTORY OF PRESENT ILLNESS:      The patient is a left-hand dominant 63 y.o. female who is being seen for evaluation of pain at the above location (left anterior knee greater than left dorsal midfoot and lateral/medial hindfoot, as well as left ring finger), which began in the beginning of November 2020 atraumatically. The pain is described mainly with mechanical terms (dull/sharp/throbbing). The pain is worse with activity and better with rest. The patient reports a static course. The patient has tried:      [x]  rest/activity modification          [x]  NSAIDs      []  opiates      []  orthotics        []  change in shoes   [x]  home exercises  []  physical therapy      []  CAM boot     []  brace:    []  injection:       []  surgery:      The patient also reports that her left ring finger pain is associated with intermittent locking. INTERVAL HISTORY 2/5/2021:  She is seen again today in the office for follow up of a previous issue (as above--left knee and left ring finger pain). Since being seen last, the patient is doing worse, particularly in terms of her left knee. At today's visit, she is not using a brace or assistive device. The location and quality of the pain have not significantly changed since the last visit. INTERVAL HISTORY 4/6/2021:  She is seen again today in the office for follow up after an injection. Since being seen last, the patient is doing better. At today's visit, she is using no brace or assistive device. She received an injection of the left knee, and reports the injection helped approximately 100% in terms of pain.   She also reports that her left ring finger trigger finger is not bothering her, and reports that her foot pain has significantly improved. INTERVAL HISTORY 6/4/2021:  She is seen again today in the office for follow up of a previous issue (as above--bilateral knees). Since being seen last, the patient is doing worse. At today's visit, she is not using a brace or assistive device. History is obtained today from:   [x]  the patient     []  EMR     []  one family member/friend    []  multiple family members/friends    []  other:      INTERVAL HISTORY 7/19/2021:  She is seen again today in the office for follow up of imaging as below. Since being seen last, the patient is doing about the same overall. At today's visit, she is using no brace/assistive device. History is obtained today from:   [x]  the patient     [x]  EMR     []  one family member/friend    []  multiple family members/friends    []  other:      INTERVAL HISTORY 8/30/2021:  She is seen again today in the office for follow up of a previous issue (as above). Since being seen last, the patient is doing better. At today's visit, she is not using a brace or assistive device. History is obtained today from:   [x]  the patient     []  EMR     []  one family member/friend    []  multiple family members/friends    []  other:      INTERVAL HISTORY 11/18/2021:  She is seen again today in the office for follow up of a previous issue (as above). Since being seen last, the patient is doing about the same overall. At today's visit, she is using a removable brace. History is obtained today from:   [x]  the patient     []  EMR     []  one family member/friend    []  multiple family members/friends    []  other:      INTERVAL HISTORY 12/7/2021:  She is seen again today in the office for follow up of imaging as below. Since being seen last, the patient is doing about the same overall. At today's visit, she is using a brace/boot.     History is obtained today from:   [x]  the patient     [x]  EMR     []  one family member/friend    []  multiple family members/friends    []  other:      INTERVAL HISTORY 1/18/2022:  She is seen again today in the office for follow up of a previous issue (as above). Since being seen last, the patient is doing worse. At today's visit, she is using a removable brace. History is obtained today from:   [x]  the patient     []  EMR     []  one family member/friend    []  multiple family members/friends    []  other:      Since her last visit, the patient has seen Dr. Jose Ortega, who referred her to weight management to decrease her BMI prior to proceeding with surgery. INTERVAL HISTORY 6/7/2022:  She is seen again today in the office for follow up of a previous issue (as above). Since being seen last, the patient is doing worse. At today's visit, she is using a removable brace. History is obtained today from:   [x]  the patient     []  EMR     []  one family member/friend    []  multiple family members/friends    []  other:           REVIEW OF SYSTEMS:  Musculoskeletal: See HPI for pertinent positives     Past Medical History:    She  has a past medical history of Diverticulitis, Former smoker, Former smoker, GERD (gastroesophageal reflux disease), Hyperlipidemia, Hypertension, Morbid obesity with BMI of 45.0-49.9, adult ([facility] [address] SINDI on CPAP, Sleep apnea, and Type II or unspecified type diabetes mellitus without mention of complication, not stated as uncontrolled. Past Surgical History:    She  has a past surgical history that includes Hysterectomy; Cholecystectomy; Colonoscopy; pr egd transoral biopsy single/multiple (7/19/2017); Upper gastrointestinal endoscopy (N/A, 10/18/2019); and Colonoscopy (N/A, 10/18/2019).      Current Medications:     Current Outpatient Medications:     lisinopril (PRINIVIL;ZESTRIL) 10 MG tablet, take 1 tablet by mouth once daily, Disp: 120 tablet, Rfl: 0    metFORMIN (GLUCOPHAGE) 500 MG tablet, take 1 tablet by mouth every morning with breakfast, Disp: 30 tablet, Rfl: 2    hydroCHLOROthiazide (HYDRODIURIL) 25 MG tablet, take 1 tablet by mouth once daily, Disp: 30 tablet, Rfl: 3    Nerve Stimulator (RA SINGLE CHANNEL PAIN RELIEF) DENI, USE AS DIRECTED, Disp: , Rfl:     Tens Unit MISC, by Does not apply route, Disp: 1 each, Rfl: 0    Handicap Placard [facility], by Does not apply route Temporary 3 months Starting 2021 Expires 2022, Disp: 1 each, Rfl: 0    atorvastatin (LIPITOR) 40 MG tablet, Take 1 tablet by mouth daily, Disp: 30 tablet, Rfl: 3    diclofenac sodium (VOLTAREN) 1 % GEL, Apply 2 g topically 2 times daily, Disp: 350 g, Rfl: 0    betamethasone valerate (VALISONE) 0.1 % cream, apply to affected area twice a day, Disp: , Rfl:     diclofenac sodium (VOLTAREN) 1 % GEL, Apply 2 g topically 2 times daily, Disp: 100 g, Rfl: 0    naproxen (NAPROSYN) 500 MG tablet, take 1 tablet by mouth twice a day if needed for pain, Disp: 60 tablet, Rfl: 0    FEROSUL 325 (65 Fe) MG tablet, take 1 tablet by mouth twice a day before meals, Disp: , Rfl:     ferrous sulfate (FE TABS 325) 325 (65 Fe) MG EC tablet, Take 1 tablet by mouth 2 times daily (before meals), Disp: 90 tablet, Rfl: 3    melatonin 1 MG tablet, Take 1 tablet by mouth nightly as needed for Sleep, Disp: 30 tablet, Rfl: 2    Elastic Bandages & Supports (ACE ELBOW BRACE LARGE/X-LARGE) [facility], 1 Brace as need for elbow pain, Disp: 1 each, Rfl: 0    aspirin 81 MG tablet, Take 81 mg by mouth daily, Disp: , Rfl:      Allergies:    Patient has no known allergies. Family History:  family history includes Cancer in her father; Diabetes in her mother; High Blood Pressure in her mother.     Social History:   Social History     Occupational History    Not on file   Tobacco Use    Smoking status: Former Smoker     Packs/day: 0.25     Years: 7.00     Pack years: 1.75     Start date: 1984     Quit date: 1991     Years since quittin.4    Smokeless tobacco: Never Used   Vaping Use    Vaping Use: Never used   Substance and Sexual Activity    Alcohol use: Not Currently    Drug use: No    Sexual activity: Not on file     Occupation: Works as a      OBJECTIVE:  Resp 16   Ht 5' 3\" (1.6 m)   Wt 282 lb (127.9 kg)   SpO2 100%   BMI 49.95 kg/m²    Psych: alert and oriented to person, time, and place  Cardio:  well perfused extremities  Resp:  normal respiratory effort    Musculoskeletal:    RLE:    Grossly normal range of motion of hip, slightly decreased flexion/extension of the knee  Painless range of motion of hip, but pain on extremes of knee motion  Stable exam of hip/knee  Skin intact without erythema/warmth  Grossly neurovascularly intact distally in the lower extremity  Appropriate strength on manual muscle testing of the iliopsoas, hamstrings, quadriceps, tibialis anterior, gastroc-soleus complex  Tenderness:  anteromedial and anterolateral knee joint line   -Kirk's test positive for pain      LLE:    Grossly normal range of motion of hip, slightly decreased flexion extension of the knee  Painless range of motion of hip, but pain on extremes of knee motion  Stable exam of hip/knee  Skin intact without erythema/warmth  Grossly neurovascularly intact distally in the lower extremity  Appropriate strength on manual muscle testing of the iliopsoas, hamstrings, quadriceps, tibialis anterior, gastroc-soleus complex  Tenderness:  anteromedial greater than anterolateral knee joint line  -Kirk's test positive for pain     -Previous exam: Tenderness diffusely throughout the midfoot, sinus Tarsi, and along the course the posterior tibial tendon      (Previous exam)  Affected upper extremity (left):  Grossly normal range of motion of wrist/fingers  Painless range of motion of wrist/fingers except for ring finger  Stable exam of wrist/hand  Skin intact without erythema/warmth   Grossly neurovascularly intact distally throughout able to fire EPL/FPL/IO/EDC/FDP  Tenderness to palpation:  A1 pulley IMPRESSION: No acute fracture/dislocation. Degenerative changes as above. Electronically signed by Ralph Hester MD        FINDINGS:  Three weightbearing views (AP, Mortise, and Lateral) of the left ankle and three weightbearing views (AP, Oblique, Lateral) of the left foot and four weightbearing views (AP, Lateral, Tunnel and Sunrise) of the left knee and 3 views (AP, oblique, lateral) of the left hand were obtained in the office today and reviewed, revealing no acute fracture, dislocation, or radioopaque foreign body/tumor. The ankle mortise is maintained with no widening of the clear spaces. Overall alignment is satisfactory. Tricompartmental degenerative changes of the knee with joint space narrowing, sclerosis, and osteophytes. Degenerative changes at the subtalar joint, as well as mildly diffusely throughout the midfoot. IMPRESSION:  No acute fracture/dislocation. Generative changes as above. Electronically signed by Ralph Hester MD        Xr Knee Left (3 Views)    Result Date: 11/25/2020  EXAMINATION: THREE X-RAY VIEWS OF THE LEFT KNEE 11/25/2020 10:31 am COMPARISON: 03/03/2010 HISTORY: ORDERING SYSTEM PROVIDED HISTORY: Pain TECHNOLOGIST PROVIDED HISTORY: Pain Acuity: Acute Type of Exam: Unknown FINDINGS: No evidence of acute fracture or dislocation. Similar small benign-appearing soft tissue calcifications in the proximal and posterolateral leg. Radiographically there is no significant joint effusion. Progression of mild to moderate degenerative changes involving primarily the medial compartment with joint space loss and osteophytes. Lateral view is obtained in a slight degree of obliquity. No acute osseous abnormality. Mild-to-moderate degenerative changes have progressed since the prior study. ASSESSMENT AND PLAN:  Body mass index is 49.95 kg/m².        She has a history of bilateral knee pain, secondary to bilateral tricompartmental osteoarthritis, on the left with severe medial chondromalacia and a medial meniscal posterior root tear. For the left knee, a corticosteroid injection on 2/5/2021 helped her pain approximately 100%, a corticosteroid injection on 7/19/2021 helped her pain approximately 75%, a Monovisc injection on 12/7/2021 helped her pain approximately 50%. For the right knee, a corticosteroid injection on 8/30/2021 helped her pain approximately 25%, a Monovisc injection on 12/7/2021 helped her pain approximately 25%, a right knee steroid injection on 1/18/2022 helped her pain approximately 100% (around 5 months). At today's visit, she reports that her bilateral knees are significantly bothering her in terms of pain. She also has a history of left foot pain secondary multiple issues--she has a component of posterior tibial tendinitis along with some degenerative changes of her subtalar joint and mildly diffusely throughout the midfoot. She is doing well with conservative management. She also has a history of a trigger finger of her left ring finger. She is doing well with conservative management. Notably, she has a somewhat complex past medical history. She has a history of non-insulin-dependent diabetes. We had a discussion today about the likely diagnosis and its natural history, physical exam and imaging findings, as well as various treatment options in detail. Surgically, we have again discussed a possible total knee replacement. She has previously met with Dr. Murphy Floyd, and he has recommended that her BMI get below 40 prior to proceeding with any surgical, and we have discussed this. At this time, she wishes to continue with conservative management. Orders/referrals were placed as below at today's visit. She may continue to use her prescribed topical Voltaren gel (we have been avoiding oral NSAIDs due to her history of GI bleeding). She may continue home exercises.     -After discussing her treatment options, she requested to proceed with bilateral knee corticosteroid injections as below. She has previously been provided information had obtained over-the-counter flatfoot style orthotic but has not obtained this. All questions were answered and the above plan was agreed upon. The patient will return to clinic in 3 months as needed without x-rays. At her next visit, we may consider repeat bilateral knee Monovisc versus corticosteroid injections. KNEE INJECTION PROCEDURE NOTE: After discussing the risks/benefits/alternatives to injection, an informed consent was obtained. The bilateral knee was verified as the correct location and allergies were reviewed. The skin overlying the injection site was cleaned with an alcohol swab followed by a local sterile prep. A 22 gauge needle was introduced into the above location under sterile conditions. A mixture of 40 mg of Kenalog and 4 mL of 1% Lidocaine without epinephrine was injected into each knee. The patient was noted to tolerate the procedure well without immediate complication. A dressing was applied and verbal instruction/education was provided. At the patient's next visit, depending on how the patient is doing and/or new imaging/labs results, we may consider the following options:    []  Orthotic (OTC)     []  Orthotic (custom)          []  Rocker bottom shoes     []  Brace (OTC)        []  Brace (custom)             []  CAM boot        []  Night splint         []  Heel cups        []  Strap      []  Toe sleeves/splints    []  PT:                     []  Wean out of immobilization   []  Advance activity       []  Topical               []  NSAIDs          []  [place]         []  Referral:         []  Stress xrays       []  CT         []  MRI        []  Injection:         []  Consider OR      []  Pick OR date    No follow-ups on file. No orders of the defined types were placed in this encounter.     No orders of the defined types were placed in this encounter. Audrey Slaughter MD  Orthopedic Surgery        Please excuse any typos/errors, as this note was created with the assistance of voice recognition software. While intending to generate a document that actually reflects the content of the visit, the document can still have some errors including those of syntax and sound-a-like substitutions which may escape proof reading. In such instances, actual meaning can be extrapolated by context.

## 2022-06-07 NOTE — PROGRESS NOTES
Dennise Gracia (:  1960) is a 63 y.o. female,Established patient, here for evaluation of the following chief complaint(s):  Diabetes (follow up no concerns)         ASSESSMENT/PLAN:  1. Type 2 diabetes mellitus without complication, without long-term current use of insulin (HCC)  -     POCT glycosylated hemoglobin (Hb A1C) is 6.7  - Continue current management with metformin  . Rx changes: none        Education: Reviewed ABCs of diabetes management (respective goals in       parentheses):  A1C (<7), blood pressure (<130/80), and cholesterol (LDL        <100). Compliance at present is estimated to be excellent. Efforts to improve      compliance (if necessary) will be directed at dietary modifications and   Increased exercise. Return in about 3 months (around 2022). Subjective   SUBJECTIVE/OBJECTIVE:    Subjective:    Dennise Gracia is a 63 y.o. female who presents for follow-up of Type 2 diabetes mellitus, hypertension, osteoarthritis. Current symptoms/problems include none   Known diabetic complications: none  Cardiovascular risk factors: diabetes mellitus and obesity (BMI >= 30 kg/m2)  Current diabetic medications include oral agent (monotherapy): Metformin. Eye exam current (within one year): no  Weight trend: stable  Prior visit with dietician: no  Current diet: well balanced  Current exercise: walking    Home blood sugar records: patient does not test  Any episodes of hypoglycemia? no    Is She on ACE inhibitor or angiotensin II receptor blocker? Yes   lisinopril (Prinivil)    Patient's medications, allergies, past medical, surgical, social and family histories were reviewed and updated as appropriate. Patient that she had a Zoom meeting with started this program.  However, she was not able to attend that meeting due to technical issues. Patient also asked about ways to lose weight. Patient was educated about the healthy diet and exercise. Review of Systems   Constitutional: Negative for fatigue and fever. Respiratory: Negative for cough and shortness of breath. Cardiovascular: Negative for chest pain, palpitations and leg swelling. Gastrointestinal: Negative. Objective   Physical Exam  Constitutional:       General: She is not in acute distress. Appearance: Normal appearance. She is obese. HENT:      Head: Normocephalic and atraumatic. Cardiovascular:      Rate and Rhythm: Normal rate and regular rhythm. Pulses: Normal pulses. Heart sounds: Normal heart sounds. No murmur heard. Pulmonary:      Effort: Pulmonary effort is normal.      Breath sounds: Normal breath sounds. No wheezing, rhonchi or rales. Musculoskeletal:      Right lower leg: No edema. Left lower leg: No edema. Neurological:      Mental Status: She is alert. Psychiatric:         Mood and Affect: Mood normal.            On this date 6/7/2022 I have spent 25 minutes reviewing previous notes, test results and face to face with the patient discussing the diagnosis and importance of compliance with the treatment plan as well as documenting on the day of the visit. An electronic signature was used to authenticate this note.     --Tereso Okeefe MD

## 2022-06-07 NOTE — PATIENT INSTRUCTIONS
Thank you for letting us take care of you today. We hope all your questions were addressed. If a question was overlooked or something else comes to mind after you return home, please contact a member of your Care Team listed below. Your Care Team at [place] [address] is Team #4  Jason Mcneal MD (Faculty)  Daniela Morales MD (Resident)  Charity Shoemaker MD (Resident)  Olya Davidson MD (Resident)  Clare Krishna MD (Resident)  RUBI Mtz, Prieto Mendoza., [facility] office)  Jose E Navas, [address] (Clinical Practice Manager)  Amada Ko, [facility] (Clinical Pharmacist)       Office phone number: [phone]    If you need to get in right away due to illness, please be advised we have \"Same Day\" appointments available Monday-Friday. Please call us at [phone] option #[phone] to schedule your \"Same Day\" appointment.

## 2022-06-08 RX ORDER — LIDOCAINE HYDROCHLORIDE 10 MG/ML
4 INJECTION, SOLUTION INFILTRATION; PERINEURAL ONCE
Status: COMPLETED | OUTPATIENT
Start: 2022-06-08 | End: 2022-06-07

## 2022-06-08 RX ORDER — TRIAMCINOLONE ACETONIDE 40 MG/ML
40 INJECTION, SUSPENSION INTRA-ARTICULAR; INTRAMUSCULAR ONCE
Status: COMPLETED | OUTPATIENT
Start: 2022-06-08 | End: 2022-06-07

## 2022-06-09 ENCOUNTER — HOSPITAL ENCOUNTER (OUTPATIENT)
Dept: PHYSICAL THERAPY | Facility: CLINIC | Age: 62
Setting detail: THERAPIES SERIES
Discharge: HOME | End: 2022-06-09
Payer: MEDICARE

## 2022-06-09 PROCEDURE — 97110 THERAPEUTIC EXERCISES: CPT

## 2022-06-09 NOTE — FLOWSHEET NOTE
[] [facility]) [facility] CENTER &  Therapy  [address]  [phone]  F: [phone] [x] [address]  [facility]   [address]  P: [phone]  F: [phone] [] [address]  [address]  P: [phone]  F: [phone] [] [address]  P: [phone]  F: [phone] [] [address]  [facility]   [address]   [place]: [phone]  F: [phone]      Physical Therapy Daily Treatment Note    Date:  2022  Patient Name:  Jodi Horn    :  1960  MRN: [id number]  Physician: Lashon Delgado MD                     Insurance: [employer] adv  Medical Diagnosis: Primary osteoarthritis of both knees (M17.0 [ICD-10-CM])                 Rehab Codes: M25.562, M25.662, M79.605, M79.652, M79.662; M25.561, M25.661, M79.604, M79.651, M79.661  Onset date: 2021                         Next 's appt. : 22     Visit# / total visits: 13/16     Cancels/No Shows: /3 (corrected count 22)    Subjective:     Pain:  [x] Yes  [] No Location: B knees Pain Rating: (0-10 scale) 2/10  Pain altered Tx:  [x] No  [] Yes  Action:     Comments: Pt states she was in tremendous pain on 22 rating 10/10. Pt notes she received cortisone injections yesterday and is experiencing mild pain symptoms. Pt continues to arrive without an assistive device however, states she is still using a cane for walking longer distances.       Objective:  Modalities:   Precautions:  Exercises: Bold completed   Exercise Bilat  Reps/ Time Weight/ Level Comments   Nu step  5'  L1.2 Warm up             mat         QS 10x3\"       Heel slides 15x ea  A      SLR with quad set  2x10 ea  Added    iso hip ADD x20     Hook lying hip ABD 20x  [employer]            SEATED       LAQ 20xea 3# Added weight 3/30/22, inc wt 4/5    Alt marches  20xea 3# Added 4/21   HS curls  20xea purple Progressed 3/30, 4/5    Hip add sets  2x10, 3\" ball                    Standing         Calf stretch-wedge 3x30\"       HS stretch on step  3x20\"ea     Hip ABD  2x10ea  2#  inc 4/27, inc wt 6/9   Hip Ext 2x10ea  2#  inc 4/27, inc wt 6/9   HS curls  2x15ea  2#  inc 4/27, 5/5 increased reps, inc wt 6/9   Alt marches  20xea  2#  inc 4/27, progressed 6/9   Heel raises  10x2  2# inc wt 6/9         Frw/Lat Monster walks 2x30 ft ea  Lime  Added 6/9                Step ups  20xea 6\" Added 4/5 , 5/11 increased reps   Side steps  15xea  6\" Added 4/5 ,5/11 increased reps    Step downs  15x ea 4\" added 4/21, 5/5 increased reps         Reciprocal stepping at stairs in hallway  3x   Able to do with UE assist          Ambulation with straight cane      5/5 left cane in car   Other:        4/7: R knee AROM: -5- 106; L knee AROM: -3-115; R knee flexion improved 11°     4/21: R knee AROM: -2 - 103; L knee ROM: 0-115; R knee extension improved and flexion regressed, L knee ext improved to 0       5/5: R knee AROM 0-102, L knee AROM 0-116, minimal changes      6/9 R knee AROM 0-105, L knee AROM 0-117           Treatment Charges: Mins Units   []  Modalities     [x]  Ther Exercise 41 3   []  Manual Therapy     []  Ther Activities     []  Aquatics     []  Vasocompression     []  Other     Total Treatment time 41 3        Assessment: [x] Progressing toward goals. Initiated treatment with Nustep warm up followed by gastroc and HS stretching with good tolerance. Able to increase ankle weights for standing exercises to progress strength with no increase of pain noted. Implemented resisted monster walks to work on hip strength with mod muscular fatigue reported. Pt presents with slightly improve ROM in bilateral knees with details listed above.  Pt has met most STG's however, continues to have trouble walking long distances. Therapist advised pt to continue HEP and to use pool at home to perform exercises. [] No change. [x] Other:  [x] Patient would continue to benefit from skilled physical therapy services in order to: decrease pain, increase strength, ROM, and overall functional mobility for improved quality of life. Problems:    [x]? ? Pain: B/L knee pain-varying intensity  [x]? ? ROM: R knee flexion 95° (Left knee flexion 110)  [x]? ? Strength: B/L quad weakness; glute medius weakness  [x]? ? Function: LEFI score 40/80; difficulty with prolonged standing and walking; difficulty with stairs                   STG: (to be met in 8 treatments)   1. ? Pain: reduce B/L knee pain to 3/10 or less for ADL's and work tasks 4/7 goal met  2. ? ROM: improve R knee flexion to 105° to improve gait on stairs 4/7 goal met  3. ? Strength:improve B/L quad strength to 5/5 to improve gait 6/9 MET   4. ? Function: improve LEFI score for walking 2 blocks to little bit of difficulty- NOT MET 25/80 69% functionally impaired (pt marked 0 for activities she does not do)   5. Patient to be independent with home exercise program as demonstrated by performance with correct form without cues. - 6/9 MET   6. Demonstrate Knowledge of fall prevention- 6/9 MET   LTG: (to be met in 16 treatments)  1. Reduce B/L knee pain to 2/10 or less for ADL's and work tasks  2. Able to ascend/descend 7 steps with reciprocal gait  3. Improve LEFI score for standing 1 hour to little bit of difficulty                    Patient goals: get better without having surgery    Pt. Education:  [x] Yes  [] No  [] Reviewed Prior HEP/Ed  Method of Education: [x] Verbal  [] Demo   [] Written  Access Code: [email or web address]  URL: [email or web address]  Date: 04/27/2022   Prepared by: [facility]     Exercises  Seated Long Arc Quad - 1 x daily - 7 x weekly - 2 sets - 10 reps  Seated Hamstring Stretch - 1 x daily - 7 x weekly - 2 sets - 10 reps  Seated March - 1 x daily - 7 x weekly - 2 sets - 10 reps  Supine Active Straight Leg Raise - 1 x daily - 7 x weekly - 2 sets - 10 reps  Supine Bridge - 1 x daily - 7 x weekly - 2 sets - 10 reps  Hooklying Clamshell with Resistance - 1 x daily - 7 x weekly - 2 sets - 10 reps  Standing Hip Abduction with Counter Support - 1 x daily - 7 x weekly - 2 sets - 10 reps  Standing Hip Extension with Counter Support - 1 x daily - 7 x weekly - 2 sets - 10 reps  Standing Hip Flexion with Counter Support - 1 x daily - 7 x weekly - 2 sets - 10 reps  Heel rises with counter support - 1 x daily - 7 x weekly - 2 sets - 10 reps  Standing March with Counter Support - 1 x daily - 7 x weekly - 2 sets - 10 reps  Step Up - 1 x daily - 7 x weekly - 2 sets - 10 reps  Lateral Step Up - 1 x daily - 7 x weekly - 2 sets - 10 reps  Forward Step Down - 1 x daily - 7 x weekly - 2 sets - 10 reps    Comprehension of Education:  [] Verbalizes understanding. [] Demonstrates understanding. [] Needs review. [x] Demonstrates/verbalizes HEP/Ed previously given. Plan: [x] Continue current frequency toward long and short term goals.     [x] Specific Instructions for subsequent treatments:  Progress as able      Time In: 1:09 pm        Time Out: 1:55 pm    Electronically signed by:  Dori Redd PTA

## 2022-06-09 NOTE — PROGRESS NOTES
I have reviewed and discussed key elements of [address] with the resident including plan of care and follow up and agree with the care serenity plan. Past Medical History:   Diagnosis Date    Diverticulitis     2 times    Former smoker     Former smoker     GERD (gastroesophageal reflux disease)     Hyperlipidemia     Hypertension     Morbid obesity with BMI of 45.0-49.9, adult ([facility] [address]     SINDI on CPAP     Sleep apnea     SINDI on CPAP    Type II or unspecified type diabetes mellitus without mention of complication, not stated as uncontrolled        Vitals:    06/07/22 1322   BP: 125/78   Pulse: 92        Diagnosis Orders   1.  Type 2 diabetes mellitus without complication, without long-term current use of insulin (HCC)  POCT glycosylated hemoglobin (Hb A1C)

## 2022-06-28 ENCOUNTER — HOSPITAL ENCOUNTER (OUTPATIENT)
Dept: PHYSICAL THERAPY | Facility: CLINIC | Age: 62
Setting detail: THERAPIES SERIES
Discharge: HOME | End: 2022-06-28
Payer: MEDICARE

## 2022-06-28 PROCEDURE — 97110 THERAPEUTIC EXERCISES: CPT

## 2022-06-28 NOTE — FLOWSHEET NOTE
[] [facility]) [facility] CENTER &  Therapy  [address]  [phone]  F: [phone] [x] [address]  [employer] [address]   [address]  P: [phone]  F: [phone] [] [place]  [address]  P: [phone]  F: [phone] [] [address]  P: [phone]  F: [phone] [] [address]  [place]   [address]   [place]: [phone]  F: [phone]      Physical Therapy Daily Treatment Note    Date:  2022  Patient Name:  Karolina Painting    :  1960  MRN: [id number]  Physician: Miguel Ray MD                     Insurance: [employer]  Medical Diagnosis: Primary osteoarthritis of both knees (M17.0 [ICD-10-CM])                 Rehab Codes: M25.562, M25.662, M79.605, M79.652, M79.662; M25.561, M25.661, M79.604, M79.651, M79.661  Onset date: 2021                         Next 's appt. : 22     Visit# / total visits: 14/16     Cancels/No Shows:     Subjective:     Pain:  [x] Yes  [] No Location: B knees Pain Rating: (0-10 scale) 2/10  Pain altered Tx:  [x] No  [] Yes  Action:     Comments: pt arrives 15 min late to PT this date. States her pain to be improved since starting PT, rating the pain in her knees equal bilaterally.      Objective:  Modalities:   Precautions:  Exercises: Bold completed; limited in time  d/t late arrival   Exercise Bilat  Reps/ Time Weight/ Level Comments   Nu step  5'  L2 Warm up             mat         QS 10x3\"       Heel slides 15x ea  A      SLR with quad set  2x10 ea  Added    iso hip ADD x20     Hook lying hip ABD 20x  [employer]                    SEATED       LAQ 20xea 3# Added weight 3/30/22, inc wt     Alt marches  20xea 3# Added    HS curls  20xea purple Progressed 3/30, 4/5    Hip add sets  2x10, 3\" ball                    Standing         Calf stretch-wedge 3x30\"       HS stretch on step  3x20\"ea     Hip ABD  2x10ea  2#  inc 4/27, inc wt 6/9   Hip Ext 2x10ea  2#  inc 4/27, inc wt 6/9   HS curls  2x15ea  2#  inc 4/27, 5/5 increased reps, inc wt 6/9   Alt marches  20xea  2#  inc 4/27, progressed 6/9   Heel raises  10x2  2# inc wt 6/9         Frw/Lat Monster walks 2x30 ft ea  [employer]  Added 6/9                Step ups  20xea 6\" Added 4/5 , 5/11 increased reps   Side steps  15xea  6\" Added 4/5 ,5/11 increased reps attempted 6/28, but pt could not tolerate d/t feeling unstable and uncomfortable    Step downs  15x ea 4\" added 4/21, 5/5 increased reps attempted 6/28, but pt could not tolerate d/t feeling unstable and uncomfortable         Reciprocal stepping at stairs in hallway  3x   Able to do with UE assist          Ambulation with straight cane      5/5 left cane in car   Other:        4/7: R knee AROM: -5- 106; L knee AROM: -3-115; R knee flexion improved 11°     4/21: R knee AROM: -2 - 103; L knee ROM: 0-115; R knee extension improved and flexion regressed, L knee ext improved to 0       5/5: R knee AROM 0-102, L knee AROM 0-116, minimal changes      6/9 R knee AROM 0-105, L knee AROM 0-117           Treatment Charges: Mins Units   []  Modalities     [x]  Ther Exercise 40 3   []  Manual Therapy     []  Ther Activities     []  Aquatics     []  Vasocompression     []  Other     Total Treatment time 40 3        Assessment: [x] Progressing toward goals. Started session with a warm up followed by exercises in standing. Pt is quick to report fatigue with exercises. Attempted step exercises but pt could not continue with lateral step ups and step downs d/t c/o knee discomfort/ feeling unstable. Performed exercises in time allotted d/t late arrival. Pt describes her knees to feel \"burning\" upon completion of treatment. [] No change.       [] Other:  [x] Patient would continue to benefit from skilled physical therapy services in order to: decrease pain, increase strength, ROM, and overall functional mobility for improved quality of life. Problems:    [x]? ? Pain: B/L knee pain-varying intensity  [x]? ? ROM: R knee flexion 95° (Left knee flexion 110)  [x]? ? Strength: B/L quad weakness; glute medius weakness  [x]? ? Function: LEFI score 40/80; difficulty with prolonged standing and walking; difficulty with stairs                   STG: (to be met in 8 treatments)   1. ? Pain: reduce B/L knee pain to 3/10 or less for ADL's and work tasks 4/7 goal met  2. ? ROM: improve R knee flexion to 105° to improve gait on stairs 4/7 goal met  3. ? Strength:improve B/L quad strength to 5/5 to improve gait 6/9 MET   4. ? Function: improve LEFI score for walking 2 blocks to little bit of difficulty- NOT MET 25/80 69% functionally impaired (pt marked 0 for activities she does not do)   5. Patient to be independent with home exercise program as demonstrated by performance with correct form without cues. - 6/9 MET   6. Demonstrate Knowledge of fall prevention- 6/9 MET   LTG: (to be met in 16 treatments)  1. Reduce B/L knee pain to 2/10 or less for ADL's and work tasks  2. Able to ascend/descend 7 steps with reciprocal gait  3. Improve LEFI score for standing 1 hour to little bit of difficulty                    Patient goals: get better without having surgery    Pt. Education:  [x] Yes  [] No  [] Reviewed Prior HEP/Ed  Method of Education: [x] Verbal  [] Demo   [] Written  Access Code: [email or web address]  URL: [email or web address]  Date: 04/27/2022   Prepared by: Kamille Le     Exercises  Seated Long Arc Quad - 1 x daily - 7 x weekly - 2 sets - 10 reps  Seated Hamstring Stretch - 1 x daily - 7 x weekly - 2 sets - 10 reps  Seated March - 1 x daily - 7 x weekly - 2 sets - 10 reps  Supine Active Straight Leg Raise - 1 x daily - 7 x weekly - 2 sets - 10 reps  Supine Bridge - 1 x daily - 7 x weekly - 2 sets - 10 reps  Hooklying Clamshell with Resistance - 1 x daily - 7 x weekly - 2 sets - 10 reps  Standing Hip Abduction with Counter Support - 1 x daily - 7 x weekly - 2 sets - 10 reps  Standing Hip Extension with Counter Support - 1 x daily - 7 x weekly - 2 sets - 10 reps  Standing Hip Flexion with Counter Support - 1 x daily - 7 x weekly - 2 sets - 10 reps  Heel rises with counter support - 1 x daily - 7 x weekly - 2 sets - 10 reps  Standing March with Counter Support - 1 x daily - 7 x weekly - 2 sets - 10 reps  Step Up - 1 x daily - 7 x weekly - 2 sets - 10 reps  Lateral Step Up - 1 x daily - 7 x weekly - 2 sets - 10 reps  Forward Step Down - 1 x daily - 7 x weekly - 2 sets - 10 reps    Comprehension of Education:  [] Verbalizes understanding. [] Demonstrates understanding. [] Needs review. [x] Demonstrates/verbalizes HEP/Ed previously given. Plan: [x] Continue current frequency toward long and short term goals.     [x] Specific Instructions for subsequent treatments:  Progress as able      Time In: 4:15pm         Time Out: 5:00pm    Electronically signed by:  Ludy Marshall PTA

## 2022-06-29 DIAGNOSIS — E11.9 TYPE 2 DIABETES MELLITUS WITHOUT COMPLICATION, WITHOUT LONG-TERM CURRENT USE OF INSULIN (HCC): ICD-10-CM

## 2022-06-29 NOTE — TELEPHONE ENCOUNTER
E-scribe request for med refills. Please review and e-scribe if applicable. Last Visit Date:  6/7/2022  Next Visit Date:  Visit date not found    Hemoglobin A1C (%)   Date Value   06/07/2022 6.7   02/11/2022 7.0 (H)   09/02/2021 6.8             ( goal A1C is < 7)   Microalb/Crt.  Ratio (mcg/mg creat)   Date Value   04/21/2022 Can not be calculated     LDL Cholesterol (mg/dL)   Date Value   02/11/2022 110       (goal LDL is <100)   AST (U/L)   Date Value   05/19/2020 17     ALT (U/L)   Date Value   05/19/2020 17     BUN (mg/dL)   Date Value   04/21/2022 17     BP Readings from Last 3 Encounters:   06/07/22 125/78   04/05/22 124/83   02/11/22 111/72          (goal 120/80)        Patient Active Problem List:     Gastroesophageal reflux disease     Hyperlipidemia with target LDL less than 100     HTN (hypertension)     Numbness and tingling     Diverticula, colon     Diverticulitis, acute     Eczema     Lump of left breast     Shoulder pain     Right groin pain     Tinea corporis     Hyperlipidemia     Morbid obesity with BMI of 45.0-49.9, adult (HCC)     SINDI (obstructive sleep apnea)     Vitamin D deficiency     Melena     Chest pain     Abnormal stress test     Contact blepharoconjunctivitis of left eye     Prediabetes     Need for prophylactic vaccination and inoculation against varicella     Acute cystitis without hematuria     Primary osteoarthritis of right knee      ----Chrys Goltz

## 2022-07-06 ENCOUNTER — HOSPITAL ENCOUNTER (OUTPATIENT)
Dept: PHYSICAL THERAPY | Facility: CLINIC | Age: 62
Setting detail: THERAPIES SERIES
Discharge: HOME | End: 2022-07-06

## 2022-07-06 NOTE — FLOWSHEET NOTE
[] [address]  [address]    [phone]  F: [phone]   [x] [address] [facility] [address]   [address]  P: [phone]  F: [phone]  [] [address] &  Therapy  [address] [facility]  P: [phone]  F: [phone] [] [address]  P: [phone]  F: [phone]  [] [address]  [address] [facility]   [address]   [place]: [phone]  F: [phone]   [] [address]  [place]: [phone]   F: [phone]     Physical Therapy Cancel/No Show note    Date: 2022  Patient: Gamaliel Torres  : 1960  MRN: [id number]    Cancels/No Shows to date:     For today's appointment patient:    [x]  Cancelled    [] Rescheduled appointment    [] No-show     Reason given by patient:    []  Patient ill    []  Conflicting appointment    [] No transportation      [] Conflict with work    [] No reason given    [] Weather related    [] COVID-19    [x] Other:      Comments:  Emergency with family member.       [x] Next appointment was confirmed    Electronically signed by: Mani Aaron PTA

## 2022-09-05 DIAGNOSIS — E03.9 HYPOTHYROIDISM, UNSPECIFIED TYPE: ICD-10-CM

## 2022-09-06 RX ORDER — HYDROCHLOROTHIAZIDE 25 MG/1
TABLET ORAL
Qty: 30 TABLET | Refills: 3 | Status: SHIPPED | OUTPATIENT
Start: 2022-09-06

## 2022-09-06 NOTE — TELEPHONE ENCOUNTER
Last visit: 6/7/22  Last Med refill: 5/15/22  Does patient have enough medication for 72 hours: No:     Next Visit Date:  Future Appointments   Date Time Provider Rico Ayala   9/8/2022  1:40 PM Oscar Cody MD Sports Med [facility]   9/20/2022  8:15 AM STA DIAG MAMMO RM 3 STAZ MAMMO STA Radiolog       Health Maintenance   Topic Date Due    Diabetic retinal exam  Never done    Pneumococcal 0-64 years Vaccine (2 - PCV) 01/01/2011    Shingles vaccine (1 of 2) Never done    Breast cancer screen  01/12/2021    Hepatitis B vaccine (2 of 3 - Risk 3-dose series) 03/11/2022    COVID-19 Vaccine (3 - Booster for Pfizer series) 06/20/2022    Flu vaccine (1) 09/01/2022    Lipids  02/11/2023    Diabetic foot exam  04/05/2023    Diabetic microalbuminuria test  04/21/2023    A1C test (Diabetic or Prediabetic)  06/07/2023    Depression Screen  06/07/2023    DTaP/Tdap/Td vaccine (2 - Td or Tdap) 03/07/2024    Colorectal Cancer Screen  10/18/2029    Hepatitis C screen  Completed    HIV screen  Completed    Hepatitis A vaccine  Aged Out    Hib vaccine  Aged Out    Meningococcal (ACWY) vaccine  Aged Out       Hemoglobin A1C (%)   Date Value   06/07/2022 6.7   02/11/2022 7.0 (H)   09/02/2021 6.8             ( goal A1C is < 7)   Microalb/Crt.  Ratio (mcg/mg creat)   Date Value   04/21/2022 Can not be calculated     LDL Cholesterol (mg/dL)   Date Value   02/11/2022 110   05/19/2020 70       (goal LDL is <100)   AST (U/L)   Date Value   05/19/2020 17     ALT (U/L)   Date Value   05/19/2020 17     BUN (mg/dL)   Date Value   04/21/2022 17     BP Readings from Last 3 Encounters:   06/07/22 125/78   04/05/22 124/83   02/11/22 111/72          (goal 120/80)    All Future Testing planned in [facility]  Lab Frequency Next Occurrence   GERTRUDIS Digital Screen Bilateral [[id number]] Once 05/05/2022               Patient Active Problem List:     Gastroesophageal reflux disease     Hyperlipidemia with target LDL less than 100     HTN (hypertension) Numbness and tingling     Diverticula, colon     Diverticulitis, acute     Eczema     Lump of left breast     Shoulder pain     Right groin pain     Tinea corporis     Hyperlipidemia     Morbid obesity with BMI of 45.0-49.9, adult (HCC)     SINDI (obstructive sleep apnea)     Vitamin D deficiency     Melena     Chest pain     Abnormal stress test     Contact blepharoconjunctivitis of left eye     Prediabetes     Need for prophylactic vaccination and inoculation against varicella     Acute cystitis without hematuria     Primary osteoarthritis of right knee

## 2022-09-12 ENCOUNTER — OFFICE VISIT (OUTPATIENT)
Dept: ORTHOPEDIC SURGERY | Age: 62
End: 2022-09-12
Payer: MEDICARE

## 2022-09-12 VITALS — HEIGHT: 63 IN | RESPIRATION RATE: 16 BRPM | OXYGEN SATURATION: 100 % | BODY MASS INDEX: 49.96 KG/M2 | WEIGHT: 282 LBS

## 2022-09-12 DIAGNOSIS — M17.0 PRIMARY OSTEOARTHRITIS OF BOTH KNEES: Primary | ICD-10-CM

## 2022-09-12 PROCEDURE — 20610 DRAIN/INJ JOINT/BURSA W/O US: CPT | Performed by: ORTHOPAEDIC SURGERY

## 2022-09-12 RX ORDER — TRIAMCINOLONE ACETONIDE 40 MG/ML
40 INJECTION, SUSPENSION INTRA-ARTICULAR; INTRAMUSCULAR ONCE
Status: COMPLETED | OUTPATIENT
Start: 2022-09-12 | End: 2022-09-12

## 2022-09-12 RX ORDER — LIDOCAINE HYDROCHLORIDE 10 MG/ML
4 INJECTION, SOLUTION INFILTRATION; PERINEURAL ONCE
Status: COMPLETED | OUTPATIENT
Start: 2022-09-12 | End: 2022-09-12

## 2022-09-12 RX ADMIN — TRIAMCINOLONE ACETONIDE 40 MG: 40 INJECTION, SUSPENSION INTRA-ARTICULAR; INTRAMUSCULAR at 10:19

## 2022-09-12 RX ADMIN — LIDOCAINE HYDROCHLORIDE 4 ML: 10 INJECTION, SOLUTION INFILTRATION; PERINEURAL at 10:19

## 2022-09-12 RX ADMIN — TRIAMCINOLONE ACETONIDE 40 MG: 40 INJECTION, SUSPENSION INTRA-ARTICULAR; INTRAMUSCULAR at 10:20

## 2022-09-12 NOTE — PROGRESS NOTES
Mitchel Aponte [facility] 2.  [address] [facility] [address] [address]  Dept: [phone]    Ambulatory Orthopedic Consult      CHIEF COMPLAINT:    Chief Complaint   Patient presents with    Knee Pain     Bilateral       HISTORY OF PRESENT ILLNESS:      The patient is a left-hand dominant 63 y.o. female who is being seen for evaluation of pain at the above location (left anterior knee greater than left dorsal midfoot and lateral/medial hindfoot, as well as left ring finger), which began in the beginning of November 2020 atraumatically. The pain is described mainly with mechanical terms (dull/sharp/throbbing). The pain is worse with activity and better with rest. The patient reports a static course. The patient has tried:      [x]  rest/activity modification          [x]  NSAIDs      []  opiates      []  orthotics        []  change in shoes   [x]  home exercises  []  physical therapy      []  CAM boot     []  brace:    []  injection:       []  surgery:      The patient also reports that her left ring finger pain is associated with intermittent locking. INTERVAL HISTORY 2/5/2021:  She is seen again today in the office for follow up of a previous issue (as above--left knee and left ring finger pain). Since being seen last, the patient is doing worse, particularly in terms of her left knee. At today's visit, she is not using a brace or assistive device. The location and quality of the pain have not significantly changed since the last visit. INTERVAL HISTORY 4/6/2021:  She is seen again today in the office for follow up after an injection. Since being seen last, the patient is doing better. At today's visit, she is using no brace or assistive device. She received an injection of the left knee, and reports the injection helped approximately 100% in terms of pain.   She also reports that her left ring finger trigger finger is not bothering her, and reports that her foot pain has significantly improved. INTERVAL HISTORY 6/4/2021:  She is seen again today in the office for follow up of a previous issue (as above--bilateral knees). Since being seen last, the patient is doing worse. At today's visit, she is not using a brace or assistive device. History is obtained today from:   [x]  the patient     []  EMR     []  one family member/friend    []  multiple family members/friends    []  other:      INTERVAL HISTORY 7/19/2021:  She is seen again today in the office for follow up of imaging as below. Since being seen last, the patient is doing about the same overall. At today's visit, she is using no brace/assistive device. History is obtained today from:   [x]  the patient     [x]  EMR     []  one family member/friend    []  multiple family members/friends    []  other:      INTERVAL HISTORY 8/30/2021:  She is seen again today in the office for follow up of a previous issue (as above). Since being seen last, the patient is doing better. At today's visit, she is not using a brace or assistive device. History is obtained today from:   [x]  the patient     []  EMR     []  one family member/friend    []  multiple family members/friends    []  other:      INTERVAL HISTORY 11/18/2021:  She is seen again today in the office for follow up of a previous issue (as above). Since being seen last, the patient is doing about the same overall. At today's visit, she is using a removable brace. History is obtained today from:   [x]  the patient     []  EMR     []  one family member/friend    []  multiple family members/friends    []  other:      INTERVAL HISTORY 12/7/2021:  She is seen again today in the office for follow up of imaging as below. Since being seen last, the patient is doing about the same overall. At today's visit, she is using a brace/boot.     History is obtained today from:   [x]  the patient     [x]  EMR     []  one family member/friend    [] multiple family members/friends    []  other:      INTERVAL HISTORY 1/18/2022:  She is seen again today in the office for follow up of a previous issue (as above). Since being seen last, the patient is doing worse. At today's visit, she is using a removable brace. History is obtained today from:   [x]  the patient     []  EMR     []  one family member/friend    []  multiple family members/friends    []  other:      Since her last visit, the patient has seen Dr. Ceci Mancuso, who referred her to weight management to decrease her BMI prior to proceeding with surgery. INTERVAL HISTORY 6/7/2022:  She is seen again today in the office for follow up of a previous issue (as above). Since being seen last, the patient is doing worse. At today's visit, she is using a removable brace. History is obtained today from:   [x]  the patient     []  EMR     []  one family member/friend    []  multiple family members/friends    []  other:      INTERVAL HISTORY 9/12/2022:  She is seen again today in the office for follow up of a previous issue (as above). Since being seen last, the patient is doing worse. At today's visit, she is not using a brace or assistive device. History is obtained today from:   [x]  the patient     []  EMR     []  one family member/friend    []  multiple family members/friends    []  other:           REVIEW OF SYSTEMS:  Musculoskeletal: See HPI for pertinent positives     Past Medical History:    She  has a past medical history of Diverticulitis, Former smoker, Former smoker, GERD (gastroesophageal reflux disease), Hyperlipidemia, Hypertension, Morbid obesity with BMI of 45.0-49.9, adult ([facility] [address] SINDI on CPAP, Sleep apnea, and Type II or unspecified type diabetes mellitus without mention of complication, not stated as uncontrolled. Past Surgical History:    She  has a past surgical history that includes Hysterectomy;  Cholecystectomy; Colonoscopy; pr egd transoral biopsy single/multiple (7/19/2017); Upper gastrointestinal endoscopy (N/A, 10/18/2019); and Colonoscopy (N/A, 10/18/2019). Current Medications:     Current Outpatient Medications:     hydroCHLOROthiazide (HYDRODIURIL) 25 MG tablet, take 1 tablet by mouth once daily, Disp: 30 tablet, Rfl: 3    metFORMIN (GLUCOPHAGE) 500 MG tablet, take 1 tablet by mouth EVERY MORNING WITH BREAKFAST, Disp: 30 tablet, Rfl: 2    Handicap Placard MISC, Dispense for 5 years, Disp: 1 each, Rfl: 0    lisinopril (PRINIVIL;ZESTRIL) 10 MG tablet, take 1 tablet by mouth once daily, Disp: 120 tablet, Rfl: 0    Nerve Stimulator (RA SINGLE CHANNEL PAIN RELIEF) DENI, USE AS DIRECTED, Disp: , Rfl:     Tens Unit MISC, by Does not apply route, Disp: 1 each, Rfl: 0    atorvastatin (LIPITOR) 40 MG tablet, Take 1 tablet by mouth daily, Disp: 30 tablet, Rfl: 3    diclofenac sodium (VOLTAREN) 1 % GEL, Apply 2 g topically 2 times daily, Disp: 350 g, Rfl: 0    betamethasone valerate (VALISONE) 0.1 % cream, apply to affected area twice a day, Disp: , Rfl:     diclofenac sodium (VOLTAREN) 1 % GEL, Apply 2 g topically 2 times daily, Disp: 100 g, Rfl: 0    naproxen (NAPROSYN) 500 MG tablet, take 1 tablet by mouth twice a day if needed for pain, Disp: 60 tablet, Rfl: 0    FEROSUL 325 (65 Fe) MG tablet, take 1 tablet by mouth twice a day before meals, Disp: , Rfl:     ferrous sulfate (FE TABS 325) 325 (65 Fe) MG EC tablet, Take 1 tablet by mouth 2 times daily (before meals), Disp: 90 tablet, Rfl: 3    melatonin 1 MG tablet, Take 1 tablet by mouth nightly as needed for Sleep, Disp: 30 tablet, Rfl: 2    Elastic Bandages & Supports (ACE ELBOW BRACE LARGE/X-LARGE) [facility], 1 Brace as need for elbow pain, Disp: 1 each, Rfl: 0    aspirin 81 MG tablet, Take 81 mg by mouth daily, Disp: , Rfl:      Allergies:    Patient has no known allergies. Family History:  family history includes Cancer in her father; Diabetes in her mother; High Blood Pressure in her mother.     Social History:   Social History Occupational History    Not on file   Tobacco Use    Smoking status: Former     Packs/day: 0.25     Years: 7.00     Pack years: 1.75     Types: Cigarettes     Start date: 1984     Quit date: 1991     Years since quittin.7    Smokeless tobacco: Never   Vaping Use    Vaping Use: Never used   Substance and Sexual Activity    Alcohol use: Not Currently    Drug use: No    Sexual activity: Not on file     Occupation: Works as a      OBJECTIVE:  Resp 16   Ht 5' 3\" (1.6 m)   Wt 282 lb (127.9 kg)   SpO2 100%   BMI 49.95 kg/m²    Psych: alert and oriented to person, time, and place  Cardio:  well perfused extremities  Resp:  normal respiratory effort    Musculoskeletal:    RLE:    Grossly normal range of motion of hip, slightly decreased flexion/extension of the knee  Painless range of motion of hip, but pain on extremes of knee motion  Stable exam of hip/knee  Skin intact without erythema/warmth  Grossly neurovascularly intact distally   Tenderness:  anteromedial and anterolateral knee joint line   -Kirk's test positive for pain      LLE:    Grossly normal range of motion of hip, slightly decreased flexion extension of the knee  Painless range of motion of hip, but pain on extremes of knee motion  Stable exam of hip/knee  Skin intact without erythema/warmth  Grossly neurovascularly intact distally   Tenderness:  anteromedial greater than anterolateral knee joint line  -Kirk's test positive for pain     -Previous exam: Tenderness diffusely throughout the midfoot, sinus Tarsi, and along the course the posterior tibial tendon      (Previous exam)  Affected upper extremity (left):  Grossly normal range of motion of wrist/fingers  Painless range of motion of wrist/fingers except for ring finger  Stable exam of wrist/hand  Skin intact without erythema/warmth   Grossly neurovascularly intact distally throughout able to fire EPL/FPL/IO/EDC/FDP  Tenderness to palpation:  A1 pulley of the ring finger--improved  -Durkan's compression test negative  -triggering elicited at the Ring finger       RADIOLOGY:   9/12/2022 No new radiology images today. Prior images reviewed for reference. MRI images and radiology report reviewed, as below:     -Right knee MRI  1. Degeneration and slight outward subluxation of the medial meniscus body. No evidence of meniscal tearing. 2. Mild to moderate tricompartmental osteoarthrosis. Mild-to-moderate medial   and patellofemoral chondromalacia. Subchondral irregularity of the lateral   femoral trochlea. Grade 3 chondromalacia of the posterior nonweightbearing   femoral condyles with subcortical cystic changes and subchondral reactive   marrow edema at the posterior nonweightbearing medial femoral condyle. 3. Small joint effusion. Trace Tovar's cyst.           MRI images and radiology report previously reviewed, as below:     -Left knee MRI  1. Tearing of the root ligament of the posterior horn medial meniscus. Outward extrusion of the medial meniscus body. Degeneration of the posterior   horn and body of the medial meniscus. Reactive marrow edema at the outer   medial tibial plateau. 2. Possible grade 1 MCL sprain. 3. Small joint effusion. 4. Tricompartmental osteoarthrosis. Severe medial chondromalacia. Focal   severe chondromalacia at the posterior weight-bearing lateral tibial plateau. 5. Mild-to-moderate lateral femoral trochlear chondromalacia with underlying   subcortical cystic change. 6. Small Baker's cyst.  Mild-to-moderate edema in the subcutaneous fat about   the knee. FINDINGS:  Four weightbearing views (AP, Tunnel, Lateral, and Sorrel) of the bilateral knee were obtained in the office today and reviewed, revealing no acute fracture, dislocation, or radioopaque foreign body/tumor. Overall alignment is satisfactory. Tricompartmental degenerative changes of the knee with joint narrowing, sclerosis, and osteophytes. IMPRESSION: No acute fracture/dislocation. Degenerative changes as above. Electronically signed by Romel Hawk MD        FINDINGS:  Three weightbearing views (AP, Mortise, and Lateral) of the left ankle and three weightbearing views (AP, Oblique, Lateral) of the left foot and four weightbearing views (AP, Lateral, Tunnel and Sunrise) of the left knee and 3 views (AP, oblique, lateral) of the left hand were obtained in the office today and reviewed, revealing no acute fracture, dislocation, or radioopaque foreign body/tumor. The ankle mortise is maintained with no widening of the clear spaces. Overall alignment is satisfactory. Tricompartmental degenerative changes of the knee with joint space narrowing, sclerosis, and osteophytes. Degenerative changes at the subtalar joint, as well as mildly diffusely throughout the midfoot. IMPRESSION:  No acute fracture/dislocation. Generative changes as above. Electronically signed by Romel Hawk MD        Xr Knee Left (3 Views)    Result Date: 11/25/2020  EXAMINATION: THREE X-RAY VIEWS OF THE LEFT KNEE 11/25/2020 10:31 am COMPARISON: 03/03/2010 HISTORY: ORDERING SYSTEM PROVIDED HISTORY: Pain TECHNOLOGIST PROVIDED HISTORY: Pain Acuity: Acute Type of Exam: Unknown FINDINGS: No evidence of acute fracture or dislocation. Similar small benign-appearing soft tissue calcifications in the proximal and posterolateral leg. Radiographically there is no significant joint effusion. Progression of mild to moderate degenerative changes involving primarily the medial compartment with joint space loss and osteophytes. Lateral view is obtained in a slight degree of obliquity. No acute osseous abnormality. Mild-to-moderate degenerative changes have progressed since the prior study. ASSESSMENT AND PLAN:  Body mass index is 49.95 kg/m².        She has a history of bilateral knee pain, secondary to bilateral tricompartmental osteoarthritis, on the left with severe medial chondromalacia and a medial meniscal posterior root tear. For the left knee, a corticosteroid injection on 2/5/2021 helped her pain approximately 100%, a corticosteroid injection on 7/19/2021 helped her pain approximately 75%, a Monovisc injection on 12/7/2021 helped her pain approximately 50%; a left knee injection on 6/7/2022 helped her pain approximately 90%. For the right knee, a corticosteroid injection on 8/30/2021 helped her pain approximately 25%, a Monovisc injection on 12/7/2021 helped her pain approximately 25%, a right knee steroid injection on 1/18/2022 helped her pain approximately 100% (around 5 months); a right knee steroid injection on 6/7/2022 helped her pain approximately 90%. At today's visit, she reports that her bilateral knees are significantly bothering her in terms of pain. She also has a history of left foot pain secondary multiple issues--she has a component of posterior tibial tendinitis along with some degenerative changes of her subtalar joint and mildly diffusely throughout the midfoot. She is doing well with conservative management. She also has a history of a trigger finger of her left ring finger. She is doing well with conservative management. Notably, she has a somewhat complex past medical history. She has a history of non-insulin-dependent diabetes. We had a discussion today about the likely diagnosis and its natural history, physical exam and imaging findings, as well as various treatment options in detail. Surgically, we have discussed a possible total knee replacement. She has previously met with Dr. Rain Deluca, and he has recommended that her BMI get below 40 prior to proceeding with any surgical, and we have discussed this. At this time, she wishes to continue with conservative management. Orders/referrals were placed as below at today's visit.       I provided a prescription for Voltaren (4g TOPL q QID PRN pain); we have been avoiding oral NSAIDs due to her history of GI bleeding. She may continue home exercises. -After discussing her treatment options, she requested to proceed with bilateral knee corticosteroid injections as below. She was again provided information on how to obtain an over-the-counter flatfoot style orthotic. All questions were answered and the above plan was agreed upon. The patient will return to clinic in 3 months as needed without x-rays. At her next visit, we may consider repeat bilateral knee Monovisc versus corticosteroid injections. KNEE INJECTION PROCEDURE NOTE: After discussing the risks/benefits/alternatives to injection, an informed consent was obtained. The bilateral knee was verified as the correct location and allergies were reviewed. The skin overlying the injection site was cleaned with an alcohol swab followed by a local sterile prep. A 22 gauge needle was introduced into the above location under sterile conditions. A mixture of 40 mg of Kenalog and 4 mL of 1% Lidocaine without epinephrine was injected into each knee. The patient was noted to tolerate the procedure well without immediate complication. A dressing was applied and verbal instruction/education was provided.                      At the patient's next visit, depending on how the patient is doing and/or new imaging/labs results, we may consider the following options:    []  Orthotic (OTC)     []  Orthotic (custom)          []  Rocker bottom shoes     []  Brace (OTC)        []  Brace (custom)             []  CAM boot        []  Night splint         []  Heel cups        []  Strap      []  Toe sleeves/splints    []  PT:                     []  Wean out of immobilization   []  Advance activity       []  Topical               []  NSAIDs          []  [place]         []  Referral:         []  Stress xrays       []  CT         []  MRI        []  Injection:         []  Consider OR      []  Pick OR date    No follow-ups on

## 2022-09-13 NOTE — PATIENT INSTRUCTIONS
Thank you for letting us take care of you today. We hope all your questions were addressed. If a question was overlooked or something else comes to mind after you return home, please contact a member of your Care Team listed below. Please make sure you have a routine office visit set up to follow-up on [address]. Your Care Team at [place] [address] is Team #1  Kate Pollack MD (Faculty)  Fab Liang MD (Faculty)  Ariana Urbina MD (Resident)  Oz Chery MD (Resident)  Adriana Diaz MD (Resident)  Julio Ramirez MD (Resident)  Janett Shone., [facility]  Honor Beverage., [facility], ([address])  GEOVANI Motta, (Clinical Practice Manager)  Aracelis Tariq, Ph.D., (Behavioral Services)  [place] [address] (Clinical Pharmacist)     Office phone number: [phone]    If you need to get in right away due to illness, please be advised we have \"Same Day\" appointments available Monday-Friday. Please call us at [phone] option #3 to schedule your \"Same Day\" appointment.
no discharge, no irritation, no pain, no redness, and no visual changes.

## 2022-10-10 DIAGNOSIS — E11.9 TYPE 2 DIABETES MELLITUS WITHOUT COMPLICATION, WITHOUT LONG-TERM CURRENT USE OF INSULIN (HCC): ICD-10-CM

## 2022-10-10 NOTE — TELEPHONE ENCOUNTER
Please address the medication refill and close the encounter. If I can be of assistance, please route to the applicable pool. Thank you. Last visit: 6-7-22  Last Med refill: 9-5-22  Does patient have enough medication for 72 hours: No:     Next Visit Date:  Future Appointments   Date Time Provider Rico Ayala   10/11/2022  9:45 AM STA DIAG MAMMO RM 3 STAZ MAMMO STA Radiolog       Health Maintenance   Topic Date Due    Diabetic retinal exam  Never done    Cervical cancer screen  Never done    Shingles vaccine (1 of 2) Never done    Breast cancer screen  01/12/2021    Hepatitis B vaccine (2 of 3 - Risk 3-dose series) 03/11/2022    COVID-19 Vaccine (3 - Booster for Pfizer series) 06/20/2022    Flu vaccine (1) 08/01/2022    Lipids  02/11/2023    Diabetic foot exam  04/05/2023    Diabetic microalbuminuria test  04/21/2023    A1C test (Diabetic or Prediabetic)  06/07/2023    Depression Screen  06/07/2023    DTaP/Tdap/Td vaccine (2 - Td or Tdap) 03/07/2024    Colorectal Cancer Screen  10/18/2029    Pneumococcal 0-64 years Vaccine  Completed    Hepatitis C screen  Completed    HIV screen  Completed    Hepatitis A vaccine  Aged Out    Hib vaccine  Aged Out    Meningococcal (ACWY) vaccine  Aged Out       Hemoglobin A1C (%)   Date Value   06/07/2022 6.7   02/11/2022 7.0 (H)   09/02/2021 6.8             ( goal A1C is < 7)   Microalb/Crt.  Ratio (mcg/mg creat)   Date Value   04/21/2022 Can not be calculated     LDL Cholesterol (mg/dL)   Date Value   02/11/2022 110   05/19/2020 70       (goal LDL is <100)   AST (U/L)   Date Value   05/19/2020 17     ALT (U/L)   Date Value   05/19/2020 17     BUN (mg/dL)   Date Value   04/21/2022 17     BP Readings from Last 3 Encounters:   06/07/22 125/78   04/05/22 124/83   02/11/22 111/72          (goal 120/80)    All Future Testing planned in [facility]  Lab Frequency Next Occurrence   GERTRUDIS Digital Screen Bilateral [[id number]] Once 05/05/2022               Patient Active Problem List:     Gastroesophageal reflux disease     Hyperlipidemia with target LDL less than 100     HTN (hypertension)     Numbness and tingling     Diverticula, colon     Diverticulitis, acute     Eczema     Lump of left breast     Shoulder pain     Right groin pain     Tinea corporis     Hyperlipidemia     Morbid obesity with BMI of 45.0-49.9, adult (HCC)     SINDI (obstructive sleep apnea)     Vitamin D deficiency     Melena     Chest pain     Abnormal stress test     Contact blepharoconjunctivitis of left eye     Prediabetes     Need for prophylactic vaccination and inoculation against varicella     Acute cystitis without hematuria     Primary osteoarthritis of right knee

## 2022-10-11 NOTE — PROGRESS NOTES
Dr. Luis Walden notified the patient ate jerri crackers. Zygomaticofacial Flap Text: Given the location of the defect, shape of the defect and the proximity to free margins a zygomaticofacial flap was deemed most appropriate for repair.  Using a sterile surgical marker, the appropriate flap was drawn incorporating the defect and placing the expected incisions within the relaxed skin tension lines where possible. The area thus outlined was incised deep to adipose tissue with a #15 scalpel blade with preservation of a vascular pedicle.  The skin margins were undermined to an appropriate distance in all directions utilizing iris scissors.  The flap was then placed into the defect and anchored with interrupted buried subcutaneous sutures.

## 2022-10-13 ENCOUNTER — HOSPITAL ENCOUNTER (OUTPATIENT)
Dept: MAMMOGRAPHY | Age: 62
Discharge: HOME | End: 2022-10-15
Payer: MEDICARE

## 2022-10-13 DIAGNOSIS — Z12.31 ENCOUNTER FOR SCREENING MAMMOGRAM FOR BREAST CANCER: ICD-10-CM

## 2022-10-13 PROCEDURE — 77063 BREAST TOMOSYNTHESIS BI: CPT

## 2022-11-14 RX ORDER — LISINOPRIL 10 MG/1
TABLET ORAL
Qty: 120 TABLET | Refills: 0 | Status: SHIPPED | OUTPATIENT
Start: 2022-11-14

## 2022-11-14 NOTE — TELEPHONE ENCOUNTER
Please address the medication refill and close the encounter. If I can be of assistance, please route to the applicable pool. Thank you. Last visit: 6-7-22  Last Med refill: 9-3-22  Does patient have enough medication for 72 hours: No:     Next Visit Date:  No future appointments. Health Maintenance   Topic Date Due    Diabetic retinal exam  Never done    Shingles vaccine (1 of 2) Never done    COVID-19 Vaccine (3 - Booster for Pfizer series) 03/17/2022    Flu vaccine (1) 08/01/2022    Lipids  02/11/2023    Diabetic foot exam  04/05/2023    Diabetic microalbuminuria test  04/21/2023    A1C test (Diabetic or Prediabetic)  06/07/2023    Depression Screen  06/07/2023    DTaP/Tdap/Td vaccine (2 - Td or Tdap) 03/07/2024    Breast cancer screen  10/13/2024    Colorectal Cancer Screen  10/18/2029    Pneumococcal 0-64 years Vaccine  Completed    Hepatitis C screen  Completed    HIV screen  Completed    Hepatitis A vaccine  Aged Out    Hib vaccine  Aged Out    Meningococcal (ACWY) vaccine  Aged Out       Hemoglobin A1C (%)   Date Value   06/07/2022 6.7   02/11/2022 7.0 (H)   09/02/2021 6.8             ( goal A1C is < 7)   Microalb/Crt.  Ratio (mcg/mg creat)   Date Value   04/21/2022 Can not be calculated     LDL Cholesterol (mg/dL)   Date Value   02/11/2022 110   05/19/2020 70       (goal LDL is <100)   AST (U/L)   Date Value   05/19/2020 17     ALT (U/L)   Date Value   05/19/2020 17     BUN (mg/dL)   Date Value   04/21/2022 17     BP Readings from Last 3 Encounters:   06/07/22 125/78   04/05/22 124/83   02/11/22 111/72          (goal 120/80)    All Future Testing planned in [facility]  Lab Frequency Next Occurrence               Patient Active Problem List:     Gastroesophageal reflux disease     Hyperlipidemia with target LDL less than 100     HTN (hypertension)     Numbness and tingling     Diverticula, colon     Diverticulitis, acute     Eczema     Lump of left breast     Shoulder pain     Right groin pain Tinea corporis     Hyperlipidemia     Morbid obesity with BMI of 45.0-49.9, adult (HCC)     SINDI (obstructive sleep apnea)     Vitamin D deficiency     Melena     Chest pain     Abnormal stress test     Contact blepharoconjunctivitis of left eye     Prediabetes     Need for prophylactic vaccination and inoculation against varicella     Acute cystitis without hematuria     Primary osteoarthritis of right knee

## 2022-12-12 ENCOUNTER — OFFICE VISIT (OUTPATIENT)
Dept: ORTHOPEDIC SURGERY | Age: 62
End: 2022-12-12
Payer: MEDICARE

## 2022-12-12 VITALS — OXYGEN SATURATION: 100 % | RESPIRATION RATE: 16 BRPM | WEIGHT: 282 LBS | BODY MASS INDEX: 49.96 KG/M2 | HEIGHT: 63 IN

## 2022-12-12 DIAGNOSIS — M17.0 PRIMARY OSTEOARTHRITIS OF BOTH KNEES: Primary | ICD-10-CM

## 2022-12-12 DIAGNOSIS — G89.29 CHRONIC PAIN OF BOTH KNEES: ICD-10-CM

## 2022-12-12 DIAGNOSIS — M25.562 CHRONIC PAIN OF BOTH KNEES: ICD-10-CM

## 2022-12-12 DIAGNOSIS — M25.561 CHRONIC PAIN OF BOTH KNEES: ICD-10-CM

## 2022-12-12 PROCEDURE — G8417 CALC BMI ABV UP PARAM F/U: HCPCS | Performed by: ORTHOPAEDIC SURGERY

## 2022-12-12 PROCEDURE — 99212 OFFICE O/P EST SF 10 MIN: CPT | Performed by: ORTHOPAEDIC SURGERY

## 2022-12-12 PROCEDURE — G8484 FLU IMMUNIZE NO ADMIN: HCPCS | Performed by: ORTHOPAEDIC SURGERY

## 2022-12-12 PROCEDURE — 3017F COLORECTAL CA SCREEN DOC REV: CPT | Performed by: ORTHOPAEDIC SURGERY

## 2022-12-12 PROCEDURE — 20610 DRAIN/INJ JOINT/BURSA W/O US: CPT | Performed by: ORTHOPAEDIC SURGERY

## 2022-12-12 PROCEDURE — G8427 DOCREV CUR MEDS BY ELIG CLIN: HCPCS | Performed by: ORTHOPAEDIC SURGERY

## 2022-12-12 PROCEDURE — 1036F TOBACCO NON-USER: CPT | Performed by: ORTHOPAEDIC SURGERY

## 2022-12-12 RX ORDER — LIDOCAINE HYDROCHLORIDE 10 MG/ML
4 INJECTION, SOLUTION INFILTRATION; PERINEURAL ONCE
Status: COMPLETED | OUTPATIENT
Start: 2022-12-12 | End: 2022-12-12

## 2022-12-12 RX ORDER — TRIAMCINOLONE ACETONIDE 40 MG/ML
40 INJECTION, SUSPENSION INTRA-ARTICULAR; INTRAMUSCULAR ONCE
Status: COMPLETED | OUTPATIENT
Start: 2022-12-12 | End: 2022-12-12

## 2022-12-12 RX ADMIN — TRIAMCINOLONE ACETONIDE 40 MG: 40 INJECTION, SUSPENSION INTRA-ARTICULAR; INTRAMUSCULAR at 11:26

## 2022-12-12 RX ADMIN — LIDOCAINE HYDROCHLORIDE 4 ML: 10 INJECTION, SOLUTION INFILTRATION; PERINEURAL at 11:25

## 2022-12-12 RX ADMIN — TRIAMCINOLONE ACETONIDE 40 MG: 40 INJECTION, SUSPENSION INTRA-ARTICULAR; INTRAMUSCULAR at 11:25

## 2022-12-12 NOTE — PROGRESS NOTES
[address]  [address] [address]  Dept: [phone]    Ambulatory Orthopedic Consult      CHIEF COMPLAINT:    Chief Complaint   Patient presents with    Knee Pain     Bilateral        HISTORY OF PRESENT ILLNESS:      The patient is a left-hand dominant 63 y.o. female who is being seen for evaluation of pain at the above location (left anterior knee greater than left dorsal midfoot and lateral/medial hindfoot, as well as left ring finger), which began in the beginning of November 2020 atraumatically. The pain is described mainly with mechanical terms (dull/sharp/throbbing). The pain is worse with activity and better with rest. The patient reports a static course. The patient has tried:      [x]  rest/activity modification          [x]  NSAIDs      []  opiates      []  orthotics        []  change in shoes   [x]  home exercises  []  physical therapy      []  CAM boot     []  brace:    []  injection:       []  surgery:      The patient also reports that her left ring finger pain is associated with intermittent locking. INTERVAL HISTORY 2/5/2021:  She is seen again today in the office for follow up of a previous issue (as above--left knee and left ring finger pain). Since being seen last, the patient is doing worse, particularly in terms of her left knee. At today's visit, she is not using a brace or assistive device. The location and quality of the pain have not significantly changed since the last visit. INTERVAL HISTORY 4/6/2021:  She is seen again today in the office for follow up after an injection. Since being seen last, the patient is doing better. At today's visit, she is using no brace or assistive device. She received an injection of the left knee, and reports the injection helped approximately 100% in terms of pain.   She also reports that her left ring finger trigger finger is not bothering her, and reports that her foot pain has significantly improved. INTERVAL HISTORY 6/4/2021:  She is seen again today in the office for follow up of a previous issue (as above--bilateral knees). Since being seen last, the patient is doing worse. At today's visit, she is not using a brace or assistive device. History is obtained today from:   [x]  the patient     []  EMR     []  one family member/friend    []  multiple family members/friends    []  other:      INTERVAL HISTORY 7/19/2021:  She is seen again today in the office for follow up of imaging as below. Since being seen last, the patient is doing about the same overall. At today's visit, she is using no brace/assistive device. History is obtained today from:   [x]  the patient     [x]  EMR     []  one family member/friend    []  multiple family members/friends    []  other:      INTERVAL HISTORY 8/30/2021:  She is seen again today in the office for follow up of a previous issue (as above). Since being seen last, the patient is doing better. At today's visit, she is not using a brace or assistive device. History is obtained today from:   [x]  the patient     []  EMR     []  one family member/friend    []  multiple family members/friends    []  other:      INTERVAL HISTORY 11/18/2021:  She is seen again today in the office for follow up of a previous issue (as above). Since being seen last, the patient is doing about the same overall. At today's visit, she is using a removable brace. History is obtained today from:   [x]  the patient     []  EMR     []  one family member/friend    []  multiple family members/friends    []  other:      INTERVAL HISTORY 12/7/2021:  She is seen again today in the office for follow up of imaging as below. Since being seen last, the patient is doing about the same overall. At today's visit, she is using a brace/boot.     History is obtained today from:   [x]  the patient     [x]  EMR     []  one family member/friend    [] multiple family members/friends    []  other:      INTERVAL HISTORY 1/18/2022:  She is seen again today in the office for follow up of a previous issue (as above). Since being seen last, the patient is doing worse. At today's visit, she is using a removable brace. History is obtained today from:   [x]  the patient     []  EMR     []  one family member/friend    []  multiple family members/friends    []  other:      Since her last visit, the patient has seen Dr. Tata Wheeler, who referred her to weight management to decrease her BMI prior to proceeding with surgery. INTERVAL HISTORY 6/7/2022:  She is seen again today in the office for follow up of a previous issue (as above). Since being seen last, the patient is doing worse. At today's visit, she is using a removable brace. History is obtained today from:   [x]  the patient     []  EMR     []  one family member/friend    []  multiple family members/friends    []  other:      INTERVAL HISTORY 9/12/2022:  She is seen again today in the office for follow up of a previous issue (as above). Since being seen last, the patient is doing worse. At today's visit, she is not using a brace or assistive device. History is obtained today from:   [x]  the patient     []  EMR     []  one family member/friend    []  multiple family members/friends    []  other:      INTERVAL HISTORY 12/12/2022:  She is seen again today in the office for follow up of a previous issue (as above). Since being seen last, the patient is doing better. At today's visit, she is not using a brace or assistive device.     History is obtained today from:   [x]  the patient     []  EMR     []  one family member/friend    []  multiple family members/friends    []  other:         REVIEW OF SYSTEMS:  Musculoskeletal: See HPI for pertinent positives     Past Medical History:    She  has a past medical history of Diverticulitis, Former smoker, Former smoker, GERD (gastroesophageal reflux disease), Hyperlipidemia, Hypertension, Morbid obesity with BMI of 45.0-49.9, adult ([facility] [address] SINDI on CPAP, Sleep apnea, and Type II or unspecified type diabetes mellitus without mention of complication, not stated as uncontrolled. Past Surgical History:    She  has a past surgical history that includes Hysterectomy; Cholecystectomy; Colonoscopy; pr egd transoral biopsy single/multiple (7/19/2017); Upper gastrointestinal endoscopy (N/A, 10/18/2019); and Colonoscopy (N/A, 10/18/2019).      Current Medications:     Current Outpatient Medications:     metFORMIN (GLUCOPHAGE) 500 MG tablet, take 1 tablet by mouth EVERY MORNING WITH BREAKFAST, Disp: 30 tablet, Rfl: 2    lisinopril (PRINIVIL;ZESTRIL) 10 MG tablet, take 1 tablet by mouth once daily, Disp: 120 tablet, Rfl: 0    hydroCHLOROthiazide (HYDRODIURIL) 25 MG tablet, take 1 tablet by mouth once daily, Disp: 30 tablet, Rfl: 3    Handicap Placard MISC, Dispense for 5 years, Disp: 1 each, Rfl: 0    Nerve Stimulator (RA SINGLE CHANNEL PAIN RELIEF) DENI, USE AS DIRECTED, Disp: , Rfl:     Tens Unit MISC, by Does not apply route, Disp: 1 each, Rfl: 0    atorvastatin (LIPITOR) 40 MG tablet, Take 1 tablet by mouth daily, Disp: 30 tablet, Rfl: 3    diclofenac sodium (VOLTAREN) 1 % GEL, Apply 2 g topically 2 times daily, Disp: 350 g, Rfl: 0    betamethasone valerate (VALISONE) 0.1 % cream, apply to affected area twice a day, Disp: , Rfl:     diclofenac sodium (VOLTAREN) 1 % GEL, Apply 2 g topically 2 times daily, Disp: 100 g, Rfl: 0    naproxen (NAPROSYN) 500 MG tablet, take 1 tablet by mouth twice a day if needed for pain, Disp: 60 tablet, Rfl: 0    FEROSUL 325 (65 Fe) MG tablet, take 1 tablet by mouth twice a day before meals, Disp: , Rfl:     ferrous sulfate (FE TABS 325) 325 (65 Fe) MG EC tablet, Take 1 tablet by mouth 2 times daily (before meals), Disp: 90 tablet, Rfl: 3    melatonin 1 MG tablet, Take 1 tablet by mouth nightly as needed for Sleep, Disp: 30 tablet, Rfl: 2    Elastic Bandages & Supports (ACE ELBOW BRACE LARGE/X-LARGE) MISC, 1 Brace as need for elbow pain, Disp: 1 each, Rfl: 0    aspirin 81 MG tablet, Take 81 mg by mouth daily, Disp: , Rfl:      Allergies:    Patient has no known allergies. Family History:  family history includes Cancer in her father; Diabetes in her mother; High Blood Pressure in her mother.     Social History:   Social History     Occupational History    Not on file   Tobacco Use    Smoking status: Former     Packs/day: 0.25     Years: 7.00     Pack years: 1.75     Types: Cigarettes     Start date: 1984     Quit date: 1991     Years since quittin.9    Smokeless tobacco: Never   Vaping Use    Vaping Use: Never used   Substance and Sexual Activity    Alcohol use: Not Currently    Drug use: No    Sexual activity: Not on file     Occupation: Works as a      OBJECTIVE:  Resp 16   Ht 5' 3\" (1.6 m)   Wt 282 lb (127.9 kg)   SpO2 100%   BMI 49.95 kg/m²    Psych: alert and oriented to person, time, and place  Cardio:  well perfused extremities  Resp:  normal respiratory effort    Musculoskeletal:    RLE:    Grossly normal range of motion of hip, slightly decreased flexion/extension of the knee  Painless range of motion of hip, but pain on extremes of knee motion  Stable exam of hip/knee  Skin intact without erythema/warmth  Grossly neurovascularly intact distally   Tenderness:  anteromedial and anterolateral knee joint line   -Kirk's test positive for pain      LLE:    Grossly normal range of motion of hip, slightly decreased flexion extension of the knee  Painless range of motion of hip, but pain on extremes of knee motion  Stable exam of hip/knee  Skin intact without erythema/warmth  Grossly neurovascularly intact distally   Tenderness:  anteromedial greater than anterolateral knee joint line  -Kirk's test positive for pain     -Previous exam: Tenderness diffusely throughout the midfoot, sinus Tarsi, and along the course the posterior tibial tendon      (Previous exam)  Affected upper extremity (left):  Grossly normal range of motion of wrist/fingers  Painless range of motion of wrist/fingers except for ring finger  Stable exam of wrist/hand  Skin intact without erythema/warmth   Grossly neurovascularly intact distally throughout able to fire EPL/FPL/IO/EDC/FDP  Tenderness to palpation:  A1 pulley of the ring finger--improved  -Durkan's compression test negative  -triggering elicited at the Ring finger       RADIOLOGY:   12/12/2022 No new radiology images today. Prior images reviewed for reference. MRI images and radiology report previously reviewed, as below:     -Right knee MRI  1. Degeneration and slight outward subluxation of the medial meniscus body. No evidence of meniscal tearing. 2. Mild to moderate tricompartmental osteoarthrosis. Mild-to-moderate medial   and patellofemoral chondromalacia. Subchondral irregularity of the lateral   femoral trochlea. Grade 3 chondromalacia of the posterior nonweightbearing   femoral condyles with subcortical cystic changes and subchondral reactive   marrow edema at the posterior nonweightbearing medial femoral condyle. 3. Small joint effusion. Trace Tovar's cyst.           MRI images and radiology report previously reviewed, as below:     -Left knee MRI  1. Tearing of the root ligament of the posterior horn medial meniscus. Outward extrusion of the medial meniscus body. Degeneration of the posterior   horn and body of the medial meniscus. Reactive marrow edema at the outer   medial tibial plateau. 2. Possible grade 1 MCL sprain. 3. Small joint effusion. 4. Tricompartmental osteoarthrosis. Severe medial chondromalacia. Focal   severe chondromalacia at the posterior weight-bearing lateral tibial plateau. 5. Mild-to-moderate lateral femoral trochlear chondromalacia with underlying   subcortical cystic change.    6. Small Baker's cyst.  Mild-to-moderate edema in the subcutaneous fat about   the knee. FINDINGS:  Four weightbearing views (AP, Tunnel, Lateral, and [place]boles) of the bilateral knee were obtained in the office today and reviewed, revealing no acute fracture, dislocation, or radioopaque foreign body/tumor. Overall alignment is satisfactory. Tricompartmental degenerative changes of the knee with joint narrowing, sclerosis, and osteophytes. IMPRESSION: No acute fracture/dislocation. Degenerative changes as above. Electronically signed by Fide Infante MD        FINDINGS:  Three weightbearing views (AP, Mortise, and Lateral) of the left ankle and three weightbearing views (AP, Oblique, Lateral) of the left foot and four weightbearing views (AP, Lateral, Tunnel and Sunrise) of the left knee and 3 views (AP, oblique, lateral) of the left hand were obtained in the office today and reviewed, revealing no acute fracture, dislocation, or radioopaque foreign body/tumor. The ankle mortise is maintained with no widening of the clear spaces. Overall alignment is satisfactory. Tricompartmental degenerative changes of the knee with joint space narrowing, sclerosis, and osteophytes. Degenerative changes at the subtalar joint, as well as mildly diffusely throughout the midfoot. IMPRESSION:  No acute fracture/dislocation. Generative changes as above. Electronically signed by Fide Infante MD        Xr Knee Left (3 Views)    Result Date: 11/25/2020  EXAMINATION: THREE X-RAY VIEWS OF THE LEFT KNEE 11/25/2020 10:31 am COMPARISON: 03/03/2010 HISTORY: ORDERING SYSTEM PROVIDED HISTORY: Pain TECHNOLOGIST PROVIDED HISTORY: Pain Acuity: Acute Type of Exam: Unknown FINDINGS: No evidence of acute fracture or dislocation. Similar small benign-appearing soft tissue calcifications in the proximal and posterolateral leg. Radiographically there is no significant joint effusion.   Progression of mild to moderate degenerative changes involving primarily the medial compartment with joint space loss and osteophytes. Lateral view is obtained in a slight degree of obliquity. No acute osseous abnormality. Mild-to-moderate degenerative changes have progressed since the prior study. ASSESSMENT AND PLAN:  Body mass index is 49.95 kg/m². She has a history of bilateral knee pain, secondary to bilateral tricompartmental osteoarthritis, on the left with severe medial chondromalacia and a medial meniscal posterior root tear. For the left knee, a corticosteroid injection on 2/5/2021 helped her pain approximately 100%, a corticosteroid injection on 7/19/2021 helped her pain approximately 75%, a Monovisc injection on 12/7/2021 helped her pain approximately 50%; a left knee injection on 6/7/2022 helped her pain approximately 90%; a left knee corticosteroid on 9/12/2022 injection helped her pain approximately 90%. For the right knee, a corticosteroid injection on 8/30/2021 helped her pain approximately 25%, a Monovisc injection on 12/7/2021 helped her pain approximately 25%, a right knee steroid injection on 1/18/2022 helped her pain approximately 100% (around 5 months); a right knee steroid injection on 6/7/2022 helped her pain approximately 90%; a right knee injection on 9/12/2022 helped her pain approximately 90%. She also has a history of left foot pain secondary multiple issues--she has a component of posterior tibial tendinitis along with some degenerative changes of her subtalar joint and mildly diffusely throughout the midfoot. She is doing well with conservative management. She also has a history of a trigger finger of her left ring finger. She is doing well with conservative management. Notably, she has a somewhat complex past medical history. She has a history of non-insulin-dependent diabetes.     We had a discussion today about the likely diagnosis and its natural history, physical exam and imaging findings, as well as various treatment options in detail. Surgically, we have previously discussed a possible total knee replacement. She has previously met with Dr. Denice Zhong, and he has recommended that her BMI get below 40 prior to proceeding with any surgical, and we have discussed this. At this time, she wishes to continue with conservative management. Orders/referrals were placed as below at today's visit. She was again prescribed topical Voltaren gel as needed for pain; we have been avoiding oral NSAIDs due to her history of GI bleeding. She may continue home exercises. -After discussing her treatment options, she requested to proceed with bilateral knee corticosteroid injections as below. She was previously provided information on how to obtain an over-the-counter flatfoot style orthotic. All questions were answered and the above plan was agreed upon. The patient will return to clinic in 3 months as needed without x-rays. At her next visit, we may consider repeat bilateral knee Monovisc versus corticosteroid injections. KNEE INJECTION PROCEDURE NOTE: After discussing the risks/benefits/alternatives to injection, an informed consent was obtained. The bilateral knee was verified as the correct location and allergies were reviewed. The skin overlying the injection site was cleaned with an alcohol swab followed by a local sterile prep. A 22 gauge needle was introduced into the above location under sterile conditions. A mixture of 40 mg of Kenalog and 4 mL of 1% Lidocaine without epinephrine was injected into each knee. The patient was noted to tolerate the procedure well without immediate complication. A dressing was applied and verbal instruction/education was provided.                      At the patient's next visit, depending on how the patient is doing and/or new imaging/labs results, we may consider the following options:    []  Orthotic (OTC)     []  Orthotic (custom)          []  Rocker bottom shoes     []  Brace (OTC) []  Brace (custom)             []  CAM boot        []  Night splint         []  Heel cups        []  Strap      []  Toe sleeves/splints    []  PT:                     []  Wean out of immobilization   []  Advance activity       []  Topical               []  NSAIDs          []  Ondina         []  Referral:         []  Stress xrays       []  CT         []  MRI        []  Injection:         []  Consider OR      []  Pick OR date    No follow-ups on file. No orders of the defined types were placed in this encounter. No orders of the defined types were placed in this encounter. Crystal Neumann MD  Orthopedic Surgery        Please excuse any typos/errors, as this note was created with the assistance of voice recognition software. While intending to generate a document that actually reflects the content of the visit, the document can still have some errors including those of syntax and sound-a-like substitutions which may escape proof reading. In such instances, actual meaning can be extrapolated by context.

## 2022-12-16 ENCOUNTER — OFFICE VISIT (OUTPATIENT)
Dept: FAMILY MEDICINE CLINIC | Age: 62
End: 2022-12-16
Payer: MEDICARE

## 2022-12-16 ENCOUNTER — HOSPITAL ENCOUNTER (OUTPATIENT)
Age: 62
Setting detail: SPECIMEN
Discharge: HOME | End: 2022-12-16

## 2022-12-16 VITALS
WEIGHT: 281.4 LBS | BODY MASS INDEX: 49.86 KG/M2 | DIASTOLIC BLOOD PRESSURE: 84 MMHG | TEMPERATURE: 97.3 F | HEIGHT: 63 IN | HEART RATE: 95 BPM | SYSTOLIC BLOOD PRESSURE: 137 MMHG

## 2022-12-16 DIAGNOSIS — R10.31 RLQ ABDOMINAL PAIN: ICD-10-CM

## 2022-12-16 DIAGNOSIS — R10.9 RIGHT FLANK PAIN: Primary | ICD-10-CM

## 2022-12-16 LAB
ABSOLUTE EOS #: 0.22 K/UL (ref 0–0.44)
ABSOLUTE IMMATURE GRANULOCYTE: 0.05 K/UL (ref 0–0.3)
ABSOLUTE LYMPH #: 4.41 K/UL (ref 1.1–3.7)
ABSOLUTE MONO #: 0.82 K/UL (ref 0.1–1.2)
ALBUMIN SERPL-MCNC: 3.9 G/DL (ref 3.5–5.2)
ALBUMIN/GLOBULIN RATIO: 1.1 (ref 1–2.5)
ALP BLD-CCNC: 98 U/L (ref 35–104)
ALT SERPL-CCNC: 23 U/L (ref 5–33)
ANION GAP SERPL CALCULATED.3IONS-SCNC: 11 MMOL/L (ref 9–17)
AST SERPL-CCNC: 21 U/L
BASOPHILS # BLD: 0 % (ref 0–2)
BASOPHILS ABSOLUTE: 0.03 K/UL (ref 0–0.2)
BILIRUB SERPL-MCNC: 0.3 MG/DL (ref 0.3–1.2)
BILIRUBIN, POC: NORMAL
BLOOD URINE, POC: NORMAL
BUN BLDV-MCNC: 15 MG/DL (ref 8–23)
CALCIUM SERPL-MCNC: 9.1 MG/DL (ref 8.6–10.4)
CHLORIDE BLD-SCNC: 103 MMOL/L (ref 98–107)
CLARITY, POC: CLEAR
CO2: 26 MMOL/L (ref 20–31)
COLOR, POC: YELLOW
CREAT SERPL-MCNC: 0.9 MG/DL (ref 0.5–0.9)
EOSINOPHILS RELATIVE PERCENT: 2 % (ref 1–4)
GFR SERPL CREATININE-BSD FRML MDRD: >60 ML/MIN/1.73M2
GLUCOSE BLD-MCNC: 101 MG/DL (ref 70–99)
GLUCOSE URINE, POC: NORMAL
HCT VFR BLD CALC: 41 % (ref 36.3–47.1)
HEMOGLOBIN: 12.5 G/DL (ref 11.9–15.1)
IMMATURE GRANULOCYTES: 0 %
KETONES, POC: NORMAL
LEUKOCYTE EST, POC: NORMAL
LIPASE: 53 U/L (ref 13–60)
LYMPHOCYTES # BLD: 38 % (ref 24–43)
MCH RBC QN AUTO: 27.2 PG (ref 25.2–33.5)
MCHC RBC AUTO-ENTMCNC: 30.5 G/DL (ref 28.4–34.8)
MCV RBC AUTO: 89.3 FL (ref 82.6–102.9)
MONOCYTES # BLD: 7 % (ref 3–12)
NITRITE, POC: NORMAL
NRBC AUTOMATED: 0 PER 100 WBC
PDW BLD-RTO: 15 % (ref 11.8–14.4)
PH, POC: 7
PLATELET # BLD: 242 K/UL (ref 138–453)
PMV BLD AUTO: 12.3 FL (ref 8.1–13.5)
POTASSIUM SERPL-SCNC: 3.6 MMOL/L (ref 3.7–5.3)
PROTEIN, POC: NORMAL
RBC # BLD: 4.59 M/UL (ref 3.95–5.11)
RBC # BLD: ABNORMAL 10*6/UL
SEG NEUTROPHILS: 53 % (ref 36–65)
SEGMENTED NEUTROPHILS ABSOLUTE COUNT: 6.1 K/UL (ref 1.5–8.1)
SODIUM BLD-SCNC: 140 MMOL/L (ref 135–144)
SPECIFIC GRAVITY, POC: 1.01
TOTAL PROTEIN: 7.4 G/DL (ref 6.4–8.3)
UROBILINOGEN, POC: 0.2
WBC # BLD: 11.6 K/UL (ref 3.5–11.3)

## 2022-12-16 PROCEDURE — 81002 URINALYSIS NONAUTO W/O SCOPE: CPT | Performed by: STUDENT IN AN ORGANIZED HEALTH CARE EDUCATION/TRAINING PROGRAM

## 2022-12-16 RX ORDER — METRONIDAZOLE 500 MG/1
500 TABLET ORAL 2 TIMES DAILY
Qty: 14 TABLET | Refills: 0 | Status: SHIPPED | OUTPATIENT
Start: 2022-12-16 | End: 2022-12-23

## 2022-12-16 RX ORDER — CIPROFLOXACIN 500 MG/1
500 TABLET, FILM COATED ORAL 2 TIMES DAILY
Qty: 14 TABLET | Refills: 0 | Status: SHIPPED | OUTPATIENT
Start: 2022-12-16 | End: 2022-12-23

## 2022-12-16 ASSESSMENT — ENCOUNTER SYMPTOMS
SHORTNESS OF BREATH: 0
ABDOMINAL PAIN: 1

## 2022-12-16 NOTE — PATIENT INSTRUCTIONS
Thank you for letting us take care of you today. We hope all your questions were addressed. If a question was overlooked or something else comes to mind after you return home, please contact a member of your Care Team listed below. Your Care Team at [place] [address] is Team #5  Valerio Juarez MD (Faculty)  Sadie Quinn MD (Resident)  Mazin Sandy MD (Resident)  Floresita Farley MD (Resident)  Reji Stauffer MD, (Resident)  Ros Cisneros., JOSEE Chavez., RMA  Alexi Greco.,  LPN  Elisa Hilton., Kiki Loving., Caryle Cree [facility] office)  Mulugeta Tran, [address] (Clinical Practice Manager)  Erika Morin, [address] [facility] (Clinical Pharmacist)       Office phone number: [phone]    If you need to get in right away due to illness, please be advised we have \"Same Day\" appointments available Monday-Friday. Please call us at [phone] option #3 to schedule your \"Same Day\" appointment.

## 2022-12-16 NOTE — PROGRESS NOTES
Visit Information    Have you changed or started any medications since your last visit including any over-the-counter medicines, vitamins, or herbal medicines? no   Have you stopped taking any of your medications? Is so, why? -  no  Are you having any side effects from any of your medications? - no    Have you seen any other physician or provider since your last visit? yes - Ortho, PT   Have you had any other diagnostic tests since your last visit?  no   Have you been seen in the emergency room and/or had an admission in a hospital since we last saw you?  no   Have you had your routine dental cleaning in the past 6 months?  no     Do you have an active MyChart account? If no, what is the barrier?   Yes    Patient Care Team:  Alvaro Coronel MD as PCP - Handy Zuñiga MD as Consulting Physician (Pulmonology)  Annette Halsted, MD as Consulting Physician (Pain Management)    Medical History Review  Past Medical, Family, and Social History reviewed and does contribute to the patient presenting condition    Health Maintenance   Topic Date Due    Diabetic retinal exam  Never done    Shingles vaccine (1 of 2) Never done    COVID-19 Vaccine (3 - Booster for Moore Peter series) 03/17/2022    Lipids  02/11/2023    Diabetic foot exam  04/05/2023    Diabetic microalbuminuria test  04/21/2023    A1C test (Diabetic or Prediabetic)  06/07/2023    Depression Screen  06/07/2023    DTaP/Tdap/Td vaccine (2 - Td or Tdap) 03/07/2024    Breast cancer screen  10/13/2024    Colorectal Cancer Screen  10/18/2029    Flu vaccine  Completed    Pneumococcal 0-64 years Vaccine  Completed    Hepatitis C screen  Completed    HIV screen  Completed    Hepatitis A vaccine  Aged Out    Hib vaccine  Aged Out    Meningococcal (ACWY) vaccine  Aged Out

## 2022-12-16 NOTE — PROGRESS NOTES
Subjective:    Farhad Vyas is a 63 y.o. female with  has a past medical history of Diverticulitis, Former smoker, Former smoker, GERD (gastroesophageal reflux disease), Hyperlipidemia, Hypertension, Morbid obesity with BMI of 45.0-49.9, adult ([address] SINDI on CPAP, Sleep apnea, and Type II or unspecified type diabetes mellitus without mention of complication, not stated as uncontrolled. Family History   Problem Relation Age of Onset    High Blood Pressure Mother     Diabetes Mother     Cancer Father        Presented tothe office today for:  Chief Complaint   Patient presents with    Diverticulitis     Having a flare up, started on Wednesday        HPI 63year old fmale with a pmh of hypertension and T2DM here for abdominal pain. Abdominal Pain  - RLQ and right flank  - Ongoing for last 3 days  - Has been about the same  - Drinking a lot of water  - Dull, non radiating, can be around 4-5/10, mainly uncomfortable  - Pain is intermittent  - Worsened by certain positioning or certain foods  - Has tried tylenol with some mild relief   - Stools are soft, no melena or hematochezia  - No fever or chills      Review of Systems   Constitutional:  Negative for fever. Respiratory:  Negative for shortness of breath. Cardiovascular:  Negative for chest pain. Gastrointestinal:  Positive for abdominal pain. Neurological:  Negative for weakness. All other systems reviewed and are negative. Objective:    /84 (Site: Right Upper Arm, Position: Sitting, Cuff Size: Large Adult)   Pulse 95   Temp 97.3 °F (36.3 °C) (Oral)   Ht 5' 2.99\" (1.6 m)   Wt 281 lb 6.4 oz (127.6 kg)   BMI 49.86 kg/m²    BP Readings from Last 3 Encounters:   12/16/22 137/84   06/07/22 125/78   04/05/22 124/83     Physical Exam  Vitals reviewed. Constitutional:       General: She is awake. Cardiovascular:      Rate and Rhythm: Normal rate and regular rhythm. Heart sounds: Normal heart sounds.    Pulmonary:      Effort: Pulmonary effort is normal.   Abdominal:      General: Abdomen is flat. Bowel sounds are decreased. Palpations: Abdomen is soft. Tenderness: There is abdominal tenderness in the right lower quadrant. There is right CVA tenderness. There is no left CVA tenderness, guarding or rebound. Neurological:      Mental Status: She is alert. Psychiatric:         Behavior: Behavior is cooperative. Lab Results   Component Value Date    WBC 6.8 05/19/2020    HGB 12.4 05/19/2020    HCT 40.3 05/19/2020     05/19/2020    CHOL 172 02/11/2022    TRIG 88 02/11/2022    HDL 44 02/11/2022    ALT 17 05/19/2020    AST 17 05/19/2020     04/21/2022    K 4.6 04/21/2022     04/21/2022    CREATININE 0.96 (H) 04/21/2022    BUN 17 04/21/2022    CO2 29 04/21/2022    TSH 2.25 04/18/2018    INR 1.2 10/18/2019    LABA1C 6.7 06/07/2022    LABMICR Can not be calculated 04/21/2022     Lab Results   Component Value Date    CALCIUM 9.8 04/21/2022    PHOS 3.1 09/12/2011     Lab Results   Component Value Date    LDLCHOLESTEROL 110 02/11/2022       Assessment and Plan:    1. Right flank pain  - POCT Urinalysis no Micro - Negative    2. RLQ abdominal pain  - Will get some lab work  - given her history, could be a mild diverticulitis flare up, will treat empirically with cipro and flagyl  - No signs of sepsis or hemodynamic instability  - Lipase; Future  - CBC with Auto Differential; Future  - Comprehensive Metabolic Panel; Future  - ciprofloxacin (CIPRO) 500 MG tablet; Take 1 tablet by mouth 2 times daily for 7 days  Dispense: 14 tablet; Refill: 0  - metroNIDAZOLE (FLAGYL) 500 MG tablet; Take 1 tablet by mouth 2 times daily for 7 days  Dispense: 14 tablet;  Refill: 0      Requested Prescriptions     Signed Prescriptions Disp Refills    ciprofloxacin (CIPRO) 500 MG tablet 14 tablet 0     Sig: Take 1 tablet by mouth 2 times daily for 7 days    metroNIDAZOLE (FLAGYL) 500 MG tablet 14 tablet 0     Sig: Take 1 tablet by mouth 2 times daily for 7 days       There are no discontinued medications. Return in about 2 weeks (around 12/30/2022) for FU Abd Pain.

## 2022-12-16 NOTE — PROGRESS NOTES
Attending Physician Statement  I have discussed the care of ValeteenYoungincluding pertinent history and exam findings,  with the resident. I have reviewed the key elements of all parts of the encounter with the resident. I agree with the assessment, plan and orders as documented by the resident.   (GE Modifier)    Abdominal pain- possible diverticulitis-Cipro/Flagyl

## 2023-01-11 DIAGNOSIS — E11.9 TYPE 2 DIABETES MELLITUS WITHOUT COMPLICATION, WITHOUT LONG-TERM CURRENT USE OF INSULIN (HCC): ICD-10-CM

## 2023-01-11 NOTE — TELEPHONE ENCOUNTER
E-scribe request for med refill. Please review and e-scribe if applicable. Last Visit Date:  12/16/2022  Next Visit Date:  1/16/2023    Hemoglobin A1C (%)   Date Value   06/07/2022 6.7   02/11/2022 7.0 (H)   09/02/2021 6.8             ( goal A1C is < 7)   Microalb/Crt.  Ratio (mcg/mg creat)   Date Value   04/21/2022 Can not be calculated     LDL Cholesterol (mg/dL)   Date Value   02/11/2022 110       (goal LDL is <100)   AST (U/L)   Date Value   12/16/2022 21     ALT (U/L)   Date Value   12/16/2022 23     BUN (mg/dL)   Date Value   12/16/2022 15     BP Readings from Last 3 Encounters:   12/16/22 137/84   06/07/22 125/78   04/05/22 124/83          (goal 120/80)        Patient Active Problem List:     Gastroesophageal reflux disease     Hyperlipidemia with target LDL less than 100     HTN (hypertension)     Numbness and tingling     Diverticula, colon     Diverticulitis, acute     Eczema     Lump of left breast     Shoulder pain     Right groin pain     Tinea corporis     Hyperlipidemia     Morbid obesity with BMI of 45.0-49.9, adult (HCC)     SINDI (obstructive sleep apnea)     Vitamin D deficiency     Melena     Chest pain     Abnormal stress test     Contact blepharoconjunctivitis of left eye     Prediabetes     Need for prophylactic vaccination and inoculation against varicella     Acute cystitis without hematuria     Primary osteoarthritis of right knee      ----Art Ayers

## 2023-01-16 ENCOUNTER — OFFICE VISIT (OUTPATIENT)
Dept: FAMILY MEDICINE CLINIC | Age: 63
End: 2023-01-16
Payer: MEDICARE

## 2023-01-16 VITALS
DIASTOLIC BLOOD PRESSURE: 71 MMHG | WEIGHT: 279.6 LBS | SYSTOLIC BLOOD PRESSURE: 123 MMHG | HEIGHT: 62 IN | HEART RATE: 81 BPM | BODY MASS INDEX: 51.45 KG/M2

## 2023-01-16 DIAGNOSIS — R10.31 RLQ ABDOMINAL PAIN: ICD-10-CM

## 2023-01-16 DIAGNOSIS — E11.9 TYPE 2 DIABETES MELLITUS WITHOUT COMPLICATION, WITHOUT LONG-TERM CURRENT USE OF INSULIN (HCC): Primary | ICD-10-CM

## 2023-01-16 LAB — HBA1C MFR BLD: 6.5 %

## 2023-01-16 PROCEDURE — G8484 FLU IMMUNIZE NO ADMIN: HCPCS

## 2023-01-16 PROCEDURE — 3017F COLORECTAL CA SCREEN DOC REV: CPT

## 2023-01-16 PROCEDURE — G8417 CALC BMI ABV UP PARAM F/U: HCPCS

## 2023-01-16 PROCEDURE — 3044F HG A1C LEVEL LT 7.0%: CPT

## 2023-01-16 PROCEDURE — G8427 DOCREV CUR MEDS BY ELIG CLIN: HCPCS

## 2023-01-16 PROCEDURE — 83036 HEMOGLOBIN GLYCOSYLATED A1C: CPT

## 2023-01-16 PROCEDURE — 3074F SYST BP LT 130 MM HG: CPT

## 2023-01-16 PROCEDURE — 3078F DIAST BP <80 MM HG: CPT

## 2023-01-16 PROCEDURE — 99213 OFFICE O/P EST LOW 20 MIN: CPT

## 2023-01-16 PROCEDURE — 1036F TOBACCO NON-USER: CPT

## 2023-01-16 PROCEDURE — 2022F DILAT RTA XM EVC RTNOPTHY: CPT

## 2023-01-16 RX ORDER — LISINOPRIL 10 MG/1
TABLET ORAL
Qty: 120 TABLET | Refills: 3 | Status: SHIPPED | OUTPATIENT
Start: 2023-01-16

## 2023-01-16 ASSESSMENT — ENCOUNTER SYMPTOMS
VOMITING: 0
WHEEZING: 0
ABDOMINAL PAIN: 0
COUGH: 0
CONSTIPATION: 0
SHORTNESS OF BREATH: 0
DIARRHEA: 0
NAUSEA: 0

## 2023-01-16 ASSESSMENT — PATIENT HEALTH QUESTIONNAIRE - PHQ9
SUM OF ALL RESPONSES TO PHQ QUESTIONS 1-9: 0
2. FEELING DOWN, DEPRESSED OR HOPELESS: 0
SUM OF ALL RESPONSES TO PHQ9 QUESTIONS 1 & 2: 0
1. LITTLE INTEREST OR PLEASURE IN DOING THINGS: 0
SUM OF ALL RESPONSES TO PHQ QUESTIONS 1-9: 0

## 2023-01-16 NOTE — PROGRESS NOTES
Visit Information    Have you changed or started any medications since your last visit including any over-the-counter medicines, vitamins, or herbal medicines? no   Are you having any side effects from any of your medications? -  no  Have you stopped taking any of your medications? Is so, why? -  no    Have you seen any other physician or provider since your last visit? Yes - Records Obtained, Ortho  Have you had any other diagnostic tests since your last visit? No  Have you been seen in the emergency room and/or had an admission to a hospital since we last saw you? No  Have you had your routine dental cleaning in the past 6 months? no    Have you activated your [employer] account? If not, what are your barriers?  Yes     Patient Care Team:  Tarun Knight MD as PCP - Fahad Abreu MD as Consulting Physician (Pulmonology)  Ino Patel MD as Consulting Physician (Pain Management)    Medical History Review  Past Medical, Family, and Social History reviewed and does not contribute to the patient presenting condition    Health Maintenance   Topic Date Due    Diabetic retinal exam  Never done    Shingles vaccine (1 of 2) Never done    COVID-19 Vaccine (3 - Booster for Moore Peter series) 03/17/2022    Lipids  02/11/2023    Diabetic foot exam  04/05/2023    Diabetic Alb to Cr ratio (uACR) test  04/21/2023    A1C test (Diabetic or Prediabetic)  06/07/2023    Depression Screen  06/07/2023    GFR test (Diabetes, CKD 3-4, OR last GFR 15-59)  12/16/2023    DTaP/Tdap/Td vaccine (2 - Td or Tdap) 03/07/2024    Breast cancer screen  10/13/2024    Colorectal Cancer Screen  10/18/2029    Flu vaccine  Completed    Pneumococcal 0-64 years Vaccine  Completed    Hepatitis C screen  Completed    HIV screen  Completed    Hepatitis A vaccine  Aged Out    Hib vaccine  Aged Out    Meningococcal (ACWY) vaccine  Aged Out

## 2023-01-16 NOTE — PROGRESS NOTES
Attending Physician Statement  I have discussed the care of Vasiliy Latham, 63 y.o. female,including pertinent history and exam findings,  with the resident Dr. Ellis Simpson MD.  History:  Chief Complaint   Patient presents with    Abdominal Pain     Patient states everything is ok    Referral - General     Requesting an Ophthalmology, and Podiatry referral       I have reviewed the key elements of the encounter with the resident. Examination was done by resident as documented in residents note. BP Readings from Last 3 Encounters:   01/16/23 123/71   12/16/22 137/84   06/07/22 125/78     /71 (Site: Left Upper Arm, Position: Sitting, Cuff Size: Large Adult)   Pulse 81   Ht 5' 2\" (1.575 m)   Wt 279 lb 9.6 oz (126.8 kg)   BMI 51.14 kg/m²   Lab Results   Component Value Date    WBC 11.6 (H) 12/16/2022    HGB 12.5 12/16/2022    HCT 41.0 12/16/2022     12/16/2022    CHOL 172 02/11/2022    TRIG 88 02/11/2022    HDL 44 02/11/2022    ALT 23 12/16/2022    AST 21 12/16/2022     12/16/2022    K 3.6 (L) 12/16/2022     12/16/2022    CREATININE 0.90 12/16/2022    BUN 15 12/16/2022    CO2 26 12/16/2022    TSH 2.25 04/18/2018    INR 1.2 10/18/2019    LABA1C 6.5 01/16/2023    LABMICR Can not be calculated 04/21/2022     Lab Results   Component Value Date    CALCIUM 9.1 12/16/2022    PHOS 3.1 09/12/2011     Lab Results   Component Value Date    LDLCHOLESTEROL 110 02/11/2022     I agree with the assessment, plan and diagnosis of    Diagnosis Orders   1. Type 2 diabetes mellitus without complication, without long-term current use of insulin (HCC)  POCT glycosylated hemoglobin (Hb A1C)    [facility] Starting [employer] and [facility] [facility] Program    ALEXANDER - Theodore Lopez MD, Ophthalmology, [facility]      2. RLQ abdominal pain          I agree with  orders as documented by the resident. Recommendations: We will continue to work on nutrition and exercise interventions.   Patient may be a candidate for GLP 1 agent and if this is too expensive possibly a DPP 4. Patient also may be a candidate for bariatric service. Will discuss further with patient in the future. Return in about 3 months (around 4/16/2023).    (Padma Chand ) Dr. Thelma Garduno MD

## 2023-01-16 NOTE — PATIENT INSTRUCTIONS
Thank you for letting us take care of you today. We hope all your questions were addressed. If a question was overlooked or something else comes to mind after you return home, please contact a member of your Care Team listed below. Your Care Team at [place] [address] is Team #4  Cyndi Flannery MD (Faculty)  Mark Blackman MD (Resident)  Abdirahman Guerrero MD (Resident)  Zee Ortiz MD (Resident)  Zarina Betts MD (Resident)  RUBI Mtz., GEOVANI Fernandez., Morenita Peng., [facility] office)  Handy Rashid, [address] (Clinical Practice Manager)  Kimberly Guerra, [facility] (Clinical Pharmacist)       Office phone number: [phone]    If you need to get in right away due to illness, please be advised we have \"Same Day\" appointments available Monday-Friday. Please call us at [phone] option #3 to schedule your \"Same Day\" appointment.

## 2023-01-25 ENCOUNTER — TELEPHONE (OUTPATIENT)
Dept: FAMILY MEDICINE CLINIC | Age: 63
End: 2023-01-25

## 2023-01-25 NOTE — TELEPHONE ENCOUNTER
Pt contacted office stated was suppose to get something called in for potassium to [facility] pharmacy at her last visit. Please advise. Thank you.

## 2023-02-02 RX ORDER — POTASSIUM CHLORIDE 20 MEQ/1
20 TABLET, EXTENDED RELEASE ORAL DAILY
Qty: 30 TABLET | Refills: 0 | Status: SHIPPED | OUTPATIENT
Start: 2023-02-02

## 2023-02-02 NOTE — TELEPHONE ENCOUNTER
Writer spoke to pt and informed that the potassium chloride was sent to patient pharmacy. Pt voiced understanding.

## 2023-03-13 ENCOUNTER — OFFICE VISIT (OUTPATIENT)
Dept: ORTHOPEDIC SURGERY | Age: 63
End: 2023-03-13
Payer: MEDICAID

## 2023-03-13 VITALS — WEIGHT: 279 LBS | OXYGEN SATURATION: 100 % | RESPIRATION RATE: 16 BRPM | BODY MASS INDEX: 51.34 KG/M2 | HEIGHT: 62 IN

## 2023-03-13 DIAGNOSIS — M17.0 PRIMARY OSTEOARTHRITIS OF BOTH KNEES: Primary | ICD-10-CM

## 2023-03-13 DIAGNOSIS — M25.562 CHRONIC PAIN OF BOTH KNEES: ICD-10-CM

## 2023-03-13 DIAGNOSIS — M25.561 CHRONIC PAIN OF BOTH KNEES: ICD-10-CM

## 2023-03-13 DIAGNOSIS — G89.29 CHRONIC PAIN OF BOTH KNEES: ICD-10-CM

## 2023-03-13 PROCEDURE — 99999 PR OFFICE/OUTPT VISIT,PROCEDURE ONLY: CPT | Performed by: ORTHOPAEDIC SURGERY

## 2023-03-13 PROCEDURE — 20610 DRAIN/INJ JOINT/BURSA W/O US: CPT | Performed by: ORTHOPAEDIC SURGERY

## 2023-03-13 RX ORDER — LIDOCAINE HYDROCHLORIDE 10 MG/ML
4 INJECTION, SOLUTION INFILTRATION; PERINEURAL ONCE
Status: COMPLETED | OUTPATIENT
Start: 2023-03-13 | End: 2023-03-13

## 2023-03-13 RX ORDER — TRIAMCINOLONE ACETONIDE 40 MG/ML
40 INJECTION, SUSPENSION INTRA-ARTICULAR; INTRAMUSCULAR ONCE
Status: COMPLETED | OUTPATIENT
Start: 2023-03-13 | End: 2023-03-13

## 2023-03-13 RX ADMIN — TRIAMCINOLONE ACETONIDE 40 MG: 40 INJECTION, SUSPENSION INTRA-ARTICULAR; INTRAMUSCULAR at 10:36

## 2023-03-13 RX ADMIN — LIDOCAINE HYDROCHLORIDE 4 ML: 10 INJECTION, SOLUTION INFILTRATION; PERINEURAL at 10:36

## 2023-03-13 RX ADMIN — LIDOCAINE HYDROCHLORIDE 4 ML: 10 INJECTION, SOLUTION INFILTRATION; PERINEURAL at 10:35

## 2023-03-13 RX ADMIN — TRIAMCINOLONE ACETONIDE 40 MG: 40 INJECTION, SUSPENSION INTRA-ARTICULAR; INTRAMUSCULAR at 10:37

## 2023-03-13 NOTE — PROGRESS NOTES
[address]  [address] [address]  Dept: [phone]    Ambulatory Orthopedic Consult      CHIEF COMPLAINT:    Chief Complaint   Patient presents with    Knee Pain     Bilateral        HISTORY OF PRESENT ILLNESS:      The patient is a left-hand dominant 63 y.o. female who is being seen for evaluation of pain at the above location (left anterior knee greater than left dorsal midfoot and lateral/medial hindfoot, as well as left ring finger), which began in the beginning of November 2020 atraumatically. The pain is described mainly with mechanical terms (dull/sharp/throbbing). The pain is worse with activity and better with rest. The patient reports a static course. The patient has tried:      [x]  rest/activity modification          [x]  NSAIDs      []  opiates      []  orthotics        []  change in shoes   [x]  home exercises  []  physical therapy      []  CAM boot     []  brace:    []  injection:       []  surgery:      The patient also reports that her left ring finger pain is associated with intermittent locking. INTERVAL HISTORY 2/5/2021:  She is seen again today in the office for follow up of a previous issue (as above--left knee and left ring finger pain). Since being seen last, the patient is doing worse, particularly in terms of her left knee. At today's visit, she is not using a brace or assistive device. The location and quality of the pain have not significantly changed since the last visit. INTERVAL HISTORY 4/6/2021:  She is seen again today in the office for follow up after an injection. Since being seen last, the patient is doing better. At today's visit, she is using no brace or assistive device. She received an injection of the left knee, and reports the injection helped approximately 100% in terms of pain.   She also reports that her left ring finger trigger finger is not bothering her, and reports that her foot pain has significantly improved. INTERVAL HISTORY 6/4/2021:  She is seen again today in the office for follow up of a previous issue (as above--bilateral knees). Since being seen last, the patient is doing worse. At today's visit, she is not using a brace or assistive device. History is obtained today from:   [x]  the patient     []  EMR     []  one family member/friend    []  multiple family members/friends    []  other:      INTERVAL HISTORY 7/19/2021:  She is seen again today in the office for follow up of imaging as below. Since being seen last, the patient is doing about the same overall. At today's visit, she is using no brace/assistive device. History is obtained today from:   [x]  the patient     [x]  EMR     []  one family member/friend    []  multiple family members/friends    []  other:      INTERVAL HISTORY 8/30/2021:  She is seen again today in the office for follow up of a previous issue (as above). Since being seen last, the patient is doing better. At today's visit, she is not using a brace or assistive device. History is obtained today from:   [x]  the patient     []  EMR     []  one family member/friend    []  multiple family members/friends    []  other:      INTERVAL HISTORY 11/18/2021:  She is seen again today in the office for follow up of a previous issue (as above). Since being seen last, the patient is doing about the same overall. At today's visit, she is using a removable brace. History is obtained today from:   [x]  the patient     []  EMR     []  one family member/friend    []  multiple family members/friends    []  other:      INTERVAL HISTORY 12/7/2021:  She is seen again today in the office for follow up of imaging as below. Since being seen last, the patient is doing about the same overall. At today's visit, she is using a brace/boot.     History is obtained today from:   [x]  the patient     [x]  EMR     []  one family member/friend    [] multiple family members/friends    []  other:      INTERVAL HISTORY 1/18/2022:  She is seen again today in the office for follow up of a previous issue (as above). Since being seen last, the patient is doing worse. At today's visit, she is using a removable brace. History is obtained today from:   [x]  the patient     []  EMR     []  one family member/friend    []  multiple family members/friends    []  other:      Since her last visit, the patient has seen Dr. Tata Wheeler, who referred her to weight management to decrease her BMI prior to proceeding with surgery. INTERVAL HISTORY 6/7/2022:  She is seen again today in the office for follow up of a previous issue (as above). Since being seen last, the patient is doing worse. At today's visit, she is using a removable brace. History is obtained today from:   [x]  the patient     []  EMR     []  one family member/friend    []  multiple family members/friends    []  other:      INTERVAL HISTORY 9/12/2022:  She is seen again today in the office for follow up of a previous issue (as above). Since being seen last, the patient is doing worse. At today's visit, she is not using a brace or assistive device. History is obtained today from:   [x]  the patient     []  EMR     []  one family member/friend    []  multiple family members/friends    []  other:      INTERVAL HISTORY 12/12/2022:  She is seen again today in the office for follow up of a previous issue (as above). Since being seen last, the patient is doing better. At today's visit, she is not using a brace or assistive device. History is obtained today from:   [x]  the patient     []  EMR     []  one family member/friend    []  multiple family members/friends    []  other:      INTERVAL HISTORY 3/13/2023:  She is seen again today in the office for follow up of a previous issue (as above). Since being seen last, the patient is doing about the same overall.  At today's visit, she is not using a brace or assistive device. History is obtained today from:   [x]  the patient     []  EMR     []  one family member/friend    []  multiple family members/friends    []  other:         REVIEW OF SYSTEMS:  Musculoskeletal: See HPI for pertinent positives     Past Medical History:    She  has a past medical history of Diverticulitis, Former smoker, Former smoker, GERD (gastroesophageal reflux disease), Hyperlipidemia, Hypertension, Morbid obesity with BMI of 45.0-49.9, adult ([facility] [address] SINDI on CPAP, Sleep apnea, and Type II or unspecified type diabetes mellitus without mention of complication, not stated as uncontrolled. Past Surgical History:    She  has a past surgical history that includes Hysterectomy; Cholecystectomy; Colonoscopy; pr egd transoral biopsy single/multiple (7/19/2017); Upper gastrointestinal endoscopy (N/A, 10/18/2019); and Colonoscopy (N/A, 10/18/2019).      Current Medications:     Current Outpatient Medications:     potassium chloride (KLOR-CON M) 20 MEQ extended release tablet, Take 1 tablet by mouth daily, Disp: 30 tablet, Rfl: 0    lisinopril (PRINIVIL;ZESTRIL) 10 MG tablet, take 1 tablet by mouth once daily, Disp: 120 tablet, Rfl: 3    metFORMIN (GLUCOPHAGE) 500 MG tablet, take 1 tablet by mouth EVERY MORNING WITH BREAKFAST, Disp: 90 tablet, Rfl: 0    diclofenac sodium (VOLTAREN) 1 % GEL, Apply 4 g topically 4 times daily as needed for Pain, Disp: 200 g, Rfl: 0    hydroCHLOROthiazide (HYDRODIURIL) 25 MG tablet, take 1 tablet by mouth once daily, Disp: 30 tablet, Rfl: 3    Handicap Placard MISC, Dispense for 5 years, Disp: 1 each, Rfl: 0    Nerve Stimulator (RA SINGLE CHANNEL PAIN RELIEF) DENI, USE AS DIRECTED, Disp: , Rfl:     Tens Unit MISC, by Does not apply route, Disp: 1 each, Rfl: 0    atorvastatin (LIPITOR) 40 MG tablet, Take 1 tablet by mouth daily, Disp: 30 tablet, Rfl: 3    diclofenac sodium (VOLTAREN) 1 % GEL, Apply 2 g topically 2 times daily, Disp: 350 g, Rfl: 0    betamethasone valerate (VALISONE) 0.1 % cream, apply to affected area twice a day, Disp: , Rfl:     diclofenac sodium (VOLTAREN) 1 % GEL, Apply 2 g topically 2 times daily, Disp: 100 g, Rfl: 0    naproxen (NAPROSYN) 500 MG tablet, take 1 tablet by mouth twice a day if needed for pain, Disp: 60 tablet, Rfl: 0    FEROSUL 325 (65 Fe) MG tablet, take 1 tablet by mouth twice a day before meals, Disp: , Rfl:     ferrous sulfate (FE TABS 325) 325 (65 Fe) MG EC tablet, Take 1 tablet by mouth 2 times daily (before meals), Disp: 90 tablet, Rfl: 3    melatonin 1 MG tablet, Take 1 tablet by mouth nightly as needed for Sleep, Disp: 30 tablet, Rfl: 2    Elastic Bandages & Supports (ACE ELBOW BRACE LARGE/X-LARGE) MISC, 1 Brace as need for elbow pain, Disp: 1 each, Rfl: 0    aspirin 81 MG tablet, Take 81 mg by mouth daily, Disp: , Rfl:      Allergies:    Patient has no known allergies. Family History:  family history includes Cancer in her father; Diabetes in her mother; High Blood Pressure in her mother.     Social History:   Social History     Occupational History    Not on file   Tobacco Use    Smoking status: Former     Packs/day: 0.25     Years: 7.00     Pack years: 1.75     Types: Cigarettes     Start date: 1984     Quit date: 1991     Years since quittin.2    Smokeless tobacco: Never   Vaping Use    Vaping Use: Never used   Substance and Sexual Activity    Alcohol use: Not Currently    Drug use: No    Sexual activity: Not on file     Occupation: Works as a      OBJECTIVE:  Resp 16   Ht 5' 2\" (1.575 m)   Wt 279 lb (126.6 kg)   SpO2 100%   BMI 51.03 kg/m²    Psych: alert and oriented to person, time, and place  Cardio:  well perfused extremities  Resp:  normal respiratory effort    Musculoskeletal:    RLE:    Grossly normal range of motion of hip, slightly decreased flexion/extension of the knee  Painless range of motion of hip, but pain on extremes of knee motion  Stable exam of hip/knee  Skin intact without erythema/warmth  Grossly neurovascularly intact distally   Tenderness:  anteromedial and anterolateral knee joint line   -Kirk's test positive for pain      LLE:    Grossly normal range of motion of hip, slightly decreased flexion extension of the knee  Painless range of motion of hip, but pain on extremes of knee motion  Stable exam of hip/knee  Skin intact without erythema/warmth  Grossly neurovascularly intact distally   Tenderness:  anteromedial greater than anterolateral knee joint line  -Kirk's test positive for pain     -Previous exam: Tenderness diffusely throughout the midfoot, sinus Tarsi, and along the course the posterior tibial tendon      (Previous exam)  Affected upper extremity (left):  Grossly normal range of motion of wrist/fingers  Painless range of motion of wrist/fingers except for ring finger  Stable exam of wrist/hand  Skin intact without erythema/warmth   Grossly neurovascularly intact distally throughout able to fire EPL/FPL/IO/EDC/FDP  Tenderness to palpation:  A1 pulley of the ring finger--improved  -Durkan's compression test negative  -triggering elicited at the Ring finger       RADIOLOGY:   3/13/2023 No new radiology images today. Prior images reviewed for reference. MRI images and radiology report previously reviewed, as below:     -Right knee MRI  1. Degeneration and slight outward subluxation of the medial meniscus body. No evidence of meniscal tearing. 2. Mild to moderate tricompartmental osteoarthrosis. Mild-to-moderate medial   and patellofemoral chondromalacia. Subchondral irregularity of the lateral   femoral trochlea. Grade 3 chondromalacia of the posterior nonweightbearing   femoral condyles with subcortical cystic changes and subchondral reactive   marrow edema at the posterior nonweightbearing medial femoral condyle. 3. Small joint effusion.   Trace Tovar's cyst.           MRI images and radiology report previously reviewed, as below:     -Left knee MRI  1. Tearing of the root ligament of the posterior horn medial meniscus. Outward extrusion of the medial meniscus body. Degeneration of the posterior   horn and body of the medial meniscus. Reactive marrow edema at the outer   medial tibial plateau. 2. Possible grade 1 MCL sprain. 3. Small joint effusion. 4. Tricompartmental osteoarthrosis. Severe medial chondromalacia. Focal   severe chondromalacia at the posterior weight-bearing lateral tibial plateau. 5. Mild-to-moderate lateral femoral trochlear chondromalacia with underlying   subcortical cystic change. 6. Small Baker's cyst.  Mild-to-moderate edema in the subcutaneous fat about   the knee. FINDINGS:  Four weightbearing views (AP, Tunnel, Lateral, and [place]) of the bilateral knee were obtained in the office today and reviewed, revealing no acute fracture, dislocation, or radioopaque foreign body/tumor. Overall alignment is satisfactory. Tricompartmental degenerative changes of the knee with joint narrowing, sclerosis, and osteophytes. IMPRESSION: No acute fracture/dislocation. Degenerative changes as above. Electronically signed by Quita Quinones MD        FINDINGS:  Three weightbearing views (AP, [place], and Lateral) of the left ankle and three weightbearing views (AP, Oblique, Lateral) of the left foot and four weightbearing views (AP, Lateral, Tunnel and [place]) of the left knee and 3 views (AP, oblique, lateral) of the left hand were obtained in the office today and reviewed, revealing no acute fracture, dislocation, or radioopaque foreign body/tumor. The ankle mortise is maintained with no widening of the clear spaces. Overall alignment is satisfactory. Tricompartmental degenerative changes of the knee with joint space narrowing, sclerosis, and osteophytes. Degenerative changes at the subtalar joint, as well as mildly diffusely throughout the midfoot. IMPRESSION:  No acute fracture/dislocation. Generative changes as above. Electronically signed by Steve Wagner MD        Xr Knee Left (3 Views)    Result Date: 11/25/2020  EXAMINATION: THREE X-RAY VIEWS OF THE LEFT KNEE 11/25/2020 10:31 am COMPARISON: 03/03/2010 HISTORY: ORDERING SYSTEM PROVIDED HISTORY: Pain TECHNOLOGIST PROVIDED HISTORY: Pain Acuity: Acute Type of Exam: Unknown FINDINGS: No evidence of acute fracture or dislocation. Similar small benign-appearing soft tissue calcifications in the proximal and posterolateral leg. Radiographically there is no significant joint effusion. Progression of mild to moderate degenerative changes involving primarily the medial compartment with joint space loss and osteophytes. Lateral view is obtained in a slight degree of obliquity. No acute osseous abnormality. Mild-to-moderate degenerative changes have progressed since the prior study. ASSESSMENT AND PLAN:  Body mass index is 51.03 kg/m². She has a history of bilateral knee pain, secondary to bilateral tricompartmental osteoarthritis, on the left with severe medial chondromalacia and a medial meniscal posterior root tear. For the left knee, a corticosteroid injection on 2/5/2021 helped her pain approximately 100%, a corticosteroid injection on 7/19/2021 helped her pain approximately 75%, a Monovisc injection on 12/7/2021 helped her pain approximately 50%; a left knee injection on 6/7/2022 helped her pain approximately 90%; a left knee corticosteroid on 9/12/2022 injection helped her pain approximately 90%; a left knee corticosteroid injection on 12/12/2022 helped her pain approximately 100% for about 2.5 months.   For the right knee, a corticosteroid injection on 8/30/2021 helped her pain approximately 25%, a Monovisc injection on 12/7/2021 helped her pain approximately 25%, a right knee steroid injection on 1/18/2022 helped her pain approximately 100% (around 5 months); a right knee steroid injection on 6/7/2022 helped her pain approximately 90%; a right knee injection on 9/12/2022 helped her pain approximately 90%; a right knee corticosteroid injection on 12/12/2022 helped her pain about 100% for about 2.5 months. She also has a history of left foot pain secondary multiple issues--she has a component of posterior tibial tendinitis along with some degenerative changes of her subtalar joint and mildly diffusely throughout the midfoot. She is doing well with conservative management. She also has a history of a trigger finger of her left ring finger. She is doing well with conservative management. Notably, she has a somewhat complex past medical history. She has a history of non-insulin-dependent diabetes. We had a discussion today about the likely diagnosis and its natural history, physical exam and imaging findings, as well as various treatment options in detail. Surgically, we have previously discussed a possible total knee replacement. She has previously met with Dr. Karen Reis, and he has recommended that her BMI get below 40 prior to proceeding with any surgical, and we have discussed this. At this time, she wishes to continue with conservative management. Orders/referrals were placed as below at today's visit. She was again prescribed topical Voltaren gel as needed for pain; we have been avoiding oral NSAIDs due to her history of GI bleeding. She may continue home exercises. -After discussing her treatment options, she requested to proceed with bilateral knee corticosteroid injections as below. She was previously provided information on how to obtain an over-the-counter flatfoot style orthotic. All questions were answered and the above plan was agreed upon. The patient will return to clinic in 3 months as needed without x-rays. At her next visit, we may consider repeat bilateral knee Monovisc versus corticosteroid injections.           KNEE INJECTION PROCEDURE NOTE: After discussing the risks/benefits/alternatives to injection, an informed consent was obtained. The bilateral knee was verified as the correct location and allergies were reviewed. The skin overlying the injection site was cleaned with an alcohol swab followed by a local sterile prep. A 22 gauge needle was introduced into the above location under sterile conditions. A mixture of 40 mg of Kenalog and 4 mL of 1% Lidocaine without epinephrine was injected into each knee. The patient was noted to tolerate the procedure well without immediate complication. A dressing was applied and verbal instruction/education was provided. At the patient's next visit, depending on how the patient is doing and/or new imaging/labs results, we may consider the following options:    []  Orthotic (OTC)     []  Orthotic (custom)          []  Rocker bottom shoes     []  Brace (OTC)        []  Brace (custom)             []  CAM boot        []  Night splint         []  Heel cups        []  Strap      []  Toe sleeves/splints    []  PT:                     []  Wean out of immobilization   []  Advance activity       []  Topical               []  NSAIDs          []  [place]         []  Referral:         []  Stress xrays       []  CT         []  MRI        []  Injection:         []  Consider OR      []  Pick OR date    No follow-ups on file. No orders of the defined types were placed in this encounter. No orders of the defined types were placed in this encounter. Jermaine Pool MD  Orthopedic Surgery        Please excuse any typos/errors, as this note was created with the assistance of voice recognition software. While intending to generate a document that actually reflects the content of the visit, the document can still have some errors including those of syntax and sound-a-like substitutions which may escape proof reading. In such instances, actual meaning can be extrapolated by context.

## 2023-04-16 DIAGNOSIS — E11.9 TYPE 2 DIABETES MELLITUS WITHOUT COMPLICATION, WITHOUT LONG-TERM CURRENT USE OF INSULIN (HCC): ICD-10-CM

## 2023-04-17 NOTE — TELEPHONE ENCOUNTER
Morbid obesity with BMI of 45.0-49.9, adult (HCC)     SINDI (obstructive sleep apnea)     Vitamin D deficiency     Melena     Chest pain     Abnormal stress test     Contact blepharoconjunctivitis of left eye     Prediabetes     Need for prophylactic vaccination and inoculation against varicella     Acute cystitis without hematuria     Primary osteoarthritis of right knee

## 2023-05-16 DIAGNOSIS — E03.9 HYPOTHYROIDISM, UNSPECIFIED TYPE: ICD-10-CM

## 2023-05-16 RX ORDER — HYDROCHLOROTHIAZIDE 25 MG/1
TABLET ORAL
Qty: 120 TABLET | Refills: 2 | Status: SHIPPED | OUTPATIENT
Start: 2023-05-16

## 2023-05-16 NOTE — TELEPHONE ENCOUNTER
Last visit: 01/16/2023  Last Med refill: 09/06/2022  Does patient have enough medication for 72 hours: No:     Next Visit Date:  No future appointments. Health Maintenance   Topic Date Due    Diabetic retinal exam  Never done    Shingles vaccine (1 of 2) Never done    COVID-19 Vaccine (3 - Booster for Pfizer series) 03/17/2022    Lipids  02/11/2023    Diabetic foot exam  04/05/2023    Diabetic Alb to Cr ratio (uACR) test  04/21/2023    GFR test (Diabetes, CKD 3-4, OR last GFR 15-59)  12/16/2023    A1C test (Diabetic or Prediabetic)  01/16/2024    Depression Screen  01/16/2024    DTaP/Tdap/Td vaccine (2 - Td or Tdap) 03/07/2024    Breast cancer screen  10/13/2024    Colorectal Cancer Screen  10/18/2029    Flu vaccine  Completed    Pneumococcal 0-64 years Vaccine  Completed    Hepatitis C screen  Completed    HIV screen  Completed    Hepatitis A vaccine  Aged Out    Hib vaccine  Aged Out    Meningococcal (ACWY) vaccine  Aged Out    Hepatitis B vaccine  Discontinued       Hemoglobin A1C (%)   Date Value   01/16/2023 6.5   06/07/2022 6.7   02/11/2022 7.0 (H)             ( goal A1C is < 7)   Microalb/Crt.  Ratio (mcg/mg creat)   Date Value   04/21/2022 Can not be calculated     LDL Cholesterol (mg/dL)   Date Value   02/11/2022 110   05/19/2020 70       (goal LDL is <100)   AST (U/L)   Date Value   12/16/2022 21     ALT (U/L)   Date Value   12/16/2022 23     BUN (mg/dL)   Date Value   12/16/2022 15     BP Readings from Last 3 Encounters:   01/16/23 123/71   12/16/22 137/84   06/07/22 125/78          (goal 120/80)    All Future Testing planned in [facility]  Lab Frequency Next Occurrence               Patient Active Problem List:     Gastroesophageal reflux disease     Hyperlipidemia with target LDL less than 100     HTN (hypertension)     Numbness and tingling     Diverticula, colon     Diverticulitis, acute     Eczema     Lump of left breast     Shoulder pain     Right groin pain     Tinea corporis     Hyperlipidemia

## 2023-06-02 ENCOUNTER — TELEPHONE (OUTPATIENT)
Dept: ORTHOPEDIC SURGERY | Age: 63
End: 2023-06-02

## 2023-06-02 NOTE — TELEPHONE ENCOUNTER
Pt called in to request bilateral knee injections. Per notes from Dr. Jovanna Rubin on pt last visit on 03/13/23 ( All questions were answered and the above plan was agreed upon. The patient will return to clinic in 3 months as needed without x-rays. At her next visit, we may consider repeat bilateral knee Monovisc versus corticosteroid injections) he might want to do a Monovisc injections instead on Cortizone and we may need a PA for this, please confirm with Dr. Sara Stevens.       Please call pt back once we have PA or if we are just going to do Cortizone @ [phone]

## 2023-06-09 ENCOUNTER — TELEPHONE (OUTPATIENT)
Dept: ORTHOPEDIC SURGERY | Age: 63
End: 2023-06-09

## 2023-07-06 ENCOUNTER — PROCEDURE VISIT (OUTPATIENT)
Dept: ORTHOPEDIC SURGERY | Age: 63
End: 2023-07-06

## 2023-07-06 VITALS — BODY MASS INDEX: 51.34 KG/M2 | OXYGEN SATURATION: 100 % | HEIGHT: 62 IN | RESPIRATION RATE: 16 BRPM | WEIGHT: 279 LBS

## 2023-07-06 DIAGNOSIS — G89.29 CHRONIC PAIN OF BOTH KNEES: ICD-10-CM

## 2023-07-06 DIAGNOSIS — M25.561 CHRONIC PAIN OF BOTH KNEES: ICD-10-CM

## 2023-07-06 DIAGNOSIS — M17.0 PRIMARY OSTEOARTHRITIS OF BOTH KNEES: Primary | ICD-10-CM

## 2023-07-06 DIAGNOSIS — M25.562 CHRONIC PAIN OF BOTH KNEES: ICD-10-CM

## 2023-07-06 NOTE — PROGRESS NOTES
1000 [facility] AND SPORTS MEDICINE  908 [facility] Shwetha Gamez  [address] [place], [place] [address] [address]  Dept: [phone]    Ambulatory Orthopedic Consult      CHIEF COMPLAINT:    Chief Complaint   Patient presents with    Knee Pain     Bilateral        HISTORY OF PRESENT ILLNESS:      The patient is a left-hand dominant 63 y.o. female who is being seen for evaluation of pain at the above location (left anterior knee greater than left dorsal midfoot and lateral/medial hindfoot, as well as left ring finger), which began in the beginning of November 2020 atraumatically. The pain is described mainly with mechanical terms (dull/sharp/throbbing). The pain is worse with activity and better with rest. The patient reports a static course. The patient has tried:      [x]  rest/activity modification          [x]  NSAIDs      []  opiates      []  orthotics        []  change in shoes   [x]  home exercises  []  physical therapy      []  CAM boot     []  brace:    []  injection:       []  surgery:      The patient also reports that her left ring finger pain is associated with intermittent locking. INTERVAL HISTORY 2/5/2021:  She is seen again today in the office for follow up of a previous issue (as above--left knee and left ring finger pain). Since being seen last, the patient is doing worse, particularly in terms of her left knee. At today's visit, she is not using a brace or assistive device. The location and quality of the pain have not significantly changed since the last visit. INTERVAL HISTORY 4/6/2021:  She is seen again today in the office for follow up after an injection. Since being seen last, the patient is doing better. At today's visit, she is using no brace or assistive device. She received an injection of the left knee, and reports the injection helped approximately 100% in terms of pain.   She also reports that her left ring finger trigger

## 2023-09-06 ENCOUNTER — TELEPHONE (OUTPATIENT)
Dept: ORTHOPEDIC SURGERY | Age: 63
End: 2023-09-06

## 2023-09-06 NOTE — TELEPHONE ENCOUNTER
Pt called in to see if she was able to book an appt for knee injections    Had Monovisc injections done on 07/06/23 per Dr. Sherman Age notes pt could have next Monovisc vs Cortizone injections in 3 months not sure if she will need PA.       Please call pt when we are able to deni @ [phone]

## 2023-09-06 NOTE — TELEPHONE ENCOUNTER
Arlene Conley,     Can you verify with patients insurance about bilateral Monovisc injections. Her last ones were 7/6/23, and she has an upcoming appointment on 10/9/23 with Dr. Chapo Hernández to discuss repeat or cortisone injections. Please and thank you.

## 2023-09-08 NOTE — TELEPHONE ENCOUNTER
Patient would have to wait 6 months from her last injection for another Monovisc injection. She would be eligable for another gel injection in January. She is OK to have cortisone at her next visit on 10/09/23. Thank you.

## 2023-10-09 ENCOUNTER — OFFICE VISIT (OUTPATIENT)
Dept: ORTHOPEDIC SURGERY | Age: 63
End: 2023-10-09

## 2023-10-09 VITALS — RESPIRATION RATE: 14 BRPM | HEIGHT: 62 IN | WEIGHT: 279 LBS | BODY MASS INDEX: 51.34 KG/M2 | OXYGEN SATURATION: 98 %

## 2023-10-09 DIAGNOSIS — G89.29 CHRONIC PAIN OF BOTH KNEES: ICD-10-CM

## 2023-10-09 DIAGNOSIS — M25.562 CHRONIC PAIN OF BOTH KNEES: ICD-10-CM

## 2023-10-09 DIAGNOSIS — M25.561 CHRONIC PAIN OF BOTH KNEES: ICD-10-CM

## 2023-10-09 DIAGNOSIS — M17.0 PRIMARY OSTEOARTHRITIS OF BOTH KNEES: Primary | ICD-10-CM

## 2023-10-09 RX ORDER — LIDOCAINE HYDROCHLORIDE 10 MG/ML
8 INJECTION, SOLUTION INFILTRATION; PERINEURAL ONCE
Status: COMPLETED | OUTPATIENT
Start: 2023-10-09 | End: 2023-10-09

## 2023-10-09 RX ORDER — TRIAMCINOLONE ACETONIDE 40 MG/ML
40 INJECTION, SUSPENSION INTRA-ARTICULAR; INTRAMUSCULAR ONCE
Status: COMPLETED | OUTPATIENT
Start: 2023-10-09 | End: 2023-10-09

## 2023-10-09 RX ADMIN — LIDOCAINE HYDROCHLORIDE 8 ML: 10 INJECTION, SOLUTION INFILTRATION; PERINEURAL at 12:03

## 2023-10-09 RX ADMIN — TRIAMCINOLONE ACETONIDE 40 MG: 40 INJECTION, SUSPENSION INTRA-ARTICULAR; INTRAMUSCULAR at 12:04

## 2023-10-09 RX ADMIN — TRIAMCINOLONE ACETONIDE 40 MG: 40 INJECTION, SUSPENSION INTRA-ARTICULAR; INTRAMUSCULAR at 12:05

## 2023-10-09 NOTE — TELEPHONE ENCOUNTER
Please advise Opioid Pregnancy And Lactation Text: These medications can lead to premature delivery and should be avoided during pregnancy. These medications are also present in breast milk in small amounts.

## 2023-10-09 NOTE — PROGRESS NOTES
[address]  220 [facility] 1826 [facility] [address]  Dept: [phone]    Ambulatory Orthopedic Consult      CHIEF COMPLAINT:    Chief Complaint   Patient presents with    Knee Pain     bilateral       HISTORY OF PRESENT ILLNESS:      The patient is a left-hand dominant 63 y.o. female who is being seen for evaluation of pain at the above location (left anterior knee greater than left dorsal midfoot and lateral/medial hindfoot, as well as left ring finger), which began in the beginning of November 2020 atraumatically. The pain is described mainly with mechanical terms (dull/sharp/throbbing). The pain is worse with activity and better with rest. The patient reports a static course. The patient has tried:      [x]  rest/activity modification          [x]  NSAIDs      []  opiates      []  orthotics        []  change in shoes   [x]  home exercises  []  physical therapy      []  CAM boot     []  brace:    []  injection:       []  surgery:      The patient also reports that her left ring finger pain is associated with intermittent locking. INTERVAL HISTORY 2/5/2021:  She is seen again today in the office for follow up of a previous issue (as above--left knee and left ring finger pain). Since being seen last, the patient is doing worse, particularly in terms of her left knee. At today's visit, she is not using a brace or assistive device. The location and quality of the pain have not significantly changed since the last visit. INTERVAL HISTORY 4/6/2021:  She is seen again today in the office for follow up after an injection. Since being seen last, the patient is doing better. At today's visit, she is using no brace or assistive device. She received an injection of the left knee, and reports the injection helped approximately 100% in terms of pain.   She also reports that her left ring finger trigger finger is not bothering her, and

## 2023-11-15 NOTE — TELEPHONE ENCOUNTER
Patient Education    BRIGHT FUTURES HANDOUT- PATIENT  15 THROUGH 17 YEAR VISITS  Here are some suggestions from [facility] experts that may be of value to your family.     HOW YOU ARE DOING  Enjoy spending time with your family. Look for ways you can help at home.  Find ways to work with your family to solve problems. Follow your family s rules.  Form healthy friendships and find fun, safe things to do with friends.  Set high goals for yourself in school and activities and for your future.  Try to be responsible for your schoolwork and for getting to school or work on time.  Find ways to deal with stress. Talk with your parents or other trusted adults if you need help.  Always talk through problems and never use violence.  If you get angry with someone, walk away if you can.  Call for help if you are in a situation that feels dangerous.  Healthy dating relationships are built on respect, concern, and doing things both of you like to do.  When you re dating or in a sexual situation,  No  means NO. NO is OK.  Don t smoke, vape, use drugs, or drink alcohol. Talk with us if you are worried about alcohol or drug use in your family.    YOUR DAILY LIFE  Visit the dentist at least twice a year.  Brush your teeth at least twice a day and floss once a day.  Be a healthy eater. It helps you do well in school and sports.  Have vegetables, fruits, lean protein, and whole grains at meals and snacks.  Limit fatty, sugary, and salty foods that are low in nutrients, such as candy, chips, and ice cream.  Eat when you re hungry. Stop when you feel satisfied.  Eat with your family often.  Eat breakfast.  Drink plenty of water. Choose water instead of soda or sports drinks.  Make sure to get enough calcium every day.  Have 3 or more servings of low-fat (1%) or fat-free milk and other low-fat dairy products, such as yogurt and cheese.  Aim for at least 1 hour of physical activity every day.  Wear your mouth guard when playing  Yes, okay to order thank you sports.  Get enough sleep.    YOUR FEELINGS  Be proud of yourself when you do something good.  Figure out healthy ways to deal with stress.  Develop ways to solve problems and make good decisions.  It s OK to feel up sometimes and down others, but if you feel sad most of the time, let us know so we can help you.  It s important for you to have accurate information about sexuality, your physical development, and your sexual feelings toward the opposite or same sex. Please consider asking us if you have any questions.    HEALTHY BEHAVIOR CHOICES  Choose friends who support your decision to not use tobacco, alcohol, or drugs. Support friends who choose not to use.  Avoid situations with alcohol or drugs.  Don t share your prescription medicines. Don t use other people s medicines.  Not having sex is the safest way to avoid pregnancy and sexually transmitted infections (STIs).  Plan how to avoid sex and risky situations.  If you re sexually active, protect against pregnancy and STIs by correctly and consistently using birth control along with a condom.  Protect your hearing at work, home, and concerts. Keep your earbud volume down.    STAYING SAFE  Always be a safe and cautious .  Insist that everyone use a lap and shoulder seat belt.  Limit the number of friends in the car and avoid driving at night.  Avoid distractions. Never text or talk on the phone while you drive.  Do not ride in a vehicle with someone who has been using drugs or alcohol.  If you feel unsafe driving or riding with someone, call someone you trust to drive you.  Wear helmets and protective gear while playing sports. Wear a helmet when riding a bike, a motorcycle, or an ATV or when skiing or skateboarding. Wear a life jacket when you do water sports.  Always use sunscreen and a hat when you re outside.  Fighting and carrying weapons can be dangerous. Talk with your parents, teachers, or doctor about how to avoid these  situations.        Consistent with Bright Futures: Guidelines for Health Supervision of Infants, Children, and Adolescents, 4th Edition  For more information, go to https://brightfutures.aap.org.             Patient Education    BRIGHT FUTURES HANDOUT- PARENT  15 THROUGH 17 YEAR VISITS  Here are some suggestions from [employer] Fut[employer] experts that may be of value to your family.     HOW YOUR FAMILY IS DOING  Set aside time to be with your teen and really listen to her hopes and concerns.  Support your teen in finding activities that interest him. Encourage your teen to help others in the community.  Help your teen find and be a part of positive after-school activities and sports.  Support your teen as she figures out ways to deal with stress, solve problems, and make decisions.  Help your teen deal with conflict.  If you are worried about your living or food situation, talk with us. Community agencies and programs such as SNAP can also provide information.    YOUR GROWING AND CHANGING TEEN  Make sure your teen visits the dentist at least twice a year.  Give your teen a fluoride supplement if the dentist recommends it.  Support your teen s healthy body weight and help him be a healthy eater.  Provide healthy foods.  Eat together as a family.  Be a role model.  Help your teen get enough calcium with low-fat or fat-free milk, low-fat yogurt, and cheese.  Encourage at least 1 hour of physical activity a day.  Praise your teen when she does something well, not just when she looks good.    YOUR TEEN S FEELINGS  If you are concerned that your teen is sad, depressed, nervous, irritable, hopeless, or angry, let us know.  If you have questions about your teen s sexual development, you can always talk with us.    HEALTHY BEHAVIOR CHOICES  Know your teen s friends and their parents. Be aware of where your teen is and what he is doing at all times.  Talk with your teen about your values and your expectations on drinking, drug use,  tobacco use, driving, and sex.  Praise your teen for healthy decisions about sex, tobacco, alcohol, and other drugs.  Be a role model.  Know your teen s friends and their activities together.  Lock your liquor in a cabinet.  Store prescription medications in a locked cabinet.  Be there for your teen when she needs support or help in making healthy decisions about her behavior.    SAFETY  Encourage safe and responsible driving habits.  Lap and shoulder seat belts should be used by everyone.  Limit the number of friends in the car and ask your teen to avoid driving at night.  Discuss with your teen how to avoid risky situations, who to call if your teen feels unsafe, and what you expect of your teen as a .  Do not tolerate drinking and driving.  If it is necessary to keep a gun in your home, store it unloaded and locked with the ammunition locked separately from the gun.      Consistent with Bright Futures: Guidelines for Health Supervision of Infants, Children, and Adolescents, 4th Edition  For more information, go to https://brightfutures.aap.org.

## 2023-12-08 ENCOUNTER — OFFICE VISIT (OUTPATIENT)
Dept: FAMILY MEDICINE CLINIC | Age: 63
End: 2023-12-08
Payer: MEDICAID

## 2023-12-08 VITALS
HEART RATE: 97 BPM | BODY MASS INDEX: 52.44 KG/M2 | HEIGHT: 62 IN | SYSTOLIC BLOOD PRESSURE: 126 MMHG | WEIGHT: 285 LBS | DIASTOLIC BLOOD PRESSURE: 81 MMHG

## 2023-12-08 DIAGNOSIS — G47.33 OSA (OBSTRUCTIVE SLEEP APNEA): ICD-10-CM

## 2023-12-08 DIAGNOSIS — Z12.31 ENCOUNTER FOR SCREENING MAMMOGRAM FOR MALIGNANT NEOPLASM OF BREAST: ICD-10-CM

## 2023-12-08 DIAGNOSIS — E11.9 TYPE 2 DIABETES MELLITUS WITHOUT COMPLICATION, WITHOUT LONG-TERM CURRENT USE OF INSULIN (HCC): Primary | ICD-10-CM

## 2023-12-08 LAB — HBA1C MFR BLD: 6.5 %

## 2023-12-08 PROCEDURE — 99213 OFFICE O/P EST LOW 20 MIN: CPT

## 2023-12-08 PROCEDURE — 3074F SYST BP LT 130 MM HG: CPT

## 2023-12-08 PROCEDURE — 90677 PCV20 VACCINE IM: CPT | Performed by: STUDENT IN AN ORGANIZED HEALTH CARE EDUCATION/TRAINING PROGRAM

## 2023-12-08 PROCEDURE — 3044F HG A1C LEVEL LT 7.0%: CPT

## 2023-12-08 PROCEDURE — 3079F DIAST BP 80-89 MM HG: CPT

## 2023-12-08 PROCEDURE — 83036 HEMOGLOBIN GLYCOSYLATED A1C: CPT

## 2023-12-08 RX ORDER — DULAGLUTIDE 0.75 MG/.5ML
0.75 INJECTION, SOLUTION SUBCUTANEOUS WEEKLY
Qty: 4 ADJUSTABLE DOSE PRE-FILLED PEN SYRINGE | Refills: 1 | Status: SHIPPED | OUTPATIENT
Start: 2023-12-08

## 2023-12-08 SDOH — ECONOMIC STABILITY: FOOD INSECURITY: WITHIN THE PAST 12 MONTHS, YOU WORRIED THAT YOUR FOOD WOULD RUN OUT BEFORE YOU GOT MONEY TO BUY MORE.: NEVER TRUE

## 2023-12-08 SDOH — ECONOMIC STABILITY: INCOME INSECURITY: HOW HARD IS IT FOR YOU TO PAY FOR THE VERY BASICS LIKE FOOD, HOUSING, MEDICAL CARE, AND HEATING?: NOT HARD AT ALL

## 2023-12-08 SDOH — ECONOMIC STABILITY: HOUSING INSECURITY
IN THE LAST 12 MONTHS, WAS THERE A TIME WHEN YOU DID NOT HAVE A STEADY PLACE TO SLEEP OR SLEPT IN A SHELTER (INCLUDING NOW)?: NO

## 2023-12-08 SDOH — ECONOMIC STABILITY: FOOD INSECURITY: WITHIN THE PAST 12 MONTHS, THE FOOD YOU BOUGHT JUST DIDN'T LAST AND YOU DIDN'T HAVE MONEY TO GET MORE.: NEVER TRUE

## 2023-12-08 ASSESSMENT — ENCOUNTER SYMPTOMS
CONSTIPATION: 0
COUGH: 0
ABDOMINAL PAIN: 0
WHEEZING: 0
SHORTNESS OF BREATH: 0
NAUSEA: 0
DIARRHEA: 0
VOMITING: 0

## 2023-12-08 NOTE — PROGRESS NOTES
Zaida Espino (:  1960) is a 66 y.o. female,Established patient, here for evaluation of the following chief complaint(s): Annual Exam (Pt requesting well visit . .. Last seen 23)         ASSESSMENT/PLAN:  1. Type 2 diabetes mellitus without complication, without long-term current use of insulin (HCC)  -   A1c today 6.5.  -Continue metformin.  -Will start Trulicity 5.86 mg weekly and follow-up in 1 month    Microalbumin, Ur; Future  -     Basic Metabolic Panel; Future  -     Lipid Panel; Future  -     AFL - Haydee Abraham MD, Ophthalmology, [id number] [facility]  2. SINDI (obstructive sleep apnea)  -    Will get sleep study and ultrasound machine after we get the results. Baseline Diagnostic Sleep Study; Future  -     Sleep Study with PAP Titration; Future  3. Encounter for screening mammogram for malignant neoplasm of breast  -     GERTRUDIS DIGITAL SCREEN W OR WO CAD BILATERAL; Future      Return in about 4 weeks (around 2024). Subjective   SUBJECTIVE/OBJECTIVE:  66years old female patient with past med history of morbid obesity, type 2 diabetes, hypertension came to the office for diabetes follow-up. Patient A1c today 6.5. Patient is on metformin 500 mg daily. Patient is interested to start another diabetes medication and she wants to start an injectable one. Patient has history for morbid obesity and would benefit from GLP-1 agonists. She also has history of SINDI. She has CPAP machine which is very old and she is asking to get a new one. No recent sleep study documented. Review of Systems   Constitutional:  Negative for diaphoresis, fatigue and fever. Respiratory:  Negative for cough, shortness of breath and wheezing. Cardiovascular:  Negative for chest pain, palpitations and leg swelling. Gastrointestinal:  Negative for abdominal pain, constipation, diarrhea, nausea and vomiting. Musculoskeletal: Negative.     Neurological:  Negative for dizziness,

## 2023-12-08 NOTE — PROGRESS NOTES
Visit Information    Have you changed or started any medications since your last visit including any over-the-counter medicines, vitamins, or herbal medicines? no   Have you stopped taking any of your medications? Is so, why? -  no  Are you having any side effects from any of your medications? - no    Have you seen any other physician or provider since your last visit? yes - Ortho 10/9/2023   Have you had any other diagnostic tests since your last visit?  no   Have you been seen in the emergency room and/or had an admission in a hospital since we last saw you?  no   Have you had your routine dental cleaning in the past 6 months?  no     Do you have an active MyChart account? If no, what is the barrier?   Yes    Patient Care Team:  Herbie Low MD as PCP - Nikki Ochoa MD as Consulting Physician (Pulmonology)  Moon Edwards MD as Consulting Physician (Pain Management)    Medical History Review  Past Medical, Family, and Social History reviewed and does contribute to the patient presenting condition    Health Maintenance   Topic Date Due    Diabetic retinal exam  Never done    Pneumococcal 0-64 years Vaccine (2 - PCV) 01/01/2011    Shingles vaccine (1 of 2) Never done    Hepatitis B vaccine (2 of 3 - Risk 3-dose series) 03/11/2022    Respiratory Syncytial Virus (RSV) age 61 yrs+ (3 - 1-dose 60+ series) Never done    Lipids  02/11/2023    Diabetic foot exam  04/05/2023    Diabetic Alb to Cr ratio (uACR) test  04/21/2023    Flu vaccine (1) 08/01/2023    COVID-19 Vaccine (3 - 2023-24 season) 09/01/2023    GFR test (Diabetes, CKD 3-4, OR last GFR 15-59)  12/16/2023    A1C test (Diabetic or Prediabetic)  01/16/2024    Depression Screen  01/16/2024    DTaP/Tdap/Td vaccine (2 - Td or Tdap) 03/07/2024    Breast cancer screen  10/13/2024    Colorectal Cancer Screen  10/18/2029    Hepatitis C screen  Completed    HIV screen  Completed    Hepatitis A vaccine  Aged Out    Hib vaccine  Aged Out

## 2023-12-20 ENCOUNTER — TELEPHONE (OUTPATIENT)
Dept: FAMILY MEDICINE CLINIC | Age: 63
End: 2023-12-20

## 2023-12-20 NOTE — TELEPHONE ENCOUNTER
[facility] sleep study calling stating patient has been denied another sleep titration test, but she needs the test to get a new machine which has broken. To do a peer to peer/appeal please call the insurance at [phone] case number/tracking is [id number]

## 2023-12-28 ENCOUNTER — HOSPITAL ENCOUNTER (OUTPATIENT)
Age: 63
Setting detail: SPECIMEN
Discharge: HOME | End: 2023-12-28

## 2023-12-28 DIAGNOSIS — E11.9 TYPE 2 DIABETES MELLITUS WITHOUT COMPLICATION, WITHOUT LONG-TERM CURRENT USE OF INSULIN (HCC): ICD-10-CM

## 2023-12-28 LAB
ANION GAP SERPL CALCULATED.3IONS-SCNC: 9 MMOL/L (ref 9–17)
BUN SERPL-MCNC: 20 MG/DL (ref 8–23)
CALCIUM SERPL-MCNC: 9.3 MG/DL (ref 8.6–10.4)
CHLORIDE SERPL-SCNC: 104 MMOL/L (ref 98–107)
CHOLEST SERPL-MCNC: 163 MG/DL
CHOLESTEROL/HDL RATIO: 5.1
CO2 SERPL-SCNC: 27 MMOL/L (ref 20–31)
CREAT SERPL-MCNC: 1.2 MG/DL (ref 0.5–0.9)
CREAT UR-MCNC: 356 MG/DL (ref 28–217)
GFR SERPL CREATININE-BSD FRML MDRD: 52 ML/MIN/1.73M2
GLUCOSE SERPL-MCNC: 88 MG/DL (ref 70–99)
HDLC SERPL-MCNC: 32 MG/DL
LDLC SERPL CALC-MCNC: 99 MG/DL (ref 0–130)
MICROALBUMIN UR-MCNC: <12 MG/L (ref 0–20)
MICROALBUMIN/CREAT UR-RTO: ABNORMAL MCG/MG CREAT (ref 0–25)
POTASSIUM SERPL-SCNC: 4.3 MMOL/L (ref 3.7–5.3)
SODIUM SERPL-SCNC: 140 MMOL/L (ref 135–144)
TRIGL SERPL-MCNC: 162 MG/DL

## 2024-01-03 ENCOUNTER — TELEPHONE (OUTPATIENT)
Dept: ORTHOPEDIC SURGERY | Age: 64
End: 2024-01-03

## 2024-01-03 NOTE — TELEPHONE ENCOUNTER
Patient left a message on the answering machine asking if she can make a follow up for knee injections. Please let me know when I can schedule her back, not sure if it 3 or 4 month f/u.   She was last seen on 10/9/23.

## 2024-01-03 NOTE — TELEPHONE ENCOUNTER
SILVA Eckert to schedule patient after 1/9/24 to receive injections for her knees. Dr. Hu is every 3 months for cortisone and if they have had gel injections those are usually every 6 months after the date of last ones, which if she is wanting the Monovisc injections (gel) she can receive those after 1/9/24. Thank you.

## 2024-01-05 ENCOUNTER — OFFICE VISIT (OUTPATIENT)
Dept: FAMILY MEDICINE CLINIC | Age: 64
End: 2024-01-05
Payer: MEDICAID

## 2024-01-05 ENCOUNTER — HOSPITAL ENCOUNTER (OUTPATIENT)
Age: 64
Setting detail: SPECIMEN
Discharge: HOME | End: 2024-01-05

## 2024-01-05 VITALS
SYSTOLIC BLOOD PRESSURE: 146 MMHG | HEIGHT: 62 IN | WEIGHT: 279 LBS | HEART RATE: 92 BPM | BODY MASS INDEX: 51.34 KG/M2 | DIASTOLIC BLOOD PRESSURE: 92 MMHG

## 2024-01-05 DIAGNOSIS — M54.50 CHRONIC RIGHT-SIDED LOW BACK PAIN, UNSPECIFIED WHETHER SCIATICA PRESENT: Primary | ICD-10-CM

## 2024-01-05 DIAGNOSIS — G89.29 CHRONIC RIGHT-SIDED LOW BACK PAIN, UNSPECIFIED WHETHER SCIATICA PRESENT: Primary | ICD-10-CM

## 2024-01-05 DIAGNOSIS — R10.9 RIGHT FLANK PAIN: ICD-10-CM

## 2024-01-05 DIAGNOSIS — E11.9 TYPE 2 DIABETES MELLITUS WITHOUT COMPLICATION, WITHOUT LONG-TERM CURRENT USE OF INSULIN (HCC): ICD-10-CM

## 2024-01-05 LAB
BACTERIA URNS QL MICRO: NORMAL
BILIRUB UR QL STRIP: NEGATIVE
BILIRUBIN, POC: NEGATIVE
BLOOD URINE, POC: NORMAL
CASTS #/AREA URNS LPF: NORMAL /LPF (ref 0–8)
CLARITY UR: CLEAR
CLARITY, POC: CLEAR
COLOR UR: YELLOW
COLOR, POC: YELLOW
EPI CELLS #/AREA URNS HPF: NORMAL /HPF (ref 0–5)
GLUCOSE UR STRIP-MCNC: NEGATIVE MG/DL
GLUCOSE URINE, POC: NEGATIVE
HGB UR QL STRIP.AUTO: NEGATIVE
KETONES UR STRIP-MCNC: NEGATIVE MG/DL
KETONES, POC: NEGATIVE
LEUKOCYTE EST, POC: NEGATIVE
LEUKOCYTE ESTERASE UR QL STRIP: NEGATIVE
NITRITE UR QL STRIP: NEGATIVE
NITRITE, POC: NEGATIVE
PH UR STRIP: 6 [PH] (ref 5–8)
PH, POC: 6
PROT UR STRIP-MCNC: NEGATIVE MG/DL
PROTEIN, POC: NEGATIVE
RBC #/AREA URNS HPF: NORMAL /HPF (ref 0–4)
SP GR UR STRIP: 1.02 (ref 1–1.03)
SPECIFIC GRAVITY, POC: 1.02
UROBILINOGEN UR STRIP-ACNC: NORMAL EU/DL (ref 0–1)
UROBILINOGEN, POC: 0.2
WBC #/AREA URNS HPF: NORMAL /HPF (ref 0–5)

## 2024-01-05 PROCEDURE — 81002 URINALYSIS NONAUTO W/O SCOPE: CPT

## 2024-01-05 PROCEDURE — 3077F SYST BP >= 140 MM HG: CPT

## 2024-01-05 PROCEDURE — 99213 OFFICE O/P EST LOW 20 MIN: CPT

## 2024-01-05 PROCEDURE — 3080F DIAST BP >= 90 MM HG: CPT

## 2024-01-05 RX ORDER — ATORVASTATIN CALCIUM 40 MG/1
40 TABLET, FILM COATED ORAL DAILY
Qty: 30 TABLET | Refills: 3 | Status: SHIPPED | OUTPATIENT
Start: 2024-01-05

## 2024-01-05 RX ORDER — TIZANIDINE 2 MG/1
2 TABLET ORAL NIGHTLY PRN
Qty: 10 TABLET | Refills: 0 | Status: SHIPPED | OUTPATIENT
Start: 2024-01-05

## 2024-01-05 RX ORDER — DULAGLUTIDE 0.75 MG/.5ML
1.5 INJECTION, SOLUTION SUBCUTANEOUS WEEKLY
Qty: 4 ADJUSTABLE DOSE PRE-FILLED PEN SYRINGE | Refills: 3 | Status: SHIPPED | OUTPATIENT
Start: 2024-01-05

## 2024-01-05 ASSESSMENT — PATIENT HEALTH QUESTIONNAIRE - PHQ9
SUM OF ALL RESPONSES TO PHQ QUESTIONS 1-9: 1
SUM OF ALL RESPONSES TO PHQ QUESTIONS 1-9: 1
SUM OF ALL RESPONSES TO PHQ9 QUESTIONS 1 & 2: 1
SUM OF ALL RESPONSES TO PHQ QUESTIONS 1-9: 1
2. FEELING DOWN, DEPRESSED OR HOPELESS: 0
SUM OF ALL RESPONSES TO PHQ QUESTIONS 1-9: 1
1. LITTLE INTEREST OR PLEASURE IN DOING THINGS: 1

## 2024-01-05 NOTE — PROGRESS NOTES
63 years old female patient came to the office to follow-up on:    -Type 2 diabetes: Last A1c was 6.5.  Patient is currently on Trulicity.  She is not taking her metformin.  She tolerated the Trulicity well without any issues.    -Chronic back pain.:  Patient has been having chronic low back pain more over the right side for several months.  She also reported urinary frequency without dysuria or urgency or hematuria.  She is taking Motrin and Tylenol for the pain which is helping.    -Labs review: Labs were reviewed with the patient.  Creatinine is 1.2 with GFR of 52.  I told the patient to stop taking NSAIDs including Motrin, Advil or naproxen.    Negative for:     Worry / mood complaints  Headache  Dizziness  Visual Disturbance  Hearing Changes  Nasal / sinus Symptoms  Mouth / tooth symptom, pain  Throat pain  Difficulty swallowing  Neck pain  Chest discomfort  Cough  SOB  N/V/D/C  Pelvic area discomfort  Bladder / voiding discomfort  Bowel complaints  Numbness/tingling/abnormal sensations   Edema / Leg swelling  Dizziness  Fatigue  Bleeding   Skin    Pertinent Pos: See HPI -     Vitals:    01/05/24 1006   BP: (!) 149/96   Pulse: 95       Alert and oriented to PPT  NAD    HEENT - neg  Neck - no bruits, no lymphadenopathy  Chest  HRRR w/o murmer  LCTAB no wheezes / rhonchi  Abdomen - soft, non-tender, BS  -Back exam: Point tenderness over the right sacroiliac joint.  Without sciatica.  No midline back tenderness  Extremities - + PTE    Gait / Station - stable, no dysequilibrium, uniform pace, no assist device, cane.     Diagnosis Orders   1. Chronic right-sided low back pain, unspecified whether sciatica present  XR LUMBAR SPINE (2-3 VIEWS)      2. Right flank pain  POCT Urinalysis no Micro      3. Type 2 diabetes mellitus without complication, without long-term current use of insulin (HCC)  atorvastatin (LIPITOR) 40 MG tablet          Plan:  1.)  Will increase Trulicity to 1.5 mg.  Will discontinue metformin  2.)  
Attending Physician Statement  I  have discussed the care of Lizbeth Hoff including pertinent history and exam findings with the resident. I agree with the assessment, plan and orders as documented by the resident.      BP (!) 146/92 (Site: Right Upper Arm, Position: Sitting, Cuff Size: Medium Adult)   Pulse 92   Ht 1.575 m (5' 2\")   Wt 126.6 kg (279 lb)   BMI 51.03 kg/m²    BP Readings from Last 3 Encounters:   01/05/24 (!) 146/92   12/08/23 126/81   01/16/23 123/71     Wt Readings from Last 3 Encounters:   01/05/24 126.6 kg (279 lb)   12/08/23 129.3 kg (285 lb)   10/09/23 126.6 kg (279 lb)          Diagnosis Orders   1. Chronic right-sided low back pain, unspecified whether sciatica present  XR LUMBAR SPINE (2-3 VIEWS)      2. Right flank pain  POCT Urinalysis no Micro    Urinalysis with Microscopic      3. Type 2 diabetes mellitus without complication, without long-term current use of insulin (HCC)  atorvastatin (LIPITOR) 40 MG tablet              Grayson Travis DO 1/5/2024 3:52 PM      
 Aged Out    Meningococcal (ACWY) vaccine  Aged Out

## 2024-01-06 ENCOUNTER — APPOINTMENT (OUTPATIENT)
Dept: GENERAL RADIOLOGY | Age: 64
End: 2024-01-06
Payer: MEDICAID

## 2024-01-06 ENCOUNTER — HOSPITAL ENCOUNTER (EMERGENCY)
Age: 64
Discharge: HOME | End: 2024-01-06
Attending: EMERGENCY MEDICINE
Payer: MEDICAID

## 2024-01-06 VITALS
BODY MASS INDEX: 51.57 KG/M2 | DIASTOLIC BLOOD PRESSURE: 85 MMHG | WEIGHT: 281.97 LBS | RESPIRATION RATE: 20 BRPM | OXYGEN SATURATION: 96 % | HEART RATE: 86 BPM | SYSTOLIC BLOOD PRESSURE: 152 MMHG | TEMPERATURE: 98.3 F

## 2024-01-06 DIAGNOSIS — S39.012A STRAIN OF LUMBAR REGION, INITIAL ENCOUNTER: Primary | ICD-10-CM

## 2024-01-06 LAB
ALBUMIN SERPL-MCNC: 4 G/DL (ref 3.5–5.2)
ALBUMIN/GLOB SERPL: 1.1 {RATIO} (ref 1–2.5)
ALP SERPL-CCNC: 88 U/L (ref 35–104)
ALT SERPL-CCNC: 15 U/L (ref 5–33)
ANION GAP SERPL CALCULATED.3IONS-SCNC: 11 MMOL/L (ref 9–17)
AST SERPL-CCNC: 18 U/L
BASOPHILS # BLD: 0.03 K/UL (ref 0–0.2)
BASOPHILS NFR BLD: 0 % (ref 0–2)
BILIRUB SERPL-MCNC: 0.4 MG/DL (ref 0.3–1.2)
BILIRUB UR QL STRIP: NEGATIVE
BUN SERPL-MCNC: 14 MG/DL (ref 8–23)
CALCIUM SERPL-MCNC: 9.8 MG/DL (ref 8.6–10.4)
CHLORIDE SERPL-SCNC: 101 MMOL/L (ref 98–107)
CLARITY UR: CLEAR
CO2 SERPL-SCNC: 24 MMOL/L (ref 20–31)
COLOR UR: YELLOW
COMMENT: NORMAL
CREAT SERPL-MCNC: 1 MG/DL (ref 0.5–0.9)
EOSINOPHIL # BLD: 0.1 K/UL (ref 0–0.44)
EOSINOPHILS RELATIVE PERCENT: 1 % (ref 1–4)
ERYTHROCYTE [DISTWIDTH] IN BLOOD BY AUTOMATED COUNT: 14.6 % (ref 11.8–14.4)
GFR SERPL CREATININE-BSD FRML MDRD: >60 ML/MIN/1.73M2
GLUCOSE SERPL-MCNC: 85 MG/DL (ref 70–99)
GLUCOSE UR STRIP-MCNC: NEGATIVE MG/DL
HCT VFR BLD AUTO: 40 % (ref 36.3–47.1)
HGB BLD-MCNC: 12.6 G/DL (ref 11.9–15.1)
HGB UR QL STRIP.AUTO: NEGATIVE
IMM GRANULOCYTES # BLD AUTO: 0.03 K/UL (ref 0–0.3)
IMM GRANULOCYTES NFR BLD: 0 %
KETONES UR STRIP-MCNC: NEGATIVE MG/DL
LEUKOCYTE ESTERASE UR QL STRIP: NEGATIVE
LYMPHOCYTES NFR BLD: 2.5 K/UL (ref 1.1–3.7)
LYMPHOCYTES RELATIVE PERCENT: 36 % (ref 24–43)
MCH RBC QN AUTO: 27.9 PG (ref 25.2–33.5)
MCHC RBC AUTO-ENTMCNC: 31.5 G/DL (ref 28.4–34.8)
MCV RBC AUTO: 88.7 FL (ref 82.6–102.9)
MONOCYTES NFR BLD: 0.41 K/UL (ref 0.1–1.2)
MONOCYTES NFR BLD: 6 % (ref 3–12)
NEUTROPHILS NFR BLD: 57 % (ref 36–65)
NEUTS SEG NFR BLD: 3.86 K/UL (ref 1.5–8.1)
NITRITE UR QL STRIP: NEGATIVE
NRBC BLD-RTO: 0 PER 100 WBC
PH UR STRIP: 7 [PH] (ref 5–8)
PLATELET # BLD AUTO: 248 K/UL (ref 138–453)
PMV BLD AUTO: 10.9 FL (ref 8.1–13.5)
POTASSIUM SERPL-SCNC: 4.2 MMOL/L (ref 3.7–5.3)
PROT SERPL-MCNC: 7.6 G/DL (ref 6.4–8.3)
PROT UR STRIP-MCNC: NEGATIVE MG/DL
RBC # BLD AUTO: 4.51 M/UL (ref 3.95–5.11)
RBC # BLD: ABNORMAL 10*6/UL
SODIUM SERPL-SCNC: 136 MMOL/L (ref 135–144)
SP GR UR STRIP: 1.01 (ref 1–1.03)
UROBILINOGEN UR STRIP-ACNC: NORMAL EU/DL (ref 0–1)
WBC OTHER # BLD: 6.9 K/UL (ref 3.5–11.3)

## 2024-01-06 PROCEDURE — 96374 THER/PROPH/DIAG INJ IV PUSH: CPT | Performed by: EMERGENCY MEDICINE

## 2024-01-06 PROCEDURE — 99284 EMERGENCY DEPT VISIT MOD MDM: CPT | Performed by: EMERGENCY MEDICINE

## 2024-01-06 PROCEDURE — 6360000002 HC RX W HCPCS

## 2024-01-06 PROCEDURE — 81003 URINALYSIS AUTO W/O SCOPE: CPT

## 2024-01-06 PROCEDURE — 80053 COMPREHEN METABOLIC PANEL: CPT

## 2024-01-06 PROCEDURE — 85025 COMPLETE CBC W/AUTO DIFF WBC: CPT

## 2024-01-06 PROCEDURE — 72100 X-RAY EXAM L-S SPINE 2/3 VWS: CPT

## 2024-01-06 RX ORDER — KETOROLAC TROMETHAMINE 30 MG/ML
30 INJECTION, SOLUTION INTRAMUSCULAR; INTRAVENOUS ONCE
Status: COMPLETED | OUTPATIENT
Start: 2024-01-06 | End: 2024-01-06

## 2024-01-06 RX ORDER — CYCLOBENZAPRINE HCL 5 MG
5 TABLET ORAL 2 TIMES DAILY PRN
Qty: 10 TABLET | Refills: 0 | Status: SHIPPED | OUTPATIENT
Start: 2024-01-06 | End: 2024-01-16

## 2024-01-06 RX ORDER — LIDOCAINE 4 G/G
1 PATCH TOPICAL DAILY
Qty: 30 PATCH | Refills: 0 | Status: SHIPPED | OUTPATIENT
Start: 2024-01-06 | End: 2024-02-05

## 2024-01-06 RX ADMIN — KETOROLAC TROMETHAMINE 30 MG: 30 INJECTION, SOLUTION INTRAMUSCULAR; INTRAVENOUS at 10:55

## 2024-01-06 ASSESSMENT — PAIN DESCRIPTION - ORIENTATION: ORIENTATION: RIGHT

## 2024-01-06 ASSESSMENT — ENCOUNTER SYMPTOMS
ABDOMINAL PAIN: 0
BACK PAIN: 1
DIARRHEA: 0
SHORTNESS OF BREATH: 0
VOMITING: 0
NAUSEA: 0

## 2024-01-06 ASSESSMENT — PAIN DESCRIPTION - ONSET: ONSET: ON-GOING

## 2024-01-06 ASSESSMENT — PAIN - FUNCTIONAL ASSESSMENT: PAIN_FUNCTIONAL_ASSESSMENT: 0-10

## 2024-01-06 ASSESSMENT — PAIN DESCRIPTION - PAIN TYPE: TYPE: CHRONIC PAIN

## 2024-01-06 ASSESSMENT — PAIN DESCRIPTION - DESCRIPTORS: DESCRIPTORS: BURNING

## 2024-01-06 ASSESSMENT — PAIN SCALES - GENERAL: PAINLEVEL_OUTOF10: 8

## 2024-01-06 ASSESSMENT — PAIN DESCRIPTION - LOCATION: LOCATION: BACK;BUTTOCKS;LEG

## 2024-01-06 ASSESSMENT — PAIN DESCRIPTION - FREQUENCY: FREQUENCY: CONTINUOUS

## 2024-01-06 NOTE — DISCHARGE INSTRUCTIONS
You were seen today in the emergency department for your back pain.  We have evaluated you and determined that you likely lumbar back strain.  We now feel you are safe for discharge home.  Please apply 1 lidocaine patch each day to the affected area.  Please take 1 tablet of Flexeril every 12 hours needed for pain.  Please not drive or operate heavy machinery while taking Flexeril.    Please return to the emergency department immediately if develop any new or worsening concerns including chest pain, shortness of breath, abdominal pain, nausea, vomiting, diarrhea, weakness, loss consciousness, fever, chills, or any other concerns.    Please call your PCP and schedule appointment within the next 24 to 48 hours for follow-up.

## 2024-01-06 NOTE — ED PROVIDER NOTES
[facility] ED  eMERGENCY dEPARTMENT eNCOUnter   Attending Attestation     Pt Name: Lizbeth Hoff  MRN: [id number]  Birthdate 1960  Date of evaluation: 1/6/24       Lizbeth Hoff is a 63 y.o. female who presents with No chief complaint on file.      History: Patient presents with right sided lumbar pain that travels into the buttocks.     Exam: HRRR, lungs CTABL, abdomen is soft, non tender. Pt well appearing with tenderness in the right lumbar/buttocks area.     Plan for symptomatic treatment and discharge if xray negative and urine clear    I performed a history and physical examination of the patient and discussed management with the resident. I reviewed the resident’s note and agree with the documented findings and plan of care. Any areas of disagreement are noted on the chart. I was personally present for the key portions of any procedures. I have documented in the chart those procedures where I was not present during the key portions. I have personally reviewed all images and agree with the resident's interpretation. I have reviewed the emergency nurses triage note. I agree with the chief complaint, past medical history, past surgical history, allergies, medications, social and family history as documented unless otherwise noted below. Documentation of the HPI, Physical Exam and Medical Decision Making performed by medical students or scribes is based on my personal performance of the HPI, PE and MDM. For Phys Assistant/ Nurse Practitioner cases/documentation I have had a face to face evaluation of this patient and have completed at least one if not all key elements of the E/M (history, physical exam, and MDM). Additional findings are as noted.    For APC cases I have personally evaluated and examined the patient in conjunction with the APC and agree with the treatment plan and disposition of the patient as recorded by the APC.    Godwin Webb MD  Attending Emergency  Physician

## 2024-01-06 NOTE — ED PROVIDER NOTES
[facility] ED  Emergency Department Encounter  Emergency Medicine Resident     Pt Name:Lizbeth Hoff  MRN: [id number]  Birthdate 1960  Date of evaluation: 1/6/24  PCP:  Delia Kumari MD  Note Started: 11:06 AM EST      CHIEF COMPLAINT       No chief complaint on file.      HISTORY OF PRESENT ILLNESS  (Location/Symptom, Timing/Onset, Context/Setting, Quality, Duration, Modifying Factors, Severity.)      Lizbeth Hoff is a 63 y.o. female who presents with back pain.  Patient states she has had right lower back pain for the last 2 days.  She states she saw her PCP yesterday who ordered urinalysis and an x-ray of her lumbar spine.  She never got the x-ray done.  She states that the back pain wraps around into her right hip.  She has no abdominal pain associated.  No nausea or vomiting.  Patient denies any history of nephrolithiasis but does state that she has history of diverticulitis.  She denies any blood in her stool.    PAST MEDICAL / SURGICAL / SOCIAL / FAMILY HISTORY      has a past medical history of Diverticulitis, Former smoker, Former smoker, GERD (gastroesophageal reflux disease), Hyperlipidemia, Hypertension, Morbid obesity with BMI of 45.0-49.9, adult (HCC), SINDI on CPAP, Sleep apnea, and Type II or unspecified type diabetes mellitus without mention of complication, not stated as uncontrolled.       has a past surgical history that includes Hysterectomy; Cholecystectomy; Colonoscopy; pr egd transoral biopsy single/multiple (7/19/2017); Upper gastrointestinal endoscopy (N/A, 10/18/2019); and Colonoscopy (N/A, 10/18/2019).      Social History     Socioeconomic History    Marital status: Single     Spouse name: Not on file    Number of children: Not on file    Years of education: Not on file    Highest education level: Not on file   Occupational History    Not on file   Tobacco Use    Smoking status: Former     Current packs/day: 0.00     Average packs/day: 0.3 packs/day for

## 2024-01-11 ENCOUNTER — OFFICE VISIT (OUTPATIENT)
Dept: ORTHOPEDIC SURGERY | Age: 64
End: 2024-01-11

## 2024-01-11 VITALS — OXYGEN SATURATION: 100 % | HEIGHT: 62 IN | RESPIRATION RATE: 16 BRPM | BODY MASS INDEX: 51.71 KG/M2 | WEIGHT: 281 LBS

## 2024-01-11 DIAGNOSIS — M17.0 PRIMARY OSTEOARTHRITIS OF BOTH KNEES: Primary | ICD-10-CM

## 2024-01-11 DIAGNOSIS — G89.29 CHRONIC PAIN OF BOTH KNEES: ICD-10-CM

## 2024-01-11 DIAGNOSIS — M25.562 CHRONIC PAIN OF BOTH KNEES: ICD-10-CM

## 2024-01-11 DIAGNOSIS — M25.561 CHRONIC PAIN OF BOTH KNEES: ICD-10-CM

## 2024-01-11 NOTE — PROGRESS NOTES
[facility]THOPEDICS AND SPORTS MEDICINE  [address]  [place] [address]  Dept: [phone]    Ambulatory Orthopedic Consult      CHIEF COMPLAINT:    Chief Complaint   Patient presents with    Knee Pain     Bilateral       HISTORY OF PRESENT ILLNESS:      The patient is a left-hand dominant 63 y.o. female who is being seen for evaluation of pain at the above location (left anterior knee greater than left dorsal midfoot and lateral/medial hindfoot, as well as left ring finger), which began in the beginning of November 2020 atraumatically. The pain is described mainly with mechanical terms (dull/sharp/throbbing). The pain is worse with activity and better with rest. The patient reports a static course.     The patient has tried:      [x]  rest/activity modification          [x]  NSAIDs      []  opiates      []  orthotics        []  change in shoes   [x]  home exercises  []  physical therapy      []  CAM boot     []  brace:    []  injection:       []  surgery:      The patient also reports that her left ring finger pain is associated with intermittent locking.    INTERVAL HISTORY 2/5/2021:  She is seen again today in the office for follow up of a previous issue (as above--left knee and left ring finger pain). Since being seen last, the patient is doing worse, particularly in terms of her left knee. At today's visit, she is not using a brace or assistive device. The location and quality of the pain have not significantly changed since the last visit.      INTERVAL HISTORY 4/6/2021:  She is seen again today in the office for follow up after an injection. Since being seen last, the patient is doing better. At today's visit, she is using no brace or assistive device.  She received an injection of the left knee, and reports the injection helped approximately 100% in terms of pain.  She also reports that her left ring finger trigger finger

## 2024-01-31 RX ORDER — TIZANIDINE 2 MG/1
TABLET ORAL
Qty: 10 TABLET | Refills: 0 | Status: SHIPPED | OUTPATIENT
Start: 2024-01-31

## 2024-01-31 NOTE — TELEPHONE ENCOUNTER
E-scribe request for santhosh. Please review and e-scribe if applicable.     Last Visit Date:  1/5/24    Next Visit Date:  3/8/2024    Hemoglobin A1C (%)   Date Value   12/08/2023 6.5   01/16/2023 6.5   06/07/2022 6.7             ( goal A1C is < 7)   No components found for: \"LABMICR\"  LDL Cholesterol (mg/dL)   Date Value   12/28/2023 99       (goal LDL is <100)   AST (U/L)   Date Value   01/06/2024 18     ALT (U/L)   Date Value   01/06/2024 15     BUN (mg/dL)   Date Value   01/06/2024 14     BP Readings from Last 3 Encounters:   01/06/24 (!) 152/85   01/05/24 (!) 146/92   12/08/23 126/81          (goal 120/80)        Patient Active Problem List:     Gastroesophageal reflux disease     Hyperlipidemia with target LDL less than 100     HTN (hypertension)     Numbness and tingling     Diverticula, colon     Diverticulitis, acute     Eczema     Lump of left breast     Shoulder pain     Right groin pain     Tinea corporis     Hyperlipidemia     Morbid obesity with BMI of 45.0-49.9, adult (HCC)     SINDI (obstructive sleep apnea)     Vitamin D deficiency     Melena     Chest pain     Abnormal stress test     Contact blepharoconjunctivitis of left eye     Prediabetes     Need for prophylactic vaccination and inoculation against varicella     Acute cystitis without hematuria     Primary osteoarthritis of right knee

## 2024-02-01 ENCOUNTER — HOSPITAL ENCOUNTER (OUTPATIENT)
Dept: MAMMOGRAPHY | Age: 64
Discharge: HOME | End: 2024-02-03
Payer: MEDICAID

## 2024-02-01 VITALS — WEIGHT: 276 LBS | BODY MASS INDEX: 48.9 KG/M2 | HEIGHT: 63 IN

## 2024-02-01 DIAGNOSIS — Z12.31 ENCOUNTER FOR SCREENING MAMMOGRAM FOR MALIGNANT NEOPLASM OF BREAST: ICD-10-CM

## 2024-02-01 PROCEDURE — 77063 BREAST TOMOSYNTHESIS BI: CPT

## 2024-02-01 RX ORDER — CYCLOBENZAPRINE HCL 5 MG
TABLET ORAL
Qty: 10 TABLET | Refills: 0 | OUTPATIENT
Start: 2024-02-01

## 2024-02-09 NOTE — TELEPHONE ENCOUNTER
E-scribe request for LISINOPRIL. Please review and e-scribe if applicable.     Last Visit Date:  1/5/2024  Next Visit Date:  3/8/2024    Hemoglobin A1C (%)   Date Value   12/08/2023 6.5   01/16/2023 6.5   06/07/2022 6.7             ( goal A1C is < 7)   No components found for: \"LABMICR\"  LDL Cholesterol (mg/dL)   Date Value   12/28/2023 99       (goal LDL is <100)   AST (U/L)   Date Value   01/06/2024 18     ALT (U/L)   Date Value   01/06/2024 15     BUN (mg/dL)   Date Value   01/06/2024 14     BP Readings from Last 3 Encounters:   01/06/24 (!) 152/85   01/05/24 (!) 146/92   12/08/23 126/81          (goal 120/80)        Patient Active Problem List:     Gastroesophageal reflux disease     Hyperlipidemia with target LDL less than 100     HTN (hypertension)     Numbness and tingling     Diverticula, colon     Diverticulitis, acute     Eczema     Lump of left breast     Shoulder pain     Right groin pain     Tinea corporis     Hyperlipidemia     Morbid obesity with BMI of 45.0-49.9, adult (HCC)     SINDI (obstructive sleep apnea)     Vitamin D deficiency     Melena     Chest pain     Abnormal stress test     Contact blepharoconjunctivitis of left eye     Prediabetes     Need for prophylactic vaccination and inoculation against varicella     Acute cystitis without hematuria     Primary osteoarthritis of right knee      ----JF

## 2024-02-10 RX ORDER — LISINOPRIL 10 MG/1
TABLET ORAL
Qty: 120 TABLET | Refills: 3 | Status: SHIPPED | OUTPATIENT
Start: 2024-02-10

## 2024-02-13 ENCOUNTER — TELEPHONE (OUTPATIENT)
Dept: FAMILY MEDICINE CLINIC | Age: 64
End: 2024-02-13

## 2024-02-13 NOTE — TELEPHONE ENCOUNTER
Patient states she is in need for you to order patient a glucometer so the can check her blood sugar.

## 2024-02-22 ENCOUNTER — TELEPHONE (OUTPATIENT)
Dept: ORTHOPEDIC SURGERY | Age: 64
End: 2024-02-22

## 2024-02-22 NOTE — TELEPHONE ENCOUNTER
Can you contact this patient and inform her that yes, she is able to get bilateral steroid injections at this time. She can schedule for the next available time/date with Dr. Hu. If you could help her do that please. Please and thank you.

## 2024-02-22 NOTE — TELEPHONE ENCOUNTER
Pt received a call but did not check messages.  She is wondering if it is time for her every 4 month injections for her knees.  Please call pt to discuss.

## 2024-03-04 ENCOUNTER — TELEPHONE (OUTPATIENT)
Dept: FAMILY MEDICINE CLINIC | Age: 64
End: 2024-03-04

## 2024-03-04 NOTE — TELEPHONE ENCOUNTER
Patient called in wanting to know did she need another sleep study done before she get new equipment for her c-pap and a new c-pap machine?

## 2024-03-07 ENCOUNTER — TELEPHONE (OUTPATIENT)
Dept: FAMILY MEDICINE CLINIC | Age: 64
End: 2024-03-07

## 2024-03-07 NOTE — TELEPHONE ENCOUNTER
Writer called the patient no answer left message that [facility] has mailed off sleep study to pt home.

## 2024-03-08 ENCOUNTER — TELEPHONE (OUTPATIENT)
Dept: FAMILY MEDICINE CLINIC | Age: 64
End: 2024-03-08

## 2024-03-08 NOTE — TELEPHONE ENCOUNTER
[facility] pharmacy contacted office in regards to a current med list. Writer informed not listed as the pharmacy. Patient would need to call and update.

## 2024-03-15 DIAGNOSIS — M17.0 PRIMARY OSTEOARTHRITIS OF BOTH KNEES: ICD-10-CM

## 2024-03-15 DIAGNOSIS — E11.9 TYPE 2 DIABETES MELLITUS WITHOUT COMPLICATION, WITHOUT LONG-TERM CURRENT USE OF INSULIN (HCC): ICD-10-CM

## 2024-03-15 RX ORDER — LANOLIN ALCOHOL/MO/W.PET/CERES
325 CREAM (GRAM) TOPICAL
Qty: 90 TABLET | Refills: 3 | Status: CANCELLED | OUTPATIENT
Start: 2024-03-15

## 2024-03-15 NOTE — TELEPHONE ENCOUNTER
Pharmacy also requesting alcohol pads, lancets, test strips, and aspirin to be sent to pharmacy as well.     Thank you.           Please address the medication refill and close the encounter.  If I can be of assistance, please route to the applicable pool.      Thank you.      Last visit: 1-5-24  Last Med refill: 5-  Does patient have enough medication for 72 hours: No:     Next Visit Date:  Future Appointments   Date Time Provider Department Center   3/18/2024 11:00 AM [facility]       Health Maintenance   Topic Date Due    Diabetic retinal exam  Never done    Shingles vaccine (1 of 2) Never done    Hepatitis B vaccine (2 of 3 - 19+ 3-dose series) 03/11/2022    Respiratory Syncytial Virus (RSV) Pregnant or age 60 yrs+ (1 - 1-dose 60+ series) Never done    Flu vaccine (1) 08/01/2023    COVID-19 Vaccine (3 - 2023-24 season) 09/01/2023    DTaP/Tdap/Td vaccine (2 - Td or Tdap) 03/07/2024    Diabetic foot exam  12/08/2024    A1C test (Diabetic or Prediabetic)  12/08/2024    Diabetic Alb to Cr ratio (uACR) test  12/28/2024    Lipids  12/28/2024    Depression Screen  01/05/2025    GFR test (Diabetes, CKD 3-4, OR last GFR 15-59)  01/06/2025    Breast cancer screen  02/01/2026    Colorectal Cancer Screen  10/18/2029    Pneumococcal 0-64 years Vaccine  Completed    Hepatitis C screen  Completed    HIV screen  Completed    Hepatitis A vaccine  Aged Out    Hib vaccine  Aged Out    Polio vaccine  Aged Out    Meningococcal (ACWY) vaccine  Aged Out       Hemoglobin A1C (%)   Date Value   12/08/2023 6.5   01/16/2023 6.5   06/07/2022 6.7             ( goal A1C is < 7)   No components found for: \"LABMICR\"  LDL Cholesterol (mg/dL)   Date Value   12/28/2023 99   02/11/2022 110       (goal LDL is <100)   AST (U/L)   Date Value   01/06/2024 18     ALT (U/L)   Date Value   01/06/2024 15     BUN (mg/dL)   Date Value   01/06/2024 14     BP Readings from Last 3 Encounters:   01/06/24 (!) 152/85   01/05/24

## 2024-03-16 RX ORDER — FERROUS SULFATE 325(65) MG
325 TABLET ORAL
Qty: 30 TABLET | Refills: 1 | Status: SHIPPED | OUTPATIENT
Start: 2024-03-16

## 2024-03-16 RX ORDER — TIZANIDINE 2 MG/1
TABLET ORAL
Qty: 10 TABLET | Refills: 0 | Status: SHIPPED | OUTPATIENT
Start: 2024-03-16

## 2024-03-16 RX ORDER — UREA 10 %
1 LOTION (ML) TOPICAL NIGHTLY PRN
Qty: 30 TABLET | Refills: 2 | Status: SHIPPED | OUTPATIENT
Start: 2024-03-16

## 2024-03-16 RX ORDER — POTASSIUM CHLORIDE 20 MEQ/1
20 TABLET, EXTENDED RELEASE ORAL DAILY
Qty: 30 TABLET | Refills: 0 | Status: SHIPPED | OUTPATIENT
Start: 2024-03-16

## 2024-03-18 ENCOUNTER — OFFICE VISIT (OUTPATIENT)
Dept: ORTHOPEDIC SURGERY | Age: 64
End: 2024-03-18
Payer: MEDICAID

## 2024-03-18 VITALS — RESPIRATION RATE: 15 BRPM | HEIGHT: 63 IN | OXYGEN SATURATION: 100 % | WEIGHT: 276 LBS | BODY MASS INDEX: 48.9 KG/M2

## 2024-03-18 DIAGNOSIS — M17.0 PRIMARY OSTEOARTHRITIS OF BOTH KNEES: Primary | ICD-10-CM

## 2024-03-18 DIAGNOSIS — M25.561 CHRONIC PAIN OF BOTH KNEES: ICD-10-CM

## 2024-03-18 DIAGNOSIS — G89.29 CHRONIC PAIN OF BOTH KNEES: ICD-10-CM

## 2024-03-18 DIAGNOSIS — M25.562 CHRONIC PAIN OF BOTH KNEES: ICD-10-CM

## 2024-03-18 PROCEDURE — 20610 DRAIN/INJ JOINT/BURSA W/O US: CPT | Performed by: ORTHOPAEDIC SURGERY

## 2024-03-18 RX ORDER — TRIAMCINOLONE ACETONIDE 40 MG/ML
40 INJECTION, SUSPENSION INTRA-ARTICULAR; INTRAMUSCULAR ONCE
Status: COMPLETED | OUTPATIENT
Start: 2024-03-18 | End: 2024-03-18

## 2024-03-18 RX ORDER — LIDOCAINE HYDROCHLORIDE 10 MG/ML
8 INJECTION, SOLUTION INFILTRATION; PERINEURAL ONCE
Status: COMPLETED | OUTPATIENT
Start: 2024-03-18 | End: 2024-03-18

## 2024-03-18 RX ADMIN — TRIAMCINOLONE ACETONIDE 40 MG: 40 INJECTION, SUSPENSION INTRA-ARTICULAR; INTRAMUSCULAR at 11:28

## 2024-03-18 RX ADMIN — LIDOCAINE HYDROCHLORIDE 8 ML: 10 INJECTION, SOLUTION INFILTRATION; PERINEURAL at 11:27

## 2024-03-18 NOTE — PROGRESS NOTES
[facility] ORTHOPEDICS  [address] [place] [address]  Dept: [phone]    Ambulatory Orthopedic Consult      CHIEF COMPLAINT:    Chief Complaint   Patient presents with    Knee Pain     Bilateral        HISTORY OF PRESENT ILLNESS:      The patient is a left-hand dominant 63 y.o. female who is being seen for evaluation of pain at the above location (left anterior knee greater than left dorsal midfoot and lateral/medial hindfoot, as well as left ring finger), which began in the beginning of November 2020 atraumatically. The pain is described mainly with mechanical terms (dull/sharp/throbbing). The pain is worse with activity and better with rest. The patient reports a static course.     The patient has tried:      [x]  rest/activity modification          [x]  NSAIDs      []  opiates      []  orthotics        []  change in shoes   [x]  home exercises  []  physical therapy      []  CAM boot     []  brace:    []  injection:       []  surgery:      The patient also reports that her left ring finger pain is associated with intermittent locking.    INTERVAL HISTORY 2/5/2021:  She is seen again today in the office for follow up of a previous issue (as above--left knee and left ring finger pain). Since being seen last, the patient is doing worse, particularly in terms of her left knee. At today's visit, she is not using a brace or assistive device. The location and quality of the pain have not significantly changed since the last visit.      INTERVAL HISTORY 4/6/2021:  She is seen again today in the office for follow up after an injection. Since being seen last, the patient is doing better. At today's visit, she is using no brace or assistive device.  She received an injection of the left knee, and reports the injection helped approximately 100% in terms of pain.  She also reports that her left ring finger trigger finger is not bothering her,

## 2024-04-03 ENCOUNTER — TELEPHONE (OUTPATIENT)
Dept: FAMILY MEDICINE CLINIC | Age: 64
End: 2024-04-03

## 2024-04-03 DIAGNOSIS — G47.33 OSA (OBSTRUCTIVE SLEEP APNEA): Primary | ICD-10-CM

## 2024-04-03 NOTE — TELEPHONE ENCOUNTER
[facility] called asking if patients Sleep order can be changed to a split Study. The other order was denied. She stated not to change any of the comments you have.  Please advise and thank you if you put a new one in I will get it faxed back.

## 2024-04-06 ENCOUNTER — APPOINTMENT (OUTPATIENT)
Dept: GENERAL RADIOLOGY | Age: 64
End: 2024-04-06
Payer: MEDICAID

## 2024-04-06 ENCOUNTER — HOSPITAL ENCOUNTER (EMERGENCY)
Age: 64
Discharge: HOME | End: 2024-04-07
Attending: EMERGENCY MEDICINE
Payer: MEDICAID

## 2024-04-06 VITALS
RESPIRATION RATE: 16 BRPM | HEIGHT: 62 IN | OXYGEN SATURATION: 96 % | WEIGHT: 27.56 LBS | SYSTOLIC BLOOD PRESSURE: 110 MMHG | BODY MASS INDEX: 5.07 KG/M2 | HEART RATE: 97 BPM | TEMPERATURE: 97.3 F | DIASTOLIC BLOOD PRESSURE: 71 MMHG

## 2024-04-06 DIAGNOSIS — R10.11 RIGHT UPPER QUADRANT ABDOMINAL PAIN: Primary | ICD-10-CM

## 2024-04-06 LAB
ALBUMIN SERPL-MCNC: 4.1 G/DL (ref 3.5–5.2)
ALBUMIN/GLOB SERPL: 1 {RATIO} (ref 1–2.5)
ALP SERPL-CCNC: 105 U/L (ref 35–104)
ALT SERPL-CCNC: 42 U/L (ref 10–35)
ANION GAP SERPL CALCULATED.3IONS-SCNC: 10 MMOL/L (ref 9–16)
AST SERPL-CCNC: 32 U/L (ref 10–35)
BASOPHILS # BLD: 0.03 K/UL (ref 0–0.2)
BASOPHILS NFR BLD: 0 % (ref 0–2)
BILIRUB SERPL-MCNC: 0.2 MG/DL (ref 0–1.2)
BUN SERPL-MCNC: 16 MG/DL (ref 8–23)
CALCIUM SERPL-MCNC: 9.6 MG/DL (ref 8.6–10.4)
CHLORIDE SERPL-SCNC: 102 MMOL/L (ref 98–107)
CO2 SERPL-SCNC: 26 MMOL/L (ref 20–31)
CREAT SERPL-MCNC: 1.2 MG/DL (ref 0.5–0.9)
EOSINOPHIL # BLD: 0.19 K/UL (ref 0–0.44)
EOSINOPHILS RELATIVE PERCENT: 2 % (ref 1–4)
ERYTHROCYTE [DISTWIDTH] IN BLOOD BY AUTOMATED COUNT: 15.2 % (ref 11.8–14.4)
GFR SERPL CREATININE-BSD FRML MDRD: 54 ML/MIN/1.73M2
GLUCOSE SERPL-MCNC: 113 MG/DL (ref 74–99)
HCT VFR BLD AUTO: 39.4 % (ref 36.3–47.1)
HGB BLD-MCNC: 12.5 G/DL (ref 11.9–15.1)
IMM GRANULOCYTES # BLD AUTO: <0.03 K/UL (ref 0–0.3)
IMM GRANULOCYTES NFR BLD: 0 %
LIPASE SERPL-CCNC: 44 U/L (ref 13–60)
LYMPHOCYTES NFR BLD: 4.61 K/UL (ref 1.1–3.7)
LYMPHOCYTES RELATIVE PERCENT: 48 % (ref 24–43)
MCH RBC QN AUTO: 27.4 PG (ref 25.2–33.5)
MCHC RBC AUTO-ENTMCNC: 31.7 G/DL (ref 28.4–34.8)
MCV RBC AUTO: 86.2 FL (ref 82.6–102.9)
MONOCYTES NFR BLD: 0.55 K/UL (ref 0.1–1.2)
MONOCYTES NFR BLD: 6 % (ref 3–12)
NEUTROPHILS NFR BLD: 44 % (ref 36–65)
NEUTS SEG NFR BLD: 4.29 K/UL (ref 1.5–8.1)
NRBC BLD-RTO: 0 PER 100 WBC
PLATELET # BLD AUTO: 220 K/UL (ref 138–453)
PMV BLD AUTO: 10.8 FL (ref 8.1–13.5)
POTASSIUM SERPL-SCNC: 3.7 MMOL/L (ref 3.7–5.3)
PROT SERPL-MCNC: 7.4 G/DL (ref 6.6–8.7)
RBC # BLD AUTO: 4.57 M/UL (ref 3.95–5.11)
RBC # BLD: ABNORMAL 10*6/UL
SODIUM SERPL-SCNC: 138 MMOL/L (ref 136–145)
TROPONIN I SERPL HS-MCNC: 7 NG/L (ref 0–14)
WBC OTHER # BLD: 9.7 K/UL (ref 3.5–11.3)

## 2024-04-06 PROCEDURE — 80053 COMPREHEN METABOLIC PANEL: CPT

## 2024-04-06 PROCEDURE — 71046 X-RAY EXAM CHEST 2 VIEWS: CPT

## 2024-04-06 PROCEDURE — 6370000000 HC RX 637 (ALT 250 FOR IP): Performed by: EMERGENCY MEDICINE

## 2024-04-06 PROCEDURE — 99285 EMERGENCY DEPT VISIT HI MDM: CPT

## 2024-04-06 PROCEDURE — 83690 ASSAY OF LIPASE: CPT

## 2024-04-06 PROCEDURE — 84484 ASSAY OF TROPONIN QUANT: CPT

## 2024-04-06 PROCEDURE — 93005 ELECTROCARDIOGRAM TRACING: CPT | Performed by: EMERGENCY MEDICINE

## 2024-04-06 PROCEDURE — 85025 COMPLETE CBC W/AUTO DIFF WBC: CPT

## 2024-04-06 RX ORDER — FAMOTIDINE 20 MG/1
20 TABLET, FILM COATED ORAL ONCE
Status: COMPLETED | OUTPATIENT
Start: 2024-04-06 | End: 2024-04-06

## 2024-04-06 RX ORDER — MAGNESIUM HYDROXIDE/ALUMINUM HYDROXICE/SIMETHICONE 120; 1200; 1200 MG/30ML; MG/30ML; MG/30ML
30 SUSPENSION ORAL ONCE
Status: COMPLETED | OUTPATIENT
Start: 2024-04-06 | End: 2024-04-06

## 2024-04-06 RX ADMIN — FAMOTIDINE 20 MG: 20 TABLET, FILM COATED ORAL at 23:18

## 2024-04-06 RX ADMIN — ALUMINUM HYDROXIDE, MAGNESIUM HYDROXIDE, AND SIMETHICONE 30 ML: 200; 200; 20 SUSPENSION ORAL at 23:18

## 2024-04-06 ASSESSMENT — PAIN SCALES - GENERAL: PAINLEVEL_OUTOF10: 7

## 2024-04-06 ASSESSMENT — LIFESTYLE VARIABLES
HOW OFTEN DO YOU HAVE A DRINK CONTAINING ALCOHOL: PATIENT DECLINED
HOW MANY STANDARD DRINKS CONTAINING ALCOHOL DO YOU HAVE ON A TYPICAL DAY: PATIENT DECLINED

## 2024-04-07 LAB — TROPONIN I SERPL HS-MCNC: 8 NG/L (ref 0–14)

## 2024-04-07 PROCEDURE — 84484 ASSAY OF TROPONIN QUANT: CPT

## 2024-04-07 RX ORDER — FAMOTIDINE 20 MG/1
20 TABLET, FILM COATED ORAL 2 TIMES DAILY
Qty: 60 TABLET | Refills: 0 | Status: SHIPPED | OUTPATIENT
Start: 2024-04-07 | End: 2024-04-08

## 2024-04-07 ASSESSMENT — HEART SCORE: ECG: NORMAL

## 2024-04-07 NOTE — ED NOTES
Pt arrived via triage for c/o abdominal/chest pain pain for 1 month  Pt states she's tried tylenol and has not been helping  Pt states she denies sob  Pt states she did take asa today  Pt states she has an appt to see  on Monday  Pt states she just couldn't handle it any more  Pt has call light within reach

## 2024-04-07 NOTE — ED PROVIDER NOTES
[facility] ED  Emergency Department Encounter  Emergency Medicine Resident     Pt Name:Lizbeth Hoff  MRN: [id number]  Birthdate 1960  Date of evaluation: 4/6/24  PCP:  Delia Kumari MD  Note Started: 10:54 PM EDT      CHIEF COMPLAINT       Chief Complaint   Patient presents with    Chest Pain       HISTORY OF PRESENT ILLNESS  (Location/Symptom, Timing/Onset, Context/Setting, Quality, Duration, Modifying Factors, Severity.)      Lizbeth Hoff is a 63 y.o. female past medical history of cholecystectomy, hysterectomy, GERD, hypertension, sleep apnea, diabetes who presents with right upper quadrant and right lower chest pain that has been ongoing for the last month intermittently.  Patient states that it is worse when she eats fatty food.  She states she has used Tylenol, Motrin, Maalox and Tums with improvement however her symptoms returned.  She does have a history of GERD.  She states she had vomiting and diarrhea last Thursday that resolved.  She denies shortness of breath.  No fevers.      Per chart review patient had cardiac cath in 2020 with mild CAD.  Normal global left ventricular systolic function.    PAST MEDICAL / SURGICAL / SOCIAL / FAMILY HISTORY      has a past medical history of Diverticulitis, Former smoker, Former smoker, GERD (gastroesophageal reflux disease), Hyperlipidemia, Hypertension, Morbid obesity with BMI of 45.0-49.9, adult (HCC), SINDI on CPAP, Sleep apnea, and Type II or unspecified type diabetes mellitus without mention of complication, not stated as uncontrolled.       has a past surgical history that includes Hysterectomy; Cholecystectomy; Colonoscopy; pr egd transoral biopsy single/multiple (7/19/2017); Upper gastrointestinal endoscopy (N/A, 10/18/2019); and Colonoscopy (N/A, 10/18/2019).      Social History     Socioeconomic History    Marital status: Single     Spouse name: Not on file    Number of children: Not on file    Years of education: Not on file

## 2024-04-07 NOTE — DISCHARGE INSTRUCTIONS
You were seen today for right upper quadrant abdominal pain.  Your abdominal labs were normal today.  Your electrolytes were normal.  Your heart labs were normal.  You had a chest x-ray shows an enlarged heart. I recommend you follow up with cardiology as soon as possible. I have also given you follow up with our GI specialist. I recommend you start taking pepcid as prescribed.     If you notice any concerning symptoms please return to the ER immediately. These can include but are not limited to: fevers, chills, shortness of breath, vomiting, weakness of the extremities, changes in your mental status, numbness, pale extremities, or chest pain.     Wound care: none    Diet: You may resume your normal diet     Activity: resume activity as tolerated.     Medications: Continue taking your home medications as previously directed. For pain You may take tylenol 1,000mg by mouth every 6 hours as needed for pain. Do not exceed 4,000mg per day. If you have liver disease don't take tylenol. You may also take ibuprofen 600mg every 6-8 hours as needed for pain. Do not exceed 2,400 mg per day. If you experience stomach pain or you have a history of kidney disease stop taking ibuprofen. You may alternate application of ice and heat 20 minutes at a time as desired.      Follow up: Please follow up with your primary care doctor within one week or as needed.

## 2024-04-07 NOTE — ED PROVIDER NOTES
[facility] ED     Emergency Department     Faculty Attestation        I performed a history and physical examination of the patient and discussed management with the resident. I reviewed the resident’s note and agree with the documented findings and plan of care. Any areas of disagreement are noted on the chart. I was personally present for the key portions of any procedures. I have documented in the chart those procedures where I was not present during the key portions. I have reviewed the emergency nurses triage note. I agree with the chief complaint, past medical history, past surgical history, allergies, medications, social and family history as documented unless otherwise noted below.    For mid-level providers such as nurse practitioners as well as physicians assistants:    I have personally seen and evaluated the patient.    I find the patient's history and physical exam are consistent with NP/PA documentation.  I agree with the care provided, treatment rendered, disposition, & follow-up plan.     Additional findings are as noted.    Vital Signs: /71   Pulse 97   Temp 97.3 °F (36.3 °C)   Resp 16   Ht 1.575 m (5' 2\")   Wt 12.5 kg (27 lb 8.9 oz)   SpO2 96%   BMI 5.04 kg/m²   PCP:  Delia Kumari MD    Pertinent Comments:     Patient complaining of right upper quadrant abdominal pain.  She thinks she had her gallbladder removed but she states she is not 100% sure.  No fevers or chills cough or systemic symptoms.  He is resting comfortably no acute distress is very mild epigastric and right upper quadrant tenderness but no rebound or guarding.  EKG Interpretation    Interpreted by emergency department physician    Rhythm: normal sinus   Rate: normal  Axis: normal  Ectopy: none    ST Segments: no acute change  T Waves: no acute change  Q Waves: none    Clinical Impression: non-specific EKG    MD Timmy Ireland MD

## 2024-04-08 ENCOUNTER — OFFICE VISIT (OUTPATIENT)
Dept: FAMILY MEDICINE CLINIC | Age: 64
End: 2024-04-08
Payer: MEDICAID

## 2024-04-08 VITALS
WEIGHT: 276 LBS | BODY MASS INDEX: 50.48 KG/M2 | HEART RATE: 90 BPM | DIASTOLIC BLOOD PRESSURE: 74 MMHG | SYSTOLIC BLOOD PRESSURE: 123 MMHG

## 2024-04-08 DIAGNOSIS — E11.9 TYPE 2 DIABETES MELLITUS WITHOUT COMPLICATION, WITHOUT LONG-TERM CURRENT USE OF INSULIN (HCC): Primary | ICD-10-CM

## 2024-04-08 LAB
EKG ATRIAL RATE: 95 BPM
EKG P AXIS: 69 DEGREES
EKG P-R INTERVAL: 170 MS
EKG Q-T INTERVAL: 374 MS
EKG QRS DURATION: 102 MS
EKG QTC CALCULATION (BAZETT): 469 MS
EKG R AXIS: -50 DEGREES
EKG T AXIS: 74 DEGREES
EKG VENTRICULAR RATE: 95 BPM
HBA1C MFR BLD: 6.4 %

## 2024-04-08 PROCEDURE — 3078F DIAST BP <80 MM HG: CPT

## 2024-04-08 PROCEDURE — 83036 HEMOGLOBIN GLYCOSYLATED A1C: CPT

## 2024-04-08 PROCEDURE — 3074F SYST BP LT 130 MM HG: CPT

## 2024-04-08 PROCEDURE — 3044F HG A1C LEVEL LT 7.0%: CPT

## 2024-04-08 PROCEDURE — 99213 OFFICE O/P EST LOW 20 MIN: CPT

## 2024-04-08 RX ORDER — PANTOPRAZOLE SODIUM 40 MG/1
40 TABLET, DELAYED RELEASE ORAL
Qty: 30 TABLET | Refills: 5 | Status: SHIPPED | OUTPATIENT
Start: 2024-04-08

## 2024-04-08 RX ORDER — DULAGLUTIDE 0.75 MG/.5ML
1.5 INJECTION, SOLUTION SUBCUTANEOUS WEEKLY
Qty: 4 ADJUSTABLE DOSE PRE-FILLED PEN SYRINGE | Refills: 3 | Status: CANCELLED | OUTPATIENT
Start: 2024-04-08

## 2024-04-08 NOTE — PROGRESS NOTES
63 years old female patient with past medical history of diabetes came for     Upper abdominal pain: patient has been having upper abdominal pain for the past several weeks. Pain is sharp achy, sometimes burning, over the RUQ and under the right breast. Increases after meals and does not radiate. Denies nausea, vomiting, diarrhea, constipation, or abdominal distention. She thinks that trulicity might cause the pain because pain started after she started taking it, around 2 months ago. She went to the ED for similar complaints, CMP, lipase was normal. Was discharged on pepcid. She has hx of GERD. Had cholecystectomy.     -DM: A1C 6.4. on Trulicity. Does not take metformin.      -HTN: well controlled     Negative for:     Worry / mood complaints  Headache  Dizziness  Visual Disturbance  Hearing Changes  Nasal / sinus Symptoms  Mouth / tooth symptom, pain  Throat pain  Difficulty swallowing  Neck pain  Chest discomfort  Cough  SOB  N/V/D/C  Pelvic area discomfort  Bladder / voiding discomfort  Bowel complaints  MSk complaints   Numbness/tingling/abnormal sensations   Edema / Leg swelling  Dizziness  Fatigue  Bleeding   Skin    Pertinent Pos: See HPI -     There were no vitals filed for this visit.    Alert and oriented to PPT  NAD    HEENT - neg  Neck - no bruits, no lymphadenopathy  Chest  HRRR w/o murmer  LCTAB no wheezes / rhonchi  Abdomen - soft, non-tender, BS  Extremities - + PTE    Gait / Station - stable, no dysequilibrium, uniform pace, no assist device, cane.     Diagnosis Orders   1. Type 2 diabetes mellitus without complication, without long-term current use of insulin (HCC)  POCT glycosylated hemoglobin (Hb A1C)          Plan:  1.)  will give protonix trail for 8 weeks  2.)  hold Trulicity. Restart metformin.   3.)  will consider GI referral if pain did not improve  4.)  follow up in 8 weeks

## 2024-04-08 NOTE — TELEPHONE ENCOUNTER
Called patient and informed patient that order has been sent to sleep center . Order and Office note plus demo's faxed

## 2024-05-06 ENCOUNTER — OFFICE VISIT (OUTPATIENT)
Dept: FAMILY MEDICINE CLINIC | Age: 64
End: 2024-05-06
Payer: MEDICAID

## 2024-05-06 ENCOUNTER — HOSPITAL ENCOUNTER (OUTPATIENT)
Age: 64
Discharge: HOME | End: 2024-05-08
Payer: MEDICAID

## 2024-05-06 ENCOUNTER — HOSPITAL ENCOUNTER (OUTPATIENT)
Dept: GENERAL RADIOLOGY | Age: 64
Discharge: HOME | End: 2024-05-08
Payer: MEDICAID

## 2024-05-06 VITALS — SYSTOLIC BLOOD PRESSURE: 133 MMHG | DIASTOLIC BLOOD PRESSURE: 82 MMHG | HEART RATE: 78 BPM

## 2024-05-06 DIAGNOSIS — R07.89 CHEST WALL PAIN: ICD-10-CM

## 2024-05-06 DIAGNOSIS — R06.02 SHORTNESS OF BREATH: ICD-10-CM

## 2024-05-06 DIAGNOSIS — G47.33 OSA (OBSTRUCTIVE SLEEP APNEA): ICD-10-CM

## 2024-05-06 DIAGNOSIS — R07.9 CHEST PAIN, UNSPECIFIED TYPE: Primary | ICD-10-CM

## 2024-05-06 DIAGNOSIS — I51.7 CARDIOMEGALY: ICD-10-CM

## 2024-05-06 PROCEDURE — 72040 X-RAY EXAM NECK SPINE 2-3 VW: CPT

## 2024-05-06 PROCEDURE — 99213 OFFICE O/P EST LOW 20 MIN: CPT

## 2024-05-06 PROCEDURE — 3079F DIAST BP 80-89 MM HG: CPT

## 2024-05-06 PROCEDURE — 72072 X-RAY EXAM THORAC SPINE 3VWS: CPT

## 2024-05-06 PROCEDURE — 3075F SYST BP GE 130 - 139MM HG: CPT

## 2024-05-07 DIAGNOSIS — E11.9 TYPE 2 DIABETES MELLITUS WITHOUT COMPLICATION, WITHOUT LONG-TERM CURRENT USE OF INSULIN (HCC): ICD-10-CM

## 2024-05-07 DIAGNOSIS — E03.9 HYPOTHYROIDISM, UNSPECIFIED TYPE: ICD-10-CM

## 2024-05-07 NOTE — TELEPHONE ENCOUNTER
E-scribe request for PENDED MEDICATIONS. Please review and e-scribe if applicable.     Last Visit Date:  5/6/2024  Next Visit Date:  6/3/2024    Hemoglobin A1C (%)   Date Value   04/08/2024 6.4   12/08/2023 6.5   01/16/2023 6.5             ( goal A1C is < 7)   No components found for: \"LABMICR\"  No components found for: \"LDLCHOLESTEROL\", \"LDLCALC\"    (goal LDL is <100)   AST (U/L)   Date Value   04/06/2024 32     ALT (U/L)   Date Value   04/06/2024 42 (H)     BUN (mg/dL)   Date Value   04/06/2024 16     BP Readings from Last 3 Encounters:   05/06/24 133/82   04/08/24 123/74   04/06/24 110/71          (goal 120/80)        Patient Active Problem List:     Gastroesophageal reflux disease     Hyperlipidemia with target LDL less than 100     HTN (hypertension)     Numbness and tingling     Diverticula, colon     Diverticulitis, acute     Eczema     Lump of left breast     Shoulder pain     Right groin pain     Tinea corporis     Hyperlipidemia     Morbid obesity with BMI of 45.0-49.9, adult (HCC)     SINDI (obstructive sleep apnea)     Vitamin D deficiency     Melena     Chest pain     Abnormal stress test     Contact blepharoconjunctivitis of left eye     Prediabetes     Need for prophylactic vaccination and inoculation against varicella     Acute cystitis without hematuria     Primary osteoarthritis of right knee      ----JF

## 2024-05-08 ENCOUNTER — TELEPHONE (OUTPATIENT)
Dept: ORTHOPEDIC SURGERY | Age: 64
End: 2024-05-08

## 2024-05-08 RX ORDER — HYDROCHLOROTHIAZIDE 25 MG/1
25 TABLET ORAL DAILY
Qty: 120 TABLET | Refills: 2 | Status: SHIPPED | OUTPATIENT
Start: 2024-05-08

## 2024-05-08 RX ORDER — POTASSIUM CHLORIDE 20 MEQ/1
20 TABLET, EXTENDED RELEASE ORAL DAILY
Qty: 30 TABLET | Refills: 0 | Status: SHIPPED | OUTPATIENT
Start: 2024-05-08

## 2024-05-08 RX ORDER — TIZANIDINE 2 MG/1
TABLET ORAL
Qty: 10 TABLET | Refills: 0 | Status: SHIPPED | OUTPATIENT
Start: 2024-05-08

## 2024-05-08 RX ORDER — ATORVASTATIN CALCIUM 40 MG/1
40 TABLET, FILM COATED ORAL DAILY
Qty: 30 TABLET | Refills: 3 | Status: SHIPPED | OUTPATIENT
Start: 2024-05-08

## 2024-05-08 NOTE — TELEPHONE ENCOUNTER
Jenna,     Patient had bilateral Monovisc on 1/11/24. I think it's too early to process PA's right now. I called patient and informed her of this. Told her to call the week of 7/15 so we can start the process and by the time we get a response it should be time for her to get them. She stated understanding.     She is scheduled for repeat steroid injections on 6/18/24 here in [place].

## 2024-05-08 NOTE — PROGRESS NOTES
63 years old female patient came to the office for follow-up on lower chest/abdominal pain.  Patient has been having pain across the lower chest and under her breasts.  She describes the pain as burning achy pain.  It starts at the lateral side of the chest and moves anteriorly.  Denies shortness of breath, palpitations, nausea, vomiting or any bowel symptoms.  She has history of GERD, she said that this pain is different than from the heartburn of GERD.  Was given PPI trial last visit, but did not help her pain.      Patient also reported lower leg edema.  She uses 4 pillows to sleep at night.  She said that she sometimes not able to lay flat gets shortness of breath.  However, she history of SINDI.  Recent chest x-ray showed pulmonary venous congestion.  No prior echo.  She had a cardiac cath in 2020 which showed mild CAD.    -SINDI: History of positive, using CPAP.  She is scheduled to have home sleep study which is needed to get her a new CPAP machine.    Negative for:     Worry / mood complaints  Headache  Dizziness  Visual Disturbance  Hearing Changes  Nasal / sinus Symptoms  Mouth / tooth symptom, pain  Throat pain  Difficulty swallowing  Neck pain  Cough  SOB  N/V/D/C  Pelvic area discomfort  Bladder / voiding discomfort  Bowel complaints  MSk complaints   Numbness/tingling/abnormal sensations   Dizziness  Fatigue  Bleeding   Skin    Pertinent Pos: See HPI -     Vitals:    05/06/24 0918   BP: 133/82   Pulse: 78       Alert and oriented to PPT  NAD    HEENT - neg  Neck - no bruits, no lymphadenopathy  Chest: Tenderness over the lateral anterior lower border of the chest wall.  No skin lesions or rash.  HRRR w/o murmer  LCTAB no wheezes / rhonchi  Abdomen - soft, non-tender, BS  Extremities: +1 bilateral lower limb edema  Gait / Station - stable, no dysequilibrium, uniform pace, no assist device, cane.     Diagnosis Orders   1. Chest pain, unspecified type  Echo (TTE) complete (PRN contrast/bubble/strain/3D)    
Attending Physician Statement  I  have discussed the care of Lizbeth Hoff including pertinent history and exam findings with the resident. I agree with the assessment, plan and orders as documented by the resident.      /82   Pulse 78    BP Readings from Last 3 Encounters:   05/06/24 133/82   04/08/24 123/74   04/06/24 110/71     Wt Readings from Last 3 Encounters:   04/08/24 125.2 kg (276 lb)   04/06/24 12.5 kg (27 lb 8.9 oz)   03/18/24 125.2 kg (276 lb)          Diagnosis Orders   1. Chest pain, unspecified type  Echo (TTE) complete (PRN contrast/bubble/strain/3D)      2. Cardiomegaly  Echo (TTE) complete (PRN contrast/bubble/strain/3D)      3. Shortness of breath  Echo (TTE) complete (PRN contrast/bubble/strain/3D)      4. Chest wall pain  XR CERVICAL SPINE (2-3 VIEWS)      5. SINDI (obstructive sleep apnea)  DME Order for CPAP as OP              Grayson Travis DO 5/8/2024 1:13 PM      
aching

## 2024-05-09 RX ORDER — FAMOTIDINE 20 MG/1
20 TABLET, FILM COATED ORAL 2 TIMES DAILY
Qty: 60 TABLET | Refills: 0 | OUTPATIENT
Start: 2024-05-09

## 2024-05-09 NOTE — TELEPHONE ENCOUNTER
Encounters:   05/06/24 133/82   04/08/24 123/74   04/06/24 110/71          (goal 120/80)    All Future Testing planned in [facility]  Lab Frequency Next Occurrence   XR LUMBAR SPINE (2-3 VIEWS) Once 01/05/2024   Sleep Study with PAP Titration Once 04/03/2024   Home Sleep Study Once 04/30/2024   Echo (TTE) complete (PRN contrast/bubble/strain/3D) Once 05/06/2024               Patient Active Problem List:     Gastroesophageal reflux disease     Hyperlipidemia with target LDL less than 100     HTN (hypertension)     Numbness and tingling     Diverticula, colon     Diverticulitis, acute     Eczema     Lump of left breast     Shoulder pain     Right groin pain     Tinea corporis     Hyperlipidemia     Morbid obesity with BMI of 45.0-49.9, adult (HCC)     SINDI (obstructive sleep apnea)     Vitamin D deficiency     Melena     Chest pain     Abnormal stress test     Contact blepharoconjunctivitis of left eye     Prediabetes     Need for prophylactic vaccination and inoculation against varicella     Acute cystitis without hematuria     Primary osteoarthritis of right knee

## 2024-05-14 ENCOUNTER — HOSPITAL ENCOUNTER (OUTPATIENT)
Age: 64
Discharge: HOME | End: 2024-05-16
Payer: MEDICAID

## 2024-05-14 DIAGNOSIS — I51.7 CARDIOMEGALY: ICD-10-CM

## 2024-05-14 DIAGNOSIS — R06.02 SHORTNESS OF BREATH: ICD-10-CM

## 2024-05-14 DIAGNOSIS — R07.9 CHEST PAIN, UNSPECIFIED TYPE: ICD-10-CM

## 2024-05-14 LAB
ECHO AO ASC DIAM: 3 CM
ECHO AO ROOT DIAM: 2.8 CM
ECHO AV AREA PEAK VELOCITY: 2.3 CM2
ECHO AV AREA VTI: 2.4 CM2
ECHO AV MEAN GRADIENT: 5 MMHG
ECHO AV MEAN VELOCITY: 1 M/S
ECHO AV PEAK GRADIENT: 8 MMHG
ECHO AV PEAK VELOCITY: 1.5 M/S
ECHO AV VELOCITY RATIO: 0.67
ECHO AV VTI: 29.8 CM
ECHO EST RA PRESSURE: 5 MMHG
ECHO LA AREA 2C: 18.6 CM2
ECHO LA AREA 4C: 18.7 CM2
ECHO LA DIAMETER: 4 CM
ECHO LA MAJOR AXIS: 5.8 CM
ECHO LA MINOR AXIS: 5.3 CM
ECHO LA TO AORTIC ROOT RATIO: 1.43
ECHO LA VOL BP: 53 ML (ref 22–52)
ECHO LA VOL MOD A2C: 54 ML (ref 22–52)
ECHO LA VOL MOD A4C: 49 ML (ref 22–52)
ECHO LV E' LATERAL VELOCITY: 9 CM/S
ECHO LV E' SEPTAL VELOCITY: 6 CM/S
ECHO LV EJECTION FRACTION BIPLANE: 46 % (ref 55–100)
ECHO LV FRACTIONAL SHORTENING: 35 % (ref 28–44)
ECHO LV INTERNAL DIMENSION DIASTOLIC: 4.9 CM (ref 3.9–5.3)
ECHO LV INTERNAL DIMENSION SYSTOLIC: 3.2 CM
ECHO LV IVSD: 1.1 CM (ref 0.6–0.9)
ECHO LV MASS 2D: 200.5 G (ref 67–162)
ECHO LV POSTERIOR WALL DIASTOLIC: 1.1 CM (ref 0.6–0.9)
ECHO LV RELATIVE WALL THICKNESS RATIO: 0.45
ECHO LVOT AREA: 3.5 CM2
ECHO LVOT AV VTI INDEX: 0.68
ECHO LVOT DIAM: 2.1 CM
ECHO LVOT MEAN GRADIENT: 2 MMHG
ECHO LVOT PEAK GRADIENT: 4 MMHG
ECHO LVOT PEAK VELOCITY: 1 M/S
ECHO LVOT SV: 69.9 ML
ECHO LVOT VTI: 20.2 CM
ECHO MV A VELOCITY: 0.74 M/S
ECHO MV AREA VTI: 2.5 CM2
ECHO MV E DECELERATION TIME (DT): 218 MS
ECHO MV E VELOCITY: 0.72 M/S
ECHO MV E/A RATIO: 0.97
ECHO MV E/E' LATERAL: 8
ECHO MV E/E' RATIO (AVERAGED): 10
ECHO MV E/E' SEPTAL: 12
ECHO MV LVOT VTI INDEX: 1.37
ECHO MV MAX VELOCITY: 0.9 M/S
ECHO MV MEAN GRADIENT: 1 MMHG
ECHO MV MEAN VELOCITY: 0.5 M/S
ECHO MV PEAK GRADIENT: 3 MMHG
ECHO MV VTI: 27.6 CM
ECHO PV MAX VELOCITY: 0.7 M/S
ECHO PV PEAK GRADIENT: 2 MMHG
ECHO RIGHT VENTRICULAR SYSTOLIC PRESSURE (RVSP): 20 MMHG
ECHO RV FREE WALL PEAK S': 10 CM/S
ECHO RV INTERNAL DIMENSION: 4 CM
ECHO RV TAPSE: 2.2 CM (ref 1.7–?)
ECHO TV REGURGITANT MAX VELOCITY: 1.91 M/S
ECHO TV REGURGITANT PEAK GRADIENT: 15 MMHG

## 2024-05-14 PROCEDURE — 93306 TTE W/DOPPLER COMPLETE: CPT

## 2024-05-14 PROCEDURE — 93306 TTE W/DOPPLER COMPLETE: CPT | Performed by: INTERNAL MEDICINE

## 2024-05-15 ENCOUNTER — HOSPITAL ENCOUNTER (OUTPATIENT)
Dept: SLEEP CENTER | Age: 64
Discharge: HOME | End: 2024-05-17
Payer: MEDICAID

## 2024-05-15 DIAGNOSIS — G47.33 OSA (OBSTRUCTIVE SLEEP APNEA): ICD-10-CM

## 2024-05-15 PROCEDURE — G0399 HOME SLEEP TEST/TYPE 3 PORTA: HCPCS

## 2024-05-18 ENCOUNTER — HOSPITAL ENCOUNTER (INPATIENT)
Age: 64
LOS: 2 days | Discharge: HOME | DRG: 244 | End: 2024-05-20
Attending: EMERGENCY MEDICINE | Admitting: STUDENT IN AN ORGANIZED HEALTH CARE EDUCATION/TRAINING PROGRAM
Payer: MEDICAID

## 2024-05-18 ENCOUNTER — APPOINTMENT (OUTPATIENT)
Dept: CT IMAGING | Age: 64
DRG: 244 | End: 2024-05-18
Payer: MEDICAID

## 2024-05-18 DIAGNOSIS — K57.32 DIVERTICULITIS OF COLON: Primary | ICD-10-CM

## 2024-05-18 DIAGNOSIS — I25.10 CORONARY ARTERY DISEASE WITHOUT ANGINA PECTORIS, UNSPECIFIED VESSEL OR LESION TYPE, UNSPECIFIED WHETHER NATIVE OR TRANSPLANTED HEART: ICD-10-CM

## 2024-05-18 LAB
ALBUMIN SERPL-MCNC: 3.7 G/DL (ref 3.5–5.2)
ALBUMIN/GLOB SERPL: 1 {RATIO} (ref 1–2.5)
ALP SERPL-CCNC: 83 U/L (ref 35–104)
ALT SERPL-CCNC: 12 U/L (ref 10–35)
ANION GAP SERPL CALCULATED.3IONS-SCNC: 11 MMOL/L (ref 9–16)
AST SERPL-CCNC: 26 U/L (ref 10–35)
BASOPHILS # BLD: 0.03 K/UL (ref 0–0.2)
BASOPHILS NFR BLD: 0 % (ref 0–2)
BILIRUB SERPL-MCNC: 0.3 MG/DL (ref 0–1.2)
BILIRUB UR QL STRIP: NEGATIVE
BUN SERPL-MCNC: 18 MG/DL (ref 8–23)
CALCIUM SERPL-MCNC: 9 MG/DL (ref 8.6–10.4)
CHLORIDE SERPL-SCNC: 105 MMOL/L (ref 98–107)
CLARITY UR: CLEAR
CO2 SERPL-SCNC: 23 MMOL/L (ref 20–31)
COLOR UR: YELLOW
COMMENT: NORMAL
CREAT SERPL-MCNC: 1.1 MG/DL (ref 0.5–0.9)
CRP SERPL HS-MCNC: 59.2 MG/L (ref 0–5)
EOSINOPHIL # BLD: 0.18 K/UL (ref 0–0.44)
EOSINOPHILS RELATIVE PERCENT: 2 % (ref 1–4)
ERYTHROCYTE [DISTWIDTH] IN BLOOD BY AUTOMATED COUNT: 15.2 % (ref 11.8–14.4)
GFR, ESTIMATED: 59 ML/MIN/1.73M2
GLUCOSE BLD-MCNC: 104 MG/DL (ref 65–105)
GLUCOSE BLD-MCNC: 119 MG/DL (ref 65–105)
GLUCOSE SERPL-MCNC: 124 MG/DL (ref 74–99)
GLUCOSE UR STRIP-MCNC: NEGATIVE MG/DL
HCT VFR BLD AUTO: 39.1 % (ref 36.3–47.1)
HGB BLD-MCNC: 11.9 G/DL (ref 11.9–15.1)
HGB UR QL STRIP.AUTO: NEGATIVE
IMM GRANULOCYTES # BLD AUTO: <0.03 K/UL (ref 0–0.3)
IMM GRANULOCYTES NFR BLD: 0 %
KETONES UR STRIP-MCNC: NEGATIVE MG/DL
LEUKOCYTE ESTERASE UR QL STRIP: NEGATIVE
LIPASE SERPL-CCNC: 33 U/L (ref 13–60)
LYMPHOCYTES NFR BLD: 4.2 K/UL (ref 1.1–3.7)
LYMPHOCYTES RELATIVE PERCENT: 42 % (ref 24–43)
MCH RBC QN AUTO: 27.1 PG (ref 25.2–33.5)
MCHC RBC AUTO-ENTMCNC: 30.4 G/DL (ref 28.4–34.8)
MCV RBC AUTO: 89.1 FL (ref 82.6–102.9)
MONOCYTES NFR BLD: 0.7 K/UL (ref 0.1–1.2)
MONOCYTES NFR BLD: 7 % (ref 3–12)
NEUTROPHILS NFR BLD: 49 % (ref 36–65)
NEUTS SEG NFR BLD: 4.97 K/UL (ref 1.5–8.1)
NITRITE UR QL STRIP: NEGATIVE
NRBC BLD-RTO: 0 PER 100 WBC
PH UR STRIP: 6 [PH] (ref 5–8)
PLATELET # BLD AUTO: 211 K/UL (ref 138–453)
PMV BLD AUTO: 11.2 FL (ref 8.1–13.5)
POTASSIUM SERPL-SCNC: 3.6 MMOL/L (ref 3.7–5.3)
PROT SERPL-MCNC: 7 G/DL (ref 6.6–8.7)
PROT UR STRIP-MCNC: NEGATIVE MG/DL
RBC # BLD AUTO: 4.39 M/UL (ref 3.95–5.11)
RBC # BLD: ABNORMAL 10*6/UL
SODIUM SERPL-SCNC: 139 MMOL/L (ref 136–145)
SP GR UR STRIP: 1.01 (ref 1–1.03)
UROBILINOGEN UR STRIP-ACNC: NORMAL EU/DL (ref 0–1)
WBC OTHER # BLD: 10.1 K/UL (ref 3.5–11.3)

## 2024-05-18 PROCEDURE — 6360000002 HC RX W HCPCS: Performed by: STUDENT IN AN ORGANIZED HEALTH CARE EDUCATION/TRAINING PROGRAM

## 2024-05-18 PROCEDURE — 6360000002 HC RX W HCPCS: Performed by: EMERGENCY MEDICINE

## 2024-05-18 PROCEDURE — 36415 COLL VENOUS BLD VENIPUNCTURE: CPT

## 2024-05-18 PROCEDURE — 96365 THER/PROPH/DIAG IV INF INIT: CPT

## 2024-05-18 PROCEDURE — 2580000003 HC RX 258

## 2024-05-18 PROCEDURE — 86140 C-REACTIVE PROTEIN: CPT

## 2024-05-18 PROCEDURE — 2500000003 HC RX 250 WO HCPCS

## 2024-05-18 PROCEDURE — 2580000003 HC RX 258: Performed by: STUDENT IN AN ORGANIZED HEALTH CARE EDUCATION/TRAINING PROGRAM

## 2024-05-18 PROCEDURE — 6360000002 HC RX W HCPCS

## 2024-05-18 PROCEDURE — 83690 ASSAY OF LIPASE: CPT

## 2024-05-18 PROCEDURE — 96376 TX/PRO/DX INJ SAME DRUG ADON: CPT

## 2024-05-18 PROCEDURE — 96375 TX/PRO/DX INJ NEW DRUG ADDON: CPT

## 2024-05-18 PROCEDURE — 80053 COMPREHEN METABOLIC PANEL: CPT

## 2024-05-18 PROCEDURE — 6370000000 HC RX 637 (ALT 250 FOR IP)

## 2024-05-18 PROCEDURE — 99285 EMERGENCY DEPT VISIT HI MDM: CPT

## 2024-05-18 PROCEDURE — 85025 COMPLETE CBC W/AUTO DIFF WBC: CPT

## 2024-05-18 PROCEDURE — 1200000000 HC SEMI PRIVATE

## 2024-05-18 PROCEDURE — 74177 CT ABD & PELVIS W/CONTRAST: CPT

## 2024-05-18 PROCEDURE — 81003 URINALYSIS AUTO W/O SCOPE: CPT

## 2024-05-18 PROCEDURE — 6360000004 HC RX CONTRAST MEDICATION: Performed by: STUDENT IN AN ORGANIZED HEALTH CARE EDUCATION/TRAINING PROGRAM

## 2024-05-18 PROCEDURE — 82947 ASSAY GLUCOSE BLOOD QUANT: CPT

## 2024-05-18 RX ORDER — 0.9 % SODIUM CHLORIDE 0.9 %
1000 INTRAVENOUS SOLUTION INTRAVENOUS ONCE
Status: COMPLETED | OUTPATIENT
Start: 2024-05-18 | End: 2024-05-18

## 2024-05-18 RX ORDER — LISINOPRIL 10 MG/1
10 TABLET ORAL DAILY
Status: DISCONTINUED | OUTPATIENT
Start: 2024-05-19 | End: 2024-05-20 | Stop reason: HOSPADM

## 2024-05-18 RX ORDER — METRONIDAZOLE 500 MG/100ML
500 INJECTION, SOLUTION INTRAVENOUS EVERY 8 HOURS
Status: DISCONTINUED | OUTPATIENT
Start: 2024-05-18 | End: 2024-05-19

## 2024-05-18 RX ORDER — ATORVASTATIN CALCIUM 40 MG/1
40 TABLET, FILM COATED ORAL DAILY
Status: DISCONTINUED | OUTPATIENT
Start: 2024-05-18 | End: 2024-05-20 | Stop reason: HOSPADM

## 2024-05-18 RX ORDER — POTASSIUM CHLORIDE 7.45 MG/ML
10 INJECTION INTRAVENOUS PRN
Status: DISCONTINUED | OUTPATIENT
Start: 2024-05-18 | End: 2024-05-20 | Stop reason: HOSPADM

## 2024-05-18 RX ORDER — ONDANSETRON 2 MG/ML
4 INJECTION INTRAMUSCULAR; INTRAVENOUS ONCE
Status: COMPLETED | OUTPATIENT
Start: 2024-05-18 | End: 2024-05-18

## 2024-05-18 RX ORDER — ONDANSETRON 2 MG/ML
4 INJECTION INTRAMUSCULAR; INTRAVENOUS EVERY 6 HOURS PRN
Status: DISCONTINUED | OUTPATIENT
Start: 2024-05-18 | End: 2024-05-20 | Stop reason: HOSPADM

## 2024-05-18 RX ORDER — ONDANSETRON 4 MG/1
4 TABLET, ORALLY DISINTEGRATING ORAL EVERY 8 HOURS PRN
Status: DISCONTINUED | OUTPATIENT
Start: 2024-05-18 | End: 2024-05-20 | Stop reason: HOSPADM

## 2024-05-18 RX ORDER — MORPHINE SULFATE 4 MG/ML
4 INJECTION, SOLUTION INTRAMUSCULAR; INTRAVENOUS ONCE
Status: DISCONTINUED | OUTPATIENT
Start: 2024-05-18 | End: 2024-05-18

## 2024-05-18 RX ORDER — DEXTROSE MONOHYDRATE 100 MG/ML
INJECTION, SOLUTION INTRAVENOUS CONTINUOUS PRN
Status: DISCONTINUED | OUTPATIENT
Start: 2024-05-18 | End: 2024-05-20 | Stop reason: HOSPADM

## 2024-05-18 RX ORDER — FENTANYL CITRATE 50 UG/ML
50 INJECTION, SOLUTION INTRAMUSCULAR; INTRAVENOUS ONCE
Status: COMPLETED | OUTPATIENT
Start: 2024-05-18 | End: 2024-05-18

## 2024-05-18 RX ORDER — INSULIN LISPRO 100 [IU]/ML
0-4 INJECTION, SOLUTION INTRAVENOUS; SUBCUTANEOUS
Status: DISCONTINUED | OUTPATIENT
Start: 2024-05-18 | End: 2024-05-20 | Stop reason: HOSPADM

## 2024-05-18 RX ORDER — MAGNESIUM SULFATE IN WATER 40 MG/ML
2000 INJECTION, SOLUTION INTRAVENOUS PRN
Status: DISCONTINUED | OUTPATIENT
Start: 2024-05-18 | End: 2024-05-20 | Stop reason: HOSPADM

## 2024-05-18 RX ORDER — SODIUM CHLORIDE 9 MG/ML
INJECTION, SOLUTION INTRAVENOUS CONTINUOUS
Status: DISCONTINUED | OUTPATIENT
Start: 2024-05-18 | End: 2024-05-19

## 2024-05-18 RX ORDER — LISINOPRIL 10 MG/1
10 TABLET ORAL DAILY
Status: DISCONTINUED | OUTPATIENT
Start: 2024-05-18 | End: 2024-05-18

## 2024-05-18 RX ORDER — POTASSIUM CHLORIDE 20 MEQ/1
40 TABLET, EXTENDED RELEASE ORAL PRN
Status: DISCONTINUED | OUTPATIENT
Start: 2024-05-18 | End: 2024-05-20 | Stop reason: HOSPADM

## 2024-05-18 RX ORDER — MORPHINE SULFATE 4 MG/ML
2 INJECTION, SOLUTION INTRAMUSCULAR; INTRAVENOUS EVERY 4 HOURS PRN
Status: DISCONTINUED | OUTPATIENT
Start: 2024-05-18 | End: 2024-05-18

## 2024-05-18 RX ORDER — HYDROCHLOROTHIAZIDE 25 MG/1
25 TABLET ORAL DAILY
Status: DISCONTINUED | OUTPATIENT
Start: 2024-05-18 | End: 2024-05-20 | Stop reason: HOSPADM

## 2024-05-18 RX ORDER — ASPIRIN 81 MG/1
81 TABLET ORAL DAILY
Status: DISCONTINUED | OUTPATIENT
Start: 2024-05-18 | End: 2024-05-20 | Stop reason: HOSPADM

## 2024-05-18 RX ORDER — SODIUM CHLORIDE 0.9 % (FLUSH) 0.9 %
5-40 SYRINGE (ML) INJECTION PRN
Status: DISCONTINUED | OUTPATIENT
Start: 2024-05-18 | End: 2024-05-20 | Stop reason: HOSPADM

## 2024-05-18 RX ORDER — INSULIN LISPRO 100 [IU]/ML
0-4 INJECTION, SOLUTION INTRAVENOUS; SUBCUTANEOUS NIGHTLY
Status: DISCONTINUED | OUTPATIENT
Start: 2024-05-18 | End: 2024-05-20 | Stop reason: HOSPADM

## 2024-05-18 RX ORDER — ACETAMINOPHEN 325 MG/1
650 TABLET ORAL EVERY 6 HOURS PRN
Status: DISCONTINUED | OUTPATIENT
Start: 2024-05-18 | End: 2024-05-20 | Stop reason: HOSPADM

## 2024-05-18 RX ORDER — SODIUM CHLORIDE 9 MG/ML
INJECTION, SOLUTION INTRAVENOUS PRN
Status: DISCONTINUED | OUTPATIENT
Start: 2024-05-18 | End: 2024-05-20 | Stop reason: HOSPADM

## 2024-05-18 RX ORDER — ENOXAPARIN SODIUM 100 MG/ML
40 INJECTION SUBCUTANEOUS DAILY
Status: DISCONTINUED | OUTPATIENT
Start: 2024-05-18 | End: 2024-05-20 | Stop reason: HOSPADM

## 2024-05-18 RX ORDER — CIPROFLOXACIN 2 MG/ML
400 INJECTION, SOLUTION INTRAVENOUS EVERY 12 HOURS
Status: DISCONTINUED | OUTPATIENT
Start: 2024-05-18 | End: 2024-05-19

## 2024-05-18 RX ORDER — MORPHINE SULFATE 2 MG/ML
2 INJECTION, SOLUTION INTRAMUSCULAR; INTRAVENOUS EVERY 4 HOURS PRN
Status: DISCONTINUED | OUTPATIENT
Start: 2024-05-18 | End: 2024-05-20 | Stop reason: HOSPADM

## 2024-05-18 RX ORDER — ACETAMINOPHEN 650 MG/1
650 SUPPOSITORY RECTAL EVERY 6 HOURS PRN
Status: DISCONTINUED | OUTPATIENT
Start: 2024-05-18 | End: 2024-05-20 | Stop reason: HOSPADM

## 2024-05-18 RX ORDER — GLUCAGON 1 MG/ML
1 KIT INJECTION PRN
Status: DISCONTINUED | OUTPATIENT
Start: 2024-05-18 | End: 2024-05-20 | Stop reason: HOSPADM

## 2024-05-18 RX ORDER — SODIUM CHLORIDE 0.9 % (FLUSH) 0.9 %
5-40 SYRINGE (ML) INJECTION EVERY 12 HOURS SCHEDULED
Status: DISCONTINUED | OUTPATIENT
Start: 2024-05-18 | End: 2024-05-20 | Stop reason: HOSPADM

## 2024-05-18 RX ORDER — POTASSIUM CHLORIDE 20 MEQ/1
40 TABLET, EXTENDED RELEASE ORAL ONCE
Status: COMPLETED | OUTPATIENT
Start: 2024-05-18 | End: 2024-05-18

## 2024-05-18 RX ADMIN — CIPROFLOXACIN 400 MG: 2 INJECTION, SOLUTION INTRAVENOUS at 08:59

## 2024-05-18 RX ADMIN — SODIUM CHLORIDE, PRESERVATIVE FREE 10 ML: 5 INJECTION INTRAVENOUS at 11:52

## 2024-05-18 RX ADMIN — SODIUM CHLORIDE: 9 INJECTION, SOLUTION INTRAVENOUS at 13:10

## 2024-05-18 RX ADMIN — MORPHINE SULFATE 2 MG: 4 INJECTION INTRAVENOUS at 11:35

## 2024-05-18 RX ADMIN — ONDANSETRON 4 MG: 2 INJECTION INTRAMUSCULAR; INTRAVENOUS at 03:36

## 2024-05-18 RX ADMIN — ACETAMINOPHEN 650 MG: 325 TABLET ORAL at 22:28

## 2024-05-18 RX ADMIN — FENTANYL CITRATE 50 MCG: 50 INJECTION, SOLUTION INTRAMUSCULAR; INTRAVENOUS at 03:41

## 2024-05-18 RX ADMIN — ONDANSETRON 4 MG: 2 INJECTION INTRAMUSCULAR; INTRAVENOUS at 02:10

## 2024-05-18 RX ADMIN — FENTANYL CITRATE 50 MCG: 50 INJECTION, SOLUTION INTRAMUSCULAR; INTRAVENOUS at 06:28

## 2024-05-18 RX ADMIN — POTASSIUM CHLORIDE 40 MEQ: 1500 TABLET, EXTENDED RELEASE ORAL at 20:08

## 2024-05-18 RX ADMIN — METRONIDAZOLE 500 MG: 500 SOLUTION INTRAVENOUS at 11:27

## 2024-05-18 RX ADMIN — PIPERACILLIN AND TAZOBACTAM 4500 MG: 4; .5 INJECTION, POWDER, LYOPHILIZED, FOR SOLUTION INTRAVENOUS at 07:58

## 2024-05-18 RX ADMIN — SODIUM CHLORIDE: 9 INJECTION, SOLUTION INTRAVENOUS at 20:08

## 2024-05-18 RX ADMIN — ATORVASTATIN CALCIUM 40 MG: 40 TABLET, FILM COATED ORAL at 20:08

## 2024-05-18 RX ADMIN — MORPHINE SULFATE 2 MG: 2 INJECTION, SOLUTION INTRAMUSCULAR; INTRAVENOUS at 15:55

## 2024-05-18 RX ADMIN — SODIUM CHLORIDE, PRESERVATIVE FREE 20 MG: 5 INJECTION INTRAVENOUS at 22:29

## 2024-05-18 RX ADMIN — SODIUM CHLORIDE 1000 ML: 9 INJECTION, SOLUTION INTRAVENOUS at 02:09

## 2024-05-18 RX ADMIN — METRONIDAZOLE 500 MG: 500 SOLUTION INTRAVENOUS at 20:37

## 2024-05-18 RX ADMIN — CIPROFLOXACIN 400 MG: 2 INJECTION, SOLUTION INTRAVENOUS at 22:34

## 2024-05-18 RX ADMIN — IOPAMIDOL 75 ML: 755 INJECTION, SOLUTION INTRAVENOUS at 03:13

## 2024-05-18 ASSESSMENT — PAIN DESCRIPTION - DESCRIPTORS
DESCRIPTORS: CRAMPING
DESCRIPTORS: SHARP;STABBING
DESCRIPTORS: ACHING;DISCOMFORT
DESCRIPTORS: ACHING
DESCRIPTORS: ACHING;SHARP

## 2024-05-18 ASSESSMENT — PAIN - FUNCTIONAL ASSESSMENT
PAIN_FUNCTIONAL_ASSESSMENT: 0-10
PAIN_FUNCTIONAL_ASSESSMENT: ACTIVITIES ARE NOT PREVENTED
PAIN_FUNCTIONAL_ASSESSMENT: ACTIVITIES ARE NOT PREVENTED
PAIN_FUNCTIONAL_ASSESSMENT: 0-10

## 2024-05-18 ASSESSMENT — PAIN SCALES - GENERAL
PAINLEVEL_OUTOF10: 8
PAINLEVEL_OUTOF10: 8
PAINLEVEL_OUTOF10: 7
PAINLEVEL_OUTOF10: 5
PAINLEVEL_OUTOF10: 2
PAINLEVEL_OUTOF10: 10
PAINLEVEL_OUTOF10: 7
PAINLEVEL_OUTOF10: 0
PAINLEVEL_OUTOF10: 8
PAINLEVEL_OUTOF10: 5
PAINLEVEL_OUTOF10: 7
PAINLEVEL_OUTOF10: 8
PAINLEVEL_OUTOF10: 0
PAINLEVEL_OUTOF10: 8

## 2024-05-18 ASSESSMENT — PAIN DESCRIPTION - ORIENTATION
ORIENTATION: RIGHT;LEFT;ANTERIOR;LOWER
ORIENTATION: ANTERIOR
ORIENTATION: RIGHT;LEFT
ORIENTATION: LEFT;RIGHT;LOWER

## 2024-05-18 ASSESSMENT — PAIN DESCRIPTION - LOCATION
LOCATION: ABDOMEN
LOCATION: HEAD
LOCATION: ABDOMEN

## 2024-05-18 ASSESSMENT — LIFESTYLE VARIABLES
HOW OFTEN DO YOU HAVE A DRINK CONTAINING ALCOHOL: NEVER
HOW MANY STANDARD DRINKS CONTAINING ALCOHOL DO YOU HAVE ON A TYPICAL DAY: PATIENT DOES NOT DRINK

## 2024-05-18 ASSESSMENT — PAIN DESCRIPTION - FREQUENCY
FREQUENCY: INTERMITTENT

## 2024-05-18 ASSESSMENT — PAIN DESCRIPTION - PAIN TYPE
TYPE: ACUTE PAIN

## 2024-05-18 ASSESSMENT — PAIN DESCRIPTION - ONSET
ONSET: GRADUAL
ONSET: ON-GOING

## 2024-05-18 NOTE — CONSULTS
frequency plus additional to accommodate PRN testing needs. Dispense all needed supplies to include: monitor, strips, lancing device, lancets, control solutions, alcohol swabs. 2/14/24   Delia Kumari MD   lisinopril (PRINIVIL;ZESTRIL) 10 MG tablet take 1 tablet by mouth once daily 2/10/24   Delia Kumari MD   Dulaglutide (TRULICITY) 0.75 MG/0.5ML SOPN Inject 1.5 mg into the skin once a week 1/5/24   Delia Kumari MD   aspirin 81 MG tablet Take 1 tablet by mouth daily    Provider, MD Ross    Scheduled Meds:   piperacillin-tazobactam  4,500 mg IntraVENous Once     Continuous Infusions:  PRN Meds:.  Allergies  has No Known Allergies.  Family History  family history includes Cancer in her father; Diabetes in her mother; High Blood Pressure in her mother.  Social History   reports that she quit smoking about 33 years ago. Her smoking use included cigarettes. She started smoking about 40 years ago. She has a 1.8 pack-year smoking history. She has been exposed to tobacco smoke. She has never used smokeless tobacco.   reports that she does not currently use alcohol.   reports no history of drug use.  Review of Systems  General Denies any fever or chills  HEENT  Denies any diplopia, tinnitus or vertigo  Resp Denies any shortness of breath, cough or wheezing  Cardiac Hx HTN  GI endorses LLQ abdominal pain, frequent soft BM w/o blood or mucous   Denies any frequency, urgency, hesitancy or incontinence  Heme Denies bruising or bleeding easily  Endocrine DM-II  Neuro Denies any focal motor or sensory deficits    PHYSICAL:   VITALS:  height is 1.575 m (5' 2\") and weight is 123.8 kg (273 lb). Her oral temperature is 98.7 °F (37.1 °C). Her blood pressure is 124/61 and her pulse is 94. Her respiration is 19 and oxygen saturation is 97%.   CONSTITUTIONAL: Alert and oriented times 3, no acute distress and cooperative to examination.  HEENT: Head is normocephalic, atraumatic. EOMI, PERRLA  NECK: Soft,

## 2024-05-18 NOTE — ED NOTES
ED to inpatient nurses report      Chief Complaint:  Chief Complaint   Patient presents with    Abdominal Pain     Hx of Diverticulitits     Present to ED from: home    MOA:     LOC: alert and orientated to name, place, date  Mobility: Requires assistance * 1  Oxygen Baseline: RA    Current needs required: RA   Pending ED orders: none  Present condition: stable    Why did the patient come to the ED? Pt presents to ED from home with c/o of abdominal pain started yesterday. Pt states he felt pain on her lower abdomen. PS of 8/10. Pt states she has history of Diverticulitis. Pt denies n/v/d, LOC, CP, SOB, fever, chills, injury/trauma, change in bowel/bladder function, loss of appetite, bleeding  or any other sx.       Pt is alert and oriented x4. NAD. VSS except HTN. Pt placed on continuous cardiac monitoring, BP, Pulse ox. IV established and labs drawn, labeled and sent to labs. Changed into gown. Dr. Burroughs at bedside assessing the pt. Call light within reach. Will monitor plan of care.   What is the plan? Admission, Antibiotics, continue to assess pain, if worsens, patient to have repeat CT per surgery  Any procedures or intervention occur? Labs, CT   Any safety concerns??    Mental Status:  Level of Consciousness: Alert (0)    Psych Assessment:   Psychosocial  Psychosocial (WDL): Within Defined Limits  Vital signs   Vitals:    05/18/24 0759 05/18/24 0814 05/18/24 0829 05/18/24 0844   BP: 130/81 135/67 123/75    Pulse: 90 82 87 79   Resp: 18 17 17 18   Temp:       TempSrc:       SpO2: 94% 97% 98% 96%   Weight:       Height:            Vitals:  Patient Vitals for the past 24 hrs:   BP Temp Temp src Pulse Resp SpO2 Height Weight   05/18/24 0844 -- -- -- 79 18 96 % -- --   05/18/24 0829 123/75 -- -- 87 17 98 % -- --   05/18/24 0814 135/67 -- -- 82 17 97 % -- --   05/18/24 0759 130/81 -- -- 90 18 94 % -- --   05/18/24 0744 (!) 153/75 -- -- 94 23 97 % -- --   05/18/24 0729 (!) 149/58 -- -- 73 16 97 % -- --   05/18/24 0714

## 2024-05-18 NOTE — ED NOTES
Pt presents to ED from home with c/o of abdominal pain started yesterday. Pt states he felt pain on her lower abdomen. PS of 8/10. Pt states she has history of Diverticulitis. Pt denies n/v/d, LOC, CP, SOB, fever, chills, injury/trauma, change in bowel/bladder function, loss of appetite, bleeding  or any other sx.      Pt is alert and oriented x4. NAD. VSS except HTN. Pt placed on continuous cardiac monitoring, BP, Pulse ox. IV established and labs drawn, labeled and sent to labs. Changed into gown. Dr. Burroughs at bedside assessing the pt. Call light within reach. Will monitor plan of care.

## 2024-05-18 NOTE — ED NOTES
The following labs were labeled with appropriate pt sticker and tubed to lab:     [x] Blue     [x] Lavender   [] on ice  [x] Green/yellow  [x] Green/black [] on ice  [] Christian  [] on ice  [x] Yellow  [] Red  [] Pink  [] Type/ Screen  [] ABG  [] VBG    [] COVID-19 swab    [] Rapid  [] PCR  [] Flu swab  [] Peds Viral Panel     [] Urine Sample  [] Fecal Sample  [] Pelvic Cultures  [] Blood Cultures  [] X 2  [] STREP Cultures  [] Wound Cultures

## 2024-05-18 NOTE — ED PROVIDER NOTES
[facility] ED  Emergency Department  Emergency Medicine Resident Turn-Over     Note Started: 7:27 AM EDT    Care of Lizbeth Hoff was assumed from Dr. Burroughs and is being seen for Abdominal Pain (Hx of Diverticulitits)  .  The patient's initial evaluation and plan have been discussed with the prior provider who initially evaluated the patient.     EMERGENCY DEPARTMENT COURSE / MEDICAL DECISION MAKING:       MEDICATIONS GIVEN:  Orders Placed This Encounter   Medications    sodium chloride 0.9 % bolus 1,000 mL    DISCONTD: morphine injection 4 mg    ondansetron (ZOFRAN) injection 4 mg    iopamidol (ISOVUE-370) 76 % injection 75 mL    ondansetron (ZOFRAN) injection 4 mg    fentaNYL (SUBLIMAZE) injection 50 mcg    fentaNYL (SUBLIMAZE) injection 50 mcg    piperacillin-tazobactam (ZOSYN) 4,500 mg in sodium chloride 0.9 % 100 mL IVPB ([id number])     Order Specific Question:   Antimicrobial Indications     Answer:   Intra-Abdominal Infection    ciprofloxacin (CIPRO) IVPB 400 mg     Order Specific Question:   Antimicrobial Indications     Answer:   Intra-Abdominal Infection    metroNIDAZOLE (FLAGYL) 500 mg in 0.9% NaCl 100 mL IVPB premix     Order Specific Question:   Antimicrobial Indications     Answer:   Intra-Abdominal Infection       LABS / RADIOLOGY:     Labs Reviewed   CBC WITH AUTO DIFFERENTIAL - Abnormal; Notable for the following components:       Result Value    RDW 15.2 (*)     Lymphocytes Absolute 4.20 (*)     All other components within normal limits   COMPREHENSIVE METABOLIC PANEL - Abnormal; Notable for the following components:    Potassium 3.6 (*)     Glucose 124 (*)     Creatinine 1.1 (*)     Est, Glom Filt Rate 59 (*)     All other components within normal limits   URINALYSIS WITH REFLEX TO CULTURE   LIPASE       CT ABDOMEN PELVIS W IV CONTRAST Additional Contrast? None    Result Date: 5/18/2024  Findings consistent with acute diverticulitis of the proximal end of the sigmoid colon 
        [facility]   Emergency Department  Faculty Attestation       I performed a history and physical examination of the patient and discussed management with the resident. I reviewed the resident’s note and agree with the documented findings including all diagnostic interpretations and plan of care. Any areas of disagreement are noted on the chart. I was personally present for the key portions of any procedures. I have documented in the chart those procedures where I was not present during the key portions. I have reviewed the emergency nurses triage note. I agree with the chief complaint, past medical history, past surgical history, allergies, medications, social and family history as documented unless otherwise noted below.  For Physician Assistant/ Nurse Practitioner cases/documentation I have personally evaluated this patient and have completed at least one if not all key elements of the E/M (history, physical exam, and MDM). Additional findings are as noted.    Patient Name: Lizbeth Hoff  MRN: [id number]  : 1960  Primary Care Physician: Delia Kumari MD    Date of evaluationa: 2024   Note Started: 2:59 AM EDT    Pertinent Comments     Chief Complaint:   Chief Complaint   Patient presents with    Abdominal Pain     Hx of Diverticulitits        Initial vitals: (If not listed, please see nursing documentation)  ED Triage Vitals [24 0156]   BP Temp Temp Source Pulse Respirations SpO2 Height Weight - Scale   (!) 144/78 98.7 °F (37.1 °C) Oral 95 20 98 % 1.575 m (5' 2\") 123.8 kg (273 lb)        HPI/PE/Impression:  This is a 63 y.o. female who presents to the Emergency Department w/ LLQ abdominal pain.  H/o diverticulitis.  States that this feels similar.  On exam she appears uncomfortable but not in distress.  Heart sounds regular and lungs clear. A bdomen is soft and nondistended but has exquisite tenderness voer the LLQ with no rebound or guarding. Alert and oriented. 
Faculty Sign-Out Attestation  Handoff taken on the following patient from prior Attending Physician: Erin    Note Started: 7:50 AM EDT    I was available and discussed any additional care issues that arose and coordinated the management plans with the resident(s) caring for the patient during my duty period. Any areas of disagreement with resident’s documentation of care or procedures are noted on the chart. I was personally present for the key portions of any/all procedures during my duty period. I have documented in the chart those procedures where I was not present during the key portions.    CT ABDOMEN PELVIS W IV CONTRAST Additional Contrast? None   Preliminary Result   Findings consistent with acute diverticulitis of the proximal end of the   sigmoid colon with moderate inflammatory phlegmon around this segment. No   evidence of diverticular perforation or abscess formation.  No evidence of   pneumoperitoneum or pneumo -retroperitoneum.      Evidence of previous cholecystectomy and hysterectomy.  Appendix is not   identified, presumably surgically absent.               Hugo Boyd MD  Attending Physician        Hugo Boyd MD  05/18/24 0753    
Course.    Risk  Prescription drug management.  Decision regarding hospitalization.          EMERGENCY DEPARTMENT COURSE:      ED Course as of 05/18/24 0853   Sat May 18, 2024   0259 WBC: 10.1 [AB]   0259 Hemoglobin Quant: 11.9 [AB]   0259 Lipase: 33 [AB]   0259 Creatinine(!): 1.1 [AB]   0355 UA unremarkable [AB]   0655 CT ABDOMEN PELVIS W IV CONTRAST Additional Contrast? None  IMPRESSION:  Findings consistent with acute diverticulitis of the proximal end of the  sigmoid colon with moderate inflammatory phlegmon around this segment. No  evidence of diverticular perforation or abscess formation.  No evidence of  pneumoperitoneum or pneumo -retroperitoneum.     Evidence of previous cholecystectomy and hysterectomy.  Appendix is not  identified, presumably surgically absent.   [AB]   0701 Patient is still having significant pain, will plan on admission for pain control.  Will discuss with general surgery [AB]   0732 Patient signed out to oncoming resident awaiting admission [AB]   0851 Per surgery, oral cipro and flagyl, admit to medicine, clear liquid diet. [JG]      ED Course User Index  [AB] Adelaida Burroughs DO  [JG] Whit Wells MD       PROCEDURES:  None    CONSULTS:  IP CONSULT TO GENERAL SURGERY  IP CONSULT TO FAMILY MEDICINE    CRITICAL CARE:  None    FINAL IMPRESSION      1. Diverticulitis of colon          DISPOSITION / PLAN     DISPOSITION Decision To Admit 05/18/2024 07:01:19 AM      PATIENT REFERRED TO:  No follow-up provider specified.    DISCHARGE MEDICATIONS:  New Prescriptions    No medications on file       Adelaida BLOOM DO  Emergency Medicine Resident    (Please note that portions of this note were completed with a voice recognition program.  Efforts were made to edit the dictations but occasionally words are mis-transcribed.)

## 2024-05-18 NOTE — PLAN OF CARE
Problem: Pain  Goal: Verbalizes/displays adequate comfort level or baseline comfort level  Outcome: Progressing     Problem: Skin/Tissue Integrity  Goal: Absence of new skin breakdown  Description: 1.  Monitor for areas of redness and/or skin breakdown  2.  Assess vascular access sites hourly  3.  Every 4-6 hours minimum:  Change oxygen saturation probe site  4.  Every 4-6 hours:  If on nasal continuous positive airway pressure, respiratory therapy assess nares and determine need for appliance change or resting period.  Outcome: Progressing      Called an left a message for her to call me back

## 2024-05-19 LAB
ANION GAP SERPL CALCULATED.3IONS-SCNC: 12 MMOL/L (ref 9–16)
BASOPHILS # BLD: 0.03 K/UL (ref 0–0.2)
BASOPHILS NFR BLD: 0 % (ref 0–2)
BUN SERPL-MCNC: 7 MG/DL (ref 8–23)
CALCIUM SERPL-MCNC: 9 MG/DL (ref 8.6–10.4)
CHLORIDE SERPL-SCNC: 107 MMOL/L (ref 98–107)
CO2 SERPL-SCNC: 22 MMOL/L (ref 20–31)
CREAT SERPL-MCNC: 1 MG/DL (ref 0.5–0.9)
EOSINOPHIL # BLD: 0.21 K/UL (ref 0–0.44)
EOSINOPHILS RELATIVE PERCENT: 2 % (ref 1–4)
ERYTHROCYTE [DISTWIDTH] IN BLOOD BY AUTOMATED COUNT: 15.4 % (ref 11.8–14.4)
GFR, ESTIMATED: 63 ML/MIN/1.73M2
GLUCOSE BLD-MCNC: 119 MG/DL (ref 65–105)
GLUCOSE BLD-MCNC: 124 MG/DL (ref 65–105)
GLUCOSE BLD-MCNC: 78 MG/DL (ref 65–105)
GLUCOSE BLD-MCNC: 88 MG/DL (ref 65–105)
GLUCOSE SERPL-MCNC: 126 MG/DL (ref 74–99)
HCT VFR BLD AUTO: 40.2 % (ref 36.3–47.1)
HGB BLD-MCNC: 12.1 G/DL (ref 11.9–15.1)
IMM GRANULOCYTES # BLD AUTO: 0.03 K/UL (ref 0–0.3)
IMM GRANULOCYTES NFR BLD: 0 %
LYMPHOCYTES NFR BLD: 3.73 K/UL (ref 1.1–3.7)
LYMPHOCYTES RELATIVE PERCENT: 40 % (ref 24–43)
MCH RBC QN AUTO: 27.1 PG (ref 25.2–33.5)
MCHC RBC AUTO-ENTMCNC: 30.1 G/DL (ref 28.4–34.8)
MCV RBC AUTO: 89.9 FL (ref 82.6–102.9)
MONOCYTES NFR BLD: 0.56 K/UL (ref 0.1–1.2)
MONOCYTES NFR BLD: 6 % (ref 3–12)
NEUTROPHILS NFR BLD: 52 % (ref 36–65)
NEUTS SEG NFR BLD: 4.77 K/UL (ref 1.5–8.1)
NRBC BLD-RTO: 0 PER 100 WBC
PLATELET # BLD AUTO: 229 K/UL (ref 138–453)
PMV BLD AUTO: 11.1 FL (ref 8.1–13.5)
POTASSIUM SERPL-SCNC: 3.9 MMOL/L (ref 3.7–5.3)
RBC # BLD AUTO: 4.47 M/UL (ref 3.95–5.11)
RBC # BLD: ABNORMAL 10*6/UL
SODIUM SERPL-SCNC: 141 MMOL/L (ref 136–145)
WBC OTHER # BLD: 9.3 K/UL (ref 3.5–11.3)

## 2024-05-19 PROCEDURE — 2580000003 HC RX 258

## 2024-05-19 PROCEDURE — 6370000000 HC RX 637 (ALT 250 FOR IP)

## 2024-05-19 PROCEDURE — 2500000003 HC RX 250 WO HCPCS

## 2024-05-19 PROCEDURE — A4216 STERILE WATER/SALINE, 10 ML: HCPCS

## 2024-05-19 PROCEDURE — 82947 ASSAY GLUCOSE BLOOD QUANT: CPT

## 2024-05-19 PROCEDURE — 85025 COMPLETE CBC W/AUTO DIFF WBC: CPT

## 2024-05-19 PROCEDURE — 6360000002 HC RX W HCPCS

## 2024-05-19 PROCEDURE — 80048 BASIC METABOLIC PNL TOTAL CA: CPT

## 2024-05-19 PROCEDURE — 1200000000 HC SEMI PRIVATE

## 2024-05-19 PROCEDURE — 36415 COLL VENOUS BLD VENIPUNCTURE: CPT

## 2024-05-19 RX ORDER — CIPROFLOXACIN 500 MG/1
500 TABLET, FILM COATED ORAL EVERY 12 HOURS SCHEDULED
Status: DISCONTINUED | OUTPATIENT
Start: 2024-05-19 | End: 2024-05-20 | Stop reason: HOSPADM

## 2024-05-19 RX ORDER — METRONIDAZOLE 500 MG/1
500 TABLET ORAL EVERY 8 HOURS SCHEDULED
Status: DISCONTINUED | OUTPATIENT
Start: 2024-05-19 | End: 2024-05-20 | Stop reason: HOSPADM

## 2024-05-19 RX ORDER — FAMOTIDINE 20 MG/1
20 TABLET, FILM COATED ORAL DAILY
Status: DISCONTINUED | OUTPATIENT
Start: 2024-05-20 | End: 2024-05-19

## 2024-05-19 RX ORDER — FAMOTIDINE 20 MG/1
20 TABLET, FILM COATED ORAL 2 TIMES DAILY
Status: DISCONTINUED | OUTPATIENT
Start: 2024-05-19 | End: 2024-05-20 | Stop reason: HOSPADM

## 2024-05-19 RX ADMIN — SODIUM CHLORIDE, PRESERVATIVE FREE 20 MG: 5 INJECTION INTRAVENOUS at 09:40

## 2024-05-19 RX ADMIN — CIPROFLOXACIN 500 MG: 500 TABLET, FILM COATED ORAL at 23:25

## 2024-05-19 RX ADMIN — ASPIRIN 81 MG: 81 TABLET, COATED ORAL at 08:16

## 2024-05-19 RX ADMIN — METRONIDAZOLE 500 MG: 500 SOLUTION INTRAVENOUS at 04:44

## 2024-05-19 RX ADMIN — METRONIDAZOLE 500 MG: 500 SOLUTION INTRAVENOUS at 16:39

## 2024-05-19 RX ADMIN — FAMOTIDINE 20 MG: 20 TABLET, FILM COATED ORAL at 23:25

## 2024-05-19 RX ADMIN — CIPROFLOXACIN 400 MG: 2 INJECTION, SOLUTION INTRAVENOUS at 09:42

## 2024-05-19 RX ADMIN — METRONIDAZOLE 500 MG: 500 TABLET ORAL at 23:25

## 2024-05-19 RX ADMIN — ATORVASTATIN CALCIUM 40 MG: 40 TABLET, FILM COATED ORAL at 08:16

## 2024-05-19 RX ADMIN — METRONIDAZOLE 500 MG: 500 SOLUTION INTRAVENOUS at 08:20

## 2024-05-19 RX ADMIN — ENOXAPARIN SODIUM 40 MG: 100 INJECTION SUBCUTANEOUS at 08:17

## 2024-05-19 ASSESSMENT — PAIN DESCRIPTION - ONSET: ONSET: ON-GOING

## 2024-05-19 ASSESSMENT — PAIN DESCRIPTION - PAIN TYPE: TYPE: CHRONIC PAIN

## 2024-05-19 ASSESSMENT — PAIN SCALES - GENERAL
PAINLEVEL_OUTOF10: 0
PAINLEVEL_OUTOF10: 4

## 2024-05-19 ASSESSMENT — PAIN DESCRIPTION - FREQUENCY: FREQUENCY: INTERMITTENT

## 2024-05-19 ASSESSMENT — PAIN DESCRIPTION - ORIENTATION: ORIENTATION: ANTERIOR;LOWER

## 2024-05-19 ASSESSMENT — PAIN DESCRIPTION - DESCRIPTORS: DESCRIPTORS: CRAMPING

## 2024-05-19 NOTE — H&P
CONSULT TO SOCIAL WORK  IP CONSULT TO SPIRITUAL SERVICES  PT/OT        Above plan discussed with the patient, who agreed to the above plan     Plan will be discussed with the attending, Dr. Keli Hunter MD  Family Medicine Resident  5/18/2024 11:18 PM

## 2024-05-19 NOTE — PLAN OF CARE
Problem: Pain  Goal: Verbalizes/displays adequate comfort level or baseline comfort level  5/19/2024 1330 by Sindhu Cage RN  Outcome: Progressing  5/19/2024 0447 by Yamile Mcrae RN  Outcome: Progressing     Problem: Skin/Tissue Integrity  Goal: Absence of new skin breakdown  Description: 1.  Monitor for areas of redness and/or skin breakdown  2.  Assess vascular access sites hourly  3.  Every 4-6 hours minimum:  Change oxygen saturation probe site  4.  Every 4-6 hours:  If on nasal continuous positive airway pressure, respiratory therapy assess nares and determine need for appliance change or resting period.  5/19/2024 1330 by Sindhu Cage, RN  Outcome: Progressing  5/19/2024 0447 by Yamile Mcrae RN  Outcome: Progressing     Problem: Discharge Planning  Goal: Discharge to home or other facility with appropriate resources  5/19/2024 1330 by Sindhu Caeg, RN  Outcome: Progressing  5/19/2024 0447 by Yamile Mcrae, RN  Outcome: Progressing     Problem: Safety - Adult  Goal: Free from fall injury  5/19/2024 1330 by Sindhu Cage, RN  Outcome: Progressing  5/19/2024 0447 by Yamile Mcrae RN  Outcome: Progressing

## 2024-05-19 NOTE — CARE COORDINATION
Case Management Assessment  Initial Evaluation    Date/Time of Evaluation: 5/19/2024 9:07 AM  Assessment Completed by: Kathleen Khan RN    If patient is discharged prior to next notation, then this note serves as note for discharge by case management.    Patient Name: Lizbeth Hoff                   YOB: 1960  Diagnosis: Diverticulitis [K57.92]  Diverticulitis of colon [K57.32]                   Date / Time: 5/18/2024  1:50 AM    Patient Admission Status: Inpatient   Readmission Risk (Low < 19, Mod (19-27), High > 27): Readmission Risk Score: 8.5    Current PCP: Delia Kumari MD  PCP verified by CM? Yes    Chart Reviewed: Yes      History Provided by: Patient  Patient Orientation: Alert and Oriented    Patient Cognition: Alert    Hospitalization in the last 30 days (Readmission):  No    If yes, Readmission Assessment in CM Navigator will be completed.    Advance Directives:      Code Status: Full Code   Patient's Primary Decision Maker is: Legal Next of Kin      Discharge Planning:    Patient lives with: Children Type of Home: House  Primary Care Giver: Self  Patient Support Systems include: Children   Current Financial resources: Medicaid  Current community resources:    Current services prior to admission: Durable Medical Equipment            Current DME: Cane, Cpap            Type of Home Care services:  None    ADLS  Prior functional level: Independent in ADLs/IADLs  Current functional level: Independent in ADLs/IADLs    PT AM-PAC:   /24  OT AM-PAC:   /24    Family can provide assistance at DC: Yes  Would you like Case Management to discuss the discharge plan with any other family members/significant others, and if so, who? No  Plans to Return to Present Housing: Yes  Other Identified Issues/Barriers to RETURNING to current housing: n/a  Potential Assistance needed at discharge: N/A            Potential DME:    Patient expects to discharge to: House  Plan for transportation at discharge:

## 2024-05-19 NOTE — PLAN OF CARE
Problem: Pain  Goal: Verbalizes/displays adequate comfort level or baseline comfort level  5/19/2024 0447 by Yamile Mcrae RN  Outcome: Progressing  5/18/2024 1602 by Sindhu Cage, RN  Outcome: Progressing     Problem: Skin/Tissue Integrity  Goal: Absence of new skin breakdown  Description: 1.  Monitor for areas of redness and/or skin breakdown  2.  Assess vascular access sites hourly  3.  Every 4-6 hours minimum:  Change oxygen saturation probe site  4.  Every 4-6 hours:  If on nasal continuous positive airway pressure, respiratory therapy assess nares and determine need for appliance change or resting period.  5/19/2024 0447 by Yamile Mcrae RN  Outcome: Progressing  5/18/2024 1602 by Sindhu Cage, RN  Outcome: Progressing     Problem: Discharge Planning  Goal: Discharge to home or other facility with appropriate resources  Outcome: Progressing     Problem: Safety - Adult  Goal: Free from fall injury  Outcome: Progressing

## 2024-05-20 VITALS
DIASTOLIC BLOOD PRESSURE: 62 MMHG | BODY MASS INDEX: 50.24 KG/M2 | WEIGHT: 273 LBS | RESPIRATION RATE: 19 BRPM | OXYGEN SATURATION: 97 % | HEART RATE: 84 BPM | HEIGHT: 62 IN | SYSTOLIC BLOOD PRESSURE: 113 MMHG | TEMPERATURE: 98.7 F

## 2024-05-20 LAB
ANION GAP SERPL CALCULATED.3IONS-SCNC: 7 MMOL/L (ref 9–16)
BASOPHILS # BLD: 0.03 K/UL (ref 0–0.2)
BASOPHILS NFR BLD: 1 % (ref 0–2)
BUN SERPL-MCNC: 7 MG/DL (ref 8–23)
CALCIUM SERPL-MCNC: 8.9 MG/DL (ref 8.6–10.4)
CHLORIDE SERPL-SCNC: 108 MMOL/L (ref 98–107)
CO2 SERPL-SCNC: 25 MMOL/L (ref 20–31)
CREAT SERPL-MCNC: 1 MG/DL (ref 0.5–0.9)
EOSINOPHIL # BLD: 0.2 K/UL (ref 0–0.44)
EOSINOPHILS RELATIVE PERCENT: 3 % (ref 1–4)
ERYTHROCYTE [DISTWIDTH] IN BLOOD BY AUTOMATED COUNT: 14.9 % (ref 11.8–14.4)
GFR, ESTIMATED: 66 ML/MIN/1.73M2
GLUCOSE BLD-MCNC: 106 MG/DL (ref 65–105)
GLUCOSE BLD-MCNC: 113 MG/DL (ref 65–105)
GLUCOSE SERPL-MCNC: 147 MG/DL (ref 74–99)
HCT VFR BLD AUTO: 35.2 % (ref 36.3–47.1)
HGB BLD-MCNC: 10.7 G/DL (ref 11.9–15.1)
IMM GRANULOCYTES # BLD AUTO: 0.03 K/UL (ref 0–0.3)
IMM GRANULOCYTES NFR BLD: 1 %
LYMPHOCYTES NFR BLD: 2.07 K/UL (ref 1.1–3.7)
LYMPHOCYTES RELATIVE PERCENT: 32 % (ref 24–43)
MCH RBC QN AUTO: 27.2 PG (ref 25.2–33.5)
MCHC RBC AUTO-ENTMCNC: 30.4 G/DL (ref 28.4–34.8)
MCV RBC AUTO: 89.6 FL (ref 82.6–102.9)
MONOCYTES NFR BLD: 0.45 K/UL (ref 0.1–1.2)
MONOCYTES NFR BLD: 7 % (ref 3–12)
NEUTROPHILS NFR BLD: 56 % (ref 36–65)
NEUTS SEG NFR BLD: 3.76 K/UL (ref 1.5–8.1)
NRBC BLD-RTO: 0 PER 100 WBC
PLATELET # BLD AUTO: 200 K/UL (ref 138–453)
PMV BLD AUTO: 11.1 FL (ref 8.1–13.5)
POTASSIUM SERPL-SCNC: 4 MMOL/L (ref 3.7–5.3)
RBC # BLD AUTO: 3.93 M/UL (ref 3.95–5.11)
RBC # BLD: ABNORMAL 10*6/UL
SODIUM SERPL-SCNC: 140 MMOL/L (ref 136–145)
WBC OTHER # BLD: 6.5 K/UL (ref 3.5–11.3)

## 2024-05-20 PROCEDURE — 82947 ASSAY GLUCOSE BLOOD QUANT: CPT

## 2024-05-20 PROCEDURE — 6360000002 HC RX W HCPCS

## 2024-05-20 PROCEDURE — 80048 BASIC METABOLIC PNL TOTAL CA: CPT

## 2024-05-20 PROCEDURE — 6370000000 HC RX 637 (ALT 250 FOR IP)

## 2024-05-20 PROCEDURE — 85025 COMPLETE CBC W/AUTO DIFF WBC: CPT

## 2024-05-20 PROCEDURE — 36415 COLL VENOUS BLD VENIPUNCTURE: CPT

## 2024-05-20 RX ORDER — METRONIDAZOLE 500 MG/1
500 TABLET ORAL EVERY 8 HOURS SCHEDULED
Qty: 30 TABLET | Refills: 0 | Status: SHIPPED | OUTPATIENT
Start: 2024-05-20 | End: 2024-05-30

## 2024-05-20 RX ORDER — CIPROFLOXACIN 500 MG/1
500 TABLET, FILM COATED ORAL EVERY 12 HOURS SCHEDULED
Qty: 20 TABLET | Refills: 0 | Status: SHIPPED | OUTPATIENT
Start: 2024-05-20 | End: 2024-05-30

## 2024-05-20 RX ADMIN — ASPIRIN 81 MG: 81 TABLET, COATED ORAL at 08:32

## 2024-05-20 RX ADMIN — ATORVASTATIN CALCIUM 40 MG: 40 TABLET, FILM COATED ORAL at 08:33

## 2024-05-20 RX ADMIN — ENOXAPARIN SODIUM 40 MG: 100 INJECTION SUBCUTANEOUS at 08:33

## 2024-05-20 RX ADMIN — LISINOPRIL 10 MG: 10 TABLET ORAL at 08:33

## 2024-05-20 RX ADMIN — METRONIDAZOLE 500 MG: 500 TABLET ORAL at 06:30

## 2024-05-20 RX ADMIN — FAMOTIDINE 20 MG: 20 TABLET, FILM COATED ORAL at 08:33

## 2024-05-20 RX ADMIN — METRONIDAZOLE 500 MG: 500 TABLET ORAL at 13:56

## 2024-05-20 RX ADMIN — CIPROFLOXACIN 500 MG: 500 TABLET, FILM COATED ORAL at 08:33

## 2024-05-20 ASSESSMENT — ENCOUNTER SYMPTOMS
COUGH: 0
VOMITING: 0
WHEEZING: 0
SHORTNESS OF BREATH: 0
CONSTIPATION: 0
ABDOMINAL PAIN: 0
BACK PAIN: 0
DIARRHEA: 0
NAUSEA: 0

## 2024-05-20 ASSESSMENT — PAIN SCALES - GENERAL: PAINLEVEL_OUTOF10: 0

## 2024-05-20 NOTE — DISCHARGE INSTRUCTIONS
-Please follow-up with your PCP in 7 days for posthospitalization follow-up  -Please follow-up with your general surgery team, who has seen you here for diverticulitis, call make an appointment, you are supposed to get outpatient colonoscopy, discussed colonoscopy plan with them.  -We have stopped one of your blood pressure medication called hydrochlorothiazide, because your potassium level was low, discussed with your PCP about dysfunction of the medication.  -If you have any health-related concerns, or develop fever, unable to pass stool Both for 24 hours with severe abdominal pain, sudden severe shortness of breath, sudden onset of weakness please come to ED for evaluation.

## 2024-05-20 NOTE — DISCHARGE SUMMARY
Department of Family Medicine  Inpatient Service  [facility]    Discharge Summary      NAME:  Lizbeth Hoff  :  1960  MRN:  [id number]    Admit date:  2024  Discharge date:  24    Admitting Physician:  Jayme Dickey MD    Primary Diagnosis on Admission:   Present on Admission:   Diverticulitis, acute   Diverticulitis of colon      Secondary Diagnoses:  has Diverticulitis, acute on their pertinent problem list.      Admission Condition:  fair     Discharged Condition: stable    Hospital Course:   The patient was admitted for the management of acute diverticulitis  Patient was kept n.p.o. started on antibiotics, abscess and perforation ruled out, general surgery taken on board.  They recommended IV hydration and antibiotic therapy.  Continuous monitoring.  Patient including for the patient in the next few days patient abdominal pain improved gradually, diet advanced, she had a bowel movement on the day of discharge, pain improved, no nausea, vomiting.  Patient was discharged with instruction to complete the antibiotic course and colonoscopy in 4 to 6 weeks for  Today on day of discharge pt feels better with no further complaints. Vitals and Labs are at pts baseline.  All consultants involved during this admission are agreeable to d/c.    Consults:  general surgery    Significant Diagnostic/theraputic interventions: Antibiotic therapy with Flagyl and ciprofloxacin      Disposition:   home    Instructions to Patient:   -Please follow-up with your PCP in 7 days for posthospitalization follow-up  -Please follow-up with your general surgery team, who has seen you here for diverticulitis, call make an appointment, you are supposed to get outpatient colonoscopy, discussed colonoscopy plan with them.  -We have stopped one of your blood pressure medication called hydrochlorothiazide, because your potassium level was low, discussed with your PCP about dysfunction of the medication.  -If

## 2024-05-20 NOTE — PLAN OF CARE
Problem: Pain  Goal: Verbalizes/displays adequate comfort level or baseline comfort level  5/20/2024 1520 by Charmaine Link RN  Outcome: Completed  5/20/2024 0200 by Yamile Mcrae RN  Outcome: Progressing     Problem: Skin/Tissue Integrity  Goal: Absence of new skin breakdown  5/20/2024 1520 by Charmaine Link RN  Outcome: Completed  5/20/2024 0200 by Yamile Mcrae RN  Outcome: Progressing     Problem: Discharge Planning  Goal: Discharge to home or other facility with appropriate resources  5/20/2024 1520 by Charmaine Link RN  Outcome: Completed  5/20/2024 0200 by Yamile Mcrae RN  Outcome: Progressing     Problem: Safety - Adult  Goal: Free from fall injury  5/20/2024 1520 by Charmaine Link RN  Outcome: Completed  5/20/2024 0200 by Yamile Mcrae RN  Outcome: Progressing     Problem: Chronic Conditions and Co-morbidities  Goal: Patient's chronic conditions and co-morbidity symptoms are monitored and maintained or improved  Outcome: Completed

## 2024-05-20 NOTE — PROGRESS NOTES
PROGRESS NOTE          PATIENT NAME: Lizbeth Hoff  MEDICAL RECORD NO. [id number]  DATE: 2024  SURGEON: Shannon  PRIMARY CARE PHYSICIAN: Delia Kumari MD    HD: # 2    ASSESSMENT    Patient Active Problem List   Diagnosis    Gastroesophageal reflux disease    Hyperlipidemia with target LDL less than 100    HTN (hypertension)    Numbness and tingling    Diverticula, colon    Diverticulitis, acute    Eczema    Lump of left breast    Shoulder pain    Right groin pain    Tinea corporis    Hyperlipidemia    Morbid obesity with BMI of 45.0-49.9, adult (HCC)    SINDI (obstructive sleep apnea)    Vitamin D deficiency    Melena    Chest pain    Abnormal stress test    Contact blepharoconjunctivitis of left eye    Prediabetes    Need for prophylactic vaccination and inoculation against varicella    Acute cystitis without hematuria    Primary osteoarthritis of right knee    Diverticulitis of colon       MEDICAL DECISION MAKING AND PLAN    Continue with medical management of acute diverticulitis with IV antibiotics.  Advance to regular diet  Encourage ambulation up out of bed to chair.  Will need outpatient colonoscopy, can follow in general surgery clinic as needed for consideration of bowel resection  General surgery will sign off    Chief Complaint: \"Feeling ok\"    SUBJECTIVE    Feeling okay, tolerating FLD without N/V. Had small BM this AM without blood. Discussed getting outpt colonoscopy and potential f/u for elective resection      OBJECTIVE  VITALS: Temp: Temp: 98.7 °F (37.1 °C)Temp  Av.4 °F (36.9 °C)  Min: 98.1 °F (36.7 °C)  Max: 98.7 °F (37.1 °C) BP Systolic (24hrs), Av , Min:113 , Max:138   Diastolic (24hrs), Av, Min:62, Max:94   Pulse Pulse  Av  Min: 78  Max: 84 Resp Resp  Av.5  Min: 18  Max: 19 Pulse ox SpO2  Av %  Min: 97 %  Max: 99 %  GENERAL: alert, no distress  NEURO: GCS 15  HEENT: Normocephalic, atraumatic  : deferred  LUNGS: Equal rise and fall of chest, normal 
    PROGRESS NOTE          PATIENT NAME: Lizbeth Hoff  MEDICAL RECORD NO. [id number]  DATE: 2024  SURGEON: Shannon  PRIMARY CARE PHYSICIAN: Delia Kumari MD    HD: # 1    ASSESSMENT    Patient Active Problem List   Diagnosis    Gastroesophageal reflux disease    Hyperlipidemia with target LDL less than 100    HTN (hypertension)    Numbness and tingling    Diverticula, colon    Diverticulitis, acute    Eczema    Lump of left breast    Shoulder pain    Right groin pain    Tinea corporis    Hyperlipidemia    Morbid obesity with BMI of 45.0-49.9, adult (HCC)    SINDI (obstructive sleep apnea)    Vitamin D deficiency    Melena    Chest pain    Abnormal stress test    Contact blepharoconjunctivitis of left eye    Prediabetes    Need for prophylactic vaccination and inoculation against varicella    Acute cystitis without hematuria    Primary osteoarthritis of right knee    Diverticulitis of colon       MEDICAL DECISION MAKING AND PLAN    Continue with medical management of acute diverticulitis with IV antibiotics.  Continue clear liquid diet.  Encourage ambulation up out of bed to chair.  Will continue to follow.    Chief Complaint: \"Abdominal pain\"    SUBJECTIVE    Lizbeth Hoff was seen and evaluated at bedside.  Afebrile, vital signs within normal limits.  Patient states her abdominal pain is significantly improved from yesterday.  Passing flatus      OBJECTIVE  VITALS: Temp: Temp: 98.5 °F (36.9 °C)Temp  Av.6 °F (37 °C)  Min: 98.5 °F (36.9 °C)  Max: 98.8 °F (37.1 °C) BP Systolic (24hrs), Av , Min:107 , Max:138   Diastolic (24hrs), Av, Min:61, Max:81   Pulse Pulse  Av  Min: 81  Max: 100 Resp Resp  Av.6  Min: 16  Max: 18 Pulse ox SpO2  Av.8 %  Min: 94 %  Max: 99 %  GENERAL: alert, no distress  NEURO: GCS 15  HEENT: Normocephalic, atraumatic  : deferred  LUNGS: Equal rise and fall of chest, normal effort with respirations  HEART: normal rate and regular rhythm  ABDOMEN: Soft, 
Discharged patient via wheel chair with all her belongings. Meds to bed done. Home medications and instructions were explained to the patient. All questions answered, verbalized understanding.  
[facility] [place]    Shift date: 5/18/24  Shift day: Saturday   Shift # 1    Room # 0250/0250-01   Name: Lizbeth Hoff                Hindu:  Jain  Place of Confucianist:     Referral: Routine Visit    Admit Date & Time: 5/18/2024  1:50 AM    Assessment:  Lizbeth Hoff is a 63 y.o. female in the hospital. Upon entering the room writer observes pt laying in bed. Pt was open to  visit.  responded to a spiritual care consult.       Intervention:  Writer introduced self and title as  . Pt shared reason for being in the hospital. Pt talked about abbi tradition and asked for prayer.  provided a ministry of presence, active listening, and a prayer.     Outcome:  Pt was grateful for  visit.     Plan:  Chaplains will remain available to offer spiritual and emotional support as needed.      Electronically signed by Chaplain JA, on 5/18/2024 at 3:13 PM.  [facility]  [phone]       05/18/24 1512   Encounter Summary   Encounter Overview/Reason Initial Encounter   Service Provided For Patient   Referral/Consult From Multi-disciplinary team   Support System Family members   Last Encounter  05/18/24   Complexity of Encounter Low   Begin Time 1450   End Time  1455   Total Time Calculated 5 min   Assessment/Intervention/Outcome   Assessment Calm;Coping   Intervention Active listening;Sustaining Presence/Ministry of presence;Prayer (assurance of)/[place];Discussed belief system/Nondenominational practices/abbi;Discussed relationship with God   Outcome Expressed Gratitude;Engaged in conversation;Expressed feelings, needs, and concerns     Electronically signed by HARPAL ARGUETA on 5/18/2024 at 3:15 PM    
Occupational Therapy    [facility]  Occupational Therapy Not Seen Note    DATE: 2024    NAME: Lizbeth Hoff  MRN: [id number]   : 1960      Patient not seen this date for Occupational Therapy due to:    Patient independent with ADLs and functional mobility with no acute OT needs. Will defer OT evaluation at this time. Please reorder OT if future needs arise.Pt and RN in agreement.    Electronically signed by JUAN ATKINS/L on 2024 at 9:25 AM   
[facility] Inpatient Service  [facility]  History & Physical Examination Note              Date:   5/18/2024  Patient name:  Lizbeth Hoff  Date of admission:  5/18/2024  1:50 AM  MRN:   [id number]  YOB: 1960    CHIEF COMPLAINT:       Chief Complaint   Patient presents with    Abdominal Pain     Hx of Diverticulitits       History Obtained From:  Patient and chart review.    HPI:     Patient is a 63-year-old female past medical history of diverticulitis, hypertension, diabetes and hyperlipidemia presents to the emergency department with left lower quadrant abdominal pain stating it feels very similar to previous episodes.  Pain has been ongoing for more than 24 hours associated with some nausea but no vomiting.  She denies any diarrhea or constipation.  Denies fever or chills.  Patient has not tried anything at home for her symptoms.  No known sick contacts.  Patient mentioned she had a previous history of cholecystectomy and hysterectomy.  Patient did not require any surgical intervention in the past for her diverticulitis.     At ER, basic blood work was unremarkable except for slight hypokalemia 3.6.  Otherwise unremarkable with normal WBCs, RBCs and hemoglobin.  Lipase WNL. urinalysis negative for infection or hematuria.  CT of the abdomen consistent with acute diverticulitis of proximal end of the sigmoid colon.  No evidence of diverticular perforation or abscess formation.  No evidence of pneumoperitoneum retroperitoneal.  Evidence of previous cholecystectomy and hysterectomy.     and she is admitted to the hospital for management of acute diverticulitis.     PAST MEDICAL HISTORY:        has a past medical history of Diverticulitis, Former smoker, Former smoker, GERD (gastroesophageal reflux disease), Hyperlipidemia, Hypertension, Morbid obesity with BMI of 45.0-49.9, adult (HCC), SINDI on CPAP, Sleep apnea, and Type II or unspecified type diabetes mellitus without 
Phone call placed to  to attempt to contact IV team, as patient continues to need IV. Awaiting phone call back.  
Physical Therapy        Physical Therapy Cancel Note      DATE: 2024    NAME: Lizbeth Hoff  MRN: [id number]   : 1960      Patient not seen this date for Physical Therapy due to:    Patient independent with functional mobility. Will defer PT evaluation at this time. Please reorder PT if future needs arise.       Electronically signed by Rui Flower PT on 2024 at 4:36 PM      
Provider Delia Kumari MD paged with family medicine to update unable to obtain IV access at this time. Per provider, will switch to oral and place order.    
Provider paged to inform of CRP result of 59.2. No further orders at this time.   
[facility] Inpatient Service  [facility]  Progress Note    Date:   5/19/2024  Patient name:  Lizbeth Hoff  Date of admission:  5/18/2024  1:50 AM  MRN:   [id number]  YOB: 1960    SUBJECTIVE/Last 24 hours update:     Patient seen and examined at the bed side , no new acute events overnight. Patient is feeling better. Reported mild abdominal pain without nausea or vomiting. passing flatus, no BN since admission yesterday.     Notes from nursing staff and Consults had been reviewed, and the overnight progress had been checked with the nursing staff as well.    REVIEW OF SYSTEMS:      CONSTITUTIONAL:  no fevers, no headcahes  EYES: negative for blury vision  HEENT: No headaches, No nasal congestion, no difficulty swallowing  RESPIRATORY:negative for dyspnea, no wheezing, no Cough  CARDIOVASCULAR: negative for chest pain, no palpitations  GASTROINTESTINAL: no nausea, no vomiting, no change in bowel habits, no abdominal pain   GENITOURINARY: negative for dysuria, no hematuria   MUSCULOSKELETAL: no joint pains, no muscle aches, no swelling of joints or extremities  NEUROLOGICAL: No  Weakness or numbness      PAST MEDICAL HISTORY:      has a past medical history of Diverticulitis, Former smoker, Former smoker, GERD (gastroesophageal reflux disease), Hyperlipidemia, Hypertension, Morbid obesity with BMI of 45.0-49.9, adult (HCC), SINDI on CPAP, Sleep apnea, and Type II or unspecified type diabetes mellitus without mention of complication, not stated as uncontrolled.    PAST SURGICAL HISTORY:      has a past surgical history that includes Hysterectomy; Cholecystectomy; Colonoscopy; pr egd transoral biopsy single/multiple (7/19/2017); Upper gastrointestinal endoscopy (N/A, 10/18/2019); and Colonoscopy (N/A, 10/18/2019).       SOCIAL HISTORY:      reports that she quit smoking about 33 years ago. Her smoking use included cigarettes. She started smoking about 40 years ago. She has a 
Daily Yohan Crow MD   81 mg at 05/19/24 0816    atorvastatin (LIPITOR) tablet 40 mg  40 mg Oral Daily Yohan Crow MD   40 mg at 05/19/24 0816    [Held by provider] hydroCHLOROthiazide (HYDRODIURIL) tablet 25 mg  25 mg Oral Daily Yohan Crow MD        sodium chloride flush 0.9 % injection 5-40 mL  5-40 mL IntraVENous 2 times per day Yohan Crow MD   10 mL at 05/18/24 1152    sodium chloride flush 0.9 % injection 5-40 mL  5-40 mL IntraVENous PRN Yohan Crow MD        0.9 % sodium chloride infusion   IntraVENous PRN Yohan Crow MD        potassium chloride (KLOR-CON M) extended release tablet 40 mEq  40 mEq Oral PRN Yohan Crow MD        Or    potassium bicarb-citric acid (EFFER-K) effervescent tablet 40 mEq  40 mEq Oral PRN Yohan Crow MD        Or    potassium chloride 10 mEq/100 mL IVPB (Peripheral Line)  10 mEq IntraVENous PRN Yohan Crow MD        magnesium sulfate 2000 mg in 50 mL IVPB premix  2,000 mg IntraVENous PRN Yohan Crow MD        enoxaparin (LOVENOX) injection 40 mg  40 mg SubCUTAneous Daily Yohan Crow MD   40 mg at 05/19/24 0817    ondansetron (ZOFRAN-ODT) disintegrating tablet 4 mg  4 mg Oral Q8H PRN Yohan Crow MD        Or    ondansetron (ZOFRAN) injection 4 mg  4 mg IntraVENous Q6H PRN Yohan Crow MD        acetaminophen (TYLENOL) tablet 650 mg  650 mg Oral Q6H PRN Yohan Crow MD   650 mg at 05/18/24 2228    Or    acetaminophen (TYLENOL) suppository 650 mg  650 mg Rectal Q6H PRN Yohan Crow MD        insulin lispro (HUMALOG,ADMELOG) injection vial 0-4 Units  0-4 Units SubCUTAneous TID WC Yohan Crow MD        insulin lispro (HUMALOG,ADMELOG) injection vial 0-4 Units  0-4 Units SubCUTAneous Nightly Yohan Crow MD        glucose chewable tablet 16 g  4 tablet Oral PRN Yohan Crow MD        dextrose bolus 10% 125 mL  125 mL IntraVENous PRN Yohan Crow MD        Or    dextrose bolus 10% 250 mL  250 mL IntraVENous PRN Tristin,

## 2024-05-20 NOTE — CARE COORDINATION
Discharge Report    [facility]  Clinical Case Management Department  Written by: Tiffany Porras RN    Patient Name: Lizbeth Hoff  Attending Provider: Jayme Dickey MD  Admit Date: 2024  1:50 AM  MRN: [id number]  Account: [id number]                     : 1960  Discharge Date: 2024      Disposition: home    Tiffany Porras RN

## 2024-05-20 NOTE — PLAN OF CARE
Problem: Pain  Goal: Verbalizes/displays adequate comfort level or baseline comfort level  5/20/2024 0200 by Yamile Mcrae RN  Outcome: Progressing  5/19/2024 1332 by Sindhu Cage RN  Outcome: Progressing  5/19/2024 1330 by Sindhu Cage, RN  Outcome: Progressing     Problem: Skin/Tissue Integrity  Goal: Absence of new skin breakdown  Description: 1.  Monitor for areas of redness and/or skin breakdown  2.  Assess vascular access sites hourly  3.  Every 4-6 hours minimum:  Change oxygen saturation probe site  4.  Every 4-6 hours:  If on nasal continuous positive airway pressure, respiratory therapy assess nares and determine need for appliance change or resting period.  5/20/2024 0200 by Yamile Mcrae RN  Outcome: Progressing  5/19/2024 1332 by Sindhu Cage RN  Outcome: Progressing  5/19/2024 1330 by Sindhu Cage RN  Outcome: Progressing     Problem: Discharge Planning  Goal: Discharge to home or other facility with appropriate resources  5/20/2024 0200 by Yamile Mcrae RN  Outcome: Progressing  5/19/2024 1332 by Sindhu Cage RN  Outcome: Progressing  5/19/2024 1330 by Sindhu Cage RN  Outcome: Progressing     Problem: Safety - Adult  Goal: Free from fall injury  5/20/2024 0200 by Yamile Mcrae RN  Outcome: Progressing  5/19/2024 1332 by Sindhu Cage RN  Outcome: Progressing  5/19/2024 1330 by Sindhu Cage, RN  Outcome: Progressing

## 2024-05-28 ENCOUNTER — HOSPITAL ENCOUNTER (OUTPATIENT)
Age: 64
Discharge: HOME | End: 2024-05-30
Payer: MEDICAID

## 2024-05-28 ENCOUNTER — HOSPITAL ENCOUNTER (OUTPATIENT)
Dept: NUCLEAR MEDICINE | Age: 64
Discharge: HOME | End: 2024-05-30
Payer: MEDICAID

## 2024-05-28 DIAGNOSIS — I25.10 CORONARY ARTERY DISEASE WITHOUT ANGINA PECTORIS, UNSPECIFIED VESSEL OR LESION TYPE, UNSPECIFIED WHETHER NATIVE OR TRANSPLANTED HEART: ICD-10-CM

## 2024-05-28 LAB
NUC STRESS EJECTION FRACTION: 62 %
STATUS: NORMAL
STRESS BASELINE DIAS BP: 51 MMHG
STRESS BASELINE HR: 69 BPM
STRESS BASELINE SYS BP: 122 MMHG
STRESS ESTIMATED WORKLOAD: 1 METS
STRESS PEAK DIAS BP: 73 MMHG
STRESS PEAK SYS BP: 140 MMHG
STRESS PERCENT HR ACHIEVED: 74 %
STRESS POST PEAK HR: 117 BPM
STRESS RATE PRESSURE PRODUCT: NORMAL BPM*MMHG
STRESS TARGET HR: 159 BPM
TID: 1.22

## 2024-05-28 PROCEDURE — 93017 CV STRESS TEST TRACING ONLY: CPT

## 2024-05-28 PROCEDURE — 3430000000 HC RX DIAGNOSTIC RADIOPHARMACEUTICAL

## 2024-05-28 PROCEDURE — 93018 CV STRESS TEST I&R ONLY: CPT | Performed by: INTERNAL MEDICINE

## 2024-05-28 PROCEDURE — A9500 TC99M SESTAMIBI: HCPCS

## 2024-05-28 PROCEDURE — 6360000002 HC RX W HCPCS

## 2024-05-28 PROCEDURE — 78452 HT MUSCLE IMAGE SPECT MULT: CPT

## 2024-05-28 PROCEDURE — 2580000003 HC RX 258

## 2024-05-28 RX ORDER — ATROPINE SULFATE 0.1 MG/ML
0.5 INJECTION INTRAVENOUS EVERY 5 MIN PRN
Status: DISCONTINUED | OUTPATIENT
Start: 2024-05-28 | End: 2024-05-28 | Stop reason: ALTCHOICE

## 2024-05-28 RX ORDER — NITROGLYCERIN 0.4 MG/1
0.4 TABLET SUBLINGUAL EVERY 5 MIN PRN
Status: DISCONTINUED | OUTPATIENT
Start: 2024-05-28 | End: 2024-05-28 | Stop reason: ALTCHOICE

## 2024-05-28 RX ORDER — ALBUTEROL SULFATE 90 UG/1
2 AEROSOL, METERED RESPIRATORY (INHALATION) PRN
Status: DISCONTINUED | OUTPATIENT
Start: 2024-05-28 | End: 2024-05-28 | Stop reason: ALTCHOICE

## 2024-05-28 RX ORDER — AMINOPHYLLINE 25 MG/ML
50 INJECTION, SOLUTION INTRAVENOUS PRN
Status: DISCONTINUED | OUTPATIENT
Start: 2024-05-28 | End: 2024-05-28 | Stop reason: ALTCHOICE

## 2024-05-28 RX ORDER — SODIUM CHLORIDE 0.9 % (FLUSH) 0.9 %
10 SYRINGE (ML) INJECTION PRN
Status: DISCONTINUED | OUTPATIENT
Start: 2024-05-28 | End: 2024-05-31 | Stop reason: HOSPADM

## 2024-05-28 RX ORDER — TETRAKIS(2-METHOXYISOBUTYLISOCYANIDE)COPPER(I) TETRAFLUOROBORATE 1 MG/ML
39.2 INJECTION, POWDER, LYOPHILIZED, FOR SOLUTION INTRAVENOUS
Status: COMPLETED | OUTPATIENT
Start: 2024-05-28 | End: 2024-05-28

## 2024-05-28 RX ORDER — REGADENOSON 0.08 MG/ML
0.4 INJECTION, SOLUTION INTRAVENOUS
Status: COMPLETED | OUTPATIENT
Start: 2024-05-28 | End: 2024-05-28

## 2024-05-28 RX ORDER — TETRAKIS(2-METHOXYISOBUTYLISOCYANIDE)COPPER(I) TETRAFLUOROBORATE 1 MG/ML
15.1 INJECTION, POWDER, LYOPHILIZED, FOR SOLUTION INTRAVENOUS
Status: DISCONTINUED | OUTPATIENT
Start: 2024-05-28 | End: 2024-05-31 | Stop reason: HOSPADM

## 2024-05-28 RX ORDER — TETRAKIS(2-METHOXYISOBUTYLISOCYANIDE)COPPER(I) TETRAFLUOROBORATE 1 MG/ML
39.2 INJECTION, POWDER, LYOPHILIZED, FOR SOLUTION INTRAVENOUS
Status: DISCONTINUED | OUTPATIENT
Start: 2024-05-28 | End: 2024-05-31 | Stop reason: HOSPADM

## 2024-05-28 RX ORDER — METOPROLOL TARTRATE 1 MG/ML
5 INJECTION, SOLUTION INTRAVENOUS EVERY 5 MIN PRN
Status: DISCONTINUED | OUTPATIENT
Start: 2024-05-28 | End: 2024-05-28 | Stop reason: ALTCHOICE

## 2024-05-28 RX ORDER — SODIUM CHLORIDE 9 MG/ML
500 INJECTION, SOLUTION INTRAVENOUS CONTINUOUS PRN
Status: DISCONTINUED | OUTPATIENT
Start: 2024-05-28 | End: 2024-05-28 | Stop reason: ALTCHOICE

## 2024-05-28 RX ORDER — SODIUM CHLORIDE 0.9 % (FLUSH) 0.9 %
5-40 SYRINGE (ML) INJECTION PRN
Status: DISCONTINUED | OUTPATIENT
Start: 2024-05-28 | End: 2024-05-28 | Stop reason: ALTCHOICE

## 2024-05-28 RX ORDER — TETRAKIS(2-METHOXYISOBUTYLISOCYANIDE)COPPER(I) TETRAFLUOROBORATE 1 MG/ML
15.1 INJECTION, POWDER, LYOPHILIZED, FOR SOLUTION INTRAVENOUS
Status: COMPLETED | OUTPATIENT
Start: 2024-05-28 | End: 2024-05-28

## 2024-05-28 RX ADMIN — REGADENOSON 0.4 MG: 0.08 INJECTION, SOLUTION INTRAVENOUS at 10:08

## 2024-05-28 RX ADMIN — TETRAKIS(2-METHOXYISOBUTYLISOCYANIDE)COPPER(I) TETRAFLUOROBORATE 15.1 MILLICURIE: 1 INJECTION, POWDER, LYOPHILIZED, FOR SOLUTION INTRAVENOUS at 08:15

## 2024-05-28 RX ADMIN — SODIUM CHLORIDE, PRESERVATIVE FREE 10 ML: 5 INJECTION INTRAVENOUS at 08:15

## 2024-05-28 RX ADMIN — SODIUM CHLORIDE, PRESERVATIVE FREE 10 ML: 5 INJECTION INTRAVENOUS at 10:11

## 2024-05-28 RX ADMIN — SODIUM CHLORIDE, PRESERVATIVE FREE 10 ML: 5 INJECTION INTRAVENOUS at 10:08

## 2024-05-28 RX ADMIN — TETRAKIS(2-METHOXYISOBUTYLISOCYANIDE)COPPER(I) TETRAFLUOROBORATE 39.2 MILLICURIE: 1 INJECTION, POWDER, LYOPHILIZED, FOR SOLUTION INTRAVENOUS at 10:11

## 2024-05-28 RX ADMIN — SODIUM CHLORIDE, PRESERVATIVE FREE 10 ML: 5 INJECTION INTRAVENOUS at 09:24

## 2024-05-30 ENCOUNTER — OFFICE VISIT (OUTPATIENT)
Dept: FAMILY MEDICINE CLINIC | Age: 64
End: 2024-05-30

## 2024-05-30 VITALS
HEART RATE: 98 BPM | DIASTOLIC BLOOD PRESSURE: 75 MMHG | SYSTOLIC BLOOD PRESSURE: 119 MMHG | HEIGHT: 62 IN | WEIGHT: 276.8 LBS | BODY MASS INDEX: 50.94 KG/M2

## 2024-05-30 DIAGNOSIS — I50.22 CHRONIC SYSTOLIC HEART FAILURE (HCC): ICD-10-CM

## 2024-05-30 DIAGNOSIS — I10 PRIMARY HYPERTENSION: ICD-10-CM

## 2024-05-30 DIAGNOSIS — Z09 HOSPITAL DISCHARGE FOLLOW-UP: ICD-10-CM

## 2024-05-30 DIAGNOSIS — G47.33 OSA (OBSTRUCTIVE SLEEP APNEA): Primary | ICD-10-CM

## 2024-05-30 RX ORDER — LISINOPRIL 5 MG/1
5 TABLET ORAL DAILY
Qty: 30 TABLET | Refills: 3 | Status: SHIPPED | OUTPATIENT
Start: 2024-05-30

## 2024-05-30 RX ORDER — SPIRONOLACTONE 25 MG/1
25 TABLET ORAL DAILY
Qty: 30 TABLET | Refills: 3 | Status: SHIPPED | OUTPATIENT
Start: 2024-05-30

## 2024-05-30 RX ORDER — FUROSEMIDE 40 MG/1
20 TABLET ORAL DAILY
Qty: 60 TABLET | Refills: 0 | Status: SHIPPED
Start: 2024-05-30 | End: 2024-05-30 | Stop reason: CLARIF

## 2024-05-30 ASSESSMENT — PATIENT HEALTH QUESTIONNAIRE - PHQ9
SUM OF ALL RESPONSES TO PHQ9 QUESTIONS 1 & 2: 0
1. LITTLE INTEREST OR PLEASURE IN DOING THINGS: NOT AT ALL
SUM OF ALL RESPONSES TO PHQ QUESTIONS 1-9: 0
SUM OF ALL RESPONSES TO PHQ QUESTIONS 1-9: 0
2. FEELING DOWN, DEPRESSED OR HOPELESS: NOT AT ALL
SUM OF ALL RESPONSES TO PHQ QUESTIONS 1-9: 0
SUM OF ALL RESPONSES TO PHQ QUESTIONS 1-9: 0

## 2024-05-30 ASSESSMENT — ENCOUNTER SYMPTOMS
ABDOMINAL PAIN: 0
WHEEZING: 0
BLOOD IN STOOL: 0
VOMITING: 0
SHORTNESS OF BREATH: 0
NAUSEA: 0
CONSTIPATION: 0
COUGH: 0
DIARRHEA: 0

## 2024-05-30 NOTE — PROGRESS NOTES
Visit Information    Have you changed or started any medications since your last visit including any over-the-counter medicines, vitamins, or herbal medicines? no   Are you having any side effects from any of your medications? -  no  Have you stopped taking any of your medications? Is so, why? -  no    Have you seen any other physician or provider since your last visit? No  Have you had any other diagnostic tests since your last visit? Yes - Records Obtained  Have you been seen in the emergency room and/or had an admission to a hospital since we last saw you? Yes - Records Obtained  Have you had your routine dental cleaning in the past 6 months? no    Have you activated your [employer] account? If not, what are your barriers? Yes     Patient Care Team:  Delia Kumari MD as PCP - Edgardo Martin MD as Consulting Physician (Pulmonology)  Ashok Reynolds MD as Consulting Physician (Pain Management)    Medical History Review  Past Medical, Family, and Social History reviewed and does not contribute to the patient presenting condition    Health Maintenance   Topic Date Due    Diabetic retinal exam  Never done    Shingles vaccine (1 of 2) Never done    Hepatitis B vaccine (2 of 3 - 19+ 3-dose series) 03/11/2022    Respiratory Syncytial Virus (RSV) Pregnant or age 60 yrs+ (1 - 1-dose 60+ series) Never done    COVID-19 Vaccine (3 - 2023-24 season) 09/01/2023    DTaP/Tdap/Td vaccine (2 - Td or Tdap) 03/07/2024    Flu vaccine (Season Ended) 08/01/2024    Diabetic foot exam  12/08/2024    Diabetic Alb to Cr ratio (uACR) test  12/28/2024    Lipids  12/28/2024    Depression Screen  01/05/2025    A1C test (Diabetic or Prediabetic)  04/08/2025    GFR test (Diabetes, CKD 3-4, OR last GFR 15-59)  05/20/2025    Breast cancer screen  02/01/2026    Colorectal Cancer Screen  10/18/2029    Pneumococcal 0-64 years Vaccine  Completed    Hepatitis C screen  Completed    HIV screen  Completed    Hepatitis A vaccine  Aged Out

## 2024-05-30 NOTE — PATIENT INSTRUCTIONS
Thank you for letting us take care of you today. We hope all your questions were addressed. If a question was overlooked or something else comes to mind after you return home, please contact a member of your Care Team listed below.      Your Care Team at [facility] is Team #2  Jayme Dickey M.D. (Faculty)  Ruth Freeman, (Resident)  Lanie De Leon, (Resident)  Marline Zamora (Resident)  Carmenza Kumari, (Resident)  Cynthia Burr, [facility]  Arian Sauer, MARILU Canada, [facility]  Samantha Tee, [facility]  Alisha Carpenter,  [facility]  Karin Pickens, [facility]  Luciana Carlson, [facility]  Bekah Sharp, [facility]  Cresencio (LJ) Niko MARILU (Clinical Practice Manager)  Sindhu Ascencio [facility] (Clinical Pharmacist)     Office phone number: [phone]    If you need to get in right away due to illness, please be advised we have \"Same Day\" appointments available Monday-Friday. Please call us at [phone] option #3 to schedule your \"Same Day\" appointment.

## 2024-05-30 NOTE — PROGRESS NOTES
Attending Physician Statement  I  have discussed the care of Lizbeth Hoff including pertinent history and exam findings with the resident. I agree with the assessment, plan and orders as documented by the resident.      /75 (Site: Right Upper Arm, Position: Sitting, Cuff Size: Medium Adult)   Pulse 98   Ht 1.575 m (5' 2.01\")   Wt 125.6 kg (276 lb 12.8 oz)   BMI 50.61 kg/m²    BP Readings from Last 3 Encounters:   05/30/24 119/75   05/20/24 113/62   05/06/24 133/82     Wt Readings from Last 3 Encounters:   05/30/24 125.6 kg (276 lb 12.8 oz)   05/18/24 123.8 kg (273 lb)   04/08/24 125.2 kg (276 lb)          Diagnosis Orders   1. SINDI (obstructive sleep apnea)  DME Order for CPAP as OP      2. Chronic systolic heart failure (HCC)  lisinopril (PRINIVIL;ZESTRIL) 5 MG tablet    spironolactone (ALDACTONE) 25 MG tablet      3. Hospital discharge follow-up  [facility] DISCHARGE MEDS RECONCILED W/ CURRENT OUTPATIENT MED LIST      4. Primary hypertension  lisinopril (PRINIVIL;ZESTRIL) 5 MG tablet              Sravani Awan DO 5/31/2024 10:42 AM

## 2024-05-30 NOTE — PROGRESS NOTES
Lizbeth Hoff (:  1960) is a 63 y.o. female,Established patient, here for evaluation of the following chief complaint(s):  Follow-Up from Hospital (Diverticulitis follow up)      Assessment & Plan   1. SINDI (obstructive sleep apnea)  -     DME Order for CPAP as OP  2. Chronic systolic heart failure (HCC)  -   Will add Aldactone for heart failure and lower limb edema.  Will also help and SINDI.  I told the patient to stop taking potassium.  Patient voiced understanding  -Will decrease the lisinopril to 5 mg due to concern of hypotension.  Patient was informed and voiced understanding    lisinopril (PRINIVIL;ZESTRIL) 5 MG tablet; Take 1 tablet by mouth daily, Disp-30 tablet, R-3Normal  -     spironolactone (ALDACTONE) 25 MG tablet; Take 1 tablet by mouth daily, Disp-30 tablet, R-3Normal  3. Hospital discharge follow-up  -     AL DISCHARGE MEDS RECONCILED W/ CURRENT OUTPATIENT MED LIST  4. Primary hypertension  -    Well-controlled.  Will decrease lisinopril to 5 mg due to the addition of Aldactone 25 mg.   lisinopril (PRINIVIL;ZESTRIL) 5 MG tablet; Take 1 tablet by mouth daily, Disp-30 tablet, R-3Normal      Return in 4 weeks (on 2024).       Subjective   63 years old female patient with past medical history of systolic heart failure, hypertension, diabetes, SINDI, recurrent diverticulitis came to the office for posthospitalization follow-up.  Patient was recently hospitalized for acute diverticulitis.  Patient is doing well today.  Denies any nausea, vomiting, abdominal pain or constipation.  Patient is tolerating oral diet without any complications.  Patient had a stress test yesterday which showed no ischemia or infarction.  She had home sleep study which showed moderate sleep apnea with recommendation to start auto CPAP 6-16 mmhg.   Patient reported lower limb edema.  She was on Lasix which was discontinued due to hypokalemia.        Review of Systems   Constitutional:  Negative for diaphoresis,

## 2024-06-04 DIAGNOSIS — G47.33 OSA (OBSTRUCTIVE SLEEP APNEA): Primary | ICD-10-CM

## 2024-06-05 NOTE — TELEPHONE ENCOUNTER
Please address the medication refill and close the encounter.  If I can be of assistance, please route to the applicable pool.      Thank you.      Last visit: 5-30-24  Last Med refill: 5-8-24  Does patient have enough medication for 72 hours: Yes    Next Visit Date:  Future Appointments   Date Time Provider Department Center   6/18/2024  9:45 AM Edwardo Hu MD [facility]   6/27/2024 10:00 AM Sarwat Pettit MD [facility]       Health Maintenance   Topic Date Due    Diabetic retinal exam  Never done    Shingles vaccine (1 of 2) Never done    Hepatitis B vaccine (2 of 3 - 19+ 3-dose series) 03/11/2022    Respiratory Syncytial Virus (RSV) Pregnant or age 60 yrs+ (1 - 1-dose 60+ series) Never done    COVID-19 Vaccine (3 - 2023-24 season) 09/01/2023    DTaP/Tdap/Td vaccine (2 - Td or Tdap) 03/07/2024    Flu vaccine (Season Ended) 08/01/2024    Diabetic foot exam  12/08/2024    Diabetic Alb to Cr ratio (uACR) test  12/28/2024    Lipids  12/28/2024    A1C test (Diabetic or Prediabetic)  04/08/2025    GFR test (Diabetes, CKD 3-4, OR last GFR 15-59)  05/20/2025    Depression Screen  05/30/2025    Breast cancer screen  02/01/2026    Colorectal Cancer Screen  10/18/2029    Pneumococcal 0-64 years Vaccine  Completed    Hepatitis C screen  Completed    HIV screen  Completed    Hepatitis A vaccine  Aged Out    Hib vaccine  Aged Out    Polio vaccine  Aged Out    Meningococcal (ACWY) vaccine  Aged Out       Hemoglobin A1C (%)   Date Value   04/08/2024 6.4   12/08/2023 6.5   01/16/2023 6.5             ( goal A1C is < 7)   No components found for: \"LABMICR\"  No components found for: \"LDLCHOLESTEROL\", \"LDLCALC\"    (goal LDL is <100)   AST (U/L)   Date Value   05/18/2024 26     ALT (U/L)   Date Value   05/18/2024 12     BUN (mg/dL)   Date Value   05/20/2024 7 (L)     BP Readings from Last 3 Encounters:   05/30/24 119/75   05/20/24 113/62   05/06/24 133/82          (goal 120/80)    All Future Testing planned in

## 2024-06-07 RX ORDER — POTASSIUM CHLORIDE 20 MEQ/1
20 TABLET, EXTENDED RELEASE ORAL DAILY
Qty: 30 TABLET | Refills: 0 | OUTPATIENT
Start: 2024-06-07

## 2024-06-07 RX ORDER — TIZANIDINE 2 MG/1
TABLET ORAL
Qty: 10 TABLET | Refills: 0 | OUTPATIENT
Start: 2024-06-07

## 2024-06-07 RX ORDER — FERROUS SULFATE 325(65) MG
1 TABLET ORAL DAILY
Qty: 30 TABLET | Refills: 1 | OUTPATIENT
Start: 2024-06-07

## 2024-06-18 ENCOUNTER — OFFICE VISIT (OUTPATIENT)
Dept: ORTHOPEDIC SURGERY | Age: 64
End: 2024-06-18
Payer: MEDICAID

## 2024-06-18 VITALS — RESPIRATION RATE: 17 BRPM | WEIGHT: 276 LBS | OXYGEN SATURATION: 99 % | BODY MASS INDEX: 50.79 KG/M2 | HEIGHT: 62 IN

## 2024-06-18 DIAGNOSIS — G89.29 CHRONIC PAIN OF BOTH KNEES: ICD-10-CM

## 2024-06-18 DIAGNOSIS — M17.0 PRIMARY OSTEOARTHRITIS OF BOTH KNEES: Primary | ICD-10-CM

## 2024-06-18 DIAGNOSIS — M25.561 CHRONIC PAIN OF BOTH KNEES: ICD-10-CM

## 2024-06-18 DIAGNOSIS — M25.562 CHRONIC PAIN OF BOTH KNEES: ICD-10-CM

## 2024-06-18 PROCEDURE — 20610 DRAIN/INJ JOINT/BURSA W/O US: CPT | Performed by: ORTHOPAEDIC SURGERY

## 2024-06-18 RX ORDER — LIDOCAINE HYDROCHLORIDE 10 MG/ML
8 INJECTION, SOLUTION INFILTRATION; PERINEURAL ONCE
Status: COMPLETED | OUTPATIENT
Start: 2024-06-18 | End: 2024-06-18

## 2024-06-18 RX ORDER — TRIAMCINOLONE ACETONIDE 40 MG/ML
40 INJECTION, SUSPENSION INTRA-ARTICULAR; INTRAMUSCULAR ONCE
Status: COMPLETED | OUTPATIENT
Start: 2024-06-18 | End: 2024-06-18

## 2024-06-18 RX ADMIN — TRIAMCINOLONE ACETONIDE 40 MG: 40 INJECTION, SUSPENSION INTRA-ARTICULAR; INTRAMUSCULAR at 13:07

## 2024-06-18 RX ADMIN — LIDOCAINE HYDROCHLORIDE 8 ML: 10 INJECTION, SOLUTION INFILTRATION; PERINEURAL at 13:06

## 2024-06-18 NOTE — PROGRESS NOTES
[facility] PHYSICIANS [facility] ORTHOPEDICS AND SPORTS MEDICINE  [address]  [place] [id number]  Dept: [phone]    Ambulatory Orthopedic Consult      CHIEF COMPLAINT:    Chief Complaint   Patient presents with    Knee Pain     Bilateral        HISTORY OF PRESENT ILLNESS:      The patient is a left-hand dominant 63 y.o. female who is being seen for evaluation of pain at the above location (left anterior knee greater than left dorsal midfoot and lateral/medial hindfoot, as well as left ring finger), which began in the beginning of November 2020 atraumatically. The pain is described mainly with mechanical terms (dull/sharp/throbbing). The pain is worse with activity and better with rest. The patient reports a static course.     The patient has tried:      [x]  rest/activity modification          [x]  NSAIDs      []  opiates      []  orthotics        []  change in shoes   [x]  home exercises  []  physical therapy      []  CAM boot     []  brace:    []  injection:       []  surgery:      The patient also reports that her left ring finger pain is associated with intermittent locking.    INTERVAL HISTORY 2/5/2021:  She is seen again today in the office for follow up of a previous issue (as above--left knee and left ring finger pain). Since being seen last, the patient is doing worse, particularly in terms of her left knee. At today's visit, she is not using a brace or assistive device. The location and quality of the pain have not significantly changed since the last visit.      INTERVAL HISTORY 4/6/2021:  She is seen again today in the office for follow up after an injection. Since being seen last, the patient is doing better. At today's visit, she is using no brace or assistive device.  She received an injection of the left knee, and reports the injection helped approximately 100% in terms of pain.  She also reports that her left ring finger trigger

## 2024-06-19 NOTE — TELEPHONE ENCOUNTER
Jenna,     Can we start a PA for Bilateral Monovisc for this patient. She was seen yesterday (6/18/24) and had bilateral steroid injections. Last Monovisc injections were 1/11/24.     Please let me know the status, and I will call patient to schedule an appointment which will have to be in 6 weeks. Thank you!

## 2024-06-27 ENCOUNTER — HOSPITAL ENCOUNTER (OUTPATIENT)
Age: 64
Setting detail: SPECIMEN
Discharge: HOME | End: 2024-06-27

## 2024-06-27 ENCOUNTER — OFFICE VISIT (OUTPATIENT)
Dept: FAMILY MEDICINE CLINIC | Age: 64
End: 2024-06-27
Payer: MEDICAID

## 2024-06-27 VITALS
DIASTOLIC BLOOD PRESSURE: 81 MMHG | OXYGEN SATURATION: 98 % | HEART RATE: 93 BPM | HEIGHT: 62 IN | BODY MASS INDEX: 49.8 KG/M2 | WEIGHT: 270.6 LBS | SYSTOLIC BLOOD PRESSURE: 137 MMHG

## 2024-06-27 DIAGNOSIS — R79.89 ELEVATED SERUM CREATININE: ICD-10-CM

## 2024-06-27 DIAGNOSIS — J30.9 ALLERGIC RHINITIS, UNSPECIFIED SEASONALITY, UNSPECIFIED TRIGGER: ICD-10-CM

## 2024-06-27 DIAGNOSIS — D64.9 ANEMIA, UNSPECIFIED TYPE: ICD-10-CM

## 2024-06-27 DIAGNOSIS — E11.9 TYPE 2 DIABETES MELLITUS WITHOUT COMPLICATION, WITHOUT LONG-TERM CURRENT USE OF INSULIN (HCC): ICD-10-CM

## 2024-06-27 DIAGNOSIS — K57.92 DIVERTICULITIS: ICD-10-CM

## 2024-06-27 DIAGNOSIS — I50.20 SYSTOLIC HEART FAILURE, UNSPECIFIED HF CHRONICITY (HCC): Primary | ICD-10-CM

## 2024-06-27 LAB
ANION GAP SERPL CALCULATED.3IONS-SCNC: 12 MMOL/L (ref 9–16)
BASOPHILS # BLD: 0.03 K/UL (ref 0–0.2)
BASOPHILS NFR BLD: 0 % (ref 0–2)
BUN SERPL-MCNC: 14 MG/DL (ref 8–23)
CALCIUM SERPL-MCNC: 9.7 MG/DL (ref 8.6–10.4)
CHLORIDE SERPL-SCNC: 103 MMOL/L (ref 98–107)
CO2 SERPL-SCNC: 25 MMOL/L (ref 20–31)
CREAT SERPL-MCNC: 1.3 MG/DL (ref 0.5–0.9)
EOSINOPHIL # BLD: 0.11 K/UL (ref 0–0.44)
EOSINOPHILS RELATIVE PERCENT: 1 % (ref 1–4)
ERYTHROCYTE [DISTWIDTH] IN BLOOD BY AUTOMATED COUNT: 17.1 % (ref 11.8–14.4)
GFR, ESTIMATED: 47 ML/MIN/1.73M2
GLUCOSE SERPL-MCNC: 107 MG/DL (ref 74–99)
HCT VFR BLD AUTO: 44.8 % (ref 36.3–47.1)
HGB BLD-MCNC: 14 G/DL (ref 11.9–15.1)
IMM GRANULOCYTES # BLD AUTO: 0.03 K/UL (ref 0–0.3)
IMM GRANULOCYTES NFR BLD: 0 %
LYMPHOCYTES NFR BLD: 3.15 K/UL (ref 1.1–3.7)
LYMPHOCYTES RELATIVE PERCENT: 36 % (ref 24–43)
MCH RBC QN AUTO: 27.7 PG (ref 25.2–33.5)
MCHC RBC AUTO-ENTMCNC: 31.3 G/DL (ref 28.4–34.8)
MCV RBC AUTO: 88.5 FL (ref 82.6–102.9)
MONOCYTES NFR BLD: 0.57 K/UL (ref 0.1–1.2)
MONOCYTES NFR BLD: 7 % (ref 3–12)
NEUTROPHILS NFR BLD: 56 % (ref 36–65)
NEUTS SEG NFR BLD: 4.93 K/UL (ref 1.5–8.1)
NRBC BLD-RTO: 0 PER 100 WBC
PLATELET # BLD AUTO: 251 K/UL (ref 138–453)
PMV BLD AUTO: 11.9 FL (ref 8.1–13.5)
POTASSIUM SERPL-SCNC: 4.7 MMOL/L (ref 3.7–5.3)
RBC # BLD AUTO: 5.06 M/UL (ref 3.95–5.11)
RBC # BLD: ABNORMAL 10*6/UL
SODIUM SERPL-SCNC: 140 MMOL/L (ref 136–145)
WBC OTHER # BLD: 8.8 K/UL (ref 3.5–11.3)

## 2024-06-27 PROCEDURE — 3044F HG A1C LEVEL LT 7.0%: CPT

## 2024-06-27 PROCEDURE — 99213 OFFICE O/P EST LOW 20 MIN: CPT

## 2024-06-27 PROCEDURE — 3079F DIAST BP 80-89 MM HG: CPT

## 2024-06-27 PROCEDURE — 3075F SYST BP GE 130 - 139MM HG: CPT

## 2024-06-27 RX ORDER — FLUTICASONE PROPIONATE 50 MCG
2 SPRAY, SUSPENSION (ML) NASAL DAILY
Qty: 1 EACH | Refills: 0 | Status: SHIPPED | OUTPATIENT
Start: 2024-06-27 | End: 2024-07-11

## 2024-06-27 ASSESSMENT — PATIENT HEALTH QUESTIONNAIRE - PHQ9
SUM OF ALL RESPONSES TO PHQ QUESTIONS 1-9: 0
SUM OF ALL RESPONSES TO PHQ9 QUESTIONS 1 & 2: 0
1. LITTLE INTEREST OR PLEASURE IN DOING THINGS: NOT AT ALL
SUM OF ALL RESPONSES TO PHQ QUESTIONS 1-9: 0
2. FEELING DOWN, DEPRESSED OR HOPELESS: NOT AT ALL

## 2024-06-27 ASSESSMENT — ENCOUNTER SYMPTOMS
ABDOMINAL PAIN: 0
NAUSEA: 0
CONSTIPATION: 0
STRIDOR: 0
WHEEZING: 0
DIARRHEA: 0
SHORTNESS OF BREATH: 1
BACK PAIN: 1
COUGH: 1

## 2024-06-27 NOTE — PROGRESS NOTES
[facility] Residency Program - Outpatient Note      Subjective:    Lizbeth Hoff is a 63 y.o. female with  has a past medical history of Diverticulitis, Former smoker, Former smoker, GERD (gastroesophageal reflux disease), Hyperlipidemia, Hypertension, Morbid obesity with BMI of 45.0-49.9, adult (HCC), SINDI on CPAP, Sleep apnea, and Type II or unspecified type diabetes mellitus without mention of complication, not stated as uncontrolled.    Presented to the office today for:  Chief Complaint   Patient presents with    Congestive Heart Failure     Discuss test results        HPI    63-year-old female new to me well-known to the practice, presented for follow-up on congestive heart failure, patient and history of stress test done in 2020 for complaints of chest pain, patient ejection fraction at that time was 67%, ejection fraction on recent echo 05/2020 449% with mild systolic dysfunction.  Patient does have some shortness of breath, but only with exercise and strenuous activities, patient was started on Aldactone last visit, improved her leg swelling and shortness of breath, she also to get this down in her weight.  Patient is NYHA class I in terms of her symptoms but as Aldactone is benefiting her for now we will continue it, might consider discontinuing it if patient develop any electrolyte imbalances.  We will also check BMP to make sure no electrolyte abnormalities present.  Advised patient on diet and exercise, low-sodium, daily weight checks to make assessment of her dry weight.  Will also provide cardiology referral on patient request.    Patient was recently admitted in hospital on 05/16/2024 with diverticulitis, she denies any abdominal pain, nausea, vomiting passing gas and stool, as per GI recommendation inpatient patient might need colonoscopy outpatient, will provide GI referral to discuss further.    Patient is currently experiencing some nasal congestion and cough

## 2024-06-27 NOTE — PROGRESS NOTES
Attending Physician Statement  I have discussed the care of ValeteenYoungincluding pertinent history and exam findings,  with the resident. I have reviewed the key elements of all parts of the encounter with the resident.  I agree with the assessment, plan and orders as documented by the resident.  (GE Modifier)    CHF- Systolic dysfunction- improving with Aldactone  HTN- well controlled  Diverticulitis- needs FU Colonoscopy

## 2024-06-27 NOTE — PATIENT INSTRUCTIONS
Thank you for letting us take care of you today. We hope all your questions were addressed. If a question was overlooked or something else comes to mind after you return home, please contact a member of your Care Team listed below.      Your Care Team at [facility] is Team #  Hugo Bello M.D. (Faculty)  Tejal Aldana M.D. (Resident)  Sarwat Pettit M.D. (Resident)   Chelsie Baum M.D. (Resident)  Ricky Chavez M.D. (Resident)  Alma Wren M.D. (Resident)  Alisha Carpenter., [facility]  Nereyda Canada, [facility]  Bekah Sharp, [facility]  Arian Sauer, MARILU Tee, [facility]  Karin Pickens, [facility]  Luciana Carlson, MARILU Dupont (LJ) RUBI Pope (Clinical Practice Manager)  Sindhu Ascencio [facility] (Clinical Pharmacist)     Office phone number: [phone]    If you need to get in right away due to illness, please be advised we have \"Same Day\" appointments available Monday-Friday. Please call us at [phone] option #3 to schedule your \"Same Day\" appointment.

## 2024-06-27 NOTE — PROGRESS NOTES
Visit Information    Have you changed or started any medications since your last visit including any over-the-counter medicines, vitamins, or herbal medicines? no   Are you having any side effects from any of your medications? -  no  Have you stopped taking any of your medications? Is so, why? -  no    Have you seen any other physician or provider since your last visit? No  Have you had any other diagnostic tests since your last visit? Yes   Have you been seen in the emergency room and/or had an admission to a hospital since we last saw you? No  Have you had your routine dental cleaning in the past 6 months? no    Have you activated your [employer] account? If not, what are your barriers? Yes     Patient Care Team:  Bernabe Hunter MD as PCP - General (Family Medicine)  Edgardo Pineda MD as Consulting Physician (Pulmonology)  Ashok Reynolds MD as Consulting Physician (Pain Management)    Medical History Review  Past Medical, Family, and Social History reviewed and does not contribute to the patient presenting condition    Health Maintenance   Topic Date Due    Diabetic retinal exam  Never done    Shingles vaccine (1 of 2) Never done    Hepatitis B vaccine (2 of 3 - 19+ 3-dose series) 03/11/2022    Respiratory Syncytial Virus (RSV) Pregnant or age 60 yrs+ (1 - 1-dose 60+ series) Never done    COVID-19 Vaccine (3 - 2023-24 season) 09/01/2023    DTaP/Tdap/Td vaccine (2 - Td or Tdap) 03/07/2024    Flu vaccine (Season Ended) 08/01/2024    Diabetic foot exam  12/08/2024    Diabetic Alb to Cr ratio (uACR) test  12/28/2024    Lipids  12/28/2024    A1C test (Diabetic or Prediabetic)  04/08/2025    GFR test (Diabetes, CKD 3-4, OR last GFR 15-59)  05/20/2025    Depression Screen  05/30/2025    Breast cancer screen  02/01/2026    Colorectal Cancer Screen  10/18/2029    Pneumococcal 0-64 years Vaccine  Completed    Hepatitis C screen  Completed    HIV screen  Completed    Hepatitis A vaccine  Aged Out    Hib vaccine  Aged Out

## 2024-06-28 DIAGNOSIS — E11.9 TYPE 2 DIABETES MELLITUS WITHOUT COMPLICATION, WITHOUT LONG-TERM CURRENT USE OF INSULIN (HCC): ICD-10-CM

## 2024-06-28 DIAGNOSIS — M17.0 PRIMARY OSTEOARTHRITIS OF BOTH KNEES: ICD-10-CM

## 2024-06-28 NOTE — TELEPHONE ENCOUNTER
disease     Hyperlipidemia with target LDL less than 100     HTN (hypertension)     Numbness and tingling     Diverticula, colon     Diverticulitis, acute     Eczema     Lump of left breast     Shoulder pain     Right groin pain     Tinea corporis     Hyperlipidemia     Morbid obesity with BMI of 45.0-49.9, adult (HCC)     SINDI (obstructive sleep apnea)     Vitamin D deficiency     Melena     Chest pain     Abnormal stress test     Contact blepharoconjunctivitis of left eye     Prediabetes     Need for prophylactic vaccination and inoculation against varicella     Acute cystitis without hematuria     Primary osteoarthritis of right knee     Diverticulitis of colon

## 2024-06-29 RX ORDER — UREA 10 %
LOTION (ML) TOPICAL
Qty: 30 TABLET | Refills: 2 | Status: SHIPPED | OUTPATIENT
Start: 2024-06-29

## 2024-07-02 ENCOUNTER — OFFICE VISIT (OUTPATIENT)
Dept: FAMILY MEDICINE CLINIC | Age: 64
End: 2024-07-02
Payer: MEDICAID

## 2024-07-02 VITALS
OXYGEN SATURATION: 100 % | DIASTOLIC BLOOD PRESSURE: 90 MMHG | HEART RATE: 94 BPM | SYSTOLIC BLOOD PRESSURE: 132 MMHG | BODY MASS INDEX: 49.13 KG/M2 | WEIGHT: 267 LBS | HEIGHT: 62 IN

## 2024-07-02 DIAGNOSIS — E66.01 CLASS 3 SEVERE OBESITY WITHOUT SERIOUS COMORBIDITY WITH BODY MASS INDEX (BMI) OF 45.0 TO 49.9 IN ADULT, UNSPECIFIED OBESITY TYPE (HCC): Primary | ICD-10-CM

## 2024-07-02 PROCEDURE — 3075F SYST BP GE 130 - 139MM HG: CPT

## 2024-07-02 PROCEDURE — 3080F DIAST BP >= 90 MM HG: CPT

## 2024-07-02 PROCEDURE — 99213 OFFICE O/P EST LOW 20 MIN: CPT

## 2024-07-02 ASSESSMENT — PATIENT HEALTH QUESTIONNAIRE - PHQ9
2. FEELING DOWN, DEPRESSED OR HOPELESS: NOT AT ALL
SUM OF ALL RESPONSES TO PHQ QUESTIONS 1-9: 0
SUM OF ALL RESPONSES TO PHQ QUESTIONS 1-9: 0
1. LITTLE INTEREST OR PLEASURE IN DOING THINGS: NOT AT ALL
SUM OF ALL RESPONSES TO PHQ QUESTIONS 1-9: 0
SUM OF ALL RESPONSES TO PHQ QUESTIONS 1-9: 0
SUM OF ALL RESPONSES TO PHQ9 QUESTIONS 1 & 2: 0

## 2024-07-02 NOTE — PROGRESS NOTES
Attending Physician Statement  I have discussed the care of ValeteenYoungincluding pertinent history and exam findings,  with the resident. I have reviewed the key elements of all parts of the encounter with the resident.  I agree with the assessment, plan and orders as documented by the resident.  (GE Modifier)    1.HTN- well controlled/BP home monitoring education done  2. Obesity- Life Style Modification- Diet and Exercise discussed.    
Visit Information    Have you changed or started any medications since your last visit including any over-the-counter medicines, vitamins, or herbal medicines? no   Are you having any side effects from any of your medications? -  no  Have you stopped taking any of your medications? Is so, why? -  no    Have you seen any other physician or provider since your last visit? No  Have you had any other diagnostic tests since your last visit? No  Have you been seen in the emergency room and/or had an admission to a hospital since we last saw you? No  Have you had your routine dental cleaning in the past 6 months? no    Have you activated your [employer] account? If not, what are your barriers? Yes     Patient Care Team:  Sarwat Pettit MD as PCP - General (Family Medicine)  Edgardo Pineda MD as Consulting Physician (Pulmonology)  Ashok Reynolds MD as Consulting Physician (Pain Management)    Medical History Review  Past Medical, Family, and Social History reviewed and does not contribute to the patient presenting condition    Health Maintenance   Topic Date Due    Diabetic retinal exam  Never done    Shingles vaccine (1 of 2) Never done    Hepatitis B vaccine (2 of 3 - 19+ 3-dose series) 03/11/2022    Respiratory Syncytial Virus (RSV) Pregnant or age 60 yrs+ (1 - 1-dose 60+ series) Never done    COVID-19 Vaccine (3 - 2023-24 season) 09/01/2023    DTaP/Tdap/Td vaccine (2 - Td or Tdap) 03/07/2024    Flu vaccine (1) 08/01/2024    Diabetic foot exam  12/08/2024    Diabetic Alb to Cr ratio (uACR) test  12/28/2024    Lipids  12/28/2024    A1C test (Diabetic or Prediabetic)  04/08/2025    Depression Screen  06/27/2025    GFR test (Diabetes, CKD 3-4, OR last GFR 15-59)  06/27/2025    Breast cancer screen  02/01/2026    Colorectal Cancer Screen  10/18/2029    Pneumococcal 0-64 years Vaccine  Completed    Hepatitis C screen  Completed    HIV screen  Completed    Hepatitis A vaccine  Aged Out    Hib vaccine  Aged Out    Polio vaccine  
file.        Disclaimer: Some orall of this note was transcribed using voice-recognition software.This may cause typographical errors occasionally. Although all effort is made to fix these errors, please do not hesitate to contact our office if there isany concern with the understanding of this note.

## 2024-07-02 NOTE — PATIENT INSTRUCTIONS
Thank you for letting us take care of you today. We hope all your questions were addressed. If a question was overlooked or something else comes to mind after you return home, please contact a member of your Care Team listed below.      Your Care Team at [facility] is Team #  Hugo Bello M.D. (Faculty)  Tejal Aldana M.D. (Resident)  Sarwat Pettit M.D. (Resident)   Chelsie Baum M.D. (Resident)  Ricky Chavez M.D. (Resident)  Alma Wren M.D. (Resident)  Alisha Carpenter., [facility]  Nereyda Canada, [facility]  Bekah Sharp, [facility]  Arian Sauer, MARILU Tee, [facility]  Karin Pickens, [facility]  Luciana Carlson, MARILU Dupont (LJ) RUBI Pope (Clinical Practice Manager)  Sindhu Ascencio [facility] (Clinical Pharmacist)     Office phone number: [phone]    If you need to get in right away due to illness, please be advised we have \"Same Day\" appointments available Monday-Friday. Please call us at [phone] option #3 to schedule your \"Same Day\" appointment.

## 2024-07-24 DIAGNOSIS — J30.9 ALLERGIC RHINITIS, UNSPECIFIED SEASONALITY, UNSPECIFIED TRIGGER: ICD-10-CM

## 2024-07-24 RX ORDER — FLUTICASONE PROPIONATE 50 MCG
SPRAY, SUSPENSION (ML) NASAL
Qty: 16 G | Refills: 0 | Status: SHIPPED | OUTPATIENT
Start: 2024-07-24

## 2024-07-24 NOTE — TELEPHONE ENCOUNTER
E-scribe request for flonase. Please review and e-scribe if applicable.     Last Visit Date:  7/2/24  Next Visit Date:  Visit date not found    Hemoglobin A1C (%)   Date Value   04/08/2024 6.4   12/08/2023 6.5   01/16/2023 6.5             ( goal A1C is < 7)   No components found for: \"LABMICR\"  No components found for: \"LDLCHOLESTEROL\", \"LDLCALC\"    (goal LDL is <100)   AST (U/L)   Date Value   05/18/2024 26     ALT (U/L)   Date Value   05/18/2024 12     BUN (mg/dL)   Date Value   06/27/2024 14     BP Readings from Last 3 Encounters:   07/02/24 (!) 132/90   06/27/24 137/81   05/30/24 119/75          (goal 120/80)        Patient Active Problem List:     Gastroesophageal reflux disease     Hyperlipidemia with target LDL less than 100     HTN (hypertension)     Numbness and tingling     Diverticula, colon     Diverticulitis, acute     Eczema     Lump of left breast     Shoulder pain     Right groin pain     Tinea corporis     Hyperlipidemia     Morbid obesity with BMI of 45.0-49.9, adult (HCC)     SINDI (obstructive sleep apnea)     Vitamin D deficiency     Melena     Chest pain     Abnormal stress test     Contact blepharoconjunctivitis of left eye     Prediabetes     Need for prophylactic vaccination and inoculation against varicella     Acute cystitis without hematuria     Primary osteoarthritis of right knee     Diverticulitis of colon      ----JF

## 2024-08-01 ENCOUNTER — TELEPHONE (OUTPATIENT)
Dept: FAMILY MEDICINE CLINIC | Age: 64
End: 2024-08-01

## 2024-08-01 NOTE — TELEPHONE ENCOUNTER
Patient is requesting a call from PCP, she has some concerns with her Spironolactone, patient is requesting a call back. Please advise

## 2024-08-01 NOTE — TELEPHONE ENCOUNTER
Called patient, patient states that her blood pressure has been running low for the past 3 to 4 days, states that the systolic is staying around 100 and diastolic around 70-60.  Patient states that she has also been experiencing headaches and fatigue.  Advised patient to hold spironolactone for now, and will see if patient can get scheduled with a provider as soon as possible.  Patient's last BMP indicated that her electrolytes were within range but her creatinine has bumped up to 1.3 with decrease in GFR to 46.  Patient also recently had a stress test in May which showed that she was not at risk for any ischemic heart disease and that her EF had improved to 62%.  Patient may need continued medical therapy for heart failure as she did have low EF earlier in May and also had shortness of breath at the time.  However denies any shortness of breath or bilateral pedal edema at this time.   However, patient's spironolactone can be held for the next few days until patient is able to see provider.  Patient voiced understanding.    Electronically signed by Chelsie Baum MD on 8/1/2024 at 3:33 PM

## 2024-08-08 ENCOUNTER — HOSPITAL ENCOUNTER (OUTPATIENT)
Age: 64
Setting detail: SPECIMEN
Discharge: HOME | End: 2024-08-08

## 2024-08-08 ENCOUNTER — OFFICE VISIT (OUTPATIENT)
Dept: FAMILY MEDICINE CLINIC | Age: 64
End: 2024-08-08

## 2024-08-08 VITALS
HEIGHT: 62 IN | DIASTOLIC BLOOD PRESSURE: 64 MMHG | HEART RATE: 100 BPM | BODY MASS INDEX: 48.84 KG/M2 | SYSTOLIC BLOOD PRESSURE: 110 MMHG | WEIGHT: 265.4 LBS

## 2024-08-08 DIAGNOSIS — I10 PRIMARY HYPERTENSION: Primary | ICD-10-CM

## 2024-08-08 DIAGNOSIS — I95.9 HYPOTENSION, UNSPECIFIED HYPOTENSION TYPE: ICD-10-CM

## 2024-08-08 DIAGNOSIS — K21.9 GASTROESOPHAGEAL REFLUX DISEASE WITHOUT ESOPHAGITIS: ICD-10-CM

## 2024-08-08 DIAGNOSIS — I10 PRIMARY HYPERTENSION: ICD-10-CM

## 2024-08-08 LAB
ANION GAP SERPL CALCULATED.3IONS-SCNC: 10 MMOL/L (ref 9–16)
BUN SERPL-MCNC: 16 MG/DL (ref 8–23)
CALCIUM SERPL-MCNC: 9.5 MG/DL (ref 8.6–10.4)
CHLORIDE SERPL-SCNC: 104 MMOL/L (ref 98–107)
CO2 SERPL-SCNC: 26 MMOL/L (ref 20–31)
CREAT SERPL-MCNC: 1.1 MG/DL (ref 0.5–0.9)
GFR, ESTIMATED: 60 ML/MIN/1.73M2
GLUCOSE SERPL-MCNC: 113 MG/DL (ref 74–99)
POTASSIUM SERPL-SCNC: 4.5 MMOL/L (ref 3.7–5.3)
SODIUM SERPL-SCNC: 140 MMOL/L (ref 136–145)

## 2024-08-08 RX ORDER — AMLODIPINE BESYLATE 5 MG/1
5 TABLET ORAL DAILY
Qty: 30 TABLET | Refills: 3 | Status: CANCELLED | OUTPATIENT
Start: 2024-08-08

## 2024-08-08 ASSESSMENT — ENCOUNTER SYMPTOMS
GASTROINTESTINAL NEGATIVE: 1
SHORTNESS OF BREATH: 0
CHEST TIGHTNESS: 0

## 2024-08-08 ASSESSMENT — PATIENT HEALTH QUESTIONNAIRE - PHQ9
SUM OF ALL RESPONSES TO PHQ QUESTIONS 1-9: 0
SUM OF ALL RESPONSES TO PHQ9 QUESTIONS 1 & 2: 0
SUM OF ALL RESPONSES TO PHQ QUESTIONS 1-9: 0
2. FEELING DOWN, DEPRESSED OR HOPELESS: NOT AT ALL
1. LITTLE INTEREST OR PLEASURE IN DOING THINGS: NOT AT ALL

## 2024-08-08 NOTE — PROGRESS NOTES
[facility] Residency Program - Outpatient Note      Subjective:    Lizbeth Hoff is a 63 y.o. female with  has a past medical history of Diverticulitis, Former smoker, Former smoker, GERD (gastroesophageal reflux disease), Hyperlipidemia, Hypertension, Morbid obesity with BMI of 45.0-49.9, adult (HCC), SINDI on CPAP, Sleep apnea, and Type II or unspecified type diabetes mellitus without mention of complication, not stated as uncontrolled.    Presented to the office today for:  Chief Complaint   Patient presents with    Hypertension     Discuss meds       Hypertension  Pertinent negatives include no chest pain, headaches, palpitations or shortness of breath.       63-year-old female seen today due to episodes of hypotension.  Patient had called PCP office last week complaining of having hypotensive episodes as well as lightheadedness.  Patient was asked to hold spironolactone and schedule a visit with the office.  Patient presents today and continues to have hypotension despite having stopped taking spironolactone.  Blood pressure in the clinic today 110/64.  Patient states that her blood pressure at home has also been on the lower side, states that the highest reading would have been 115/70.  Patient also reports feeling pressure in her back, or feeling of fullness near her kidneys.  Denies any change in urine.  Denies any decrease in urine.  Denies any history of kidney stones in the past.  Patient also states that she was started on Protonix recently and has had improvement in her GERD like symptoms but continues to have occasional burning on her right side.  States that it is often in the morning before she has eaten something or when she lies down at night.  On further questioning about medications, patient states that she has been taking furosemide for the past few months.      Review of Systems   Constitutional:  Positive for fatigue. Negative for chills, diaphoresis and

## 2024-08-08 NOTE — PROGRESS NOTES
Attending Physician Statement  I have discussed the care of Lizbeth Hoff, 63 y.o. female,including pertinent history and exam findings,  with the resident Chelsie Ramirez MD.  History:  Chief Complaint   Patient presents with    Hypertension     Discuss meds       I have reviewed the key elements of the encounter with the resident. Examination was done by resident as documented in residents note.    BP Readings from Last 3 Encounters:   08/08/24 110/64   07/02/24 (!) 132/90   06/27/24 137/81     /64 (Site: Right Upper Arm, Position: Sitting, Cuff Size: Large Adult)   Pulse 100   Ht 1.575 m (5' 2.01\")   Wt 120.4 kg (265 lb 6.4 oz)   BMI 48.53 kg/m²   Lab Results   Component Value Date    WBC 8.8 06/27/2024    HGB 14.0 06/27/2024    HCT 44.8 06/27/2024     06/27/2024    CHOL 163 12/28/2023    TRIG 162 (H) 12/28/2023    HDL 32 (L) 12/28/2023    ALT 12 05/18/2024    AST 26 05/18/2024     06/27/2024    K 4.7 06/27/2024     06/27/2024    CREATININE 1.3 (H) 06/27/2024    BUN 14 06/27/2024    CO2 25 06/27/2024    TSH 2.25 04/18/2018    INR 1.2 10/18/2019    LABA1C 6.4 04/08/2024     Lab Results   Component Value Date    CALCIUM 9.7 06/27/2024    PHOS 3.1 09/12/2011     No results found for: \"LDLDIRECT\"  I agree with the assessment, plan and diagnosis of    Diagnosis Orders   1. Primary hypertension  US RENAL COMPLETE    Basic Metabolic Panel        I agree with orders as documented by the resident.    Recommendations: Agree with resident assessment and plan.  We will closely monitor patient blood pressure as well as creatinine especially in the setting of using Lasix without prescription and patient stated symptoms of intermittent dizziness and lightheadedness.  If patient does develop or has worsening creatinine consistent with AMIRAH we will refer patient to the ER.  Resident team to give patient ER precautions as well.  Patient to avoid nephrotoxic substances well.    Return in about 2 weeks

## 2024-08-08 NOTE — PATIENT INSTRUCTIONS
Thank you for letting us take care of you today. We hope all your questions were addressed. If a question was overlooked or something else comes to mind after you return home, please contact a member of your Care Team listed below.      Your Care Team at [facility] is Team #  Hugo Bello M.D. (Faculty)  Tejal Aldana M.D. (Resident)  Sarwat Pettit M.D. (Resident)   Chelsie Baum M.D. (Resident)  Ricky Chavez M.D. (Resident)  Alma Wren M.D. (Resident)  Alisha Carpenter., [facility]  Nereyda Canada, [facility]  Bekah Sharp, [facility]  Arian Sauer, MARILU Tee, [facility]  Karin Pickens, [facility]  Luciana Carlson, MARILU Dupont (LJ) RUBI Pope (Clinical Practice Manager)  Sindhu Ascencio [facility] (Clinical Pharmacist)     Office phone number: [phone]    If you need to get in right away due to illness, please be advised we have \"Same Day\" appointments available Monday-Friday. Please call us at [phone] option #3 to schedule your \"Same Day\" appointment.

## 2024-08-08 NOTE — PROGRESS NOTES
Visit Information    Have you changed or started any medications since your last visit including any over-the-counter medicines, vitamins, or herbal medicines? no   Are you having any side effects from any of your medications? -  no  Have you stopped taking any of your medications? Is so, why? -  no    Have you seen any other physician or provider since your last visit? No  Have you had any other diagnostic tests since your last visit? No  Have you been seen in the emergency room and/or had an admission to a hospital since we last saw you? No  Have you had your routine dental cleaning in the past 6 months? no    Have you activated your [employer] account? If not, what are your barriers? Yes     Patient Care Team:  Sarwat Pettit MD as PCP - General (Family Medicine)  Edgardo Pineda MD as Consulting Physician (Pulmonology)  Ashok Reynolds MD as Consulting Physician (Pain Management)    Medical History Review  Past Medical, Family, and Social History reviewed and does not contribute to the patient presenting condition    Health Maintenance   Topic Date Due    Diabetic retinal exam  Never done    Shingles vaccine (1 of 2) Never done    Hepatitis B vaccine (2 of 3 - 19+ 3-dose series) 03/11/2022    Respiratory Syncytial Virus (RSV) Pregnant or age 60 yrs+ (1 - 1-dose 60+ series) Never done    COVID-19 Vaccine (3 - 2023-24 season) 09/01/2023    DTaP/Tdap/Td vaccine (2 - Td or Tdap) 03/07/2024    Flu vaccine (1) 08/01/2024    Diabetic foot exam  12/08/2024    Diabetic Alb to Cr ratio (uACR) test  12/28/2024    Lipids  12/28/2024    A1C test (Diabetic or Prediabetic)  04/08/2025    GFR test (Diabetes, CKD 3-4, OR last GFR 15-59)  06/27/2025    Depression Screen  07/02/2025    Breast cancer screen  02/01/2026    Colorectal Cancer Screen  10/18/2029    Pneumococcal 0-64 years Vaccine  Completed    Hepatitis C screen  Completed    HIV screen  Completed    Hepatitis A vaccine  Aged Out    Hib vaccine  Aged Out    Polio vaccine

## 2024-08-10 ENCOUNTER — HOSPITAL ENCOUNTER (OUTPATIENT)
Dept: ULTRASOUND IMAGING | Age: 64
End: 2024-08-10
Payer: MEDICAID

## 2024-08-10 DIAGNOSIS — I10 PRIMARY HYPERTENSION: ICD-10-CM

## 2024-08-10 PROCEDURE — 76770 US EXAM ABDO BACK WALL COMP: CPT

## 2024-08-19 ENCOUNTER — OFFICE VISIT (OUTPATIENT)
Dept: ORTHOPEDIC SURGERY | Age: 64
End: 2024-08-19
Payer: MEDICAID

## 2024-08-19 VITALS — WEIGHT: 275 LBS | HEIGHT: 63 IN | BODY MASS INDEX: 48.73 KG/M2

## 2024-08-19 DIAGNOSIS — E66.01 CLASS 3 SEVERE OBESITY WITH BODY MASS INDEX (BMI) OF 45.0 TO 49.9 IN ADULT, UNSPECIFIED OBESITY TYPE, UNSPECIFIED WHETHER SERIOUS COMORBIDITY PRESENT (HCC): ICD-10-CM

## 2024-08-19 DIAGNOSIS — M17.0 PRIMARY OSTEOARTHRITIS OF BOTH KNEES: Primary | ICD-10-CM

## 2024-08-19 DIAGNOSIS — M25.561 CHRONIC PAIN OF BOTH KNEES: ICD-10-CM

## 2024-08-19 DIAGNOSIS — G89.29 CHRONIC PAIN OF BOTH KNEES: ICD-10-CM

## 2024-08-19 DIAGNOSIS — M25.562 CHRONIC PAIN OF BOTH KNEES: ICD-10-CM

## 2024-08-19 PROCEDURE — 99214 OFFICE O/P EST MOD 30 MIN: CPT | Performed by: PHYSICIAN ASSISTANT

## 2024-08-19 NOTE — PROGRESS NOTES
[facility]  [address]  [place] [address]  Dept: [phone]  Dept Fax: [phone]        Established Patient - Dr Hu      Subjective:   Lizbeth Hoff is a 63 y.o. year old female who presents to our office today for routine followup regarding her   1. Primary osteoarthritis of both knees    2. Chronic pain of both knees    3. Class 3 severe obesity with body mass index (BMI) of 45.0 to 49.9 in adult, unspecified obesity type, unspecified whether serious comorbidity present (HCC)        Chief Complaint   Patient presents with    Knee Pain     B/L knee pain       HPI Lizbeth Hoff  is a 63 y.o.  female who presents today for evaluation and treatment of chronic bilateral knee pain due to osteoarthritis. The patient was previously evaluated with Dr Edwardo Hu for her knee pain. Her last appointment was on 6/18/2024 and underwent treatment in the form of bilateral knee corticosteroid injections - she noted 6 weeks of improvement in her knee pain with the injections. She has also had prior Monovisc injections (Last injections on 1/11/2024) and also had improvement in her knee discomfort for short periods of time.    The patient has a current BMI of 48.71 (275lbs). She notes that in 2020 she was worked up with [facility] to have bariatric surgery, but then the COVID pandemic occurred, and she never followed up with their office.    The patient also has diabetes with a last documented HgbA1c of 6.4 as of 4/8/2024. She is a former smoker (Quit in 1991).       Review of Systems   Constitutional:  Negative for activity change and fever.   HENT:  Negative for sneezing.    Respiratory:  Negative for cough and shortness of breath.    Cardiovascular:  Negative for chest pain.   Gastrointestinal:  Negative for vomiting.   Musculoskeletal:  Positive for arthralgias (bilateral knee (R>L)). Negative for joint swelling and

## 2024-08-21 DIAGNOSIS — E11.9 TYPE 2 DIABETES MELLITUS WITHOUT COMPLICATION, WITHOUT LONG-TERM CURRENT USE OF INSULIN (HCC): ICD-10-CM

## 2024-08-21 DIAGNOSIS — J30.9 ALLERGIC RHINITIS, UNSPECIFIED SEASONALITY, UNSPECIFIED TRIGGER: ICD-10-CM

## 2024-08-21 ASSESSMENT — ENCOUNTER SYMPTOMS
SHORTNESS OF BREATH: 0
COLOR CHANGE: 0
COUGH: 0
VOMITING: 0

## 2024-08-22 ENCOUNTER — OFFICE VISIT (OUTPATIENT)
Dept: FAMILY MEDICINE CLINIC | Age: 64
End: 2024-08-22
Payer: MEDICAID

## 2024-08-22 VITALS
SYSTOLIC BLOOD PRESSURE: 136 MMHG | WEIGHT: 267.6 LBS | DIASTOLIC BLOOD PRESSURE: 78 MMHG | BODY MASS INDEX: 47.41 KG/M2 | HEIGHT: 63 IN | HEART RATE: 69 BPM

## 2024-08-22 DIAGNOSIS — I10 PRIMARY HYPERTENSION: Primary | ICD-10-CM

## 2024-08-22 DIAGNOSIS — E78.5 HYPERLIPIDEMIA, UNSPECIFIED HYPERLIPIDEMIA TYPE: ICD-10-CM

## 2024-08-22 PROCEDURE — 99213 OFFICE O/P EST LOW 20 MIN: CPT

## 2024-08-22 PROCEDURE — 3078F DIAST BP <80 MM HG: CPT

## 2024-08-22 PROCEDURE — 3075F SYST BP GE 130 - 139MM HG: CPT

## 2024-08-22 RX ORDER — FLUTICASONE PROPIONATE 50 MCG
SPRAY, SUSPENSION (ML) NASAL
Qty: 16 G | Refills: 0 | Status: SHIPPED | OUTPATIENT
Start: 2024-08-22

## 2024-08-22 RX ORDER — HYDROCHLOROTHIAZIDE 25 MG/1
25 TABLET ORAL EVERY MORNING
Qty: 30 TABLET | Refills: 0 | Status: SHIPPED | OUTPATIENT
Start: 2024-08-22

## 2024-08-22 RX ORDER — ATORVASTATIN CALCIUM 40 MG/1
40 TABLET, FILM COATED ORAL DAILY
Qty: 30 TABLET | Refills: 3 | Status: SHIPPED | OUTPATIENT
Start: 2024-08-22

## 2024-08-22 ASSESSMENT — ENCOUNTER SYMPTOMS
NAUSEA: 0
DIARRHEA: 0
SHORTNESS OF BREATH: 0
ABDOMINAL PAIN: 0
COUGH: 0
CONSTIPATION: 0
VOMITING: 0
CHOKING: 0
CHEST TIGHTNESS: 0
ABDOMINAL DISTENTION: 0

## 2024-08-22 NOTE — TELEPHONE ENCOUNTER
Faxed Refill Request of Lipitor received from [facility]. Medication Pended. Pt last seen on 8/8/24, Next appt is 8/22/24.    Health Maintenance   Topic Date Due    Diabetic retinal exam  Never done    Shingles vaccine (1 of 2) Never done    Hepatitis B vaccine (2 of 3 - 19+ 3-dose series) 03/11/2022    Respiratory Syncytial Virus (RSV) Pregnant or age 60 yrs+ (1 - 1-dose 60+ series) Never done    COVID-19 Vaccine (3 - 2023-24 season) 09/01/2023    DTaP/Tdap/Td vaccine (2 - Td or Tdap) 03/07/2024    Flu vaccine (1) 08/01/2024    Diabetic foot exam  12/08/2024    Diabetic Alb to Cr ratio (uACR) test  12/28/2024    Lipids  12/28/2024    A1C test (Diabetic or Prediabetic)  04/08/2025    Depression Screen  08/08/2025    GFR test (Diabetes, CKD 3-4, OR last GFR 15-59)  08/08/2025    Breast cancer screen  02/01/2026    Colorectal Cancer Screen  10/18/2029    Pneumococcal 0-64 years Vaccine  Completed    Hepatitis C screen  Completed    HIV screen  Completed    Hepatitis A vaccine  Aged Out    Hib vaccine  Aged Out    Polio vaccine  Aged Out    Meningococcal (ACWY) vaccine  Aged Out       Hemoglobin A1C (%)   Date Value   04/08/2024 6.4   12/08/2023 6.5   01/16/2023 6.5             ( goal A1C is < 7)   No components found for: \"LABMICR\"  No components found for: \"LDLCHOLESTEROL\", \"LDLCALC\"    (goal LDL is <100)   AST (U/L)   Date Value   05/18/2024 26     ALT (U/L)   Date Value   05/18/2024 12     BUN (mg/dL)   Date Value   08/08/2024 16     BP Readings from Last 3 Encounters:   08/08/24 110/64   07/02/24 (!) 132/90   06/27/24 137/81          (goal 120/80)    All Future Testing planned in [facility]  Lab Frequency Next Occurrence   XR LUMBAR SPINE (2-3 VIEWS) Once 01/05/2024   Sleep Study with PAP Titration Once 06/04/2024       Next Visit Date:  Future Appointments   Date Time Provider Department Center   8/22/2024 10:20 AM Chelsie Baum MD [facility]   9/11/2024  2:15 PM Jack Michelle MD [facility] 
         (goal 120/80)    All Future Testing planned in [facility]  Lab Frequency Next Occurrence   XR LUMBAR SPINE (2-3 VIEWS) Once 01/05/2024   Sleep Study with PAP Titration Once 06/04/2024               Patient Active Problem List:     Gastroesophageal reflux disease     Hyperlipidemia with target LDL less than 100     HTN (hypertension)     Numbness and tingling     Diverticula, colon     Diverticulitis, acute     Eczema     Lump of left breast     Shoulder pain     Right groin pain     Tinea corporis     Hyperlipidemia     Morbid obesity with BMI of 45.0-49.9, adult (HCC)     SINDI (obstructive sleep apnea)     Vitamin D deficiency     Melena     Chest pain     Abnormal stress test     Contact blepharoconjunctivitis of left eye     Prediabetes     Need for prophylactic vaccination and inoculation against varicella     Acute cystitis without hematuria     Primary osteoarthritis of right knee     Diverticulitis of colon

## 2024-08-22 NOTE — PATIENT INSTRUCTIONS
Thank you for letting us take care of you today. We hope all your questions were addressed. If a question was overlooked or something else comes to mind after you return home, please contact a member of your Care Team listed below.      Your Care Team at [facility] is Team #  Hugo Bello M.D. (Faculty)  Tejal Aldana M.D. (Resident)  Sarwat Pettit M.D. (Resident)   Chelsie Baum M.D. (Resident)  Ricky Chavez M.D. (Resident)  Alma Wren M.D. (Resident)  Alisha Carpenter., [facility], [facility]  Nereyda Canada, [facility], [facility]  Bekah Sharp, [facility]  Arian Sauer, MARILU Tee, [facility]  Karin Pickens, [facility]  Luciana Carlson, MARILU Dupont (LJ) RUBI Pope (Clinical Practice Manager)  Sindhu Ascencio [facility] (Clinical Pharmacist)     Office phone number: [phone]    If you need to get in right away due to illness, please be advised we have \"Same Day\" appointments available Monday-Friday. Please call us at [phone] option #3 to schedule your \"Same Day\" appointment.

## 2024-08-22 NOTE — PROGRESS NOTES
[facility] Residency Program - Outpatient Note      Subjective:    Lizbeth Hoff is a 63 y.o. female with  has a past medical history of Diverticulitis, Former smoker, Former smoker, GERD (gastroesophageal reflux disease), Hyperlipidemia, Hypertension, Morbid obesity with BMI of 45.0-49.9, adult (HCC), SINDI on CPAP, Sleep apnea, and Type II or unspecified type diabetes mellitus without mention of complication, not stated as uncontrolled.    Presented to the office today for:  Chief Complaint   Patient presents with    Hypertension       HPI    63-year-old female seen today to follow-up on hypertension.  When patient was seen at last visit, patient was having hypotensive episodes, and also had an elevated creatinine at the time.  Patient was advised to stop taking lisinopril, Aldactone and also Lasix which was actually never prescribed to her but patient was taking.  Repeat BMP showed improvement in creatinine and renal ultrasound did not reveal any hydronephrosis or acute kidney involvement.  Patient's blood pressure in the clinic today is well-controlled, patient does upon further questioning states that she had some hydrochlorothiazide that had been previously prescribed to her at home which she had been taking during this time.  Patient also stated that she had subjective swelling in her legs and the medication made that better.  Patient also stated the swelling was only present upon long durations of walking or standing.  Patient denied any palpitations, lightheadedness, chest pain, chest pressure, or headaches.  Patient did have continued complaints of GERD like pain, epigastric and radiation slightly towards the left left under breast.  Patient did not have any concerning exertional symptoms.  Related to food and position.  Talk to patient about lifestyle modification, eating smaller meals, not going hungry for long periods of time, not eating right before bed.    Review of 
Attending Physician Statement  I have discussed the care of Lizbeth Hoff, 63 y.o. female,including pertinent history and exam findings,  with the resident Chelsie Ramirez MD.  History:  Chief Complaint   Patient presents with    Hypertension       I have reviewed the key elements of the encounter with the resident. Examination was done by resident as documented in residents note.    BP Readings from Last 3 Encounters:   08/22/24 136/78   08/08/24 110/64   07/02/24 (!) 132/90     /78 (Site: Right Upper Arm, Position: Sitting, Cuff Size: Large Adult)   Pulse 69   Ht 1.6 m (5' 2.99\")   Wt 121.4 kg (267 lb 9.6 oz)   BMI 47.42 kg/m²   Lab Results   Component Value Date    WBC 8.8 06/27/2024    HGB 14.0 06/27/2024    HCT 44.8 06/27/2024     06/27/2024    CHOL 163 12/28/2023    TRIG 162 (H) 12/28/2023    HDL 32 (L) 12/28/2023    ALT 12 05/18/2024    AST 26 05/18/2024     08/08/2024    K 4.5 08/08/2024     08/08/2024    CREATININE 1.1 (H) 08/08/2024    BUN 16 08/08/2024    CO2 26 08/08/2024    TSH 2.25 04/18/2018    INR 1.2 10/18/2019    LABA1C 6.4 04/08/2024     Lab Results   Component Value Date    CALCIUM 9.5 08/08/2024    PHOS 3.1 09/12/2011     No results found for: \"LDLDIRECT\"  I agree with the assessment, plan and diagnosis of    Diagnosis Orders   1. Primary hypertension  hydroCHLOROthiazide (HYDRODIURIL) 25 MG tablet      2. Hyperlipidemia, unspecified hyperlipidemia type  Lipid Panel        I agree with orders as documented by the resident.    Recommendations: Agree with resident A&P.  Concern for polypharmacy.  Patient would benefit from further discussion with pharmacy team in the future.      Return in about 3 months (around 11/22/2024) for htn follow up.   (GE Modifier ) Dr. FRANSISCA TRUONG MD  
Aged Out    Meningococcal (ACWY) vaccine  Aged Out

## 2024-08-24 ENCOUNTER — HOSPITAL ENCOUNTER (EMERGENCY)
Age: 64
Discharge: HOME | End: 2024-08-24
Attending: EMERGENCY MEDICINE
Payer: MEDICAID

## 2024-08-24 ENCOUNTER — APPOINTMENT (OUTPATIENT)
Dept: CT IMAGING | Age: 64
End: 2024-08-24
Payer: MEDICAID

## 2024-08-24 VITALS
SYSTOLIC BLOOD PRESSURE: 154 MMHG | TEMPERATURE: 97.3 F | OXYGEN SATURATION: 97 % | DIASTOLIC BLOOD PRESSURE: 82 MMHG | HEART RATE: 86 BPM | RESPIRATION RATE: 18 BRPM

## 2024-08-24 DIAGNOSIS — M62.838 SPASM OF MUSCLE: Primary | ICD-10-CM

## 2024-08-24 DIAGNOSIS — S39.012A BACK STRAIN, INITIAL ENCOUNTER: ICD-10-CM

## 2024-08-24 LAB
ALBUMIN SERPL-MCNC: 3.8 G/DL (ref 3.5–5.2)
ALBUMIN/GLOB SERPL: 1 {RATIO} (ref 1–2.5)
ALP SERPL-CCNC: 83 U/L (ref 35–104)
ALT SERPL-CCNC: 14 U/L (ref 10–35)
ANION GAP SERPL CALCULATED.3IONS-SCNC: 10 MMOL/L (ref 9–16)
AST SERPL-CCNC: 21 U/L (ref 10–35)
BASOPHILS # BLD: 0.03 K/UL (ref 0–0.2)
BASOPHILS NFR BLD: 0 % (ref 0–2)
BILIRUB SERPL-MCNC: 0.3 MG/DL (ref 0–1.2)
BILIRUB UR QL STRIP: NEGATIVE
BUN BLD-MCNC: 16 MG/DL (ref 8–26)
BUN SERPL-MCNC: 16 MG/DL (ref 8–23)
C TRACH DNA SPEC QL PROBE+SIG AMP: NORMAL
CA-I BLD-SCNC: 1.23 MMOL/L (ref 1.15–1.33)
CALCIUM SERPL-MCNC: 9.2 MG/DL (ref 8.6–10.4)
CANDIDA SPECIES: NEGATIVE
CHLORIDE BLD-SCNC: 108 MMOL/L (ref 98–107)
CHLORIDE SERPL-SCNC: 107 MMOL/L (ref 98–107)
CLARITY UR: CLEAR
CO2 BLD CALC-SCNC: 29 MMOL/L (ref 22–30)
CO2 SERPL-SCNC: 24 MMOL/L (ref 20–31)
COLOR UR: YELLOW
COMMENT: NORMAL
CREAT SERPL-MCNC: 1.1 MG/DL (ref 0.5–0.9)
EGFR, POC: 64 ML/MIN/1.73M2
EOSINOPHIL # BLD: 0.25 K/UL (ref 0–0.44)
EOSINOPHILS RELATIVE PERCENT: 3 % (ref 1–4)
ERYTHROCYTE [DISTWIDTH] IN BLOOD BY AUTOMATED COUNT: 16 % (ref 11.8–14.4)
GARDNERELLA VAGINALIS: NEGATIVE
GFR, ESTIMATED: 58 ML/MIN/1.73M2
GLUCOSE BLD-MCNC: 116 MG/DL (ref 74–100)
GLUCOSE SERPL-MCNC: 114 MG/DL (ref 74–99)
GLUCOSE UR STRIP-MCNC: NEGATIVE MG/DL
HCO3 VENOUS: 28.7 MMOL/L (ref 22–29)
HCT VFR BLD AUTO: 36 % (ref 36–46)
HCT VFR BLD AUTO: 38.9 % (ref 36.3–47.1)
HGB BLD-MCNC: 12.5 G/DL (ref 11.9–15.1)
HGB UR QL STRIP.AUTO: NEGATIVE
IMM GRANULOCYTES # BLD AUTO: <0.03 K/UL (ref 0–0.3)
IMM GRANULOCYTES NFR BLD: 0 %
KETONES UR STRIP-MCNC: NEGATIVE MG/DL
LEUKOCYTE ESTERASE UR QL STRIP: NEGATIVE
LIPASE SERPL-CCNC: 36 U/L (ref 13–60)
LYMPHOCYTES NFR BLD: 3.59 K/UL (ref 1.1–3.7)
LYMPHOCYTES RELATIVE PERCENT: 49 % (ref 24–43)
MCH RBC QN AUTO: 29.1 PG (ref 25.2–33.5)
MCHC RBC AUTO-ENTMCNC: 32.1 G/DL (ref 28.4–34.8)
MCV RBC AUTO: 90.5 FL (ref 82.6–102.9)
MONOCYTES NFR BLD: 0.59 K/UL (ref 0.1–1.2)
MONOCYTES NFR BLD: 8 % (ref 3–12)
N GONORRHOEA DNA SPEC QL PROBE+SIG AMP: NORMAL
NEUTROPHILS NFR BLD: 40 % (ref 36–65)
NEUTS SEG NFR BLD: 3 K/UL (ref 1.5–8.1)
NITRITE UR QL STRIP: NEGATIVE
NRBC BLD-RTO: 0 PER 100 WBC
O2 SAT, VEN: 46.5 % (ref 60–85)
PCO2 VENOUS: 51.2 MM HG (ref 41–51)
PH UR STRIP: 6 [PH] (ref 5–8)
PH VENOUS: 7.36 (ref 7.32–7.43)
PLATELET # BLD AUTO: 208 K/UL (ref 138–453)
PMV BLD AUTO: 11.3 FL (ref 8.1–13.5)
PO2 VENOUS: 27 MM HG (ref 30–50)
POC ANION GAP: 7 MMOL/L (ref 7–16)
POC CREATININE: 1 MG/DL (ref 0.51–1.19)
POC HEMOGLOBIN (CALC): 12.4 G/DL (ref 12–16)
POC LACTIC ACID: 0.9 MMOL/L (ref 0.56–1.39)
POSITIVE BASE EXCESS, VEN: 2.3 MMOL/L (ref 0–3)
POTASSIUM BLD-SCNC: 3.8 MMOL/L (ref 3.5–4.5)
POTASSIUM SERPL-SCNC: 4.1 MMOL/L (ref 3.7–5.3)
PROT SERPL-MCNC: 7 G/DL (ref 6.6–8.7)
PROT UR STRIP-MCNC: NEGATIVE MG/DL
RBC # BLD AUTO: 4.3 M/UL (ref 3.95–5.11)
RBC # BLD: ABNORMAL 10*6/UL
SODIUM BLD-SCNC: 143 MMOL/L (ref 138–146)
SODIUM SERPL-SCNC: 141 MMOL/L (ref 136–145)
SOURCE: NORMAL
SP GR UR STRIP: 1.01 (ref 1–1.03)
SPECIMEN DESCRIPTION: NORMAL
TRICHOMONAS: NEGATIVE
TROPONIN I SERPL HS-MCNC: 7 NG/L (ref 0–14)
UROBILINOGEN UR STRIP-ACNC: NORMAL EU/DL (ref 0–1)
WBC OTHER # BLD: 7.5 K/UL (ref 3.5–11.3)

## 2024-08-24 PROCEDURE — 83690 ASSAY OF LIPASE: CPT

## 2024-08-24 PROCEDURE — 85025 COMPLETE CBC W/AUTO DIFF WBC: CPT

## 2024-08-24 PROCEDURE — 6360000002 HC RX W HCPCS

## 2024-08-24 PROCEDURE — 80053 COMPREHEN METABOLIC PANEL: CPT

## 2024-08-24 PROCEDURE — 6370000000 HC RX 637 (ALT 250 FOR IP)

## 2024-08-24 PROCEDURE — 84484 ASSAY OF TROPONIN QUANT: CPT

## 2024-08-24 PROCEDURE — 82330 ASSAY OF CALCIUM: CPT

## 2024-08-24 PROCEDURE — 87660 TRICHOMONAS VAGIN DIR PROBE: CPT

## 2024-08-24 PROCEDURE — 96374 THER/PROPH/DIAG INJ IV PUSH: CPT | Performed by: EMERGENCY MEDICINE

## 2024-08-24 PROCEDURE — 87591 N.GONORRHOEAE DNA AMP PROB: CPT

## 2024-08-24 PROCEDURE — 87491 CHLMYD TRACH DNA AMP PROBE: CPT

## 2024-08-24 PROCEDURE — 93005 ELECTROCARDIOGRAM TRACING: CPT | Performed by: EMERGENCY MEDICINE

## 2024-08-24 PROCEDURE — 82565 ASSAY OF CREATININE: CPT

## 2024-08-24 PROCEDURE — 82803 BLOOD GASES ANY COMBINATION: CPT

## 2024-08-24 PROCEDURE — 87480 CANDIDA DNA DIR PROBE: CPT

## 2024-08-24 PROCEDURE — 71260 CT THORAX DX C+: CPT

## 2024-08-24 PROCEDURE — 99285 EMERGENCY DEPT VISIT HI MDM: CPT | Performed by: EMERGENCY MEDICINE

## 2024-08-24 PROCEDURE — 6360000004 HC RX CONTRAST MEDICATION: Performed by: EMERGENCY MEDICINE

## 2024-08-24 PROCEDURE — 84520 ASSAY OF UREA NITROGEN: CPT

## 2024-08-24 PROCEDURE — 81003 URINALYSIS AUTO W/O SCOPE: CPT

## 2024-08-24 PROCEDURE — 87510 GARDNER VAG DNA DIR PROBE: CPT

## 2024-08-24 PROCEDURE — 82947 ASSAY GLUCOSE BLOOD QUANT: CPT

## 2024-08-24 PROCEDURE — 83605 ASSAY OF LACTIC ACID: CPT

## 2024-08-24 PROCEDURE — 74177 CT ABD & PELVIS W/CONTRAST: CPT

## 2024-08-24 PROCEDURE — 80051 ELECTROLYTE PANEL: CPT

## 2024-08-24 PROCEDURE — 85014 HEMATOCRIT: CPT

## 2024-08-24 PROCEDURE — 87086 URINE CULTURE/COLONY COUNT: CPT

## 2024-08-24 RX ORDER — PREDNISONE 20 MG/1
60 TABLET ORAL ONCE
Status: COMPLETED | OUTPATIENT
Start: 2024-08-24 | End: 2024-08-24

## 2024-08-24 RX ORDER — CYCLOBENZAPRINE HCL 5 MG
5 TABLET ORAL 3 TIMES DAILY PRN
Qty: 15 TABLET | Refills: 0 | Status: SHIPPED | OUTPATIENT
Start: 2024-08-24 | End: 2024-08-29

## 2024-08-24 RX ORDER — CYCLOBENZAPRINE HCL 10 MG
10 TABLET ORAL 3 TIMES DAILY PRN
Qty: 15 TABLET | Refills: 0 | Status: SHIPPED | OUTPATIENT
Start: 2024-08-24 | End: 2024-08-24

## 2024-08-24 RX ORDER — IOPAMIDOL 755 MG/ML
75 INJECTION, SOLUTION INTRAVASCULAR
Status: COMPLETED | OUTPATIENT
Start: 2024-08-24 | End: 2024-08-24

## 2024-08-24 RX ORDER — KETOROLAC TROMETHAMINE 30 MG/ML
30 INJECTION, SOLUTION INTRAMUSCULAR; INTRAVENOUS ONCE
Status: COMPLETED | OUTPATIENT
Start: 2024-08-24 | End: 2024-08-24

## 2024-08-24 RX ADMIN — IOPAMIDOL 75 ML: 755 INJECTION, SOLUTION INTRAVENOUS at 06:40

## 2024-08-24 RX ADMIN — KETOROLAC TROMETHAMINE 30 MG: 30 INJECTION, SOLUTION INTRAMUSCULAR; INTRAVENOUS at 07:54

## 2024-08-24 RX ADMIN — PREDNISONE 60 MG: 20 TABLET ORAL at 08:18

## 2024-08-24 ASSESSMENT — ENCOUNTER SYMPTOMS
SHORTNESS OF BREATH: 0
CHEST TIGHTNESS: 0
BACK PAIN: 1
ABDOMINAL PAIN: 0

## 2024-08-24 ASSESSMENT — PAIN SCALES - GENERAL: PAINLEVEL_OUTOF10: 6

## 2024-08-24 NOTE — ED PROVIDER NOTES
[facility]     Emergency Department     Faculty Attestation    I performed a history and physical examination of the patient and discussed management with the resident. I have reviewed and agree with the resident’s findings including all diagnostic interpretations, and treatment plans as written. Any areas of disagreement are noted on the chart. I was personally present for the key portions of any procedures. I have documented in the chart those procedures where I was not present during the key portions. I have reviewed the emergency nurses triage note. I agree with the chief complaint, past medical history, past surgical history, allergies, medications, social and family history as documented unless otherwise noted below. Documentation of the HPI, Physical Exam and Medical Decision Making performed by spenser is based on my personal performance of the HPI, PE and MDM. For Physician Assistant/ Nurse Practitioner cases/documentation I have personally evaluated this patient and have completed at least one if not all key elements of the E/M (history, physical exam, and MDM). Additional findings are as noted.    Note Started: 4:23 AM EDT     64 yo F L lower back pain radiating anteriorly for 3 days, no fall, no injury, no fever, no cp,   PE vss gcs 15 Solomon RN escort for exam: Tender left flank, lateral to L1 2345, skin is intact, no deformity, abdomen nontender, abdomen obese, no rebound, no guarding, no rigidity,    Ct chest / abd stable, ua stable, trop stable, discharged stable condition,   -I feel pt having musculoskeletal type process  EKG Interpretation    Interpreted by me  Normal sinus, heart rate 65, no ischemia, left axis, QTc 447    CRITICAL CARE: There was a high probability of clinically significant/life threatening deterioration in this patient's condition which required my urgent intervention.  Total critical care time was 5 minutes.  This

## 2024-08-24 NOTE — ED NOTES
Writer went into patient's room to check on the patient. Patient requesting something for pain, has not received anything while in ED. Patient tried tylenol, hot, and cold compresses at home with no relief. Resident notified.

## 2024-08-24 NOTE — ED NOTES
Patient to ED drive herself complaining of lower back/flank pain since Monday of this week. Patient states she's been having vaginal discharges as well for 2 days now. Patient denies any recent falls or injury. Patient also states she/s been taking OTC medication and flexeril for pain but it's not helping her at all. Denies Chest pain or SOB at this time. Patient is resting comfortably, NAD, VSS, Call light in reach. Plan of care on going.

## 2024-08-24 NOTE — DISCHARGE INSTRUCTIONS
Call today or tomorrow to follow up with Sarwat Pettit MD  in 3 days.    Use an ice pack or bag filled with ice and apply to the injured area 3 - 4 times a day for 15 - 20 minutes each time.  If the injury is older than 3 days, then use a heating pad to help relax the muscles in your back.    Use ibuprofen or Tylenol (unless prescribed medications that have Tylenol in it) for pain.  You can take over the counter Ibuprofen (advil) tablets (4 tablets every 8 hours or 3 tablets every 6 hours or 2 tablets every 4 hours)    Return to the Emergency Department for inability to move legs, worsening of pain, tingling / loss of sensation, any other care or concern.

## 2024-08-24 NOTE — ED PROVIDER NOTES
[facility] ED  Emergency Department Encounter  Emergency Medicine Resident     Pt Name:Lizbeth Hoff  MRN: [id number]  Birthdate 1960  Date of evaluation: 8/24/24  PCP:  Sarwat Pettit MD  Note Started: 5:22 AM EDT      CHIEF COMPLAINT       Chief Complaint   Patient presents with    Back Pain    Vaginal Discharge     X2 days       HISTORY OF PRESENT ILLNESS  (Location/Symptom, Timing/Onset, Context/Setting, Quality, Duration, Modifying Factors, Severity.)      Lizbeth Hoff is a 63 y.o. female who presents with back pain that began on Monday.  Patient states since then, her pain has gotten progressively worse.  Patient is never experienced pain like this before.  Pain is exacerbated with any movement.  Pain is relieved somewhat with Tylenol and Flexeril.  Not relieved with any positional changes.  Patient also reports vaginal discharge and patient dysuria for the past 2 days.    PAST MEDICAL / SURGICAL / SOCIAL / FAMILY HISTORY      has a past medical history of Diverticulitis, Former smoker, Former smoker, GERD (gastroesophageal reflux disease), Hyperlipidemia, Hypertension, Morbid obesity with BMI of 45.0-49.9, adult (HCC), SINDI on CPAP, Sleep apnea, and Type II or unspecified type diabetes mellitus without mention of complication, not stated as uncontrolled.     has a past surgical history that includes Hysterectomy; Cholecystectomy; Colonoscopy; pr egd transoral biopsy single/multiple (7/19/2017); Upper gastrointestinal endoscopy (N/A, 10/18/2019); and Colonoscopy (N/A, 10/18/2019).      Social History     Socioeconomic History    Marital status: Single     Spouse name: Not on file    Number of children: Not on file    Years of education: Not on file    Highest education level: Not on file   Occupational History    Not on file   Tobacco Use    Smoking status: Former     Current packs/day: 0.00     Average packs/day: 0.3 packs/day for 7.0 years (1.8 ttl pk-yrs)     Types: Cigarettes       hydroCHLOROthiazide (HYDRODIURIL) 25 MG tablet Take 1 tablet by mouth every morning 8/22/24   Chelsie Baum MD   melatonin 1 MG tablet TAKE 1 TABLET BY MOUTH AT NIGHT AS NEEDED FOR SLEEP AS DIRECTED 6/29/24   Sarwat Pettit MD   diclofenac sodium (VOLTAREN) 1 % GEL APPLY TO AFFECTED AREA(S) TOPICALLY 4 GRAMS 4 TIMES DAILY AS NEEDED FOR PAIN 6/29/24   Sarwat Pettit MD   metFORMIN (GLUCOPHAGE) 500 MG tablet TAKE 1 TABLET BY MOUTH EVERY MORNING WITH BREAKFAST 6/29/24   Sarwat Pettit MD   pantoprazole (PROTONIX) 40 MG tablet Take 1 tablet by mouth every morning (before breakfast) 4/8/24   Delia Kumari MD   blood glucose monitor kit and supplies Dispense sufficient amount for indicated testing frequency plus additional to accommodate PRN testing needs. Dispense all needed supplies to include: monitor, strips, lancing device, lancets, control solutions, alcohol swabs. 2/14/24   Delia Kumari MD   aspirin 81 MG tablet Take 1 tablet by mouth daily    Provider, MD Ross       REVIEW OF SYSTEMS       Review of Systems   Constitutional:  Negative for chills and fever.   HENT:  Negative for congestion.    Respiratory:  Negative for chest tightness and shortness of breath.    Cardiovascular:  Negative for chest pain.   Gastrointestinal:  Negative for abdominal pain.   Genitourinary:  Positive for dysuria, flank pain and vaginal discharge.   Musculoskeletal:  Positive for back pain.   Neurological:  Negative for dizziness and light-headedness.   All other systems reviewed and are negative.      PHYSICAL EXAM      INITIAL VITALS:   BP (!) 154/82   Pulse 86   Temp 97.3 °F (36.3 °C)   Resp 18   SpO2 97%     Physical Exam  Constitutional:       Appearance: Normal appearance. She is obese.   Eyes:      Extraocular Movements: Extraocular movements intact.      Conjunctiva/sclera: Conjunctivae normal.   Cardiovascular:      Rate and Rhythm: Normal rate and regular rhythm.      Pulses: Normal pulses.

## 2024-08-24 NOTE — ED PROVIDER NOTES
[facility]  FACULTY HANDOFF       Handoff taken on the following patient from prior Attending Physician:  Pt Name: Lizbeth Hoff  PCP:  Sarwat Pettit MD    Attestation  I was available and discussed any additional care issues that arose and coordinated the management plans with the resident(s) caring for the patient during my duty period. Any areas of disagreement with resident's documentation of care or procedures are noted on the chart. I was personally present for the key portions of any/all procedures during my duty period. I have documented in the chart those procedures where I was not present during the key portions.         CHIEF COMPLAINT       Chief Complaint   Patient presents with    Back Pain    Vaginal Discharge     X2 days         CURRENT MEDICATIONS     Previous Medications  Previous Medications    ASPIRIN 81 MG TABLET    Take 1 tablet by mouth daily    ATORVASTATIN (LIPITOR) 40 MG TABLET    Take 1 tablet by mouth daily    BLOOD GLUCOSE MONITOR KIT AND SUPPLIES    Dispense sufficient amount for indicated testing frequency plus additional to accommodate PRN testing needs. Dispense all needed supplies to include: monitor, strips, lancing device, lancets, control solutions, alcohol swabs.    DICLOFENAC SODIUM (VOLTAREN) 1 % GEL    APPLY TO AFFECTED AREA(S) TOPICALLY 4 GRAMS 4 TIMES DAILY AS NEEDED FOR PAIN    FLUTICASONE (FLONASE) 50 MCG/ACT NASAL SPRAY    INSTILL 2 SPRAYS INTO EACH NOSTRIL ONCE DAILY FOR 14 DAYS AS DIRECTED    HYDROCHLOROTHIAZIDE (HYDRODIURIL) 25 MG TABLET    Take 1 tablet by mouth every morning    MELATONIN 1 MG TABLET    TAKE 1 TABLET BY MOUTH AT NIGHT AS NEEDED FOR SLEEP AS DIRECTED    METFORMIN (GLUCOPHAGE) 500 MG TABLET    TAKE 1 TABLET BY MOUTH EVERY MORNING WITH BREAKFAST    PANTOPRAZOLE (PROTONIX) 40 MG TABLET    Take 1 tablet by mouth every morning (before breakfast)       Encounter Medications  Orders Placed This Encounter   Medications    iopamidol

## 2024-08-24 NOTE — ED NOTES
The following labs were labeled with appropriate pt sticker and tubed to lab:     [x] Blue     [x] Lavender   [] on ice  [x] Green/yellow  [] Green/black [] on ice  [] Christian  [] on ice  [x] Yellow  [] Red  [] Pink  [] Type/ Screen  [] ABG  [] VBG    [] COVID-19 swab    [] Rapid  [] PCR  [] Flu swab  [] Peds Viral Panel     [] Urine Sample  [] Fecal Sample  [] Pelvic Cultures  [] Blood Cultures  [] X 2  [] STREP Cultures  [] Wound Cultures

## 2024-08-25 LAB
EKG ATRIAL RATE: 65 BPM
EKG P AXIS: 37 DEGREES
EKG P-R INTERVAL: 170 MS
EKG Q-T INTERVAL: 430 MS
EKG QRS DURATION: 102 MS
EKG QTC CALCULATION (BAZETT): 447 MS
EKG R AXIS: -44 DEGREES
EKG T AXIS: -29 DEGREES
EKG VENTRICULAR RATE: 65 BPM
MICROORGANISM SPEC CULT: NO GROWTH
SERVICE CMNT-IMP: NORMAL
SPECIMEN DESCRIPTION: NORMAL

## 2024-08-26 LAB
C TRACH DNA SPEC QL PROBE+SIG AMP: NEGATIVE
N GONORRHOEA DNA SPEC QL PROBE+SIG AMP: NEGATIVE
SPECIMEN DESCRIPTION: NORMAL

## 2024-09-10 ENCOUNTER — OFFICE VISIT (OUTPATIENT)
Dept: FAMILY MEDICINE CLINIC | Age: 64
End: 2024-09-10
Payer: MEDICAID

## 2024-09-10 ENCOUNTER — HOSPITAL ENCOUNTER (EMERGENCY)
Age: 64
Discharge: HOME | End: 2024-09-10
Attending: EMERGENCY MEDICINE
Payer: MEDICAID

## 2024-09-10 ENCOUNTER — APPOINTMENT (OUTPATIENT)
Dept: GENERAL RADIOLOGY | Age: 64
End: 2024-09-10
Payer: MEDICAID

## 2024-09-10 ENCOUNTER — APPOINTMENT (OUTPATIENT)
Dept: CT IMAGING | Age: 64
End: 2024-09-10
Payer: MEDICAID

## 2024-09-10 VITALS
BODY MASS INDEX: 46.78 KG/M2 | OXYGEN SATURATION: 98 % | HEART RATE: 122 BPM | WEIGHT: 264 LBS | DIASTOLIC BLOOD PRESSURE: 98 MMHG | SYSTOLIC BLOOD PRESSURE: 144 MMHG | HEIGHT: 63 IN

## 2024-09-10 VITALS
TEMPERATURE: 98.1 F | DIASTOLIC BLOOD PRESSURE: 92 MMHG | RESPIRATION RATE: 19 BRPM | WEIGHT: 266 LBS | HEIGHT: 63 IN | SYSTOLIC BLOOD PRESSURE: 155 MMHG | OXYGEN SATURATION: 99 % | HEART RATE: 88 BPM | BODY MASS INDEX: 47.13 KG/M2

## 2024-09-10 DIAGNOSIS — R00.2 PALPITATIONS: Primary | ICD-10-CM

## 2024-09-10 DIAGNOSIS — I10 PRIMARY HYPERTENSION: ICD-10-CM

## 2024-09-10 DIAGNOSIS — R10.32 LEFT LOWER QUADRANT ABDOMINAL PAIN: ICD-10-CM

## 2024-09-10 DIAGNOSIS — R10.32 ABDOMINAL PAIN, LEFT LOWER QUADRANT: Primary | ICD-10-CM

## 2024-09-10 LAB
ALBUMIN SERPL-MCNC: 4.3 G/DL (ref 3.5–5.2)
ALBUMIN/GLOB SERPL: 1 {RATIO} (ref 1–2.5)
ALP SERPL-CCNC: 88 U/L (ref 35–104)
ALT SERPL-CCNC: 12 U/L (ref 10–35)
ANION GAP SERPL CALCULATED.3IONS-SCNC: 10 MMOL/L (ref 9–16)
AST SERPL-CCNC: 20 U/L (ref 10–35)
BASOPHILS # BLD: 0.03 K/UL (ref 0–0.2)
BASOPHILS NFR BLD: 0 % (ref 0–2)
BILIRUB SERPL-MCNC: 0.5 MG/DL (ref 0–1.2)
BILIRUBIN, POC: NEGATIVE
BLOOD URINE, POC: NEGATIVE
BUN SERPL-MCNC: 13 MG/DL (ref 8–23)
CALCIUM SERPL-MCNC: 9.8 MG/DL (ref 8.6–10.4)
CHLORIDE SERPL-SCNC: 103 MMOL/L (ref 98–107)
CLARITY, POC: CLEAR
CO2 SERPL-SCNC: 26 MMOL/L (ref 20–31)
COLOR, POC: YELLOW
CREAT SERPL-MCNC: 1.1 MG/DL (ref 0.5–0.9)
D DIMER PPP FEU-MCNC: 1.08 UG/ML FEU (ref 0–0.57)
EOSINOPHIL # BLD: 0.11 K/UL (ref 0–0.44)
EOSINOPHILS RELATIVE PERCENT: 1 % (ref 1–4)
ERYTHROCYTE [DISTWIDTH] IN BLOOD BY AUTOMATED COUNT: 15.4 % (ref 11.8–14.4)
GFR, ESTIMATED: 57 ML/MIN/1.73M2
GLUCOSE SERPL-MCNC: 114 MG/DL (ref 74–99)
GLUCOSE URINE, POC: NEGATIVE MG/DL
HCT VFR BLD AUTO: 41.2 % (ref 36.3–47.1)
HGB BLD-MCNC: 13.2 G/DL (ref 11.9–15.1)
IMM GRANULOCYTES # BLD AUTO: 0.03 K/UL (ref 0–0.3)
IMM GRANULOCYTES NFR BLD: 0 %
KETONES, POC: NEGATIVE MG/DL
LEUKOCYTE EST, POC: NEGATIVE
LIPASE SERPL-CCNC: 23 U/L (ref 13–60)
LYMPHOCYTES NFR BLD: 3.14 K/UL (ref 1.1–3.7)
LYMPHOCYTES RELATIVE PERCENT: 28 % (ref 24–43)
MCH RBC QN AUTO: 28.4 PG (ref 25.2–33.5)
MCHC RBC AUTO-ENTMCNC: 32 G/DL (ref 28.4–34.8)
MCV RBC AUTO: 88.6 FL (ref 82.6–102.9)
MONOCYTES NFR BLD: 0.69 K/UL (ref 0.1–1.2)
MONOCYTES NFR BLD: 6 % (ref 3–12)
NEUTROPHILS NFR BLD: 65 % (ref 36–65)
NEUTS SEG NFR BLD: 7.07 K/UL (ref 1.5–8.1)
NITRITE, POC: NEGATIVE
NRBC BLD-RTO: 0 PER 100 WBC
PH, POC: 6.5
PLATELET # BLD AUTO: 236 K/UL (ref 138–453)
PMV BLD AUTO: 11.5 FL (ref 8.1–13.5)
POTASSIUM SERPL-SCNC: 3.8 MMOL/L (ref 3.7–5.3)
PROT SERPL-MCNC: 7.7 G/DL (ref 6.6–8.7)
PROTEIN, POC: NEGATIVE MG/DL
RBC # BLD AUTO: 4.65 M/UL (ref 3.95–5.11)
RBC # BLD: ABNORMAL 10*6/UL
SODIUM SERPL-SCNC: 139 MMOL/L (ref 136–145)
SPECIFIC GRAVITY, POC: 1.02
TROPONIN I SERPL HS-MCNC: 7 NG/L (ref 0–14)
TROPONIN I SERPL HS-MCNC: <6 NG/L (ref 0–14)
UROBILINOGEN, POC: 0.2 MG/DL
WBC OTHER # BLD: 11.1 K/UL (ref 3.5–11.3)

## 2024-09-10 PROCEDURE — 6360000004 HC RX CONTRAST MEDICATION

## 2024-09-10 PROCEDURE — 99285 EMERGENCY DEPT VISIT HI MDM: CPT

## 2024-09-10 PROCEDURE — 93005 ELECTROCARDIOGRAM TRACING: CPT

## 2024-09-10 PROCEDURE — 84484 ASSAY OF TROPONIN QUANT: CPT

## 2024-09-10 PROCEDURE — 3077F SYST BP >= 140 MM HG: CPT

## 2024-09-10 PROCEDURE — 83690 ASSAY OF LIPASE: CPT

## 2024-09-10 PROCEDURE — 6360000002 HC RX W HCPCS

## 2024-09-10 PROCEDURE — 93010 ELECTROCARDIOGRAM REPORT: CPT

## 2024-09-10 PROCEDURE — 85025 COMPLETE CBC W/AUTO DIFF WBC: CPT

## 2024-09-10 PROCEDURE — 71260 CT THORAX DX C+: CPT

## 2024-09-10 PROCEDURE — 99215 OFFICE O/P EST HI 40 MIN: CPT

## 2024-09-10 PROCEDURE — 85379 FIBRIN DEGRADATION QUANT: CPT

## 2024-09-10 PROCEDURE — 96374 THER/PROPH/DIAG INJ IV PUSH: CPT

## 2024-09-10 PROCEDURE — 71045 X-RAY EXAM CHEST 1 VIEW: CPT

## 2024-09-10 PROCEDURE — 3080F DIAST BP >= 90 MM HG: CPT

## 2024-09-10 PROCEDURE — 81002 URINALYSIS NONAUTO W/O SCOPE: CPT

## 2024-09-10 PROCEDURE — 2580000003 HC RX 258

## 2024-09-10 PROCEDURE — 80053 COMPREHEN METABOLIC PANEL: CPT

## 2024-09-10 RX ORDER — 0.9 % SODIUM CHLORIDE 0.9 %
500 INTRAVENOUS SOLUTION INTRAVENOUS ONCE
Status: COMPLETED | OUTPATIENT
Start: 2024-09-10 | End: 2024-09-10

## 2024-09-10 RX ORDER — SPIRONOLACTONE 25 MG/1
TABLET ORAL
COMMUNITY
Start: 2024-08-21 | End: 2024-09-10

## 2024-09-10 RX ORDER — FUROSEMIDE 40 MG
TABLET ORAL
COMMUNITY
Start: 2024-08-21 | End: 2024-09-10

## 2024-09-10 RX ORDER — MORPHINE SULFATE 4 MG/ML
4 INJECTION INTRAVENOUS ONCE
Status: COMPLETED | OUTPATIENT
Start: 2024-09-10 | End: 2024-09-10

## 2024-09-10 RX ORDER — LISINOPRIL 5 MG/1
TABLET ORAL
COMMUNITY
Start: 2024-08-21 | End: 2024-09-10

## 2024-09-10 RX ORDER — LISINOPRIL 5 MG/1
2.5 TABLET ORAL DAILY
Qty: 15 TABLET | Refills: 2 | Status: SHIPPED | OUTPATIENT
Start: 2024-09-10 | End: 2024-12-09

## 2024-09-10 RX ORDER — IOPAMIDOL 755 MG/ML
75 INJECTION, SOLUTION INTRAVASCULAR
Status: COMPLETED | OUTPATIENT
Start: 2024-09-10 | End: 2024-09-10

## 2024-09-10 RX ADMIN — IOPAMIDOL 75 ML: 755 INJECTION, SOLUTION INTRAVENOUS at 13:38

## 2024-09-10 RX ADMIN — MORPHINE SULFATE 4 MG: 4 INJECTION INTRAVENOUS at 13:03

## 2024-09-10 RX ADMIN — SODIUM CHLORIDE 500 ML: 9 INJECTION, SOLUTION INTRAVENOUS at 13:03

## 2024-09-10 ASSESSMENT — PAIN DESCRIPTION - DESCRIPTORS: DESCRIPTORS: SHARP

## 2024-09-10 ASSESSMENT — ENCOUNTER SYMPTOMS
NAUSEA: 0
CONSTIPATION: 0
BLOOD IN STOOL: 0
VOMITING: 0
STRIDOR: 0
BACK PAIN: 1
COUGH: 0
SHORTNESS OF BREATH: 0
ABDOMINAL PAIN: 1
ABDOMINAL PAIN: 1
VOMITING: 0
NAUSEA: 0
WHEEZING: 0

## 2024-09-10 ASSESSMENT — PAIN SCALES - GENERAL: PAINLEVEL_OUTOF10: 7

## 2024-09-10 ASSESSMENT — PAIN DESCRIPTION - LOCATION: LOCATION: ABDOMEN

## 2024-09-10 ASSESSMENT — HEART SCORE: ECG: NORMAL

## 2024-09-10 ASSESSMENT — PAIN DESCRIPTION - ORIENTATION: ORIENTATION: LEFT;LOWER

## 2024-09-10 ASSESSMENT — PAIN - FUNCTIONAL ASSESSMENT: PAIN_FUNCTIONAL_ASSESSMENT: ACTIVITIES ARE NOT PREVENTED

## 2024-09-10 ASSESSMENT — PAIN DESCRIPTION - PAIN TYPE: TYPE: ACUTE PAIN

## 2024-09-11 ENCOUNTER — OFFICE VISIT (OUTPATIENT)
Dept: GASTROENTEROLOGY | Age: 64
End: 2024-09-11
Payer: MEDICAID

## 2024-09-11 ENCOUNTER — TELEPHONE (OUTPATIENT)
Dept: FAMILY MEDICINE CLINIC | Age: 64
End: 2024-09-11

## 2024-09-11 VITALS
SYSTOLIC BLOOD PRESSURE: 152 MMHG | BODY MASS INDEX: 46.77 KG/M2 | DIASTOLIC BLOOD PRESSURE: 83 MMHG | WEIGHT: 264 LBS | TEMPERATURE: 97.3 F

## 2024-09-11 DIAGNOSIS — K58.2 IRRITABLE BOWEL SYNDROME WITH BOTH CONSTIPATION AND DIARRHEA: ICD-10-CM

## 2024-09-11 DIAGNOSIS — K21.9 GASTROESOPHAGEAL REFLUX DISEASE, UNSPECIFIED WHETHER ESOPHAGITIS PRESENT: ICD-10-CM

## 2024-09-11 DIAGNOSIS — K57.32 DIVERTICULITIS OF COLON: Primary | ICD-10-CM

## 2024-09-11 DIAGNOSIS — K57.30 DIVERTICULA, COLON: ICD-10-CM

## 2024-09-11 DIAGNOSIS — E66.8 MODERATE OBESITY: ICD-10-CM

## 2024-09-11 DIAGNOSIS — I10 PRIMARY HYPERTENSION: Primary | ICD-10-CM

## 2024-09-11 PROBLEM — E66.9 MODERATE OBESITY: Status: ACTIVE | Noted: 2024-09-11

## 2024-09-11 LAB
EKG ATRIAL RATE: 103 BPM
EKG P AXIS: 48 DEGREES
EKG P-R INTERVAL: 168 MS
EKG Q-T INTERVAL: 372 MS
EKG QRS DURATION: 98 MS
EKG QTC CALCULATION (BAZETT): 487 MS
EKG R AXIS: -47 DEGREES
EKG T AXIS: 41 DEGREES
EKG VENTRICULAR RATE: 103 BPM

## 2024-09-11 PROCEDURE — 3079F DIAST BP 80-89 MM HG: CPT | Performed by: INTERNAL MEDICINE

## 2024-09-11 PROCEDURE — 99204 OFFICE O/P NEW MOD 45 MIN: CPT | Performed by: INTERNAL MEDICINE

## 2024-09-11 PROCEDURE — 3077F SYST BP >= 140 MM HG: CPT | Performed by: INTERNAL MEDICINE

## 2024-09-11 ASSESSMENT — ENCOUNTER SYMPTOMS
WHEEZING: 0
ABDOMINAL DISTENTION: 1
COUGH: 0
SHORTNESS OF BREATH: 0
VOICE CHANGE: 0
BLOOD IN STOOL: 0
VOMITING: 0
NAUSEA: 0
DIARRHEA: 1
SORE THROAT: 0
ABDOMINAL PAIN: 1
ANAL BLEEDING: 0
CHOKING: 0
TROUBLE SWALLOWING: 0
CONSTIPATION: 0
RECTAL PAIN: 0

## 2024-09-12 ENCOUNTER — HOSPITAL ENCOUNTER (OUTPATIENT)
Dept: PHYSICAL THERAPY | Age: 64
Setting detail: THERAPIES SERIES
Discharge: HOME | End: 2024-09-12
Payer: MEDICAID

## 2024-09-12 PROCEDURE — 97162 PT EVAL MOD COMPLEX 30 MIN: CPT

## 2024-09-12 PROCEDURE — 97110 THERAPEUTIC EXERCISES: CPT

## 2024-09-12 RX ORDER — BISACODYL 5 MG/1
TABLET, DELAYED RELEASE ORAL
Qty: 4 TABLET | Refills: 0 | Status: SHIPPED | OUTPATIENT
Start: 2024-09-12

## 2024-09-12 RX ORDER — POLYETHYLENE GLYCOL 3350 17 G/17G
POWDER, FOR SOLUTION ORAL
Qty: 238 G | Refills: 0 | Status: SHIPPED | OUTPATIENT
Start: 2024-09-12

## 2024-09-16 ENCOUNTER — HOSPITAL ENCOUNTER (OUTPATIENT)
Dept: PHYSICAL THERAPY | Age: 64
Setting detail: THERAPIES SERIES
Discharge: HOME | End: 2024-09-16
Payer: MEDICAID

## 2024-09-18 ENCOUNTER — HOSPITAL ENCOUNTER (OUTPATIENT)
Dept: PHYSICAL THERAPY | Age: 64
Setting detail: THERAPIES SERIES
Discharge: HOME | End: 2024-09-18
Payer: MEDICAID

## 2024-09-18 PROCEDURE — 97110 THERAPEUTIC EXERCISES: CPT

## 2024-09-19 DIAGNOSIS — J30.9 ALLERGIC RHINITIS, UNSPECIFIED SEASONALITY, UNSPECIFIED TRIGGER: ICD-10-CM

## 2024-09-20 RX ORDER — FLUTICASONE PROPIONATE 50 MCG
SPRAY, SUSPENSION (ML) NASAL
Qty: 16 G | Refills: 0 | Status: SHIPPED | OUTPATIENT
Start: 2024-09-20

## 2024-09-24 ENCOUNTER — HOSPITAL ENCOUNTER (OUTPATIENT)
Dept: PHYSICAL THERAPY | Age: 64
Setting detail: THERAPIES SERIES
Discharge: HOME | End: 2024-09-24
Payer: MEDICAID

## 2024-09-24 PROCEDURE — 97110 THERAPEUTIC EXERCISES: CPT

## 2024-09-24 RX ORDER — PANTOPRAZOLE SODIUM 40 MG/1
40 TABLET, DELAYED RELEASE ORAL
Qty: 30 TABLET | Refills: 5 | Status: SHIPPED | OUTPATIENT
Start: 2024-09-24

## 2024-09-26 ENCOUNTER — HOSPITAL ENCOUNTER (OUTPATIENT)
Dept: PHYSICAL THERAPY | Age: 64
Setting detail: THERAPIES SERIES
Discharge: HOME | End: 2024-09-26
Payer: MEDICAID

## 2024-10-03 ENCOUNTER — HOSPITAL ENCOUNTER (OUTPATIENT)
Dept: PHYSICAL THERAPY | Age: 64
Setting detail: THERAPIES SERIES
Discharge: HOME | End: 2024-10-03

## 2024-10-03 ENCOUNTER — OFFICE VISIT (OUTPATIENT)
Dept: FAMILY MEDICINE CLINIC | Age: 64
End: 2024-10-03
Payer: MEDICAID

## 2024-10-03 VITALS
WEIGHT: 265.4 LBS | HEART RATE: 100 BPM | DIASTOLIC BLOOD PRESSURE: 72 MMHG | SYSTOLIC BLOOD PRESSURE: 125 MMHG | HEIGHT: 63 IN | BODY MASS INDEX: 47.02 KG/M2

## 2024-10-03 DIAGNOSIS — Z00.01 ENCOUNTER FOR PREVENTATIVE ADULT HEALTH CARE EXAM WITH ABNORMAL FINDINGS: Primary | ICD-10-CM

## 2024-10-03 DIAGNOSIS — Z71.3 NUTRITIONAL COUNSELING: ICD-10-CM

## 2024-10-03 DIAGNOSIS — Z71.82 EXERCISE COUNSELING: ICD-10-CM

## 2024-10-03 DIAGNOSIS — Z23 FLU VACCINE NEED: ICD-10-CM

## 2024-10-03 DIAGNOSIS — E66.01 MORBID OBESITY WITH BMI OF 45.0-49.9, ADULT: ICD-10-CM

## 2024-10-03 DIAGNOSIS — I10 PRIMARY HYPERTENSION: ICD-10-CM

## 2024-10-03 PROCEDURE — 3078F DIAST BP <80 MM HG: CPT

## 2024-10-03 PROCEDURE — 90656 IIV3 VACC NO PRSV 0.5 ML IM: CPT

## 2024-10-03 PROCEDURE — 99396 PREV VISIT EST AGE 40-64: CPT

## 2024-10-03 PROCEDURE — 3074F SYST BP LT 130 MM HG: CPT

## 2024-10-03 ASSESSMENT — PATIENT HEALTH QUESTIONNAIRE - PHQ9
1. LITTLE INTEREST OR PLEASURE IN DOING THINGS: NOT AT ALL
SUM OF ALL RESPONSES TO PHQ QUESTIONS 1-9: 0
SUM OF ALL RESPONSES TO PHQ QUESTIONS 1-9: 0
2. FEELING DOWN, DEPRESSED OR HOPELESS: NOT AT ALL
SUM OF ALL RESPONSES TO PHQ QUESTIONS 1-9: 0
SUM OF ALL RESPONSES TO PHQ9 QUESTIONS 1 & 2: 0
SUM OF ALL RESPONSES TO PHQ QUESTIONS 1-9: 0

## 2024-10-03 NOTE — PATIENT INSTRUCTIONS
Thank you for letting us take care of you today. We hope all your questions were addressed. If a question was overlooked or something else comes to mind after you return home, please contact a member of your Care Team listed below.      Your Care Team at [facility] is Team #  Hugo Bello M.D. (Faculty)  Tejal Aldana M.D. (Resident)  Sarwat Pettit M.D. (Resident)   Chelsie Baum M.D. (Resident)  Ricky Chavez M.D. (Resident)  Alma Wren M.D. (Resident)  Alisha Carpenter., [facility]  Nereyda Canada, [facility]  Bekah Sharp, [facility]  Arian Sauer, MARILU Tee, [facility]  Karin Pickens, [facility]  Luciana Carlson, MARILU Dupont (LJ) RUBI Pope (Clinical Practice Manager)  Sindhu Ascencio [facility] (Clinical Pharmacist)     Office phone number: [phone]    If you need to get in right away due to illness, please be advised we have \"Same Day\" appointments available Monday-Friday. Please call us at [phone] option #3 to schedule your \"Same Day\" appointment.

## 2024-10-03 NOTE — PROGRESS NOTES
[facility] Residency Program - Outpatient Note      Subjective:    Lizbeth Hoff is a 63 y.o. female with  has a past medical history of Diverticulitis, Former smoker, Former smoker, GERD (gastroesophageal reflux disease), Hyperlipidemia, Hypertension, Morbid obesity with BMI of 45.0-49.9, adult, SINDI on CPAP, Sleep apnea, and Type II or unspecified type diabetes mellitus without mention of complication, not stated as uncontrolled.    Presented to the office today for:  Chief Complaint   Patient presents with    H&P     Foster caregiver physical        HPI    63 female presents to the clinic today for physical in order to get caregiver/ privileges.  Patient is overall doing well, hypertension well-controlled on 12.5 mg of hydrothorax thiazide and 2.5 mg of lisinopril.  Patient compliant with medication.  Denies any shortness of breath, chest pain, chest tightness, lightheadedness or any other concerning symptoms.  Patient does have continued occasional heartburn, is taking 40 mg Protonix as well as over-the-counter Pepcid.  Patient has followed up with GI, has an upcoming EGD and colonoscopy scheduled in February.  No concerns today.  Patient does not smoke or drink alcohol.      Review of Systems   Constitutional:  Negative for diaphoresis, fatigue and fever.   Respiratory:  Negative for cough, chest tightness and shortness of breath.    Cardiovascular:  Negative for chest pain, palpitations and leg swelling.   Gastrointestinal: Negative.    Genitourinary: Negative.    Neurological:  Negative for dizziness, syncope, weakness, light-headedness and headaches.       Face to Face done for DME needs.        Procedures    DME Order for (Specify) as OP     - DME device ordered - BP cuff and machine   - Diagnosis: Hypertension  - Length of Need: Lifetime            The patient has a   Family History   Problem Relation Age of Onset    High Blood Pressure Mother     Diabetes 
Visit Information    Have you changed or started any medications since your last visit including any over-the-counter medicines, vitamins, or herbal medicines? no   Are you having any side effects from any of your medications? -  no  Have you stopped taking any of your medications? Is so, why? -  no    Have you seen any other physician or provider since your last visit? No  Have you had any other diagnostic tests since your last visit? no  Have you been seen in the emergency room and/or had an admission to a hospital since we last saw you? No  Have you had your routine dental cleaning in the past 6 months? no    Have you activated your [employer] account? If not, what are your barriers? Yes     Patient Care Team:  Sarwat Pettit MD as PCP - General (Family Medicine)  Edgardo Pineda MD as Consulting Physician (Pulmonology)  Ashok Reynolds MD as Consulting Physician (Pain Management)    Medical History Review  Past Medical, Family, and Social History reviewed and does not contribute to the patient presenting condition    Health Maintenance   Topic Date Due    Diabetic retinal exam  Never done    Shingles vaccine (1 of 2) Never done    Hepatitis B vaccine (2 of 3 - 19+ 3-dose series) 03/11/2022    Respiratory Syncytial Virus (RSV) Pregnant or age 60 yrs+ (1 - 1-dose 60+ series) Never done    DTaP/Tdap/Td vaccine (2 - Td or Tdap) 03/07/2024    Flu vaccine (1) 08/01/2024    COVID-19 Vaccine (3 - 2023-24 season) 09/01/2024    Diabetic foot exam  12/08/2024    Diabetic Alb to Cr ratio (uACR) test  12/28/2024    Lipids  12/28/2024    A1C test (Diabetic or Prediabetic)  04/08/2025    Depression Screen  08/22/2025    GFR test (Diabetes, CKD 3-4, OR last GFR 15-59)  09/10/2025    Breast cancer screen  02/01/2026    Colorectal Cancer Screen  10/18/2029    Pneumococcal 0-64 years Vaccine  Completed    Hepatitis C screen  Completed    HIV screen  Completed    Hepatitis A vaccine  Aged Out    Hib vaccine  Aged Out    Polio vaccine  
with cardiology.  Patient declines a closer follow-up within 2 to 3 months would prefer to wait until after the winter.    Return in about 6 months (around 4/3/2025) for Follow-up.   (GE Modifier ) Dr. FRANSISCA TRUONG MD

## 2024-10-14 ENCOUNTER — TELEPHONE (OUTPATIENT)
Dept: FAMILY MEDICINE CLINIC | Age: 64
End: 2024-10-14

## 2024-10-14 NOTE — TELEPHONE ENCOUNTER
Tried to contact patient to reschedule patient's appointment from 11/28/2024, due to provider not in clinic that day. Voie mail full no message left.  My chart message sent

## 2024-10-16 DIAGNOSIS — M17.0 PRIMARY OSTEOARTHRITIS OF BOTH KNEES: ICD-10-CM

## 2024-10-16 NOTE — TELEPHONE ENCOUNTER
Last visit: 10/03/2024  Last Med refill: 06/29/2024  Does patient have enough medication for 72 hours: No:     Next Visit Date:  Future Appointments   Date Time Provider Department Center   10/21/2024 10:15 AM Brenda Smith PA-C ORTHO SPECIA [facility]   11/6/2024  9:30 AM Aaliyah Clark MD [facility]   11/27/2024 11:45 AM Candace Conrad MD [facility]       Health Maintenance   Topic Date Due    Diabetic retinal exam  Never done    Shingles vaccine (1 of 2) Never done    Hepatitis B vaccine (2 of 3 - 19+ 3-dose series) 03/11/2022    Respiratory Syncytial Virus (RSV) Pregnant or age 60 yrs+ (1 - 1-dose 60+ series) Never done    DTaP/Tdap/Td vaccine (2 - Td or Tdap) 03/07/2024    COVID-19 Vaccine (3 - 2023-24 season) 09/01/2024    Diabetic foot exam  12/08/2024    Diabetic Alb to Cr ratio (uACR) test  12/28/2024    Lipids  12/28/2024    A1C test (Diabetic or Prediabetic)  04/08/2025    GFR test (Diabetes, CKD 3-4, OR last GFR 15-59)  09/10/2025    Depression Screen  10/03/2025    Breast cancer screen  02/01/2026    Colorectal Cancer Screen  10/18/2029    Flu vaccine  Completed    Pneumococcal 0-64 years Vaccine  Completed    Hepatitis C screen  Completed    HIV screen  Completed    Hepatitis A vaccine  Aged Out    Hib vaccine  Aged Out    Polio vaccine  Aged Out    Meningococcal (ACWY) vaccine  Aged Out       Hemoglobin A1C (%)   Date Value   04/08/2024 6.4   12/08/2023 6.5   01/16/2023 6.5             ( goal A1C is < 7)   No components found for: \"LABMICR\"  No components found for: \"LDLCHOLESTEROL\", \"LDLCALC\"    (goal LDL is <100)   AST (U/L)   Date Value   09/10/2024 20     ALT (U/L)   Date Value   09/10/2024 12     BUN (mg/dL)   Date Value   09/10/2024 13     BP Readings from Last 3 Encounters:   10/03/24 125/72   09/11/24 (!) 152/83   09/10/24 (!) 155/92          (goal 120/80)    All Future Testing planned in [facility]  Lab Frequency Next Occurrence   XR LUMBAR SPINE (2-3 VIEWS) Once 01/05/2024

## 2024-10-21 ENCOUNTER — PROCEDURE VISIT (OUTPATIENT)
Dept: ORTHOPEDIC SURGERY | Age: 64
End: 2024-10-21
Payer: COMMERCIAL

## 2024-10-21 VITALS — BODY MASS INDEX: 48.58 KG/M2 | WEIGHT: 264 LBS | HEIGHT: 62 IN

## 2024-10-21 DIAGNOSIS — G89.29 CHRONIC PAIN OF BOTH KNEES: Primary | ICD-10-CM

## 2024-10-21 DIAGNOSIS — M17.0 PRIMARY OSTEOARTHRITIS OF BOTH KNEES: ICD-10-CM

## 2024-10-21 DIAGNOSIS — M25.562 CHRONIC PAIN OF BOTH KNEES: Primary | ICD-10-CM

## 2024-10-21 DIAGNOSIS — M25.561 CHRONIC PAIN OF BOTH KNEES: Primary | ICD-10-CM

## 2024-10-21 PROCEDURE — 99214 OFFICE O/P EST MOD 30 MIN: CPT | Performed by: PHYSICIAN ASSISTANT

## 2024-10-21 PROCEDURE — 20610 DRAIN/INJ JOINT/BURSA W/O US: CPT | Performed by: PHYSICIAN ASSISTANT

## 2024-10-21 RX ORDER — BUPIVACAINE HYDROCHLORIDE 2.5 MG/ML
2 INJECTION, SOLUTION INFILTRATION; PERINEURAL ONCE
Status: COMPLETED | OUTPATIENT
Start: 2024-10-21 | End: 2024-10-21

## 2024-10-21 RX ORDER — METHYLPREDNISOLONE ACETATE 80 MG/ML
80 INJECTION, SUSPENSION INTRA-ARTICULAR; INTRALESIONAL; INTRAMUSCULAR; SOFT TISSUE ONCE
Status: COMPLETED | OUTPATIENT
Start: 2024-10-21 | End: 2024-10-21

## 2024-10-21 RX ADMIN — BUPIVACAINE HYDROCHLORIDE 2 ML: 2.5 INJECTION, SOLUTION INFILTRATION; PERINEURAL at 10:38

## 2024-10-21 RX ADMIN — METHYLPREDNISOLONE ACETATE 80 MG: 80 INJECTION, SUSPENSION INTRA-ARTICULAR; INTRALESIONAL; INTRAMUSCULAR; SOFT TISSUE at 10:38

## 2024-10-21 ASSESSMENT — ENCOUNTER SYMPTOMS
VOMITING: 0
COLOR CHANGE: 0
SHORTNESS OF BREATH: 0
COUGH: 0

## 2024-10-21 NOTE — PROGRESS NOTES
[facility] PHYSICIANS [facility]  [address]  [place] [address]  Dept: [phone]  Dept Fax: [phone]        Ambulatory Follow Up      Subjective:   Lizbeth Hoff is a 63 y.o. year old female who presents to our office today for routine followup regarding her   1. Chronic pain of both knees    2. Primary osteoarthritis of both knees        Chief Complaint   Patient presents with    Knee Pain     B/L knee pain      History of Present Illness  The patient is a 63-year-old female here today for reevaluation of chronic bilateral knee pain. She was last evaluated on 08/19/2024.    She experiences intermittent knee pain, with the severity alternating between her left and right knees. The pain is constant, persisting throughout the day and night, particularly when she moves frequently or lies down. She occasionally struggles with walking long distances and continues to use a cane for mobility. She takes Tylenol for pain relief.    She has not been able to attend weight management sessions due to a recent bereavement in her family.     After the last appointment, her insurance has denied coverage for Monovisc injections. She expresses interest in receiving regular steroid injections today. She has previously received injections from Dr. Hu with the last injections being on 6/18/2024 , which she found beneficial. She has not undergone formal physical therapy for her knees.    SOCIAL HISTORY  She quit smoking in 1991.    ALLERGIES  She denies any allergies to medications. She denies allergy to Betadine or iodine.      Review of Systems   Constitutional:  Negative for activity change and fever.   HENT:  Negative for sneezing.    Respiratory:  Negative for cough and shortness of breath.    Cardiovascular:  Negative for chest pain.   Gastrointestinal:  Negative for vomiting.   Musculoskeletal:  Positive for arthralgias (bilateral knee). Negative for joint

## 2024-10-30 NOTE — DISCHARGE SUMMARY
[x] [facility]  Outpatient Rehabilitation &  Therapy  [address]  [phone]  [phone] [] [facility]  Outpatient Rehabilitation &  Therapy  [address]  P: [phone]  F: [phone] [] [facility]  Outpatient Rehabilitation &  Therapy  [address]  P: [phone]  F: [phone] [] [facility]  Outpatient Rehabilitation &  Therapy  [address]  [phone]  [phone] [] [facility]  Outpatient Rehabilitation &  Therapy  [address]   P: [phone]  F: [phone]  [] [facility]  Outpatient Rehabilitation &  Therapy  [address]  P: [phone]  F: [phone] [] [facility]  Outpatient Rehabilitation &  Therapy  [address]  P: [phone]  F: [phone] [] [facility]  Outpatient Rehabilitation &  Therapy  [address]  P: [phone]  F: [phone] [] [facility]  Outpatient Rehabilitation &  Therapy  [address]  P: [phone]  F: [phone]  [] [facility] Outpatient Rehabilitation &  Therapy  [address]  P: [phone]  F: [phone]        Physical Therapy Discharge Note    Date: 10/30/2024      Patient: Lizbeth Hoff  : 1960  MRN: [id number]    Physician: Brenda Smith PA-C                                Insurance: [facility] MEDICAID (AUTH REQUIRED AFTER 30TH VISIT), exclusions: [id number]; [id number]; [id number]; [id number]; [id number];    Medical Diagnosis: M17.0 (ICD-10-CM) - Primary osteoarthritis of both knees, M25.561, M25.562, G89.29 (ICD-10-CM) - Chronic pain of both knees                        Rehab Codes: M 62.81, R 26.89, M 25.561, M 25.562  Onset date: 2020             Next 's appt.: 10/21 cortisone inj,  FP  Visit# / total visits: 3/20; Check goals at visit

## 2024-11-05 ENCOUNTER — TELEPHONE (OUTPATIENT)
Dept: FAMILY MEDICINE CLINIC | Age: 64
End: 2024-11-05

## 2024-11-05 NOTE — TELEPHONE ENCOUNTER
Honey with [employer] called office in regards to office note and another diagnoses besides HTN for Bp Cuff. Writer asked for fax number to fax over the office notes. Per Honey disregard dont need the office notes nor do they need another diagnoses. Honey apologized and stated has all need for patient and was a mix up as got confused with insurance but all is okay and no longer need a new diagnosis or patient office notes.

## 2024-11-13 DIAGNOSIS — J30.9 ALLERGIC RHINITIS, UNSPECIFIED SEASONALITY, UNSPECIFIED TRIGGER: ICD-10-CM

## 2024-11-14 RX ORDER — FLUTICASONE PROPIONATE 50 MCG
SPRAY, SUSPENSION (ML) NASAL
Qty: 16 G | Refills: 0 | Status: SHIPPED | OUTPATIENT
Start: 2024-11-14

## 2024-11-14 NOTE — TELEPHONE ENCOUNTER
12/12/2024   Basic Metabolic Panel Once 10/03/2024               Patient Active Problem List:     Gastroesophageal reflux disease     Hyperlipidemia with target LDL less than 100     HTN (hypertension)     Numbness and tingling     Diverticula, colon     Diverticulitis, acute     Eczema     Lump of left breast     Shoulder pain     Right groin pain     Tinea corporis     Hyperlipidemia     Morbid obesity with BMI of 45.0-49.9, adult     SINDI (obstructive sleep apnea)     Vitamin D deficiency     Melena     Chest pain     Abnormal stress test     Contact blepharoconjunctivitis of left eye     Prediabetes     Flu vaccine need     Acute cystitis without hematuria     Primary osteoarthritis of right knee     Diverticulitis of colon     Moderate obesity     Irritable bowel syndrome with both constipation and diarrhea

## 2024-11-22 DIAGNOSIS — E11.9 TYPE 2 DIABETES MELLITUS WITHOUT COMPLICATION, WITHOUT LONG-TERM CURRENT USE OF INSULIN (HCC): ICD-10-CM

## 2024-11-27 NOTE — TELEPHONE ENCOUNTER
Patient did not know if she was due for A1c , that's why she would like an order for labs.  Please advise and thank you

## 2024-11-29 DIAGNOSIS — J30.9 ALLERGIC RHINITIS, UNSPECIFIED SEASONALITY, UNSPECIFIED TRIGGER: ICD-10-CM

## 2024-11-29 DIAGNOSIS — I10 PRIMARY HYPERTENSION: ICD-10-CM

## 2024-11-29 NOTE — TELEPHONE ENCOUNTER
Please address the medication refill and close the encounter.  If I can be of assistance, please route to the applicable pool.      Thank you.      Last visit: 9-  Last Med refill: 11-  Does patient have enough medication for 72 hours: No:     Next Visit Date:  Future Appointments   Date Time Provider Department Center   2/20/2025  1:20 PM Sarwat Pettit MD [facility]       Health Maintenance   Topic Date Due    Diabetic retinal exam  Never done    Shingles vaccine (1 of 2) Never done    Hepatitis B vaccine (2 of 3 - 19+ 3-dose series) 03/11/2022    Respiratory Syncytial Virus (RSV) Pregnant or age 60 yrs+ (1 - Risk 60-74 years 1-dose series) Never done    DTaP/Tdap/Td vaccine (2 - Td or Tdap) 03/07/2024    COVID-19 Vaccine (3 - 2023-24 season) 09/01/2024    Diabetic foot exam  12/08/2024    Diabetic Alb to Cr ratio (uACR) test  12/28/2024    Lipids  12/28/2024    A1C test (Diabetic or Prediabetic)  04/08/2025    GFR test (Diabetes, CKD 3-4, OR last GFR 15-59)  09/10/2025    Depression Screen  10/03/2025    Breast cancer screen  02/01/2026    Colorectal Cancer Screen  10/18/2029    Flu vaccine  Completed    Pneumococcal 0-64 years Vaccine  Completed    Hepatitis C screen  Completed    HIV screen  Completed    Hepatitis A vaccine  Aged Out    Hib vaccine  Aged Out    Polio vaccine  Aged Out    Meningococcal (ACWY) vaccine  Aged Out       Hemoglobin A1C (%)   Date Value   04/08/2024 6.4   12/08/2023 6.5   01/16/2023 6.5             ( goal A1C is < 7)   No components found for: \"LABMICR\"  No components found for: \"LDLCHOLESTEROL\", \"LDLCALC\"    (goal LDL is <100)   AST (U/L)   Date Value   09/10/2024 20     ALT (U/L)   Date Value   09/10/2024 12     BUN (mg/dL)   Date Value   09/10/2024 13     BP Readings from Last 3 Encounters:   11/06/24 124/82   10/03/24 125/72   09/11/24 (!) 152/83          (goal 120/80)    All Future Testing planned in [facility]  Lab Frequency Next Occurrence   XR LUMBAR

## 2024-11-29 NOTE — TELEPHONE ENCOUNTER
Please address the medication refill and close the encounter.  If I can be of assistance, please route to the applicable pool.      Thank you.    Last visit: 10-3-2024  Last Med refill: 9-  Does patient have enough medication for 72 hours: No:     Next Visit Date:  Future Appointments   Date Time Provider Department Center   2/20/2025  1:20 PM Sarwat Pettit MD [facility]       Health Maintenance   Topic Date Due    Diabetic retinal exam  Never done    Shingles vaccine (1 of 2) Never done    Hepatitis B vaccine (2 of 3 - 19+ 3-dose series) 03/11/2022    Respiratory Syncytial Virus (RSV) Pregnant or age 60 yrs+ (1 - Risk 60-74 years 1-dose series) Never done    DTaP/Tdap/Td vaccine (2 - Td or Tdap) 03/07/2024    COVID-19 Vaccine (3 - 2023-24 season) 09/01/2024    Diabetic foot exam  12/08/2024    Diabetic Alb to Cr ratio (uACR) test  12/28/2024    Lipids  12/28/2024    A1C test (Diabetic or Prediabetic)  04/08/2025    GFR test (Diabetes, CKD 3-4, OR last GFR 15-59)  09/10/2025    Depression Screen  10/03/2025    Breast cancer screen  02/01/2026    Colorectal Cancer Screen  10/18/2029    Flu vaccine  Completed    Pneumococcal 0-64 years Vaccine  Completed    Hepatitis C screen  Completed    HIV screen  Completed    Hepatitis A vaccine  Aged Out    Hib vaccine  Aged Out    Polio vaccine  Aged Out    Meningococcal (ACWY) vaccine  Aged Out       Hemoglobin A1C (%)   Date Value   04/08/2024 6.4   12/08/2023 6.5   01/16/2023 6.5             ( goal A1C is < 7)   No components found for: \"LABMICR\"  No components found for: \"LDLCHOLESTEROL\", \"LDLCALC\"    (goal LDL is <100)   AST (U/L)   Date Value   09/10/2024 20     ALT (U/L)   Date Value   09/10/2024 12     BUN (mg/dL)   Date Value   09/10/2024 13     BP Readings from Last 3 Encounters:   11/06/24 124/82   10/03/24 125/72   09/11/24 (!) 152/83          (goal 120/80)    All Future Testing planned in [facility]  Lab Frequency Next Occurrence   XR LUMBAR SPINE

## 2024-11-30 RX ORDER — LISINOPRIL 5 MG/1
2.5 TABLET ORAL DAILY
Qty: 15 TABLET | Refills: 2 | Status: SHIPPED | OUTPATIENT
Start: 2024-11-30

## 2024-11-30 RX ORDER — FLUTICASONE PROPIONATE 50 MCG
SPRAY, SUSPENSION (ML) NASAL
Qty: 16 G | Refills: 0 | Status: SHIPPED | OUTPATIENT
Start: 2024-11-30

## 2024-12-09 NOTE — TELEPHONE ENCOUNTER
Spoke with patient and she would like to have a lab order , patient needs the labs done for her dentist.  Please advise and thank you

## 2024-12-12 ENCOUNTER — TELEPHONE (OUTPATIENT)
Dept: FAMILY MEDICINE CLINIC | Age: 64
End: 2024-12-12

## 2024-12-12 DIAGNOSIS — M25.519 CHRONIC SHOULDER PAIN, UNSPECIFIED LATERALITY: Primary | ICD-10-CM

## 2024-12-12 DIAGNOSIS — G89.29 CHRONIC SHOULDER PAIN, UNSPECIFIED LATERALITY: Primary | ICD-10-CM

## 2024-12-12 DIAGNOSIS — E11.9 TYPE 2 DIABETES MELLITUS WITHOUT COMPLICATION, WITHOUT LONG-TERM CURRENT USE OF INSULIN (HCC): ICD-10-CM

## 2024-12-12 DIAGNOSIS — R73.03 PREDIABETES: ICD-10-CM

## 2024-12-12 NOTE — TELEPHONE ENCOUNTER
Last visit: 10/3/24  Last Med refill:   Does patient have enough medication for 72 hours: No:     Next Visit Date:  Future Appointments   Date Time Provider Department Center   1/13/2025  2:00 PM Avelina Jarvis APRN - CNP [facility]       Health Maintenance   Topic Date Due    Diabetic retinal exam  Never done    Shingles vaccine (1 of 2) Never done    Hepatitis B vaccine (2 of 3 - 19+ 3-dose series) 03/11/2022    Respiratory Syncytial Virus (RSV) Pregnant or age 60 yrs+ (1 - Risk 60-74 years 1-dose series) Never done    DTaP/Tdap/Td vaccine (2 - Td or Tdap) 03/07/2024    COVID-19 Vaccine (3 - 2023-24 season) 09/01/2024    Diabetic foot exam  12/08/2024    Diabetic Alb to Cr ratio (uACR) test  12/28/2024    Lipids  12/28/2024    A1C test (Diabetic or Prediabetic)  04/08/2025    GFR test (Diabetes, CKD 3-4, OR last GFR 15-59)  09/10/2025    Depression Screen  10/03/2025    Breast cancer screen  02/01/2026    Colorectal Cancer Screen  10/18/2029    Flu vaccine  Completed    Pneumococcal 0-64 years Vaccine  Completed    Hepatitis C screen  Completed    HIV screen  Completed    Hepatitis A vaccine  Aged Out    Hib vaccine  Aged Out    Polio vaccine  Aged Out    Meningococcal (ACWY) vaccine  Aged Out       Hemoglobin A1C (%)   Date Value   04/08/2024 6.4   12/08/2023 6.5   01/16/2023 6.5             ( goal A1C is < 7)   No components found for: \"LABMICR\"  No components found for: \"LDLCHOLESTEROL\", \"LDLCALC\"    (goal LDL is <100)   AST (U/L)   Date Value   09/10/2024 20     ALT (U/L)   Date Value   09/10/2024 12     BUN (mg/dL)   Date Value   09/10/2024 13     BP Readings from Last 3 Encounters:   11/06/24 124/82   10/03/24 125/72   09/11/24 (!) 152/83          (goal 120/80)    All Future Testing planned in [facility]  Lab Frequency Next Occurrence   XR LUMBAR SPINE (2-3 VIEWS) Once 01/05/2024   Lipid Panel Once 08/22/2024   EGD Once 12/11/2024   COLONOSCOPY W/ OR W/O BIOPSY Once 12/12/2024   Basic

## 2024-12-12 NOTE — PROGRESS NOTES
Patient requested, hemoglobin A1c, patient has an upcoming appointment with dentist, and she is requesting to get it done before that.    Patient is also requesting a PT referral, will provide her.

## 2024-12-12 NOTE — TELEPHONE ENCOUNTER
Patient called the office requesting a PT referral, patient stated she was taking a break due to a death in the family, once referral is placed she is requesting this to be faxed. Please advise

## 2024-12-17 NOTE — TELEPHONE ENCOUNTER
Called patient and she wants order mailed to her , Mailed order to patient.     she is requesting a medication refill - Metformin pended medication     Last visit: 10/03/2024  Last Med refill: 06/29/2024  Does patient have enough medication for 72 hours: No:     Next Visit Date:  Future Appointments   Date Time Provider Department Center   1/13/2025  2:00 PM Avelina Jarvis, APRN - CNP [facility]       Health Maintenance   Topic Date Due    Diabetic retinal exam  Never done    Shingles vaccine (1 of 2) Never done    Hepatitis B vaccine (2 of 3 - 19+ 3-dose series) 03/11/2022    Respiratory Syncytial Virus (RSV) Pregnant or age 60 yrs+ (1 - Risk 60-74 years 1-dose series) Never done    DTaP/Tdap/Td vaccine (2 - Td or Tdap) 03/07/2024    COVID-19 Vaccine (3 - 2023-24 season) 09/01/2024    Diabetic foot exam  12/08/2024    Diabetic Alb to Cr ratio (uACR) test  12/28/2024    Lipids  12/28/2024    A1C test (Diabetic or Prediabetic)  04/08/2025    GFR test (Diabetes, CKD 3-4, OR last GFR 15-59)  09/10/2025    Depression Screen  10/03/2025    Breast cancer screen  02/01/2026    Colorectal Cancer Screen  10/18/2029    Flu vaccine  Completed    Pneumococcal 0-64 years Vaccine  Completed    Hepatitis C screen  Completed    HIV screen  Completed    Hepatitis A vaccine  Aged Out    Hib vaccine  Aged Out    Polio vaccine  Aged Out    Meningococcal (ACWY) vaccine  Aged Out       Hemoglobin A1C (%)   Date Value   04/08/2024 6.4   12/08/2023 6.5   01/16/2023 6.5             ( goal A1C is < 7)   No components found for: \"LABMICR\"  No components found for: \"LDLCHOLESTEROL\", \"LDLCALC\"    (goal LDL is <100)   AST (U/L)   Date Value   09/10/2024 20     ALT (U/L)   Date Value   09/10/2024 12     BUN (mg/dL)   Date Value   09/10/2024 13     BP Readings from Last 3 Encounters:   11/06/24 124/82   10/03/24 125/72   09/11/24 (!) 152/83          (goal 120/80)    All Future Testing planned in [facility]  Lab Frequency Next

## 2024-12-20 ENCOUNTER — HOSPITAL ENCOUNTER (OUTPATIENT)
Age: 64
Discharge: HOME | End: 2024-12-20
Payer: COMMERCIAL

## 2024-12-20 DIAGNOSIS — I10 PRIMARY HYPERTENSION: ICD-10-CM

## 2024-12-20 DIAGNOSIS — R73.03 PREDIABETES: ICD-10-CM

## 2024-12-20 LAB
ANION GAP SERPL CALCULATED.3IONS-SCNC: 10 MMOL/L (ref 9–16)
BUN SERPL-MCNC: 13 MG/DL (ref 8–23)
CALCIUM SERPL-MCNC: 9.9 MG/DL (ref 8.6–10.4)
CHLORIDE SERPL-SCNC: 101 MMOL/L (ref 98–107)
CO2 SERPL-SCNC: 28 MMOL/L (ref 20–31)
CREAT SERPL-MCNC: 1.1 MG/DL (ref 0.6–0.9)
EST. AVERAGE GLUCOSE BLD GHB EST-MCNC: 131 MG/DL
GFR, ESTIMATED: 57 ML/MIN/1.73M2
GLUCOSE SERPL-MCNC: 118 MG/DL (ref 74–99)
HBA1C MFR BLD: 6.2 % (ref 4–6)
POTASSIUM SERPL-SCNC: 3.9 MMOL/L (ref 3.7–5.3)
SODIUM SERPL-SCNC: 139 MMOL/L (ref 136–145)

## 2024-12-20 PROCEDURE — 36415 COLL VENOUS BLD VENIPUNCTURE: CPT

## 2024-12-20 PROCEDURE — 80048 BASIC METABOLIC PNL TOTAL CA: CPT

## 2024-12-20 PROCEDURE — 83036 HEMOGLOBIN GLYCOSYLATED A1C: CPT

## 2024-12-23 ENCOUNTER — TELEPHONE (OUTPATIENT)
Dept: FAMILY MEDICINE CLINIC | Age: 64
End: 2024-12-23

## 2024-12-23 NOTE — TELEPHONE ENCOUNTER
Patient called the office stating she feels like her diverticulitis is flaring up, patient is requesting an antibiotic to be sent to the pharmacy. Patient has an upcoming appointment Thursday.

## 2024-12-24 DIAGNOSIS — K57.92 DIVERTICULITIS: Primary | ICD-10-CM

## 2024-12-24 DIAGNOSIS — E11.9 TYPE 2 DIABETES MELLITUS WITHOUT COMPLICATION, WITHOUT LONG-TERM CURRENT USE OF INSULIN (HCC): ICD-10-CM

## 2024-12-24 RX ORDER — CIPROFLOXACIN 500 MG/1
500 TABLET, FILM COATED ORAL 2 TIMES DAILY
Qty: 14 TABLET | Refills: 0 | Status: SHIPPED
Start: 2024-12-24 | End: 2024-12-26 | Stop reason: ALTCHOICE

## 2024-12-24 RX ORDER — METRONIDAZOLE 500 MG/1
500 TABLET ORAL 2 TIMES DAILY
Qty: 14 TABLET | Refills: 0 | Status: SHIPPED
Start: 2024-12-24 | End: 2024-12-26 | Stop reason: ALTCHOICE

## 2024-12-26 ENCOUNTER — OFFICE VISIT (OUTPATIENT)
Dept: FAMILY MEDICINE CLINIC | Age: 64
End: 2024-12-26
Payer: COMMERCIAL

## 2024-12-26 ENCOUNTER — HOSPITAL ENCOUNTER (OUTPATIENT)
Age: 64
Setting detail: SPECIMEN
Discharge: HOME | End: 2024-12-26

## 2024-12-26 VITALS
WEIGHT: 263 LBS | DIASTOLIC BLOOD PRESSURE: 68 MMHG | HEART RATE: 87 BPM | TEMPERATURE: 97.6 F | SYSTOLIC BLOOD PRESSURE: 121 MMHG | BODY MASS INDEX: 48.1 KG/M2

## 2024-12-26 DIAGNOSIS — R73.03 PREDIABETES: Primary | ICD-10-CM

## 2024-12-26 DIAGNOSIS — I10 PRIMARY HYPERTENSION: ICD-10-CM

## 2024-12-26 DIAGNOSIS — K57.92 ACUTE DIVERTICULITIS: ICD-10-CM

## 2024-12-26 DIAGNOSIS — Z23 IMMUNIZATION DUE: ICD-10-CM

## 2024-12-26 DIAGNOSIS — R73.03 PREDIABETES: ICD-10-CM

## 2024-12-26 LAB
CREAT UR-MCNC: 182 MG/DL (ref 28–217)
MICROALBUMIN UR-MCNC: <12 MG/L (ref 0–20)
MICROALBUMIN/CREAT UR-RTO: NORMAL MCG/MG CREAT (ref 0–25)

## 2024-12-26 PROCEDURE — 3078F DIAST BP <80 MM HG: CPT

## 2024-12-26 PROCEDURE — 99211 OFF/OP EST MAY X REQ PHY/QHP: CPT

## 2024-12-26 PROCEDURE — 3074F SYST BP LT 130 MM HG: CPT

## 2024-12-26 PROCEDURE — 90715 TDAP VACCINE 7 YRS/> IM: CPT

## 2024-12-26 PROCEDURE — 99213 OFFICE O/P EST LOW 20 MIN: CPT

## 2024-12-26 RX ORDER — ATORVASTATIN CALCIUM 40 MG/1
40 TABLET, FILM COATED ORAL DAILY
Qty: 30 TABLET | Refills: 3 | Status: SHIPPED | OUTPATIENT
Start: 2024-12-26

## 2024-12-26 RX ORDER — HYDROCHLOROTHIAZIDE 25 MG/1
25 TABLET ORAL EVERY MORNING
Qty: 90 TABLET | Refills: 0 | Status: SHIPPED | OUTPATIENT
Start: 2024-12-26

## 2024-12-26 SDOH — ECONOMIC STABILITY: INCOME INSECURITY: HOW HARD IS IT FOR YOU TO PAY FOR THE VERY BASICS LIKE FOOD, HOUSING, MEDICAL CARE, AND HEATING?: NOT HARD AT ALL

## 2024-12-26 SDOH — ECONOMIC STABILITY: FOOD INSECURITY: WITHIN THE PAST 12 MONTHS, THE FOOD YOU BOUGHT JUST DIDN'T LAST AND YOU DIDN'T HAVE MONEY TO GET MORE.: NEVER TRUE

## 2024-12-26 SDOH — ECONOMIC STABILITY: FOOD INSECURITY: WITHIN THE PAST 12 MONTHS, YOU WORRIED THAT YOUR FOOD WOULD RUN OUT BEFORE YOU GOT MONEY TO BUY MORE.: NEVER TRUE

## 2024-12-26 NOTE — PROGRESS NOTES
[facility] Residency Program - Outpatient Note      Subjective:    Lizbeth Hoff is a 64 y.o. female with  has a past medical history of Diverticulitis, Former smoker, Former smoker, GERD (gastroesophageal reflux disease), Hyperlipidemia, Hypertension, Morbid obesity with BMI of 45.0-49.9, adult, SINDI on CPAP, Sleep apnea, and Type II or unspecified type diabetes mellitus without mention of complication, not stated as uncontrolled.    Presented to the office today for:  Chief Complaint   Patient presents with    Follow-up     Patient following up on her diverticulitis. Patient states she feel a little better       HPI  64-year-old female presented office today with complaints of lower abdominal tenderness that has been going on for the last 4 days, associated with loose bowel movements 2-3, she mentioned it feels like her diverticulitis episode, she is feeling better today, her diarrhea has resolved, her bowel movements are still loose but to once a day.  I have also prescribed patient antibiotics on 9 request which she did not , advised to not use antibiotics as her diverticulitis seems like improving already    Patient also wanted to discuss her results of her hemoglobin A1c, discussed with patient.  Hemoglobin A1c in prediabetic range and is better than the last time 6.4, will continue with current metformin dose of 500 mg  Advised patient to make ophthalmologist appointment    Patient blood pressure well-controlled on hydrochlorothiazide 25, and lisinopril 5 mg.    Care gaps discussed with the patient  Patient agreeable to get Tdap booster    The patient has a   Family History   Problem Relation Age of Onset    High Blood Pressure Mother     Diabetes Mother     Cancer Father        Objective:    /68 (Site: Right Upper Arm, Position: Sitting, Cuff Size: Large Adult)   Pulse 87   Temp 97.6 °F (36.4 °C) (Oral)   Wt 119.3 kg (263 lb)   BMI 48.10 kg/m²    BP 
Attending Physician Statement  I  have discussed the care of Lizbeth Hoff including pertinent history and exam findings with the resident. I agree with the assessment, plan and orders as documented by the resident.      /68 (Site: Right Upper Arm, Position: Sitting, Cuff Size: Large Adult)   Pulse 87   Temp 97.6 °F (36.4 °C) (Oral)   Wt 119.3 kg (263 lb)   BMI 48.10 kg/m²    BP Readings from Last 3 Encounters:   12/26/24 121/68   11/06/24 124/82   10/03/24 125/72     Wt Readings from Last 3 Encounters:   12/26/24 119.3 kg (263 lb)   11/06/24 118.4 kg (261 lb)   10/21/24 119.7 kg (264 lb)          Diagnosis Orders   1. Prediabetes  Albumin/Creatinine Ratio, Urine    HM DIABETES FOOT EXAM      2. Acute diverticulitis        3. Primary hypertension  hydroCHLOROthiazide (HYDRODIURIL) 25 MG tablet      4. Immunization due  Tdap, BOOSTRIX, (age 10 yrs+), IM              Jayme Dickey MD 12/27/2024 3:42 PM      
vaccine  Aged Out    Meningococcal (ACWY) vaccine  Aged Out

## 2024-12-26 NOTE — TELEPHONE ENCOUNTER
Once 12/11/2024   COLONOSCOPY W/ OR W/O BIOPSY Once 12/12/2024   Albumin/Creatinine Ratio, Urine Once 12/26/2024               Patient Active Problem List:     Gastroesophageal reflux disease     Hyperlipidemia with target LDL less than 100     HTN (hypertension)     Numbness and tingling     Diverticula, colon     Diverticulitis, acute     Eczema     Lump of left breast     Shoulder pain     Right groin pain     Tinea corporis     Hyperlipidemia     Morbid obesity with BMI of 45.0-49.9, adult     SINDI (obstructive sleep apnea)     Vitamin D deficiency     Melena     Chest pain     Abnormal stress test     Contact blepharoconjunctivitis of left eye     Prediabetes     Flu vaccine need     Acute cystitis without hematuria     Primary osteoarthritis of right knee     Diverticulitis of colon     Moderate obesity     Irritable bowel syndrome with both constipation and diarrhea

## 2024-12-26 NOTE — PATIENT INSTRUCTIONS
Thank you for letting us take care of you today. We hope all your questions were addressed. If a question was overlooked or something else comes to mind after you return home, please contact a member of your Care Team listed below.      Your Care Team at [facility] is Team #  Hugo Bello M.D. (Faculty)  Tejal Aldana M.D. (Resident)  Sarwat Pettit M.D. (Resident)   Chelsie Baum M.D. (Resident)  Ricky Chavez M.D. (Resident)  Alma Wren M.D. (Resident)  Alisha Carpenter., [facility]  Nereyda Canada, [facility]  Bekah Sharp, [facility]  Tobin Tee, [facility]  Karin Pickens, [facility]  Luciana Carlson, [facility]  Carlton Dupont (LJ) RUBI Pope (Clinical Practice Manager)  Sindhu Ascencio [facility] (Clinical Pharmacist)     Office phone number: [phone]    If you need to get in right away due to illness, please be advised we have \"Same Day\" appointments available Monday-Friday. Please call us at [phone] option #3 to schedule your \"Same Day\" appointment.

## 2025-01-03 ENCOUNTER — TELEPHONE (OUTPATIENT)
Dept: FAMILY MEDICINE CLINIC | Age: 65
End: 2025-01-03

## 2025-01-08 ENCOUNTER — HOSPITAL ENCOUNTER (EMERGENCY)
Age: 65
Discharge: HOME | End: 2025-01-08
Attending: EMERGENCY MEDICINE
Payer: COMMERCIAL

## 2025-01-08 VITALS
HEIGHT: 62 IN | RESPIRATION RATE: 18 BRPM | OXYGEN SATURATION: 100 % | TEMPERATURE: 99.3 F | WEIGHT: 258 LBS | HEART RATE: 95 BPM | DIASTOLIC BLOOD PRESSURE: 81 MMHG | BODY MASS INDEX: 47.48 KG/M2 | SYSTOLIC BLOOD PRESSURE: 122 MMHG

## 2025-01-08 DIAGNOSIS — K57.92 DIVERTICULITIS: Primary | ICD-10-CM

## 2025-01-08 LAB
ALBUMIN SERPL-MCNC: 3.9 G/DL (ref 3.5–5.2)
ALBUMIN/GLOB SERPL: 1.1 {RATIO} (ref 1–2.5)
ALP SERPL-CCNC: 81 U/L (ref 35–104)
ALT SERPL-CCNC: 13 U/L (ref 10–35)
ANION GAP SERPL CALCULATED.3IONS-SCNC: 10 MMOL/L (ref 9–16)
AST SERPL-CCNC: 19 U/L (ref 10–35)
BASOPHILS # BLD: <0.03 K/UL (ref 0–0.2)
BASOPHILS NFR BLD: 0 % (ref 0–2)
BILIRUB DIRECT SERPL-MCNC: 0.1 MG/DL (ref 0–0.2)
BILIRUB INDIRECT SERPL-MCNC: 0.2 MG/DL (ref 0–1)
BILIRUB SERPL-MCNC: 0.3 MG/DL (ref 0–1.2)
BUN SERPL-MCNC: 12 MG/DL (ref 8–23)
CALCIUM SERPL-MCNC: 9.7 MG/DL (ref 8.6–10.4)
CHLORIDE SERPL-SCNC: 103 MMOL/L (ref 98–107)
CO2 SERPL-SCNC: 27 MMOL/L (ref 20–31)
CREAT SERPL-MCNC: 1.1 MG/DL (ref 0.6–0.9)
EOSINOPHIL # BLD: 0.21 K/UL (ref 0–0.44)
EOSINOPHILS RELATIVE PERCENT: 2 % (ref 1–4)
ERYTHROCYTE [DISTWIDTH] IN BLOOD BY AUTOMATED COUNT: 14.4 % (ref 11.8–14.4)
GFR, ESTIMATED: 57 ML/MIN/1.73M2
GLOBULIN SER CALC-MCNC: 3.4 G/DL
GLUCOSE SERPL-MCNC: 114 MG/DL (ref 74–99)
HCT VFR BLD AUTO: 35.3 % (ref 36.3–47.1)
HGB BLD-MCNC: 11.6 G/DL (ref 11.9–15.1)
IMM GRANULOCYTES # BLD AUTO: 0.04 K/UL (ref 0–0.3)
IMM GRANULOCYTES NFR BLD: 0 %
LACTIC ACID, WHOLE BLOOD: 1.9 MMOL/L (ref 0.7–2.1)
LIPASE SERPL-CCNC: 26 U/L (ref 13–60)
LYMPHOCYTES NFR BLD: 3.44 K/UL (ref 1.1–3.7)
LYMPHOCYTES RELATIVE PERCENT: 32 % (ref 24–43)
MCH RBC QN AUTO: 28.2 PG (ref 25.2–33.5)
MCHC RBC AUTO-ENTMCNC: 32.9 G/DL (ref 28.4–34.8)
MCV RBC AUTO: 85.7 FL (ref 82.6–102.9)
MONOCYTES NFR BLD: 0.71 K/UL (ref 0.1–1.2)
MONOCYTES NFR BLD: 7 % (ref 3–12)
NEUTROPHILS NFR BLD: 59 % (ref 36–65)
NEUTS SEG NFR BLD: 6.49 K/UL (ref 1.5–8.1)
NRBC BLD-RTO: 0 PER 100 WBC
PLATELET # BLD AUTO: 210 K/UL (ref 138–453)
PMV BLD AUTO: 10.9 FL (ref 8.1–13.5)
POTASSIUM SERPL-SCNC: 3.6 MMOL/L (ref 3.7–5.3)
PROT SERPL-MCNC: 7.3 G/DL (ref 6.6–8.7)
RBC # BLD AUTO: 4.12 M/UL (ref 3.95–5.11)
SODIUM SERPL-SCNC: 140 MMOL/L (ref 136–145)
WBC OTHER # BLD: 10.9 K/UL (ref 3.5–11.3)

## 2025-01-08 PROCEDURE — 80048 BASIC METABOLIC PNL TOTAL CA: CPT

## 2025-01-08 PROCEDURE — 80076 HEPATIC FUNCTION PANEL: CPT

## 2025-01-08 PROCEDURE — 99284 EMERGENCY DEPT VISIT MOD MDM: CPT

## 2025-01-08 PROCEDURE — 83690 ASSAY OF LIPASE: CPT

## 2025-01-08 PROCEDURE — 6360000002 HC RX W HCPCS

## 2025-01-08 PROCEDURE — 83605 ASSAY OF LACTIC ACID: CPT

## 2025-01-08 PROCEDURE — 96374 THER/PROPH/DIAG INJ IV PUSH: CPT

## 2025-01-08 PROCEDURE — 6370000000 HC RX 637 (ALT 250 FOR IP)

## 2025-01-08 PROCEDURE — 85025 COMPLETE CBC W/AUTO DIFF WBC: CPT

## 2025-01-08 RX ORDER — POTASSIUM CHLORIDE 1500 MG/1
40 TABLET, EXTENDED RELEASE ORAL ONCE
Status: COMPLETED | OUTPATIENT
Start: 2025-01-08 | End: 2025-01-08

## 2025-01-08 RX ORDER — KETOROLAC TROMETHAMINE 15 MG/ML
15 INJECTION, SOLUTION INTRAMUSCULAR; INTRAVENOUS ONCE
Status: COMPLETED | OUTPATIENT
Start: 2025-01-08 | End: 2025-01-08

## 2025-01-08 RX ADMIN — POTASSIUM CHLORIDE 40 MEQ: 1500 TABLET, EXTENDED RELEASE ORAL at 20:38

## 2025-01-08 RX ADMIN — KETOROLAC TROMETHAMINE 15 MG: 15 INJECTION, SOLUTION INTRAMUSCULAR; INTRAVENOUS at 20:38

## 2025-01-08 ASSESSMENT — LIFESTYLE VARIABLES
HOW MANY STANDARD DRINKS CONTAINING ALCOHOL DO YOU HAVE ON A TYPICAL DAY: PATIENT DOES NOT DRINK
HOW OFTEN DO YOU HAVE A DRINK CONTAINING ALCOHOL: NEVER

## 2025-01-08 ASSESSMENT — PAIN SCALES - GENERAL: PAINLEVEL_OUTOF10: 6

## 2025-01-08 ASSESSMENT — PAIN DESCRIPTION - LOCATION: LOCATION: ABDOMEN

## 2025-01-08 ASSESSMENT — PAIN - FUNCTIONAL ASSESSMENT: PAIN_FUNCTIONAL_ASSESSMENT: 0-10

## 2025-01-08 NOTE — ED TRIAGE NOTES
Pt co abd pain, lower left side, 10/10.  Pt states she is on meds from PCP for diverticulitis flare up with no relief.  Pt respirations are even and unlabored.  Pt is alert and oriented X 4.  Pt is speaking in complete sentences.   Bed is in the lowest position.   Call light is within reach.    Will continue to monitor.

## 2025-01-09 NOTE — ED PROVIDER NOTES
[facility]     Emergency Department     Faculty Attestation    I performed a history and physical examination of the patient and discussed management with the resident. I reviewed the resident's note and agree with the documented findings and plan of care. Any areas of disagreement are noted on the chart. I was personally present for the key portions of any procedures. I have documented in the chart those procedures where I was not present during the key portions. I have reviewed the emergency nurses triage note. I agree with the chief complaint, past medical history, past surgical history, allergies, medications, social and family history as documented unless otherwise noted below. For Physician Assistant/ Nurse Practitioner cases/documentation I have personally evaluated this patient and have completed at least one if not all key elements of the E/M (history, physical exam, and MDM). Additional findings are as noted.    History of diverticulitis, on Cipro and Flagyl since Sunday, mild left lower quadrant abdominal pain with voluntary guarding, no rebounding.     Solomon Duran MD  01/08/25 1923

## 2025-01-09 NOTE — DISCHARGE INSTRUCTIONS
-You were seen and evaluated by emergency medicine physicians at [facility].    -Please follow-up with your primary care physician and/or with the referrals to specialist.    -You were diagnosed with: Diverticulitis    -Your blood work showed no acute abnormalities.  Please take your antibiotics as prescribed and to completion.  Please follow-up with your PCP.  You can take over-the-counter Tylenol and ibuprofen to supplement your pain control.    -Please return to the Emergency Department if you are experiencing the following symptoms acutely:  Headache, fever, chills, nausea, vomiting, chest pain, shortness of breath, abdominal pain, change with urination, change with bowel movements, change in your skin/hair/nail, weakness, fatigue, altered mental status and/or any change from baseline health.    -Thank you for coming to [facility].

## 2025-01-09 NOTE — ED PROVIDER NOTES
[facility] EMERGENCY DEPARTMENT  Emergency Department Encounter  Emergency Medicine Resident     Pt Name:Lizbeth Hoff  MRN: [id number]  Birthdate 1960  Date of evaluation: 1/8/25  PCP:  Sarwat Pettit MD  Note Started: 7:01 PM EST      CHIEF COMPLAINT       Chief Complaint   Patient presents with    Abdominal Pain    Diverticulitis       HISTORY OF PRESENT ILLNESS  (Location/Symptom, Timing/Onset, Context/Setting, Quality, Duration, Modifying Factors, Severity.)      Lizbeth Hoff is a 64-year-old female presents to the ED with lower abdominal pain and complaints that her current diverticulitis infection is not improving.  Patient recently saw her PCP Dr. Krueger and was prescribed ciprofloxacin and Flagyl for antibiotic coverage.  Patient has been taking the medication as prescribed but states there has been minimal improvement at this time.  Patient is tolerating oral intake of food and water and passing gas and bowel movements but states she is having smaller sized BMs.  Denies any blood or black tarry stool.  Patient does endorse mild nausea but still is keeping food down.  Patient reports no headache, fever, chills, chest pain, shortness of breath, upper abdominal pain.    PAST MEDICAL / SURGICAL / SOCIAL / FAMILY HISTORY      has a past medical history of Diverticulitis, Former smoker, Former smoker, GERD (gastroesophageal reflux disease), Hyperlipidemia, Hypertension, Morbid obesity with BMI of 45.0-49.9, adult, SINDI on CPAP, Sleep apnea, and Type II or unspecified type diabetes mellitus without mention of complication, not stated as uncontrolled.       has a past surgical history that includes Hysterectomy; Cholecystectomy; Colonoscopy; pr egd transoral biopsy single/multiple (7/19/2017); Upper gastrointestinal endoscopy (N/A, 10/18/2019); and Colonoscopy (N/A, 10/18/2019).      Social History     Socioeconomic History    Marital status: Single     Spouse name: Not on file    Number of children:

## 2025-01-15 ENCOUNTER — TELEPHONE (OUTPATIENT)
Dept: ORTHOPEDIC SURGERY | Age: 65
End: 2025-01-15

## 2025-01-15 NOTE — TELEPHONE ENCOUNTER
LVM for pt to call back to discuss. Need clinical documentation to submit with PA for gel injections, pt was previously a pt of Brenda [facility], may be scheduled with resident clinic if desired.

## 2025-01-15 NOTE — TELEPHONE ENCOUNTER
Patient called in stating she started on [employer] insurance and wants to see about getting a gel injection. Was not sure if it needs prior authorization  Please return call  Thank you

## 2025-01-21 DIAGNOSIS — J30.9 ALLERGIC RHINITIS, UNSPECIFIED SEASONALITY, UNSPECIFIED TRIGGER: ICD-10-CM

## 2025-01-21 DIAGNOSIS — M17.0 PRIMARY OSTEOARTHRITIS OF BOTH KNEES: ICD-10-CM

## 2025-01-21 NOTE — TELEPHONE ENCOUNTER
Last visit:   Last Med refill:   Does patient have enough medication for 72 hours: No:     Next Visit Date:  Future Appointments   Date Time Provider Department Center   1/22/2025  9:10 AM SCHEDULE, [facility] OR[facility] SPECIA [facility]   6/26/2025  9:00 AM Sarwat Pettit MD [facility] E[facility] DEP   7/15/2025  1:15 PM Maury Flores MD [facility]       Health Maintenance   Topic Date Due    Diabetic retinal exam  Never done    Shingles vaccine (1 of 2) Never done    Hepatitis B vaccine (2 of 3 - 19+ 3-dose series) 03/11/2022    Respiratory Syncytial Virus (RSV) Pregnant or age 60 yrs+ (1 - Risk 60-74 years 1-dose series) Never done    COVID-19 Vaccine (3 - 2023-24 season) 09/01/2024    Lipids  12/28/2024    Depression Screen  10/03/2025    A1C test (Diabetic or Prediabetic)  12/20/2025    Diabetic foot exam  12/26/2025    Diabetic Alb to Cr ratio (uACR) test  12/26/2025    GFR test (Diabetes, CKD 3-4, OR last GFR 15-59)  01/08/2026    Breast cancer screen  02/01/2026    Colorectal Cancer Screen  10/18/2029    DTaP/Tdap/Td vaccine (3 - Td or Tdap) 12/26/2034    Flu vaccine  Completed    Pneumococcal 0-64 years Vaccine  Completed    Hepatitis C screen  Completed    HIV screen  Completed    Hepatitis A vaccine  Aged Out    Hib vaccine  Aged Out    Polio vaccine  Aged Out    Meningococcal (ACWY) vaccine  Aged Out       Hemoglobin A1C (%)   Date Value   12/20/2024 6.2 (H)   04/08/2024 6.4   12/08/2023 6.5             ( goal A1C is < 7)   No components found for: \"LABMICR\"  No components found for: \"LDLCHOLESTEROL\", \"LDLCALC\"    (goal LDL is <100)   AST (U/L)   Date Value   01/08/2025 19     ALT (U/L)   Date Value   01/08/2025 13     BUN (mg/dL)   Date Value   01/08/2025 12     BP Readings from Last 3 Encounters:   01/13/25 124/82   01/08/25 122/81   12/26/24 121/68          (goal 120/80)    All Future Testing planned in [facility]  Lab Frequency Next Occurrence   Lipid Panel Once 08/22/2024   EGD Once

## 2025-01-22 ENCOUNTER — OFFICE VISIT (OUTPATIENT)
Dept: ORTHOPEDIC SURGERY | Age: 65
End: 2025-01-22

## 2025-01-22 VITALS — HEIGHT: 62 IN | WEIGHT: 268 LBS | BODY MASS INDEX: 49.32 KG/M2

## 2025-01-22 DIAGNOSIS — M25.561 CHRONIC PAIN OF BOTH KNEES: Primary | ICD-10-CM

## 2025-01-22 DIAGNOSIS — G89.29 CHRONIC PAIN OF BOTH KNEES: Primary | ICD-10-CM

## 2025-01-22 DIAGNOSIS — M25.562 CHRONIC PAIN OF BOTH KNEES: Primary | ICD-10-CM

## 2025-01-22 DIAGNOSIS — M17.0 PRIMARY OSTEOARTHRITIS OF BOTH KNEES: ICD-10-CM

## 2025-01-22 RX ORDER — METHYLPREDNISOLONE ACETATE 80 MG/ML
80 INJECTION, SUSPENSION INTRA-ARTICULAR; INTRALESIONAL; INTRAMUSCULAR; SOFT TISSUE ONCE
Status: COMPLETED | OUTPATIENT
Start: 2025-01-22 | End: 2025-01-22

## 2025-01-22 RX ORDER — BUPIVACAINE HYDROCHLORIDE 2.5 MG/ML
2 INJECTION, SOLUTION INFILTRATION; PERINEURAL ONCE
Status: COMPLETED | OUTPATIENT
Start: 2025-01-22 | End: 2025-01-22

## 2025-01-22 RX ORDER — DICLOFENAC SODIUM 10 MG/G
GEL TOPICAL
Qty: 200 G | Refills: 2 | Status: SHIPPED | OUTPATIENT
Start: 2025-01-22

## 2025-01-22 RX ORDER — FLUTICASONE PROPIONATE 50 MCG
SPRAY, SUSPENSION (ML) NASAL
Qty: 16 G | Refills: 0 | Status: SHIPPED | OUTPATIENT
Start: 2025-01-22

## 2025-01-22 RX ADMIN — METHYLPREDNISOLONE ACETATE 80 MG: 80 INJECTION, SUSPENSION INTRA-ARTICULAR; INTRALESIONAL; INTRAMUSCULAR; SOFT TISSUE at 09:53

## 2025-01-22 RX ADMIN — BUPIVACAINE HYDROCHLORIDE 2 ML: 2.5 INJECTION, SOLUTION INFILTRATION; PERINEURAL at 09:52

## 2025-01-22 RX ADMIN — BUPIVACAINE HYDROCHLORIDE 2 ML: 2.5 INJECTION, SOLUTION INFILTRATION; PERINEURAL at 09:53

## 2025-01-22 NOTE — PROGRESS NOTES
[facility]  [address]  [place] [address]  Dept: [phone]  Dept Fax: [phone]        Orthopaedic Clinic Follow Up      Subjective:     Lizbeth Hoff is a 64 y.o. year old female who presents to the clinic today for routine follow up of bilateral knee pain and osteoarthritis.  Her knee pain is longstanding and she has been seeing Dr. Hu and Dr. Villa in the past.  She has been receiving cortisone and viscous injections from Dr. Hu over the past few years.  Her last injection was cortisone in October 2024.  These injections provide her roughly 2 months relief.  She typically gets viscous injections every 6 months with cortisone injections between at the 3-month uriel.  She denies any falls or accidents.  Her pain is mostly located on the medial aspect of the knee and worse with long periods of standing or use.  She does not use a cane or walker to ambulate.  Does not report recent falls or trauma. She does have a history of diabetes and a BMI of 49.  She is discussed the surgical treatments in the past however is working on decreasing her BMI.  She would like to obtain repeat injections today.    Review of Systems  Gen: No fever, chills, malaise  CV: No chest pain or palpitations  Resp: No cough or shortness of breath  GI: No nausea, vomiting, diarrhea, or constipation  Neuro: No seizures, vertigo, or headache  Msk: Bilateral knee pain  10 remaining systems reviewed and negative    Objective :   There were no vitals filed for this visit.Body mass index is 49.02 kg/m².  General: No acute distress, resting comfortably in the clinic  Neuro: Alert. oriented  Eyes: Extra-ocular muscles intact  Pulm: Respirations unlabored and regular.  Skin: Warm, well perfused  Psych:   Patient has good fund of knowledge and displays understanding of exam, diagnosis, and plan.  MSK:  Evaluation of the bilateral knee reveals no significant

## 2025-01-31 ENCOUNTER — TELEPHONE (OUTPATIENT)
Dept: FAMILY MEDICINE CLINIC | Age: 65
End: 2025-01-31

## 2025-01-31 NOTE — TELEPHONE ENCOUNTER
Patient called in requesting for a mammogram to be order patient states she been having pain in both breast.

## 2025-02-03 DIAGNOSIS — Z12.31 ENCOUNTER FOR SCREENING MAMMOGRAM FOR MALIGNANT NEOPLASM OF BREAST: Primary | ICD-10-CM

## 2025-02-03 NOTE — TELEPHONE ENCOUNTER
Patient called again about her Mammogram order, patient has been advised it could take up to 24 to 72 hours for a response from provider

## 2025-02-05 ENCOUNTER — ANESTHESIA (OUTPATIENT)
Dept: OPERATING ROOM | Age: 65
End: 2025-02-05
Payer: COMMERCIAL

## 2025-02-05 ENCOUNTER — HOSPITAL ENCOUNTER (OUTPATIENT)
Age: 65
Setting detail: OUTPATIENT SURGERY
Discharge: HOME | End: 2025-02-05
Attending: INTERNAL MEDICINE | Admitting: INTERNAL MEDICINE
Payer: COMMERCIAL

## 2025-02-05 ENCOUNTER — ANESTHESIA EVENT (OUTPATIENT)
Dept: OPERATING ROOM | Age: 65
End: 2025-02-05
Payer: COMMERCIAL

## 2025-02-05 VITALS
DIASTOLIC BLOOD PRESSURE: 82 MMHG | BODY MASS INDEX: 47.11 KG/M2 | OXYGEN SATURATION: 98 % | TEMPERATURE: 97.3 F | RESPIRATION RATE: 15 BRPM | HEIGHT: 62 IN | WEIGHT: 256 LBS | HEART RATE: 82 BPM | SYSTOLIC BLOOD PRESSURE: 140 MMHG

## 2025-02-05 DIAGNOSIS — K57.32 DIVERTICULITIS OF COLON: ICD-10-CM

## 2025-02-05 DIAGNOSIS — K58.2 IRRITABLE BOWEL SYNDROME WITH BOTH CONSTIPATION AND DIARRHEA: ICD-10-CM

## 2025-02-05 DIAGNOSIS — K57.30 DIVERTICULA, COLON: ICD-10-CM

## 2025-02-05 DIAGNOSIS — K21.9 GASTROESOPHAGEAL REFLUX DISEASE, UNSPECIFIED WHETHER ESOPHAGITIS PRESENT: ICD-10-CM

## 2025-02-05 DIAGNOSIS — E66.9 MODERATE OBESITY: ICD-10-CM

## 2025-02-05 LAB — GLUCOSE BLD-MCNC: 143 MG/DL (ref 65–105)

## 2025-02-05 PROCEDURE — 88342 IMHCHEM/IMCYTCHM 1ST ANTB: CPT

## 2025-02-05 PROCEDURE — 7100000010 HC PHASE II RECOVERY - FIRST 15 MIN: Performed by: INTERNAL MEDICINE

## 2025-02-05 PROCEDURE — 3609012400 HC EGD TRANSORAL BIOPSY SINGLE/MULTIPLE: Performed by: INTERNAL MEDICINE

## 2025-02-05 PROCEDURE — 2709999900 HC NON-CHARGEABLE SUPPLY: Performed by: INTERNAL MEDICINE

## 2025-02-05 PROCEDURE — 7100000011 HC PHASE II RECOVERY - ADDTL 15 MIN: Performed by: INTERNAL MEDICINE

## 2025-02-05 PROCEDURE — 3700000001 HC ADD 15 MINUTES (ANESTHESIA): Performed by: INTERNAL MEDICINE

## 2025-02-05 PROCEDURE — 2580000003 HC RX 258: Performed by: ANESTHESIOLOGY

## 2025-02-05 PROCEDURE — 3700000000 HC ANESTHESIA ATTENDED CARE: Performed by: INTERNAL MEDICINE

## 2025-02-05 PROCEDURE — 3609027000 HC COLONOSCOPY: Performed by: INTERNAL MEDICINE

## 2025-02-05 PROCEDURE — 88305 TISSUE EXAM BY PATHOLOGIST: CPT

## 2025-02-05 PROCEDURE — 2580000003 HC RX 258: Performed by: NURSE ANESTHETIST, CERTIFIED REGISTERED

## 2025-02-05 PROCEDURE — 82947 ASSAY GLUCOSE BLOOD QUANT: CPT

## 2025-02-05 PROCEDURE — 6360000002 HC RX W HCPCS: Performed by: NURSE ANESTHETIST, CERTIFIED REGISTERED

## 2025-02-05 RX ORDER — LIDOCAINE HYDROCHLORIDE 10 MG/ML
INJECTION, SOLUTION INFILTRATION; PERINEURAL
Status: DISCONTINUED | OUTPATIENT
Start: 2025-02-05 | End: 2025-02-05 | Stop reason: SDUPTHER

## 2025-02-05 RX ORDER — SODIUM CHLORIDE, SODIUM LACTATE, POTASSIUM CHLORIDE, CALCIUM CHLORIDE 600; 310; 30; 20 MG/100ML; MG/100ML; MG/100ML; MG/100ML
INJECTION, SOLUTION INTRAVENOUS
Status: DISCONTINUED | OUTPATIENT
Start: 2025-02-05 | End: 2025-02-05 | Stop reason: SDUPTHER

## 2025-02-05 RX ORDER — SODIUM CHLORIDE, SODIUM LACTATE, POTASSIUM CHLORIDE, CALCIUM CHLORIDE 600; 310; 30; 20 MG/100ML; MG/100ML; MG/100ML; MG/100ML
INJECTION, SOLUTION INTRAVENOUS CONTINUOUS
Status: DISCONTINUED | OUTPATIENT
Start: 2025-02-05 | End: 2025-02-05 | Stop reason: HOSPADM

## 2025-02-05 RX ORDER — LIDOCAINE HYDROCHLORIDE 10 MG/ML
1 INJECTION, SOLUTION EPIDURAL; INFILTRATION; INTRACAUDAL; PERINEURAL
Status: DISCONTINUED | OUTPATIENT
Start: 2025-02-05 | End: 2025-02-05 | Stop reason: HOSPADM

## 2025-02-05 RX ORDER — SODIUM CHLORIDE 0.9 % (FLUSH) 0.9 %
5-40 SYRINGE (ML) INJECTION PRN
Status: DISCONTINUED | OUTPATIENT
Start: 2025-02-05 | End: 2025-02-05 | Stop reason: HOSPADM

## 2025-02-05 RX ORDER — SODIUM CHLORIDE 9 MG/ML
INJECTION, SOLUTION INTRAVENOUS PRN
Status: DISCONTINUED | OUTPATIENT
Start: 2025-02-05 | End: 2025-02-05 | Stop reason: HOSPADM

## 2025-02-05 RX ORDER — SODIUM CHLORIDE 0.9 % (FLUSH) 0.9 %
5-40 SYRINGE (ML) INJECTION EVERY 12 HOURS SCHEDULED
Status: DISCONTINUED | OUTPATIENT
Start: 2025-02-05 | End: 2025-02-05 | Stop reason: HOSPADM

## 2025-02-05 RX ORDER — SODIUM CHLORIDE 9 MG/ML
INJECTION, SOLUTION INTRAVENOUS CONTINUOUS
Status: DISCONTINUED | OUTPATIENT
Start: 2025-02-05 | End: 2025-02-05 | Stop reason: HOSPADM

## 2025-02-05 RX ORDER — PROPOFOL 10 MG/ML
INJECTION, EMULSION INTRAVENOUS
Status: DISCONTINUED | OUTPATIENT
Start: 2025-02-05 | End: 2025-02-05 | Stop reason: SDUPTHER

## 2025-02-05 RX ADMIN — SODIUM CHLORIDE, POTASSIUM CHLORIDE, SODIUM LACTATE AND CALCIUM CHLORIDE: 600; 310; 30; 20 INJECTION, SOLUTION INTRAVENOUS at 08:00

## 2025-02-05 RX ADMIN — PROPOFOL 70 MG: 10 INJECTION, EMULSION INTRAVENOUS at 08:35

## 2025-02-05 RX ADMIN — PROPOFOL 70 MG: 10 INJECTION, EMULSION INTRAVENOUS at 08:24

## 2025-02-05 RX ADMIN — LIDOCAINE HYDROCHLORIDE 50 MG: 10 INJECTION, SOLUTION INFILTRATION; PERINEURAL at 08:14

## 2025-02-05 RX ADMIN — PROPOFOL 70 MG: 10 INJECTION, EMULSION INTRAVENOUS at 08:16

## 2025-02-05 RX ADMIN — SODIUM CHLORIDE, POTASSIUM CHLORIDE, SODIUM LACTATE AND CALCIUM CHLORIDE: 600; 310; 30; 20 INJECTION, SOLUTION INTRAVENOUS at 08:10

## 2025-02-05 RX ADMIN — PROPOFOL 70 MG: 10 INJECTION, EMULSION INTRAVENOUS at 08:30

## 2025-02-05 RX ADMIN — PROPOFOL 70 MG: 10 INJECTION, EMULSION INTRAVENOUS at 08:14

## 2025-02-05 RX ADMIN — PROPOFOL 70 MG: 10 INJECTION, EMULSION INTRAVENOUS at 08:21

## 2025-02-05 RX ADMIN — PROPOFOL 70 MG: 10 INJECTION, EMULSION INTRAVENOUS at 08:19

## 2025-02-05 ASSESSMENT — ENCOUNTER SYMPTOMS: SHORTNESS OF BREATH: 0

## 2025-02-05 ASSESSMENT — PAIN - FUNCTIONAL ASSESSMENT
PAIN_FUNCTIONAL_ASSESSMENT: 0-10
PAIN_FUNCTIONAL_ASSESSMENT: FACE, LEGS, ACTIVITY, CRY, AND CONSOLABILITY (FLACC)

## 2025-02-05 NOTE — DISCHARGE INSTRUCTIONS
Upper GI Endoscopy: What to Expect at Home  Your Recovery  You may have a sore throat for a day or two after the test.  How can you care for yourself at home?  Activity  Rest as much as you need to after you go home.  You should be able to go back to your usual activities the day after the test.  Diet  Drink plenty of fluids (unless your doctor has told you not to).  Follow-up care is a key part of your treatment and safety. Be sure to make and go to all appointments, and call your doctor if you are having problems.  When should you call for help?  Call 911 anytime you think you may need emergency care. For example, call if:  You passed out (lost consciousness).  You cough up blood.  You vomit blood or what looks like coffee grounds.  You pass maroon or very bloody stools.  Call your doctor now or seek immediate medical care if:  You have trouble swallowing.  You have belly pain.  Your stools are black and tarlike or have streaks of blood.  You are sick to your stomach or cannot keep fluids down.  Watch closely for changes in your health, and be sure to contact your doctor if:  Your throat still hurts after a day or two.  You do not get better as expected.   Where can you learn more?   Go to [email or web address] and sign in to your [employer] account. Enter J454 in the Search Health Information box to learn more about “Upper GI Endoscopy: What to Expect at Home.”    .     © 8042-6678 [employer]. Care instructions adapted under license by [employer]. This care instruction is for use with your licensed healthcare professional. If you have questions about a medical condition or this instruction, always ask your healthcare professional. [employer] disclaims any warranty or liability for your use of this information.  Content Version: 9.9.587058; Last Revised: February 20, 2013    Colonoscopy: What to Expect at Home  Your Recovery  Your doctor will talk to you

## 2025-02-05 NOTE — ANESTHESIA PRE PROCEDURE
Department of Anesthesiology  Preprocedure Note       Name:  Lizbeth Hoff   Age:  64 y.o.  :  1960                                          MRN:  [id number]         Date:  2025      Surgeon: Surgeon(s):  Jack Michelle MD    Procedure: Procedure(s):  COLONOSCOPY DIAGNOSTIC  ESOPHAGOGASTRODUODENOSCOPY    Medications prior to admission:   Prior to Admission medications    Medication Sig Start Date End Date Taking? Authorizing Provider   fluticasone (FLONASE) 50 MCG/ACT nasal spray INSTILL 2 SPRAYS INTO EACH NOSTRIL ONCE DAILY FOR 14 DAYS AS DIRECTED 25  Yes Sarwat Pettit MD GNP DICLOFENAC SODIUM 1 % GEL APPLY TO AFFECTED AREA(S) TOPICALLY 4 GRAMS 4 TIMES DAILY AS NEEDED FOR PAIN 25  Yes Sarwat Pettit MD   nitroGLYCERIN (NITROSTAT) 0.4 MG SL tablet Place 1 tablet under the tongue every 5 minutes as needed for Chest pain up to max of 3 total doses. If no relief after 1 dose, call 911. 25  Yes Avelina Jarvis, APRN - CNP   atorvastatin (LIPITOR) 40 MG tablet TAKE 1 TABLET BY MOUTH DAILY 24  Yes Sarwat Pettit MD   hydroCHLOROthiazide (HYDRODIURIL) 25 MG tablet Take 1 tablet by mouth every morning 24  Yes Sarwat Pettit MD   metFORMIN (GLUCOPHAGE) 500 MG tablet take 1 tablet by mouth EVERY MORNING WITH BREAKFAST 24  Yes Sarwat Pettit MD   lisinopril (PRINIVIL;ZESTRIL) 5 MG tablet TAKE 1/2 TABLET BY MOUTH DAILY 24  Yes Sarwat Pettit MD   pantoprazole (PROTONIX) 40 MG tablet Take 1 tablet by mouth every morning (before breakfast) 24  Yes Sarwat Pettit MD   aspirin 81 MG tablet Take 1 tablet by mouth daily    Provider, MD Ross       Current medications:    Current Facility-Administered Medications   Medication Dose Route Frequency Provider Last Rate Last Admin   • lidocaine PF 1 % injection 1 mL  1 mL IntraDERmal Once PRN Elías Fam MD       • 0.9 % sodium chloride infusion   IntraVENous Continuous Elías Fam MD       • lactated ringers

## 2025-02-05 NOTE — OP NOTE
.PROCEDURE NOTE    DATE OF PROCEDURE: 2/5/2025    SURGEON: Jack Michelle MD    ASSISTANT: None    PREOPERATIVE DIAGNOSIS: SCREENING  HX OF COLON POLYPS  HX OF DIVERTICULITIS  IBS    POSTOPERATIVE DIAGNOSIS: as described below    OPERATION: Total colonoscopy     ANESTHESIA: MAC PER ANESTHESIA     ESTIMATED BLOOD LOSS: less than 50     COMPLICATIONS: None.     SPECIMENS:  Was Not Obtained    HISTORY: The patient is a 64 y.o. year old female with history of above preop diagnosis.  I recommended colonoscopy with possible biopsy or polypectomy and I explained the risk, benefits, expected outcome, and alternatives to the procedure.  Risks included but are not limited to bleeding, infection, respiratory distress, hypotension, and perforation of the colon and possibility of missing a lesion.  The patient understands and is in agreement.      PROCEDURE: The patient was given IV conscious sedation.  The patient's SPO2 remained above 90% throughout the procedure. The colonoscope was inserted per rectum and advanced under direct vision to the cecum without difficulty.  The prep was good.    MILD REDUDANCY AND SMALL AMOUNT OF PASTY STOOLS  Findings:  Terminal ileum: normal    Cecum/Ascending colon: normal    Transverse colon: abnormal: DIVERTICULOSIS    Descending/Sigmoid colon: abnormal: EXTENSIVE DIVERTICULOSIS    Rectum/Anus: examined in normal and retroflexed positions and was abnormal: MOD INT HEMORRHOIDS    Withdrawal Time was (minutes): 10    The colon was decompressed and the scope was removed.  The patient tolerated the procedure well.     Recommendations/Plan:   Lifestyle and dietary modifications as discussed  F/U In Office in 3-4 weeks  Discussed with the family  Colonoscopy Recall :7-8 year  POST SEDATION PATIENT WAS STABLE WITH STABLE VITAL SIGNS AND OXYGEN SATURATIONS AND WAS DISCHARGED HOME WITH RIDE IN A STABLE CONDITION.    Electronically signed by Jack Michelle MD  on 2/5/2025 at 8:24 AM

## 2025-02-05 NOTE — ANESTHESIA POSTPROCEDURE EVALUATION
Department of Anesthesiology  Postprocedure Note    Patient: Lizbeth Hoff  MRN: [id number]  YOB: 1960  Date of evaluation: 2/5/2025    Procedure Summary       Date: 02/05/25 Room / Location: [address]    Anesthesia Start: 0810 Anesthesia Stop: 0851    Procedures:       COLONOSCOPY DIAGNOSTIC      ESOPHAGOGASTRODUODENOSCOPY WITH BIOPSY Diagnosis:       Diverticulitis of colon      Diverticula, colon      Gastroesophageal reflux disease, unspecified whether esophagitis present      Moderate obesity      Irritable bowel syndrome with both constipation and diarrhea      (Diverticulitis of colon [K57.32])      (Diverticula, colon [K57.30])      (Gastroesophageal reflux disease, unspecified whether esophagitis present [K21.9])      (Moderate obesity [E66.8])      (Irritable bowel syndrome with both constipation and diarrhea [K58.2])    Surgeons: Jack Michelle MD Responsible Provider: Pamela Bridges MD    Anesthesia Type: MAC ASA Status: 3            Anesthesia Type: No value filed.    Donna Phase I: Donna Score: 10    Donna Phase II: Donna Score: 9    Anesthesia Post Evaluation    Airway patency: patent  Cardiovascular status: hemodynamically stable  Respiratory status: acceptable    No notable events documented.

## 2025-02-05 NOTE — OP NOTE
.PROCEDURE NOTE    DATE OF PROCEDURE: 2/5/2025     SURGEON: Jack Michelle MD    ASSISTANT: None    PREOPERATIVE DIAGNOSIS: GERD  ABD PAINS  DYSPHAGIA    POSTOPERATIVE DIAGNOSIS: As described below    OPERATION: Upper GI endoscopy with Biopsy    ANESTHESIA: MAC PER ANESTHESIA     ESTIMATED BLOOD LOSS: Less than 50 ml    COMPLICATIONS: None.     SPECIMENS:  Was Obtained:     HISTORY: The patient is a 64 y.o. year old female with history of above preop diagnosis.  I recommended esophagogastroduodenoscopy with possible biopsy and I explained the risk, benefits, expected outcome, and alternatives to the procedure.  Risks included but are not limited to bleeding, infection, respiratory distress, hypotension, and perforation of the esophagus, stomach, or duodenum.  Patient understands and is in agreement.    PROCEDURE: The patient was given IV conscious sedation.  The patient's SPO2 remained above 90% throughout the procedure. The gastroscope was inserted orally and advanced under direct vision through the esophagus, through the stomach, through the pylorus, and into the descending duodenum.      Findings:    Retropharyngeal area was grossly normal appearing    Esophagus: abnormal: MULTIPLE SEMI CIRCULAR WEBS IN THE DISTAL ESOPHAGUS NON OBSTRUCTING  NON OBSTRUCTING SCHATZKI'S RING  MULTIPLE PASSES WERE MADE AND USING JUMBO BIOPSY FORCEPS THESE WEBS WERE BROKEN AND BIOPSIES TAKEN FROM SCHATZKI;S RING    Stomach:    Fundus: normal    Body: abnormal: MOD DIFFUSE GASTRITIS     Antrum: abnormal: MOD DIFFUSE GASTRITIS WAS BIOPSIED FOR H PYLORI    Duodenum:     Descending: normal    Bulb: normal    The scope was removed and the patient tolerated the procedure well.     Recommendations/Plan:   F/U Biopsies  F/U In Office in 3-4 weeks  Discussed with the family  Post sedation patient was stable with stable vital signs and stable O2 saturations    Electronically signed by Jack Michelle MD  on 2/5/2025 at 8:22 AM

## 2025-02-05 NOTE — H&P
Interval H&P Note    Pt Name: Lizbeth Hoff  MRN: [id number]  YOB: 1960  Date of evaluation: 2/5/2025      [x] I have reviewed in [facility] the Cardiology Progress Note by Avelina Jarvis CNP dated 1/13/25 attached below for the Interval History and Physical note.     [x] I have examined  Lizbeth Hoff, a 64 y.o. female with PMH of HTN, HLD, GERD, DM II, SINDI on CPAP, morbid obesity, mild CAD, and tachycardia who arrived for the scheduled COLONOSCOPY DIAGNOSTIC, ESOPHAGOGASTRODUODENOSCOPY by Jack Michelle MD for Diverticulitis of colon; Diverticula, colon; Gastroesophageal reflux disea*. Patient arrived for her procedures and followed bowel prep until watery clear. Previous colonoscopy.  10/17/19 by Dr Cage(Op Note below) No FH colon cancer or polyps.  Patient denies bowel changes, bloody tarry stools, diarrhea alternating with constipation, abdominal pain or unintentional weight loss. The patient denies new health changes, fever, chills, wheezing, cough, increased SOB, chest pain, open sores or wounds.  Last ASA 2/3/25  +DM Refer to epic for current BS     NOTE: Patient arrived for procedure  She denies dizziness, lightheadedness, palpations, increased SOB, feeling faint or having chest pain Repeat apical . SpO2 97% on room air      NM STRESS TEST WITH MYOCARDIAL PERFUSION 05/28/2024  2:25 PM, 05/28/2024  4:28 PM (Final)     Interpretation Summary    Stress Combined Conclusion: The study is negative for myocardial ischemia. Findings suggest a low risk of cardiac events.    Stress Function: Left ventricular function post-stress is normal. Post-stress ejection fraction is 62%. The stress end diastolic cavity size is normal. The stress end systolic cavity size is normal.    Perfusion Comments: Prone images were obtained. LV perfusion is normal. There is no evidence of inducible ischemia.    Perfusion Conclusion: There is no evidence of transient ischemic dilation (TID). TID ratio is 1.22.

## 2025-02-07 LAB — SURGICAL PATHOLOGY REPORT: NORMAL

## 2025-02-12 ENCOUNTER — HOSPITAL ENCOUNTER (OUTPATIENT)
Dept: MAMMOGRAPHY | Age: 65
Discharge: HOME | End: 2025-02-14
Payer: COMMERCIAL

## 2025-02-12 VITALS — BODY MASS INDEX: 47.11 KG/M2 | HEIGHT: 62 IN | WEIGHT: 256 LBS

## 2025-02-12 DIAGNOSIS — Z12.31 ENCOUNTER FOR SCREENING MAMMOGRAM FOR MALIGNANT NEOPLASM OF BREAST: ICD-10-CM

## 2025-02-12 PROCEDURE — 77063 BREAST TOMOSYNTHESIS BI: CPT

## 2025-02-18 DIAGNOSIS — J30.9 ALLERGIC RHINITIS, UNSPECIFIED SEASONALITY, UNSPECIFIED TRIGGER: ICD-10-CM

## 2025-02-19 RX ORDER — FLUTICASONE PROPIONATE 50 MCG
SPRAY, SUSPENSION (ML) NASAL
Qty: 16 G | Refills: 0 | Status: SHIPPED | OUTPATIENT
Start: 2025-02-19

## 2025-02-19 NOTE — TELEPHONE ENCOUNTER
Last visit: 12.26.24  Last Med refill: 1.22.25  Does patient have enough medication for 72 hours: {Yes    Next Visit Date:  Future Appointments   Date Time Provider Department Center   6/26/2025  9:00 AM Sarwat Pettit MD [facility] DEP   7/15/2025  1:15 PM Maury Flores MD [facility]       Health Maintenance   Topic Date Due    Diabetic retinal exam  Never done    Shingles vaccine (1 of 2) Never done    Hepatitis B vaccine (2 of 3 - 19+ 3-dose series) 03/11/2022    Respiratory Syncytial Virus (RSV) Pregnant or age 60 yrs+ (1 - Risk 60-74 years 1-dose series) Never done    COVID-19 Vaccine (3 - 2024-25 season) 09/01/2024    Lipids  12/28/2024    Depression Screen  10/03/2025    A1C test (Diabetic or Prediabetic)  12/20/2025    Diabetic foot exam  12/26/2025    Diabetic Alb to Cr ratio (uACR) test  12/26/2025    GFR test (Diabetes, CKD 3-4, OR last GFR 15-59)  01/08/2026    Breast cancer screen  02/12/2027    DTaP/Tdap/Td vaccine (3 - Td or Tdap) 12/26/2034    Colorectal Cancer Screen  02/05/2035    Flu vaccine  Completed    Pneumococcal 50+ years Vaccine  Completed    Hepatitis C screen  Completed    HIV screen  Completed    Hepatitis A vaccine  Aged Out    Hib vaccine  Aged Out    Polio vaccine  Aged Out    Meningococcal (ACWY) vaccine  Aged Out    Pneumococcal 0-49 years Vaccine  Discontinued       Hemoglobin A1C (%)   Date Value   12/20/2024 6.2 (H)   04/08/2024 6.4   12/08/2023 6.5             ( goal A1C is < 7)   No components found for: \"LABMICR\"  No components found for: \"LDLCHOLESTEROL\", \"LDLCALC\"    (goal LDL is <100)   AST (U/L)   Date Value   01/08/2025 19     ALT (U/L)   Date Value   01/08/2025 13     BUN (mg/dL)   Date Value   01/08/2025 12     BP Readings from Last 3 Encounters:   02/05/25 (!) 140/82   01/13/25 124/82   01/08/25 122/81          (goal 120/80)    All Future Testing planned in [facility]  Lab Frequency Next Occurrence   Lipid Panel Once 08/22/2024   EGD Once

## 2025-03-04 ENCOUNTER — OFFICE VISIT (OUTPATIENT)
Dept: FAMILY MEDICINE CLINIC | Age: 65
End: 2025-03-04
Payer: COMMERCIAL

## 2025-03-04 VITALS
WEIGHT: 260.4 LBS | SYSTOLIC BLOOD PRESSURE: 111 MMHG | BODY MASS INDEX: 47.92 KG/M2 | DIASTOLIC BLOOD PRESSURE: 69 MMHG | OXYGEN SATURATION: 99 % | HEIGHT: 62 IN | HEART RATE: 97 BPM | TEMPERATURE: 99.6 F

## 2025-03-04 DIAGNOSIS — J06.9 VIRAL URI: Primary | ICD-10-CM

## 2025-03-04 LAB
INFLUENZA A ANTIBODY: POSITIVE
INFLUENZA B ANTIBODY: NEGATIVE

## 2025-03-04 PROCEDURE — 87804 INFLUENZA ASSAY W/OPTIC: CPT

## 2025-03-04 PROCEDURE — 3078F DIAST BP <80 MM HG: CPT

## 2025-03-04 PROCEDURE — 3074F SYST BP LT 130 MM HG: CPT

## 2025-03-04 PROCEDURE — 99212 OFFICE O/P EST SF 10 MIN: CPT

## 2025-03-04 NOTE — PROGRESS NOTES
Attending Physician Statement  I  have discussed the care of Lizbeth Hoff including pertinent history and exam findings with the resident. I agree with the assessment, plan and orders as documented by the resident.      /69 (Site: Left Upper Arm, Position: Sitting, Cuff Size: Medium Adult)   Pulse 97   Temp 99.6 °F (37.6 °C)   Ht 1.575 m (5' 2\")   Wt 118.1 kg (260 lb 6.4 oz)   SpO2 99%   BMI 47.63 kg/m²    BP Readings from Last 3 Encounters:   03/04/25 111/69   02/05/25 (!) 140/82   01/13/25 124/82     Wt Readings from Last 3 Encounters:   03/04/25 118.1 kg (260 lb 6.4 oz)   02/12/25 116.1 kg (256 lb)   02/05/25 116.1 kg (256 lb)          Diagnosis Orders   1. Viral URI  POCT Influenza A/B          B/l ear pain, sore throat- recent sick contact 2 weeks ago, subjective fever mild erythema of throat; ear exam air fluid level- flu A positive, symptoms mostly resolved precautions given, home supportive care.     Sravani Awan DO 3/4/2025 3:23 PM       Order entered

## 2025-03-04 NOTE — PROGRESS NOTES
DIABETES and HYPERTENSION visit    BP Readings from Last 3 Encounters:   02/05/25 (!) 140/82   01/13/25 124/82   01/08/25 122/81        Hemoglobin A1C (%)   Date Value   12/20/2024 6.2 (H)   04/08/2024 6.4   12/08/2023 6.5     HDL (mg/dL)   Date Value   12/28/2023 32 (L)     BUN (mg/dL)   Date Value   01/08/2025 12     Creatinine (mg/dL)   Date Value   01/08/2025 1.1 (H)     Glucose (mg/dL)   Date Value   01/08/2025 114 (H)   10/09/2011 136 (H)            Have you changed or started any medications since your last visit including any over-the-counter medicines, vitamins, or herbal medicines? no   Have you stopped taking any of your medications? Is so, why? -  no  Are you having any side effects from any of your medications? - no    Have you seen any other physician or provider since your last visit?  YES- [facility]   Have you had any other diagnostic tests since your last visit?  yes - CT- Chest , Xray - chest , labs, Mammogram , EKG, Colonoscopy , EGD    Have you been seen in the emergency room and/or had an admission in a hospital since we last saw you?  yes - [facility]   Have you had your routine dental cleaning in the past 6 months?  no     Have you had your annual diabetic retinal (eye) exam? No   (ensure copy of exam is in the chart)    Do you have an active MyChart account? If no, what is the barrier?  Yes    Patient Care Team:  Sarwat Pettit MD as PCP - General (Family Medicine)  Edgardo Pineda MD as Consulting Physician (Pulmonology)  Ashok Reynolds MD as Consulting Physician (Pain Management)    Medical History Review  Past Medical, Family, and Social History reviewed and does not contribute to the patient presenting condition    Health Maintenance   Topic Date Due    Diabetic retinal exam  Never done    Shingles vaccine (1 of 2) Never done    Hepatitis B vaccine (2 of 3 - 19+ 3-dose series) 03/11/2022    Respiratory Syncytial Virus (RSV) Pregnant or age 60 yrs+ (1 - Risk 60-74 years 1-dose series)

## 2025-03-04 NOTE — PROGRESS NOTES
Subjective:      Chief Complaint   Patient presents with    Ear Pain     Patient states both ears have been hurting for about 2 weeks        HPI  Lizbeth Hoff is a 64 y.o. female with a  has a past medical history of Diverticulitis, Former smoker, Former smoker, GERD (gastroesophageal reflux disease), Hyperlipidemia, Hypertension, Morbid obesity with BMI of 45.0-49.9, adult, SINDI on CPAP, Sleep apnea, and Type II or unspecified type diabetes mellitus without mention of complication, not stated as uncontrolled. who is presenting today for bilateral ear pain. The pain is mild, and the patient says there is no exacerbating or alleviating factors. She denies discharge from her ears, or cough, or dizziness, or hearing loss, or tinnitus. She endorses feeling slightly feverish, as well as some sore throat. Her grandchild had upper respiratory symptoms 2 weeks prior. She has been doing well overall.      Patient Active Problem List   Diagnosis    Gastroesophageal reflux disease    Hyperlipidemia with target LDL less than 100    HTN (hypertension)    Numbness and tingling    Diverticula, colon    Diverticulitis, acute    Eczema    Lump of left breast    Shoulder pain    Right groin pain    Tinea corporis    Hyperlipidemia    Morbid obesity with BMI of 45.0-49.9, adult    SINDI (obstructive sleep apnea)    Vitamin D deficiency    Melena    Chest pain    Abnormal stress test    Contact blepharoconjunctivitis of left eye    Prediabetes    Flu vaccine need    Acute cystitis without hematuria    Primary osteoarthritis of right knee    Diverticulitis of colon    Moderate obesity    Irritable bowel syndrome with both constipation and diarrhea       Prior to Visit Medications    Medication Sig Taking? Authorizing Provider   fluticasone (FLONASE) 50 MCG/ACT nasal spray INSTILL 2 SPRAYS INTO EACH NOSTRIL ONCE DAILY FOR 14 DAYS AS DIRECTED Yes Sarwat Pettit MD   GNP DICLOFENAC SODIUM 1 % GEL APPLY TO AFFECTED AREA(S) TOPICALLY 4 GRAMS

## 2025-03-04 NOTE — PATIENT INSTRUCTIONS
Thank you for letting us take care of you today. We hope all your questions were addressed. If a question was overlooked or something else comes to mind after you return home, please contact a member of your Care Team listed below.      Your Care Team at [facility] is Team #1  Teresa Mcallister M.D. (Faculty)  Sarwat Sosa M.D. (Resident)  Junior Rizvi D.O. (Resident)  Arie Hooks M.D. (Resident)  Ray Trivedi M.D. (Resident)  Nereyda Canada, [facility]  Carlton Tee, [facility]  Alisha Carpenter, [facility]  Karin Pickens, [facility]  Luciana Carlson, [facility]  Bekah Sharp, [facility]  Tobin Dupont (LJ) Niko,   Sindhu Ascencio [facility] (Clinical Pharmacist)     Office phone number: [phone]    If you need to get in right away due to illness, please be advised we have \"Same Day\" appointments available Monday-Friday. Please call us at [phone] option #3 to schedule your \"Same Day\" appointment.

## 2025-03-07 ENCOUNTER — HOSPITAL ENCOUNTER (EMERGENCY)
Age: 65
Discharge: HOME | End: 2025-03-07
Attending: EMERGENCY MEDICINE
Payer: OTHER MISCELLANEOUS

## 2025-03-07 ENCOUNTER — APPOINTMENT (OUTPATIENT)
Dept: GENERAL RADIOLOGY | Age: 65
End: 2025-03-07
Payer: OTHER MISCELLANEOUS

## 2025-03-07 VITALS
TEMPERATURE: 98.4 F | RESPIRATION RATE: 18 BRPM | HEART RATE: 71 BPM | DIASTOLIC BLOOD PRESSURE: 77 MMHG | OXYGEN SATURATION: 100 % | SYSTOLIC BLOOD PRESSURE: 136 MMHG

## 2025-03-07 DIAGNOSIS — R07.9 CHEST PAIN, UNSPECIFIED TYPE: ICD-10-CM

## 2025-03-07 DIAGNOSIS — M79.601 BILATERAL ARM PAIN: ICD-10-CM
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
## 2025-03-07 DIAGNOSIS — M79.602 BILATERAL ARM PAIN: ICD-10-CM

## 2025-03-07 PROCEDURE — 73060 X-RAY EXAM OF HUMERUS: CPT

## 2025-03-07 PROCEDURE — 71046 X-RAY EXAM CHEST 2 VIEWS: CPT

## 2025-03-07 PROCEDURE — 99283 EMERGENCY DEPT VISIT LOW MDM: CPT

## 2025-03-07 RX ORDER — CYCLOBENZAPRINE HCL 5 MG
5 TABLET ORAL 2 TIMES DAILY PRN
Qty: 10 TABLET | Refills: 0 | Status: SHIPPED | OUTPATIENT
Start: 2025-03-07 | End: 2025-03-17

## 2025-03-07 RX ORDER — IBUPROFEN 600 MG/1
600 TABLET, FILM COATED ORAL 3 TIMES DAILY PRN
Qty: 30 TABLET | Refills: 0 | Status: SHIPPED | OUTPATIENT
Start: 2025-03-07 | End: 2025-03-13

## 2025-03-07 ASSESSMENT — PAIN - FUNCTIONAL ASSESSMENT
PAIN_FUNCTIONAL_ASSESSMENT: 0-10
PAIN_FUNCTIONAL_ASSESSMENT: NONE - DENIES PAIN

## 2025-03-07 ASSESSMENT — PAIN SCALES - GENERAL: PAINLEVEL_OUTOF10: 4

## 2025-03-08 NOTE — ED PROVIDER NOTES
status: Former     Current packs/day: 0.00     Average packs/day: 0.3 packs/day for 7.0 years (1.8 ttl pk-yrs)     Types: Cigarettes     Start date: 1984     Quit date: 1991     Years since quittin.2     Passive exposure: Past    Smokeless tobacco: Never   Vaping Use    Vaping status: Never Used   Substance and Sexual Activity    Alcohol use: Not Currently    Drug use: No    Sexual activity: Not on file   Other Topics Concern    Not on file   Social History Narrative    Not on file     Social Drivers of Health     Financial Resource Strain: Low Risk  (2024)    Overall Financial Resource Strain (CARDIA)     Difficulty of Paying Living Expenses: Not hard at all   Food Insecurity: No Food Insecurity (2025)    Received from [facility]    Hunger Screening     Within the past 12 months we worried whether our food would run out before we got money to buy more.: Never True     Within the past 12 months the food we bought just didn't last and we didn't have money to get more.: Never True   Transportation Needs: Unknown (2024)    PRAPARE - Transportation     Lack of Transportation (Medical): Not on file     Lack of Transportation (Non-Medical): No   Physical Activity: Not on file   Stress: Not on file   Social Connections: Not on file   Intimate Partner Violence: Not on file   Housing Stability: Unknown (2024)    Housing Stability Vital Sign     Unable to Pay for Housing in the Last Year: Not on file     Number of Times Moved in the Last Year: Not on file     Homeless in the Last Year: No       Family History   Problem Relation Age of Onset    High Blood Pressure Mother     Diabetes Mother     Cancer Father     Breast Cancer Maternal Aunt 60       Allergies:  Patient has no known allergies.    Home Medications:  Prior to Admission medications    Medication Sig Start Date End Date Taking? Authorizing Provider   cyclobenzaprine (FLEXERIL) 5 MG tablet Take 1 tablet by mouth 2  R-0Print             Lena James MD  Emergency Medicine Resident    (Please note that portions of this note were completed with a voice recognition program.  Efforts were made to edit the dictations but occasionally words are mis-transcribed.)
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
## 2025-03-11 ENCOUNTER — HOSPITAL ENCOUNTER (EMERGENCY)
Age: 65
Discharge: HOME | End: 2025-03-11
Attending: EMERGENCY MEDICINE
Payer: OTHER MISCELLANEOUS

## 2025-03-11 ENCOUNTER — APPOINTMENT (OUTPATIENT)
Dept: GENERAL RADIOLOGY | Age: 65
End: 2025-03-11
Payer: OTHER MISCELLANEOUS

## 2025-03-11 VITALS
RESPIRATION RATE: 19 BRPM | TEMPERATURE: 98.2 F | DIASTOLIC BLOOD PRESSURE: 80 MMHG | HEART RATE: 71 BPM | SYSTOLIC BLOOD PRESSURE: 139 MMHG | OXYGEN SATURATION: 100 %

## 2025-03-11 DIAGNOSIS — K21.9 GASTROESOPHAGEAL REFLUX DISEASE WITHOUT ESOPHAGITIS: ICD-10-CM

## 2025-03-11 DIAGNOSIS — R07.9 CHEST PAIN, UNSPECIFIED TYPE: Primary | ICD-10-CM

## 2025-03-11 LAB
ALBUMIN SERPL-MCNC: 4.3 G/DL (ref 3.5–5.2)
ALBUMIN/GLOB SERPL: 1.2 {RATIO} (ref 1–2.5)
ALP SERPL-CCNC: 92 U/L (ref 35–104)
ALT SERPL-CCNC: 19 U/L (ref 10–35)
ANION GAP SERPL CALCULATED.3IONS-SCNC: 13 MMOL/L (ref 9–16)
AST SERPL-CCNC: 26 U/L (ref 10–35)
BASOPHILS # BLD: 0.03 K/UL (ref 0–0.2)
BASOPHILS NFR BLD: 0 % (ref 0–2)
BILIRUB SERPL-MCNC: 0.3 MG/DL (ref 0–1.2)
BUN SERPL-MCNC: 17 MG/DL (ref 8–23)
CALCIUM SERPL-MCNC: 10 MG/DL (ref 8.6–10.4)
CHLORIDE SERPL-SCNC: 102 MMOL/L (ref 98–107)
CO2 SERPL-SCNC: 24 MMOL/L (ref 20–31)
CREAT SERPL-MCNC: 1 MG/DL (ref 0.6–0.9)
EOSINOPHIL # BLD: 0.18 K/UL (ref 0–0.44)
EOSINOPHILS RELATIVE PERCENT: 2 % (ref 1–4)
ERYTHROCYTE [DISTWIDTH] IN BLOOD BY AUTOMATED COUNT: 14.7 % (ref 11.8–14.4)
GFR, ESTIMATED: 64 ML/MIN/1.73M2
GLUCOSE SERPL-MCNC: 121 MG/DL (ref 74–99)
HCT VFR BLD AUTO: 39.2 % (ref 36.3–47.1)
HGB BLD-MCNC: 12.6 G/DL (ref 11.9–15.1)
IMM GRANULOCYTES # BLD AUTO: 0.07 K/UL (ref 0–0.3)
IMM GRANULOCYTES NFR BLD: 1 %
LIPASE SERPL-CCNC: 33 U/L (ref 13–60)
LYMPHOCYTES NFR BLD: 2.78 K/UL (ref 1.1–3.7)
LYMPHOCYTES RELATIVE PERCENT: 34 % (ref 24–43)
MCH RBC QN AUTO: 27.8 PG (ref 25.2–33.5)
MCHC RBC AUTO-ENTMCNC: 32.1 G/DL (ref 28.4–34.8)
MCV RBC AUTO: 86.3 FL (ref 82.6–102.9)
MONOCYTES NFR BLD: 0.35 K/UL (ref 0.1–1.2)
MONOCYTES NFR BLD: 4 % (ref 3–12)
NEUTROPHILS NFR BLD: 59 % (ref 36–65)
NEUTS SEG NFR BLD: 4.73 K/UL (ref 1.5–8.1)
NRBC BLD-RTO: 0 PER 100 WBC
PLATELET # BLD AUTO: 232 K/UL (ref 138–453)
PMV BLD AUTO: 10.9 FL (ref 8.1–13.5)
POTASSIUM SERPL-SCNC: 3.4 MMOL/L (ref 3.7–5.3)
PROT SERPL-MCNC: 8 G/DL (ref 6.6–8.7)
RBC # BLD AUTO: 4.54 M/UL (ref 3.95–5.11)
RBC # BLD: ABNORMAL 10*6/UL
SODIUM SERPL-SCNC: 139 MMOL/L (ref 136–145)
TROPONIN I SERPL HS-MCNC: 6 NG/L (ref 0–14)
TROPONIN I SERPL HS-MCNC: 7 NG/L (ref 0–14)
WBC OTHER # BLD: 8.1 K/UL (ref 3.5–11.3)

## 2025-03-11 PROCEDURE — 2500000003 HC RX 250 WO HCPCS

## 2025-03-11 PROCEDURE — 93005 ELECTROCARDIOGRAM TRACING: CPT

## 2025-03-11 PROCEDURE — 99285 EMERGENCY DEPT VISIT HI MDM: CPT

## 2025-03-11 PROCEDURE — 96374 THER/PROPH/DIAG INJ IV PUSH: CPT

## 2025-03-11 PROCEDURE — 2580000003 HC RX 258

## 2025-03-11 PROCEDURE — 6370000000 HC RX 637 (ALT 250 FOR IP)

## 2025-03-11 PROCEDURE — 80053 COMPREHEN METABOLIC PANEL: CPT

## 2025-03-11 PROCEDURE — 71045 X-RAY EXAM CHEST 1 VIEW: CPT

## 2025-03-11 PROCEDURE — 83690 ASSAY OF LIPASE: CPT

## 2025-03-11 PROCEDURE — 85025 COMPLETE CBC W/AUTO DIFF WBC: CPT

## 2025-03-11 PROCEDURE — 84484 ASSAY OF TROPONIN QUANT: CPT

## 2025-03-11 PROCEDURE — 6360000002 HC RX W HCPCS

## 2025-03-11 PROCEDURE — 96375 TX/PRO/DX INJ NEW DRUG ADDON: CPT

## 2025-03-11 RX ORDER — KETOROLAC TROMETHAMINE 30 MG/ML
30 INJECTION, SOLUTION INTRAMUSCULAR; INTRAVENOUS ONCE
Status: COMPLETED | OUTPATIENT
Start: 2025-03-11 | End: 2025-03-11

## 2025-03-11 RX ORDER — MAGNESIUM HYDROXIDE/ALUMINUM HYDROXICE/SIMETHICONE 120; 1200; 1200 MG/30ML; MG/30ML; MG/30ML
30 SUSPENSION ORAL ONCE
Status: COMPLETED | OUTPATIENT
Start: 2025-03-11 | End: 2025-03-11

## 2025-03-11 RX ADMIN — POTASSIUM BICARBONATE 40 MEQ: 782 TABLET, EFFERVESCENT ORAL at 13:24

## 2025-03-11 RX ADMIN — FAMOTIDINE 20 MG: 10 INJECTION, SOLUTION INTRAVENOUS at 11:38

## 2025-03-11 RX ADMIN — ALUMINUM HYDROXIDE, MAGNESIUM HYDROXIDE, AND SIMETHICONE 30 ML: 200; 200; 20 SUSPENSION ORAL at 11:38

## 2025-03-11 RX ADMIN — KETOROLAC TROMETHAMINE 30 MG: 30 INJECTION, SOLUTION INTRAMUSCULAR at 11:38

## 2025-03-11 ASSESSMENT — PAIN DESCRIPTION - LOCATION
LOCATION: CHEST
LOCATION: CHEST

## 2025-03-11 ASSESSMENT — PAIN SCALES - GENERAL
PAINLEVEL_OUTOF10: 6
PAINLEVEL_OUTOF10: 3

## 2025-03-11 ASSESSMENT — PAIN DESCRIPTION - ORIENTATION: ORIENTATION: RIGHT

## 2025-03-11 ASSESSMENT — ENCOUNTER SYMPTOMS
ABDOMINAL PAIN: 0
SHORTNESS OF BREATH: 0
NAUSEA: 0
VOMITING: 0

## 2025-03-11 ASSESSMENT — HEART SCORE: ECG: NORMAL

## 2025-03-11 ASSESSMENT — PAIN - FUNCTIONAL ASSESSMENT: PAIN_FUNCTIONAL_ASSESSMENT: 0-10

## 2025-03-11 ASSESSMENT — PAIN DESCRIPTION - DESCRIPTORS: DESCRIPTORS: ACHING

## 2025-03-11 NOTE — ED NOTES
Pt presented to ED ambulatory from triage.  Pt presents with C/O of chest pain that started last night .  Pt states the pain comes and goes .  Pt denies any other C/O.  Pt is Alert and oriented. Pt is resting comfortably on stretcher with call light in reach.  No acute distress noted. Respirations are even and unlabored.  White board updated. Will continue to follow plan of care.

## 2025-03-11 NOTE — DISCHARGE INSTRUCTIONS
Your heart workup today was very unremarkable.  Your EKG was normal.  Your heart enzyme levels were normal.  We did treat you for acid reflux in case this was the cause of your pain.  Please call your cardiologist this week to schedule an appointment for as soon as possible.    If you have not had a stress test in over a year your primary care physician may order this test as further work-up for your chest pain.  If you have a cardiologist, then you should also call them to discuss further treatment options.    PLEASE RETURN TO THE EMERGENCY DEPARTMENT IMMEDIATELY for worsening symptoms of increasing pain, shortness of breath, feeling of your heart fluttering or racing, swelling to your feet, unable to lay flat, or if you develop any concerning symptoms such as: high fever not relieved by acetaminophen (Tylenol) and/or ibuprofen (Motrin / Advil), chills, persistent nausea and/or vomiting, loss of consciousness, numbness, weakness or tingling in the arms or legs or change in color of the extremities, changes in mental status, persistent headache, blurry vision, loss of bladder / bowel control, unable to follow up with your physician, or other any other care or concern.

## 2025-03-12 LAB
EKG ATRIAL RATE: 77 BPM
EKG P AXIS: 43 DEGREES
EKG P-R INTERVAL: 168 MS
EKG Q-T INTERVAL: 396 MS
EKG QRS DURATION: 104 MS
EKG QTC CALCULATION (BAZETT): 448 MS
EKG R AXIS: -46 DEGREES
EKG T AXIS: 16 DEGREES
EKG VENTRICULAR RATE: 77 BPM

## 2025-03-13 ENCOUNTER — OFFICE VISIT (OUTPATIENT)
Dept: FAMILY MEDICINE CLINIC | Age: 65
End: 2025-03-13

## 2025-03-13 ENCOUNTER — TELEPHONE (OUTPATIENT)
Dept: FAMILY MEDICINE CLINIC | Age: 65
End: 2025-03-13

## 2025-03-13 VITALS
DIASTOLIC BLOOD PRESSURE: 75 MMHG | SYSTOLIC BLOOD PRESSURE: 121 MMHG | BODY MASS INDEX: 47.59 KG/M2 | HEIGHT: 62 IN | WEIGHT: 258.6 LBS | HEART RATE: 101 BPM

## 2025-03-13 DIAGNOSIS — J30.9 ALLERGIC RHINITIS, UNSPECIFIED SEASONALITY, UNSPECIFIED TRIGGER: ICD-10-CM
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
## 2025-03-13 DIAGNOSIS — M25.512 BILATERAL SHOULDER PAIN, UNSPECIFIED CHRONICITY: Primary | ICD-10-CM

## 2025-03-13 DIAGNOSIS — M25.511 BILATERAL SHOULDER PAIN, UNSPECIFIED CHRONICITY: Primary | ICD-10-CM

## 2025-03-13 RX ORDER — LORATADINE 10 MG/1
10 TABLET ORAL DAILY
Qty: 30 TABLET | Refills: 3 | Status: SHIPPED | OUTPATIENT
Start: 2025-03-13 | End: 2025-07-11

## 2025-03-13 SDOH — ECONOMIC STABILITY: FOOD INSECURITY: WITHIN THE PAST 12 MONTHS, THE FOOD YOU BOUGHT JUST DIDN'T LAST AND YOU DIDN'T HAVE MONEY TO GET MORE.: NEVER TRUE

## 2025-03-13 SDOH — ECONOMIC STABILITY: FOOD INSECURITY: WITHIN THE PAST 12 MONTHS, YOU WORRIED THAT YOUR FOOD WOULD RUN OUT BEFORE YOU GOT MONEY TO BUY MORE.: NEVER TRUE

## 2025-03-13 ASSESSMENT — PATIENT HEALTH QUESTIONNAIRE - PHQ9
SUM OF ALL RESPONSES TO PHQ QUESTIONS 1-9: 0
SUM OF ALL RESPONSES TO PHQ QUESTIONS 1-9: 0
1. LITTLE INTEREST OR PLEASURE IN DOING THINGS: NOT AT ALL
2. FEELING DOWN, DEPRESSED OR HOPELESS: NOT AT ALL
SUM OF ALL RESPONSES TO PHQ QUESTIONS 1-9: 0
SUM OF ALL RESPONSES TO PHQ QUESTIONS 1-9: 0

## 2025-03-13 ASSESSMENT — ENCOUNTER SYMPTOMS
WHEEZING: 0
ABDOMINAL PAIN: 0
CHEST TIGHTNESS: 0
RHINORRHEA: 1
SINUS PRESSURE: 1
SHORTNESS OF BREATH: 0
BACK PAIN: 0
NAUSEA: 0
DIARRHEA: 0
SINUS PAIN: 1

## 2025-03-13 NOTE — PATIENT INSTRUCTIONS
Thank you for letting us take care of you today. We hope all your questions were addressed. If a question was overlooked or something else comes to mind after you return home, please contact a member of your Care Team listed below.      Your Care Team at [facility] is Team #1  Teresa Mcallister M.D. (Faculty)  Sarwat Sosa M.D. (Resident)  Junior Rizvi D.O. (Resident)  Arie Hooks M.D. (Resident)  Ray Trivedi M.D. (Resident)  Nereyda Canada, [facility]  Carlton Tee, [facility]  Alisha Carpenter, [facility]  Karin Pickens, [facility]  Luciana Carlson, [facility]  Bekah Sharp, [facility]  Tobin Dupont (LJ) Niko,   Sindhu Ascencio [facility] (Clinical Pharmacist)     Office phone number: [phone]    If you need to get in right away due to illness, please be advised we have \"Same Day\" appointments available Monday-Friday. Please call us at [phone] option #3 to schedule your \"Same Day\" appointment.
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
## 2025-03-13 NOTE — PROGRESS NOTES
Visit Information    Have you changed or started any medications since your last visit including any over-the-counter medicines, vitamins, or herbal medicines? no   Are you having any side effects from any of your medications? -  no  Have you stopped taking any of your medications? Is so, why? -  no    Have you seen any other physician or provider since your last visit? No  Have you had any other diagnostic tests since your last visit? Yes - Records Obtained, EKG, chest x-ray   Have you been seen in the emergency room and/or had an admission to a hospital since we last saw you? Yes - Records Obtained  Have you had your routine dental cleaning in the past 6 months? no    Have you activated your [employer] account? If not, what are your barriers? Yes     Patient Care Team:  Sarwat Pettit MD as PCP - General (Family Medicine)  Edgardo Pineda MD as Consulting Physician (Pulmonology)  Ashok Reynolds MD as Consulting Physician (Pain Management)    Medical History Review  Past Medical, Family, and Social History reviewed and does not contribute to the patient presenting condition    Health Maintenance   Topic Date Due    Diabetic retinal exam  Never done    Shingles vaccine (1 of 2) Never done    Hepatitis B vaccine (2 of 3 - 19+ 3-dose series) 03/11/2022    Respiratory Syncytial Virus (RSV) Pregnant or age 60 yrs+ (1 - Risk 60-74 years 1-dose series) Never done    COVID-19 Vaccine (3 - 2024-25 season) 09/01/2024    Lipids  12/28/2024    Depression Screen  10/03/2025    A1C test (Diabetic or Prediabetic)  12/20/2025    Diabetic foot exam  12/26/2025    Diabetic Alb to Cr ratio (uACR) test  12/26/2025    GFR test (Diabetes, CKD 3-4, OR last GFR 15-59)  03/11/2026    Breast cancer screen  02/12/2027    DTaP/Tdap/Td vaccine (3 - Td or Tdap) 12/26/2034    Colorectal Cancer Screen  02/05/2035    Flu vaccine  Completed    Pneumococcal 50+ years Vaccine  Completed    Hepatitis C screen  Completed    HIV screen  Completed

## 2025-04-01 ENCOUNTER — HOSPITAL ENCOUNTER (OUTPATIENT)
Age: 65
Setting detail: THERAPIES SERIES
Discharge: HOME | End: 2025-04-01

## 2025-04-01 NOTE — FLOWSHEET NOTE
[x] [facility]  Outpatient Rehabilitation &  Therapy  [address]  [phone]  [phone] [] [facility]  Outpatient Rehabilitation &  Therapy  [address]  P: [phone]  F: [phone] [] [facility]  Outpatient Rehabilitation &  Therapy  [address]  P: [phone]  F: [phone] [] [facility]  Outpatient Rehabilitation &  Therapy  [address]  [phone]  [phone] [] [facility]  Outpatient Rehabilitation &  Therapy  [address]   P: [phone]  F: [phone]  [] [facility]  Outpatient Rehabilitation &  Therapy  [address]  P: [phone]  F: [phone] [] [facility]  Outpatient Rehabilitation &  Therapy  [address]  P: [phone]  F: [phone] [] [facility]  Outpatient Rehabilitation &  Therapy  [address]  P: [phone]  F: [phone] [] [facility]  Outpatient Rehabilitation &  Therapy  [address]  P: [phone]  F: [phone]  [] [facility] Outpatient Rehabilitation &  Therapy  [address]  P: [phone]  F: [phone]     Therapy Cancel/No Show note    Date: 2025  Patient: Lizbeth Hoff  : 1960  MRN: [id number]    Cancels/No Shows to date:     For today's appointment patient:    [x]  Cancelled For Initial evaluation    [] Rescheduled appointment    [] No-show     Reason given by patient:    []  Patient ill    []  Conflicting appointment    [] No transportation      [] Conflict with work    [] No reason given    [] Weather related    [] COVID-19    [x] Other:      Comments:  Cx, dgtr having surgery, will call back to reschedule       [] Next appointment was confirmed    Electronically signed by: Jada Johnston PT

## 2025-04-07 DIAGNOSIS — M17.0 PRIMARY OSTEOARTHRITIS OF BOTH KNEES: ICD-10-CM

## 2025-04-07 DIAGNOSIS — I10 PRIMARY HYPERTENSION: ICD-10-CM

## 2025-04-07 DIAGNOSIS — J30.9 ALLERGIC RHINITIS, UNSPECIFIED SEASONALITY, UNSPECIFIED TRIGGER: ICD-10-CM

## 2025-04-07 DIAGNOSIS — E11.9 TYPE 2 DIABETES MELLITUS WITHOUT COMPLICATION, WITHOUT LONG-TERM CURRENT USE OF INSULIN: ICD-10-CM

## 2025-04-07 RX ORDER — FLUTICASONE PROPIONATE 50 MCG
SPRAY, SUSPENSION (ML) NASAL
Qty: 16 G | Refills: 0 | Status: SHIPPED | OUTPATIENT
Start: 2025-04-07

## 2025-04-07 RX ORDER — HYDROCHLOROTHIAZIDE 25 MG/1
25 TABLET ORAL EVERY MORNING
Qty: 90 TABLET | Refills: 1 | Status: SHIPPED | OUTPATIENT
Start: 2025-04-07

## 2025-04-07 RX ORDER — DICLOFENAC SODIUM 10 MG/G
GEL TOPICAL
Qty: 200 G | Refills: 2 | Status: SHIPPED | OUTPATIENT
Start: 2025-04-07

## 2025-04-07 RX ORDER — PANTOPRAZOLE SODIUM 40 MG/1
40 TABLET, DELAYED RELEASE ORAL
Qty: 30 TABLET | Refills: 5 | Status: SHIPPED | OUTPATIENT
Start: 2025-04-07

## 2025-04-07 RX ORDER — ATORVASTATIN CALCIUM 40 MG/1
40 TABLET, FILM COATED ORAL DAILY
Qty: 30 TABLET | Refills: 3 | Status: SHIPPED | OUTPATIENT
Start: 2025-04-07

## 2025-04-07 RX ORDER — LISINOPRIL 5 MG/1
2.5 TABLET ORAL DAILY
Qty: 15 TABLET | Refills: 2 | Status: SHIPPED | OUTPATIENT
Start: 2025-04-07

## 2025-04-07 NOTE — TELEPHONE ENCOUNTER
Last visit: 03/04/2025  Last Med refill:   Does patient have enough medication for 72 hours: No:     Next Visit Date:  Future Appointments   Date Time Provider Department Center   4/8/2025 10:00 AM Krishan Cruz, PT [facility]   4/18/2025 10:30 AM Sarwat Sosa MD [facility] DEP   6/26/2025  9:00 AM Sarwat Pettit MD [facility] DEP   7/15/2025  1:15 PM Maury Boateng MD [facility]       Health Maintenance   Topic Date Due    Diabetic retinal exam  Never done    Shingles vaccine (1 of 2) Never done    Hepatitis B vaccine (2 of 3 - 19+ 3-dose series) 03/11/2022    Respiratory Syncytial Virus (RSV) Pregnant or age 60 yrs+ (1 - Risk 60-74 years 1-dose series) Never done    COVID-19 Vaccine (3 - 2024-25 season) 09/01/2024    Lipids  12/28/2024    A1C test (Diabetic or Prediabetic)  12/20/2025    Diabetic foot exam  12/26/2025    Diabetic Alb to Cr ratio (uACR) test  12/26/2025    GFR test (Diabetes, CKD 3-4, OR last GFR 15-59)  03/11/2026    Depression Screen  03/13/2026    Breast cancer screen  02/12/2027    DTaP/Tdap/Td vaccine (3 - Td or Tdap) 12/26/2034    Colorectal Cancer Screen  02/05/2035    Flu vaccine  Completed    Pneumococcal 50+ years Vaccine  Completed    Hepatitis C screen  Completed    HIV screen  Completed    Hepatitis A vaccine  Aged Out    Hib vaccine  Aged Out    Polio vaccine  Aged Out    Meningococcal (ACWY) vaccine  Aged Out    Meningococcal B vaccine  Aged Out    Pneumococcal 0-49 years Vaccine  Discontinued       Hemoglobin A1C (%)   Date Value   12/20/2024 6.2 (H)   04/08/2024 6.4   12/08/2023 6.5             ( goal A1C is < 7)   No components found for: \"LABMICR\"  No components found for: \"LDLCHOLESTEROL\", \"LDLCALC\"    (goal LDL is <100)   AST (U/L)   Date Value   03/11/2025 26     ALT (U/L)   Date Value   03/11/2025 19     BUN (mg/dL)   Date Value   03/11/2025 17     BP Readings from Last 3 Encounters:   03/13/25 121/75   03/11/25 139/80

## 2025-04-08 ENCOUNTER — HOSPITAL ENCOUNTER (OUTPATIENT)
Age: 65
Setting detail: THERAPIES SERIES
Discharge: HOME | End: 2025-04-08
Payer: COMMERCIAL

## 2025-04-08 PROCEDURE — 97110 THERAPEUTIC EXERCISES: CPT

## 2025-04-08 PROCEDURE — 97161 PT EVAL LOW COMPLEX 20 MIN: CPT
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
## 2025-04-09 NOTE — PRE-CERTIFICATION NOTE
[x] [facility]  Outpatient Rehabilitation &  Therapy  [address]  [phone]  [phone] [] [facility]  Outpatient Rehabilitation &  Therapy  [address]  P: [phone]  F: [phone] [] [facility]  Outpatient Rehabilitation &  Therapy  [address]  P: [phone]  F: [phone] [] [facility]  Outpatient Rehabilitation &  Therapy  [address]  [phone]  [phone] [] [facility]  Outpatient Rehabilitation &  Therapy  [address]   P: [phone]  F: [phone]  [] [facility]  Outpatient Rehabilitation &  Therapy  [address]  P: [phone]  F: [phone] [] [facility]  Outpatient Rehabilitation &  Therapy  [address]  P: [phone]  F: [phone] [] [facility]  Outpatient Rehabilitation &  Therapy  [address]  P: [phone]  F: [phone] [] [facility]  Outpatient Rehabilitation &  Therapy  [address]  P: [phone]  F: [phone]  [] [facility] Outpatient Rehabilitation &  Therapy  [address]  P: [phone]  F: [phone]          Therapy Pre-certification Note      4/9/2025    Lizbeth Hoff  1960   [id number]      Insurance approval was received for Physical Therapy from [employer] on 04/09/2025.  Approval was received for 8 visits, from 4/8/2025 to 6/7/2025.  Authorization number [id number] (Therapy-PT) .    Patient is scheduled.      Electronically signed by Kathy Neves PT on 4/9/2025 at 5:03 AM

## 2025-04-11 ENCOUNTER — HOSPITAL ENCOUNTER (OUTPATIENT)
Age: 65
Setting detail: THERAPIES SERIES
Discharge: HOME | End: 2025-04-11
Payer: COMMERCIAL

## 2025-04-11 PROCEDURE — 97110 THERAPEUTIC EXERCISES: CPT

## 2025-04-11 NOTE — FLOWSHEET NOTE
[x] [facility]  Outpatient Rehabilitation &  Therapy  [address]  [phone]  [phone]     Physical Therapy Daily Treatment Note    Date:  2025  Patient Name:  Lizbeth Hoff    :  1960  MRN: [id number]  Physician: Sarwat Sosa MD                                   Insurance: [employer]SA - BASED ON MEDICAL NECESSITY/AUTH AFTER EVAL - PT/OT/ST IS BASED ON MEDICAL NECESSITY   Medical Diagnosis: M25.511, M25.512 (ICD-10-CM) - Bilateral shoulder pain, unspecified chronicity                          Rehab Codes: M25.511, M25.512, M25.611, M25.612, R26.89  Onset Date: 2025                                  Next 's appt.: 2025 - Orthopedics  Visit# / total visits: 2/8    Cancels/No Shows: 0/0    Subjective:    Pain:   [x] Yes  [] No Location: B shoulders  Pain Rating: (0-10 scale) 3/10  Pain altered Tx: [x] No  [] Yes  Action:  Comments: Pt reporting taking Tylenol this tx date prior to attending therapy. States level of pain in B shoulders has dec as a result. Pt reports being able to complete HEP at home.     Objective:  Modalities:   Precautions  [x] No  [] Yes:     Exercise       Reps/ Time Weight/ Level Comments Completed  25    UBE 2.5 mins ea  Forward and backward; Warm-up billed for taking subjective.  x   Seated          B ER  10x3 ea    With dowel - Underhanded .  x   B scapular retractions 10x3\"     x   Posterior shoulder rolls 10x ea     x   Upper trapezius stretch  3x30\" ea     x    Levator scapulae stretch  3x30\" ea     x    AAROM dowel flexion  10x2     x    Supine:           AAROM dowel flexion 10x       x   PROM of B shoulders  10x ea and B    Flexion/abduction/internal/external x    Soft tissue mobilization of B pec major  5 mins    In 90 deg abd x                                    Other:       Treatment Charges:     Mins Units   []  Modalities       [x] Ther Exercise 43 3   []  Neuromuscular Re-ed     []

## 2025-04-15 ENCOUNTER — APPOINTMENT (OUTPATIENT)
Age: 65
End: 2025-04-15
Payer: COMMERCIAL

## 2025-04-16 ENCOUNTER — OFFICE VISIT (OUTPATIENT)
Dept: ORTHOPEDIC SURGERY | Age: 65
End: 2025-04-16

## 2025-04-16 VITALS — BODY MASS INDEX: 47.48 KG/M2 | WEIGHT: 258 LBS | HEIGHT: 62 IN

## 2025-04-16 DIAGNOSIS — G89.29 CHRONIC PAIN OF BOTH KNEES: Primary | ICD-10-CM

## 2025-04-16 DIAGNOSIS — M25.561 CHRONIC PAIN OF BOTH KNEES: Primary | ICD-10-CM

## 2025-04-16 DIAGNOSIS — M25.562 CHRONIC PAIN OF BOTH KNEES: Primary | ICD-10-CM

## 2025-04-16 RX ORDER — METHYLPREDNISOLONE ACETATE 80 MG/ML
80 INJECTION, SUSPENSION INTRA-ARTICULAR; INTRALESIONAL; INTRAMUSCULAR; SOFT TISSUE ONCE
Status: COMPLETED | OUTPATIENT
Start: 2025-04-16 | End: 2025-04-16

## 2025-04-16 RX ORDER — BUPIVACAINE HYDROCHLORIDE 2.5 MG/ML
2 INJECTION, SOLUTION INFILTRATION; PERINEURAL ONCE
Status: COMPLETED | OUTPATIENT
Start: 2025-04-16 | End: 2025-04-16

## 2025-04-16 RX ADMIN — METHYLPREDNISOLONE ACETATE 80 MG: 80 INJECTION, SUSPENSION INTRA-ARTICULAR; INTRALESIONAL; INTRAMUSCULAR; SOFT TISSUE at 16:17

## 2025-04-16 RX ADMIN — BUPIVACAINE HYDROCHLORIDE 5 MG: 2.5 INJECTION, SOLUTION INFILTRATION; PERINEURAL at 16:16

## 2025-04-16 NOTE — PROGRESS NOTES
[facility] ORTHO [facility]  [address]  [place] [place] [facility] [address]  Dept: [phone]  Dept Fax: [phone]        Ambulatory   Follow Up    Subjective:   Lizbeth Hoff is a 64 year old female who presents to our office today for evaluation of her bilateral knee pain.  Patient has had ongoing knee pain due to her osteoarthritis for quite some time.  She has previously seen Dr. Hu and Dr. Villa in the past and received corticosteroid and viscous injections in the past.  Her last corticosteroid injection was on 1/22/2025 in our clinic.  She stated that this gave her about 2 months of relief.  Patient was originally supposed to have repeat Synvisc injections however the claim was denied by her insurance.  I discussed with patient that we will need to resubmit for approval before we are able to repeat her Synvisc injections.  Patient continues to have similar pain over the medial aspect of her bilateral knees however she denies any new injury, trauma or falls.  Her pain is fairly consistent with her prior complaints.  Patient's BMI has decreased from her previous visit at 49 to now 47.  Patient has been active with physical therapy and feels that this has given her some improvement in her symptoms.    Review of Systems   Constitutional: Negative for Fever and Chills.   HENT: Negative for Congestion.    Eyes: Negative for Blurred Vision and Double Vision.   Respiratory: Negative for Cough, Shortness of Breath and Wheezing.    Cardiovascular: Negative for Chest Pain and Palpitations.   Gastrointestinal: Negative for Nausea. Negative for Vomiting.   Musculoskeletal: Positive for Myalgias and Joint Pain (bilateral knee pain).   Skin: Negative for Itching and Rash.   Neurological: Negative for Dizziness, Sensory Change and Headaches.   Psychiatric/Behavioral: Negative for Depression and Suicidal Ideas.     Objective:   General: AAOx3, NAD.  Ortho

## 2025-04-18 ENCOUNTER — OFFICE VISIT (OUTPATIENT)
Dept: FAMILY MEDICINE CLINIC | Age: 65
End: 2025-04-18
Payer: COMMERCIAL

## 2025-04-18 ENCOUNTER — HOSPITAL ENCOUNTER (OUTPATIENT)
Age: 65
Setting detail: SPECIMEN
Discharge: HOME | End: 2025-04-18

## 2025-04-18 ENCOUNTER — HOSPITAL ENCOUNTER (OUTPATIENT)
Age: 65
Setting detail: THERAPIES SERIES
Discharge: HOME | End: 2025-04-18
Payer: COMMERCIAL

## 2025-04-18 VITALS
WEIGHT: 260.8 LBS | DIASTOLIC BLOOD PRESSURE: 79 MMHG | HEIGHT: 62 IN | SYSTOLIC BLOOD PRESSURE: 127 MMHG | HEART RATE: 73 BPM | BODY MASS INDEX: 47.99 KG/M2

## 2025-04-18 DIAGNOSIS — E11.9 TYPE 2 DIABETES MELLITUS WITHOUT COMPLICATION, WITHOUT LONG-TERM CURRENT USE OF INSULIN: Primary | ICD-10-CM

## 2025-04-18 DIAGNOSIS — K58.2 IRRITABLE BOWEL SYNDROME WITH BOTH CONSTIPATION AND DIARRHEA: ICD-10-CM

## 2025-04-18 DIAGNOSIS — Z13.220 SCREENING FOR HYPERLIPIDEMIA: ICD-10-CM

## 2025-04-18 LAB
CHOLEST SERPL-MCNC: 152 MG/DL (ref 0–199)
CHOLESTEROL/HDL RATIO: 3.4
HBA1C MFR BLD: 6.4 %
HDLC SERPL-MCNC: 45 MG/DL
LDLC SERPL CALC-MCNC: 92 MG/DL (ref 0–100)
TRIGL SERPL-MCNC: 74 MG/DL
VLDLC SERPL CALC-MCNC: 15 MG/DL (ref 1–30)

## 2025-04-18 PROCEDURE — 99213 OFFICE O/P EST LOW 20 MIN: CPT

## 2025-04-18 PROCEDURE — 3078F DIAST BP <80 MM HG: CPT

## 2025-04-18 PROCEDURE — 3074F SYST BP LT 130 MM HG: CPT

## 2025-04-18 PROCEDURE — 3044F HG A1C LEVEL LT 7.0%: CPT

## 2025-04-18 PROCEDURE — 97110 THERAPEUTIC EXERCISES: CPT

## 2025-04-18 PROCEDURE — 83036 HEMOGLOBIN GLYCOSYLATED A1C: CPT

## 2025-04-18 ASSESSMENT — ENCOUNTER SYMPTOMS
ABDOMINAL DISTENTION: 0
NAUSEA: 0
SHORTNESS OF BREATH: 0
BLOOD IN STOOL: 0
ABDOMINAL PAIN: 1
BACK PAIN: 0
VOMITING: 0
CHEST TIGHTNESS: 0
CONSTIPATION: 1

## 2025-04-18 NOTE — PROGRESS NOTES
Diabetic visit information    BP Readings from Last 3 Encounters:   03/13/25 121/75   03/11/25 139/80   03/07/25 136/77       Hemoglobin A1C (%)   Date Value   12/20/2024 6.2 (H)   04/08/2024 6.4   12/08/2023 6.5               Have you changed or started any medications since your last visit including any over-the-counter medicines, vitamins, or herbal medicines? no   Have you stopped taking any of your medications? Is so, why? -  no  Are you having any side effects from any of your medications? - no    Have you seen any other physician or provider since your last visit?  yes - Ortho, PT   Have you had any other diagnostic tests since your last visit?  no   Have you been seen in the emergency room and/or had an admission in a hospital since we last saw you?  no     Have you had your annual diabetic retinal (eye) exam? No   (ensure copy of exam is in the chart)    Have you had your routine dental cleaning in the past 6 months? no    Do you have an active MyChart account?  If not, what are your barriers?  Yes    Patient Care Team:  Sarwat Pettit MD as PCP - General (Family Medicine)  Edgardo Pienda MD as Consulting Physician (Pulmonology)  Ashok Reynolds MD as Consulting Physician (Pain Management)    Medical history Review  Past Medical, Family, and Social History reviewed and does contribute to the patient presenting condition.    Health Maintenance   Topic Date Due    Diabetic retinal exam  Never done    Shingles vaccine (1 of 2) Never done    Hepatitis B vaccine (2 of 3 - 19+ 3-dose series) 03/11/2022    Respiratory Syncytial Virus (RSV) Pregnant or age 60 yrs+ (1 - Risk 60-74 years 1-dose series) Never done    COVID-19 Vaccine (3 - 2024-25 season) 09/01/2024    Lipids  12/28/2024    A1C test (Diabetic or Prediabetic)  12/20/2025    Diabetic foot exam  12/26/2025    Diabetic Alb to Cr ratio (uACR) test  12/26/2025    GFR test (Diabetes, CKD 3-4, OR last GFR 15-59)  03/11/2026    Depression Screen  03/13/2026

## 2025-04-18 NOTE — PROGRESS NOTES
Attending Physician Statement  I  have discussed the care of Lizbeth Hoff including pertinent history and exam findings with the resident. I agree with the assessment, plan and orders as documented by the resident.      /79 (BP Site: Right Upper Arm, Patient Position: Sitting, BP Cuff Size: Large Adult)   Pulse 73   Ht 1.575 m (5' 2.01\")   Wt 118.3 kg (260 lb 12.8 oz)   BMI 47.69 kg/m²    BP Readings from Last 3 Encounters:   04/18/25 127/79   03/13/25 121/75   03/11/25 139/80     Wt Readings from Last 3 Encounters:   04/18/25 118.3 kg (260 lb 12.8 oz)   04/16/25 117 kg (258 lb)   03/13/25 117.3 kg (258 lb 9.6 oz)          Diagnosis Orders   1. Type 2 diabetes mellitus without complication, without long-term current use of insulin  POCT glycosylated hemoglobin (Hb A1C)      2. Irritable bowel syndrome with both constipation and diarrhea  linaclotide (LINZESS) 145 MCG capsule      3. Screening for hyperlipidemia  Lipid Panel              Grayson Travis DO 4/18/2025 3:25 PM

## 2025-04-18 NOTE — PROGRESS NOTES
Subjective:    Lizbeth Hoff is a 64 y.o. female with  has a past medical history of Diverticulitis, Former smoker, Former smoker, GERD (gastroesophageal reflux disease), Hyperlipidemia, Hypertension, Morbid obesity with BMI of 45.0-49.9, adult, SINDI on CPAP, Sleep apnea, and Type II or unspecified type diabetes mellitus without mention of complication, not stated as uncontrolled.    Presented to the office today for:  Chief Complaint   Patient presents with    Diabetes     Follow up on DM     Anxiety     Follow up        Diabetes  Pertinent negatives for hypoglycemia include no headaches or nervousness/anxiousness. Pertinent negatives for diabetes include no chest pain, no fatigue and no weakness.   Anxiety  Patient reports no chest pain, nausea, nervous/anxious behavior, palpitations or shortness of breath.         Present for follow-up on diabetes  A1c 6.4 patient continues to take metformin with no side effects or any symptoms.    Patient is still concerned about chronic lower abdominal discomfort associated with constipation sometimes, patient has history of IBS with constipation diarrhea, had colonoscopy recently with only remarkable results for diverticulosis.  Does endorse dysuria sometimes but is not consistent and does not have it today.  Denies any vaginal discharge or pain or symptoms, patient had hysterectomy previously.      Review of Systems   Constitutional:  Negative for chills, fatigue and fever.   Respiratory:  Negative for chest tightness and shortness of breath.    Cardiovascular:  Negative for chest pain, palpitations and leg swelling.   Gastrointestinal:  Positive for abdominal pain and constipation. Negative for abdominal distention, blood in stool, nausea and vomiting.   Genitourinary:  Negative for dysuria, flank pain, frequency and vaginal discharge.   Musculoskeletal:  Negative for back pain.   Neurological:  Negative for weakness and headaches.   Psychiatric/Behavioral:  Negative for

## 2025-04-18 NOTE — FLOWSHEET NOTE
[]      5. Independent with Home Exercise Programs. []  []  []                  Date Addressed:            LTG: To be met in 16 treatments           1. Improve score on assessment tool UEFI from 50% impairment to less than 25% impairment.  []  []  []      2. Reduce pain levels to 1/10 or less in B shoulders and B elbows with reaching overhead, lifting objects, and dressing. []  []  []      3. Pt to /grab a cone off shelf at least 120 degrees overhead and then place it back IND for ease of lifting objects, dressing, and reaching overhead.  []  []  []                       Pt. Education:  [x] Yes  [] No  [x] Reviewed Prior HEP/Ed - Education on inc blood flow with soft tissue mobilization. Education given on ROM activities to inc mobility.   Method of Education: [] Verbal  [] Demo  [] Written  Comprehension of Education:  [x] Verbalizes understanding.  [x] Demonstrates understanding.  [] Needs review.  [x] Demonstrates/verbalizes HEP/Ed previously given.     Plan: [x] Continue current frequency toward long and short term goals.    [x] Specific Instructions for subsequent treatments: Improve ROM of B shoulders and stretching of periscapular musculature.       Time In: 807 am            Time Out: 855 am     Electronically signed by:  Tabitha Martin PTA

## 2025-04-18 NOTE — PATIENT INSTRUCTIONS
Thank you for letting us take care of you today. We hope all your questions were addressed. If a question was overlooked or something else comes to mind after you return home, please contact a member of your Care Team listed below.      Your Care Team at [facility] is Team #1  Teresa Mcallister M.D. (Faculty)  Sarwat Sosa M.D. (Resident)  Junior Rizvi D.O. (Resident)  Arie Hooks M.D. (Resident)  Ray Trivedi M.D. (Resident)  Nereyda Canada, [facility]  Carlton Tee, [facility]  Alisha Carpenter, [facility]  Karin Pickens, [facility]  Luciana Carlson, [facility]  Bekah Sharp, [facility]  Tobin Dupont (LJ) Niko,   Sindhu Ascencio [facility] (Clinical Pharmacist)     Office phone number: [phone]    If you need to get in right away due to illness, please be advised we have \"Same Day\" appointments available Monday-Friday. Please call us at [phone] option #3 to schedule your \"Same Day\" appointment.

## 2025-04-22 ENCOUNTER — HOSPITAL ENCOUNTER (OUTPATIENT)
Age: 65
Setting detail: THERAPIES SERIES
Discharge: HOME | End: 2025-04-22
Payer: COMMERCIAL

## 2025-04-22 NOTE — FLOWSHEET NOTE
[x] [facility]  Outpatient Rehabilitation &  Therapy  [address]  [phone]  [phone]     Therapy Cancel/No Show note    Date: 2025  Patient: Lizbeth Hoff  : 1960  MRN: [id number]    Cancels/No Shows to date:     For today's appointment patient:    [x]  Cancelled    [] Rescheduled appointment    [] No-show     Reason given by patient:    [x]  Patient ill - called stating stomach bug    []  Conflicting appointment    [] No transportation      [] Conflict with work    [] No reason given    [] Weather related    [] COVID-19    [] Other:      Comments:        [x] Next appointment was confirmed    Electronically signed by: Tabitha Martin, PTA

## 2025-04-25 ENCOUNTER — HOSPITAL ENCOUNTER (OUTPATIENT)
Age: 65
Setting detail: THERAPIES SERIES
Discharge: HOME | End: 2025-04-25
Payer: COMMERCIAL

## 2025-04-25 PROCEDURE — 97110 THERAPEUTIC EXERCISES: CPT

## 2025-04-25 PROCEDURE — 97161 PT EVAL LOW COMPLEX 20 MIN: CPT

## 2025-04-25 NOTE — THERAPY EVALUATION
[x] [facility]  Outpatient Rehabilitation &  Therapy  [address]  [phone]  F: [phone]     Physical Therapy Lower Extremity Evaluation    Date:  2025  Patient: Lizbeth Hoff  : 1960  MRN: [id number]  Physician: Scott Brock MD       Insurance: [place] [employer] [facility] - BASED ON MEDICAL NECESSITY/AUTH AFTER EVAL - PT/OT/ST IS BASED ON MEDICAL NECESSITY   Medical Diagnosis: M25.561, M25.562, G89.29 (ICD-10-CM) - Chronic pain of both knees     Rehab Codes: M25.661, M25.662, M25.551,M25.552, M62.81, Z91.81  Onset date: 2025      Next 's appt.: 2025 - Family Medicine    Subjective:   CC/HPI: Patient is a 64 year old female who presents to outpatient physical therapy with c/o pain in B knees. Pt presenting to clinic with cane on the L side during ambulation. Reporting insidious onset of pain in B knees in \"2021\". States there was no mechanism of injury that started the pain in B knees. Reports having episodes of instability in B knees. No falls \"recently\", but has fallen in the past. States she has been to physical therapy \"maybe a year after\" the pain developed in B knees. Reporting physical therapy helped with improving her level of function and manage pain. Pt reporting getting a Cortizone shot 2 weeks ago in B knees, which also helped with lowering the pain in B knees. Pt coming to therapy with referral for B chronic knee pain.       PMHx: [x] Refer to full medical chart in [facility]   [] Unremarkable [x] Diabetes [x] HTN  [] Pacemaker   [] MI/Heart Problems [] Cancer [] Arthritis   [x] Other:    Past Medical History:   Diagnosis Date    Diverticulitis     2 times    Former smoker     Former smoker     GERD (gastroesophageal reflux disease)     Hyperlipidemia     Hypertension     Morbid obesity with BMI of 45.0-49.9, adult (HCC)     SINDI on CPAP     Sleep apnea     SINDI on CPAP    Type II or unspecified type diabetes mellitus without mention of

## 2025-04-28 NOTE — PRE-CERTIFICATION NOTE
[x] [facility]  Outpatient Rehabilitation &  Therapy  [address]  [phone]  F: [phone] [] [facility]  Outpatient Rehabilitation &  Therapy  [address]  P: [phone]  F: [phone] [] [facility]  Outpatient Rehabilitation &  Therapy  [address]  P: [phone]  F: [phone] [] [facility]  Outpatient Rehabilitation &  Therapy  [address]  P: [phone]  F: [phone] [] [facility]  Outpatient Rehabilitation &  Therapy  [address]   P: [phone]  F: [phone]  [] [facility]  Outpatient Rehabilitation &  Therapy  [address]   P: [phone]  F: [phone] [] [facility]  Outpatient Rehabilitation &  Therapy  [address]  P: [phone]  F: [phone] [] [facility]  Outpatient Rehabilitation &  Therapy  [address]  P: [phone]  F: [phone] [] [facility]  Outpatient Rehabilitation &  Therapy  [address]  P: [phone]  F: [phone]           Lizbeth Hoff   1960   [id number]    4/28/2025    Attempted to get auth for new dx - per [email or web address], no new auth is needed.        Electronically signed by Kathy Neves PT on 4/28/2025 at 9:29 AM

## 2025-04-29 ENCOUNTER — HOSPITAL ENCOUNTER (OUTPATIENT)
Age: 65
Setting detail: THERAPIES SERIES
Discharge: HOME | End: 2025-04-29
Payer: COMMERCIAL

## 2025-04-29 PROCEDURE — 97110 THERAPEUTIC EXERCISES: CPT

## 2025-04-29 NOTE — FLOWSHEET NOTE
IND with a 2/10 level of pain or less in B knees for ease of ambulation and going up and downs stairs []  []  []                     Patient goals: \"Strengthen knees and keep blood flow\"    Pt. Education:  [x] Yes  [] No  [x] Reviewed Prior HEP/Ed  Method of Education: [x] Verbal  [x] Demo  [] Written  Comprehension of Education:  [x] Verbalizes understanding.  [] Demonstrates understanding.  [x] Needs review.  [] Demonstrates/verbalizes HEP/Ed previously given.   Access Code: [email or web address]  Date: 04/25/2025  Prepared by: Krishan Cruz     Exercises  - Standing Hip Extension with Resistance at Ankles and Counter Support  - 1 x daily - 7 x weekly - 3 sets - 15 reps  - Seated Hip Abduction with Resistance  - 1 x daily - 7 x weekly - 3 sets - 15 reps  - Seated Heel Slide  - 1 x daily - 7 x weekly - 3 sets - 10 reps  - Seated Hamstring Curls with Resistance  - 1 x daily - 7 x weekly - 3 sets - 10 reps       Plan: [x] Continue current frequency toward long and short term goals.     [x] Frequency:  2 x/week for 16 visits   [x] Specific Instructions for subsequent treatments:  Improve strength of B hips and knees. Focus on Hip abduction B for hips and B knees         Time In: 1021              Time Out: 1109      Electronically signed by:  Kai Pena PTA

## 2025-04-29 NOTE — THERAPY DISCHARGE
[x] [facility]  Outpatient Rehabilitation &  Therapy  [address]  [phone]  F: [phone]      Physical Therapy Discharge Note    Date: 2025      Patient: Lizbeth Hoff  : 1960  MRN: [id number]  Physician: Sarwat Sosa MD                                   Insurance: [employer] Bronze HSA - BASED ON MEDICAL NECESSITY/    Approval was received for 8 visits, from 2025 to 2025.  Authorization number [id number] (Therapy-PT)  PT/OT/ST IS BASED ON MEDICAL NECESSITY   Medical Diagnosis: M25.511, M25.512 (ICD-10-CM) - Bilateral shoulder pain, unspecified chronicity                          Rehab Codes: M25.511, M25.512, M25.611, M25.612, R26.89  Onset Date: 2025                                  Next 's appt.: 2025 - Orthopedics  Visit# / total visits: 3/8                        Cancels/No Shows: 0  Date of initial visit: 2025                Date of final visit: 2025      Subjective:  Refer to most recent note in Epic.    Objective:  Refer to most recent note in Epic.    Assessment:  Refer to most recent note in Epic.    Treatment to Date:  Refer to most recent note in Epic.     Discharge Status:     [] Pt to continue exercise/home instructions independently.    [x] Therapy interrupted due to: Pt request to no longer be seen for her shoulders. She wishes only to be seen for her shoulders.     [] Pt has 2 or more no shows/cancels, is discontinued per our policy.    [] Unknown discharge/failed to continue treatment.    [x] Other: Pt request to no longer be seen for her shoulders. She wishes only to be seen for her shoulders.         Electronically signed by Krishan Cruz PT on 2025 at 1:43 PM      If you have any questions or concerns, please don't hesitate to call.  Thank you for your referral.

## 2025-05-02 ENCOUNTER — HOSPITAL ENCOUNTER (OUTPATIENT)
Age: 65
Setting detail: THERAPIES SERIES
Discharge: HOME | End: 2025-05-02
Payer: COMMERCIAL

## 2025-05-02 PROCEDURE — 97110 THERAPEUTIC EXERCISES: CPT

## 2025-05-02 NOTE — FLOWSHEET NOTE
1. Improve score on assessment tool KOOS JR from 35 impairment to less than 15% impairment.  []  []  []      2. Reduce pain levels to 1/10 or less in B knees for ease of: Ambulation long distances, going up and down stairs, and prolonged standing.   []  []  []      3. Pt able to perform 12 step ups on 8 inch step IND with a 2/10 level of pain or less in B knees for ease of ambulation and going up and downs stairs []  []  []                     Patient goals: \"Strengthen knees and keep blood flow\"    Pt. Education:  [x] Yes  [] No  [x] Reviewed Prior HEP/Ed  Method of Education: [x] Verbal  [x] Demo  [] Written  Comprehension of Education:  [x] Verbalizes understanding.  [] Demonstrates understanding.  [x] Needs review.  [] Demonstrates/verbalizes HEP/Ed previously given.   Access Code: [email or web address]  Date: 04/25/2025  Prepared by: Krishan Cruz     Exercises  - Standing Hip Extension with Resistance at Ankles and Counter Support  - 1 x daily - 7 x weekly - 3 sets - 15 reps  - Seated Hip Abduction with Resistance  - 1 x daily - 7 x weekly - 3 sets - 15 reps  - Seated Heel Slide  - 1 x daily - 7 x weekly - 3 sets - 10 reps  - Seated Hamstring Curls with Resistance  - 1 x daily - 7 x weekly - 3 sets - 10 reps       Plan: [x] Continue current frequency toward long and short term goals.     [x] Frequency:  2 x/week for 16 visits   [x] Specific Instructions for subsequent treatments:  Improve strength of B hips and knees. Focus on Hip abduction B for hips and B knees         Time In: 1120             Time Out: 1242      Electronically signed by:  Krishan Cruz, PT

## 2025-05-05 DIAGNOSIS — J30.9 ALLERGIC RHINITIS, UNSPECIFIED SEASONALITY, UNSPECIFIED TRIGGER: ICD-10-CM

## 2025-05-05 NOTE — TELEPHONE ENCOUNTER
Last visit: 4.18.25  Last Med refill: 4.8.25  Does patient have enough medication for 72 hours: Yes    Next Visit Date:  Future Appointments   Date Time Provider Department Center   5/6/2025 11:00 AM Krishan Cruz, PT [facility]   5/9/2025 11:00 AM Krishan Cruz, PT [facility]   5/22/2025 10:00 AM Sarwat Pettit MD [facility]   6/26/2025  9:00 AM Sarwat Pettit MD [facility]   7/15/2025  1:15 PM Maury Boateng MD [facility]       Health Maintenance   Topic Date Due    Diabetic retinal exam  Never done    Shingles vaccine (1 of 2) Never done    Hepatitis B vaccine (2 of 3 - 19+ 3-dose series) 03/11/2022    Respiratory Syncytial Virus (RSV) Pregnant or age 60 yrs+ (1 - Risk 60-74 years 1-dose series) Never done    COVID-19 Vaccine (3 - 2024-25 season) 09/01/2024    Diabetic foot exam  12/26/2025    Diabetic Alb to Cr ratio (uACR) test  12/26/2025    GFR test (Diabetes, CKD 3-4, OR last GFR 15-59)  03/11/2026    Depression Screen  03/13/2026    A1C test (Diabetic or Prediabetic)  04/18/2026    Lipids  04/18/2026    Breast cancer screen  02/12/2027    DTaP/Tdap/Td vaccine (3 - Td or Tdap) 12/26/2034    Colorectal Cancer Screen  02/05/2035    Flu vaccine  Completed    Pneumococcal 50+ years Vaccine  Completed    Hepatitis C screen  Completed    HIV screen  Completed    Hepatitis A vaccine  Aged Out    Hib vaccine  Aged Out    Polio vaccine  Aged Out    Meningococcal (ACWY) vaccine  Aged Out    Meningococcal B vaccine  Aged Out    Pneumococcal 0-49 years Vaccine  Discontinued       Hemoglobin A1C (%)   Date Value   04/18/2025 6.4   12/20/2024 6.2 (H)   04/08/2024 6.4             ( goal A1C is < 7)   No components found for: \"LABMICR\"  No components found for: \"LDLCHOLESTEROL\", \"LDLCALC\"    (goal LDL is <100)   AST (U/L)   Date Value   03/11/2025 26     ALT (U/L)   Date Value   03/11/2025 19     BUN (mg/dL)   Date Value   03/11/2025 17     BP Readings

## 2025-05-06 ENCOUNTER — TELEPHONE (OUTPATIENT)
Dept: ORTHOPEDIC SURGERY | Age: 65
End: 2025-05-06

## 2025-05-06 ENCOUNTER — HOSPITAL ENCOUNTER (OUTPATIENT)
Age: 65
Setting detail: THERAPIES SERIES
Discharge: HOME | End: 2025-05-06
Payer: COMMERCIAL

## 2025-05-06 NOTE — FLOWSHEET NOTE
[x] [facility]  Outpatient Rehabilitation &  Therapy  [address]  [phone]  [phone]     Therapy Cancel/No Show note    Date: 2025  Patient: Lizbeth Hoff  : 1960  MRN: [id number]    Cancels/No Shows to date: 20    For today's appointment patient:    [x]  Cancelled    [] Rescheduled appointment    [] No-show     Reason given by patient:    []  Patient ill    []  Conflicting appointment    [] No transportation      [] Conflict with work    [] No reason given    [] Weather related    [] COVID-19    [x] Other:      Comments:  cx  - pt left VM leg is in pain; doesn't feel like doing exs today. attempted to call patient back to conf next appt - no answer and VM not set up       [x] Next appointment was confirmed    Electronically signed by: Krishan Cruz, PT

## 2025-05-06 NOTE — TELEPHONE ENCOUNTER
Returned call to patient and informed her that her insurance company denied the gel injections, office has submitted paperwork. Informed patient to contact her insurance company for further information/explanation of denial. Patient verbalized understanding

## 2025-05-06 NOTE — TELEPHONE ENCOUNTER
Patient requesting knee injections but she isn't sure which one she needs (has received both gel and cortisone in the past) Also, not sure if she needs prior auth for insurance/if insurance will cover the injection.     Please call patient @[phone]

## 2025-05-07 RX ORDER — FLUTICASONE PROPIONATE 50 MCG
SPRAY, SUSPENSION (ML) NASAL
Qty: 16 G | Refills: 0 | Status: SHIPPED | OUTPATIENT
Start: 2025-05-07

## 2025-05-09 ENCOUNTER — APPOINTMENT (OUTPATIENT)
Age: 65
End: 2025-05-09
Payer: COMMERCIAL

## 2025-05-12 NOTE — FLOWSHEET NOTE
[x] [facility]  Outpatient Rehabilitation &  Therapy  [address]  [phone]  F: [phone] [] [facility]  Outpatient Rehabilitation &  Therapy  [address]  P: [phone]  F: [phone] [] [facility]  Outpatient Rehabilitation &  Therapy  [address]  P: [phone]  F: [phone] [] [facility]  Outpatient Rehabilitation &  Therapy  [address]  P: [phone]  F: [phone] [] [facility]  Outpatient Rehabilitation &  Therapy  [address]   [address]   P: [phone]  F: [phone]  [] [facility]  Outpatient Rehabilitation &  Therapy  [address]   P: [phone]  F: [phone] [] [facility]  Outpatient Rehabilitation &  Therapy  [address]  P: [phone]  F: [phone] [] [facility]  Outpatient Rehabilitation &  Therapy  [address]  P: [phone]  F: [phone] [] [facility]  Outpatient Rehabilitation &  Therapy  [address]  P: [phone]  F: [phone]           Lizbeth Hoff   1960   [id number]    5/12/2025    Left message that clinic is cx'ing appt on 5/15/2025 and to call back to [facility].    Electronically signed by Kathy Neves PT on 5/12/2025 at 9:06 AM

## 2025-05-14 ENCOUNTER — TELEPHONE (OUTPATIENT)
Dept: ORTHOPEDIC SURGERY | Age: 65
End: 2025-05-14

## 2025-05-14 NOTE — TELEPHONE ENCOUNTER
Gel inj denied by insurance and patient calling to ask if provider can prescribe a different medication. Provider has to call this phone number: [phone]    Ins doesn't cover the one medication that she has been using.     Ref ID:  [id number]    Please call patient if any questions: [phone]

## 2025-05-15 ENCOUNTER — HOSPITAL ENCOUNTER (OUTPATIENT)
Age: 65
Setting detail: THERAPIES SERIES
Discharge: HOME | End: 2025-05-15
Payer: COMMERCIAL

## 2025-05-15 NOTE — TELEPHONE ENCOUNTER
Spoke with patient and informed office would contact insurance for update. Writer spoke with Armond VAUGHAN at specialty pharmacy and she stated that patient prior auth needed to be ran through her medical and faxed over form . Form filled  out and faxed

## 2025-05-15 NOTE — TELEPHONE ENCOUNTER
Received call from patient requesting status update of other gel injection medication being called into the pharmacy. Pt stated she talked to someone yesterday who told her there is a different gel injection that Dr. Simon can call in for her since the regular one wasn't approved.    Pt also requesting something for the pain since gel injections have been denied. Stated cream is not doing anything to help her knees. Please call into:[facility]    Please advise.    Call back #: [phone]

## 2025-05-18 PROBLEM — Z13.220 SCREENING FOR HYPERLIPIDEMIA: Status: RESOLVED | Noted: 2025-04-18 | Resolved: 2025-05-18

## 2025-05-22 ENCOUNTER — HOSPITAL ENCOUNTER (OUTPATIENT)
Age: 65
Setting detail: SPECIMEN
Discharge: HOME | End: 2025-05-22

## 2025-05-22 ENCOUNTER — OFFICE VISIT (OUTPATIENT)
Age: 65
End: 2025-05-22
Payer: COMMERCIAL

## 2025-05-22 ENCOUNTER — TELEPHONE (OUTPATIENT)
Age: 65
End: 2025-05-22

## 2025-05-22 VITALS
BODY MASS INDEX: 48.47 KG/M2 | HEIGHT: 62 IN | SYSTOLIC BLOOD PRESSURE: 125 MMHG | WEIGHT: 263.4 LBS | DIASTOLIC BLOOD PRESSURE: 85 MMHG | HEART RATE: 114 BPM

## 2025-05-22 DIAGNOSIS — R00.0 TACHYCARDIA: ICD-10-CM

## 2025-05-22 DIAGNOSIS — M17.0 PRIMARY OSTEOARTHRITIS OF BOTH KNEES: Primary | ICD-10-CM

## 2025-05-22 LAB — TSH SERPL DL<=0.05 MIU/L-ACNC: 1.67 UIU/ML (ref 0.27–4.2)

## 2025-05-22 PROCEDURE — 3074F SYST BP LT 130 MM HG: CPT

## 2025-05-22 PROCEDURE — 99213 OFFICE O/P EST LOW 20 MIN: CPT

## 2025-05-22 PROCEDURE — 3079F DIAST BP 80-89 MM HG: CPT

## 2025-05-22 RX ORDER — CELECOXIB 100 MG/1
100 CAPSULE ORAL 3 TIMES DAILY PRN
Qty: 90 CAPSULE | Refills: 0 | Status: SHIPPED | OUTPATIENT
Start: 2025-05-22

## 2025-05-22 NOTE — PROGRESS NOTES
Attending Physician Statement  I have discussed the care of ValeteenYoungincluding pertinent history and exam findings,  with the resident. I have reviewed the key elements of all parts of the encounter with the resident.  I agree with the assessment, plan and orders as documented by the resident.  (GE Modifier)    IBD- doing well with Linzess  Bilat Knee DJD- Celebrex

## 2025-05-22 NOTE — PROGRESS NOTES
[facility] - Outpatient Note      Subjective:    Lizbeth Hoff is a 64 y.o. female with  has a past medical history of Diverticulitis, Former smoker, Former smoker, GERD (gastroesophageal reflux disease), Hyperlipidemia, Hypertension, Morbid obesity with BMI of 45.0-49.9, adult (HCC), SINDI on CPAP, Sleep apnea, and Type II or unspecified type diabetes mellitus without mention of complication, not stated as uncontrolled.    Presented to the office today for:  Chief Complaint   Patient presents with    Pain     Return in about 4 weeks (around 5/16/2025) for with pcp for abd pain .       HPI  64-year-old female presented office today with complaints of bilateral knee pain, patient has mild knee osteoarthritis evident on recent x-rays done, patient uses ibuprofen and Tylenol, patient kidney numbers 1.1 on recent BMP, advised patient about judicious use of NSAID, ordered celecoxib for her, also ordered Voltaren gel for her.  Discussed steroid injection with patient as well patient has an orthopedic appointment, and working with physical therapy as well.  Will follow-up patient  During this encounter patient pulse 114, patient denies any chest pain, denies any palpitation, EKG ordered.  With patient discussed that we can check patient TSH.  Patient agreeable however she denies any heat intolerance, no unintentional weight loss as well    The patient has a   Family History   Problem Relation Age of Onset    High Blood Pressure Mother     Diabetes Mother     Cancer Father     Breast Cancer Maternal Aunt 60       Objective:    /85 (BP Site: Right Upper Arm, Patient Position: Sitting, BP Cuff Size: Medium Adult)   Pulse (!) 114   Ht 1.575 m (5' 2\")   Wt 119.5 kg (263 lb 6.4 oz)   BMI 48.18 kg/m²    BP Readings from Last 3 Encounters:   05/22/25 125/85   04/18/25 127/79   03/13/25 121/75       Physical Exam  Constitutional:       Appearance: She is obese.

## 2025-05-22 NOTE — PATIENT INSTRUCTIONS
Thank you for letting us take care of you today. We hope all your questions were addressed. If a question was overlooked or something else comes to mind after you return home, please contact a member of your Care Team listed below.      Your Care Team at [facility] is Team #  Hugo Bello M.D. (Faculty)  Tejal Aldana M.D. (Resident)  Sarwat Pettit M.D. (Resident)   Chelsie Baum M.D. (Resident)  Ricky Chavez M.D. (Resident)  Alma Wren M.D. (Resident)  Alisha Carpenter., [facility], [facility]  Nereyda Canada, [facility], [facility]  Bekah Sharp, [facility]  Tobin Tee, [facility]  Karin Pickens, [facility]  Luciana Carlson, [facility], [facility]  Carlton Dupont (LJ) RUBI Pope (Clinical Practice Manager)  Sindhu Ascencio [facility] (Clinical Pharmacist)     Office phone number: [phone]    If you need to get in right away due to illness, please be advised we have \"Same Day\" appointments available Monday-Friday. Please call us at [phone] option #3 to schedule your \"Same Day\" appointment.

## 2025-05-22 NOTE — TELEPHONE ENCOUNTER
Patient called office in regards to appt and to cancel. Patient stated she is having a lot of knee pain which is effecting her ability to walk. Writer informed could cancel but if experiencing discomfort maybe wise to have provider to evaluate. Patient stated she is going to see if her sister could bring her in to the appt today. Patient would like to keep appointment and if needed will call back to rolo. Patient thanked writer.

## 2025-05-28 ENCOUNTER — OFFICE VISIT (OUTPATIENT)
Dept: ORTHOPEDIC SURGERY | Age: 65
End: 2025-05-28
Payer: COMMERCIAL

## 2025-05-28 VITALS — HEIGHT: 62 IN | BODY MASS INDEX: 48.16 KG/M2

## 2025-05-28 DIAGNOSIS — M17.0 PRIMARY OSTEOARTHRITIS OF BOTH KNEES: Primary | ICD-10-CM

## 2025-05-28 PROCEDURE — 99213 OFFICE O/P EST LOW 20 MIN: CPT | Performed by: ORTHOPAEDIC SURGERY

## 2025-05-28 RX ORDER — ERGOCALCIFEROL 1.25 MG/1
50000 CAPSULE, LIQUID FILLED ORAL WEEKLY
Qty: 8 CAPSULE | Refills: 1 | Status: SHIPPED | OUTPATIENT
Start: 2025-05-28

## 2025-05-28 NOTE — PROGRESS NOTES
appropriate anatomic landmarks were identified and the bilateral knee joint was prepped in a sterile fashion using alcohol and/or betadine. A 18 gauge needle was then used to inject 3 cc Durolane into the joint. The injection was advanced without resistance confirming appropriate position. The patient tolerated the procedure well and the site was dressed with a band-aid. Patient was advised to ice the area for 15-20 minutes to relieve any injection site related pain. Patient was advised to contact nurse if area becomes swollen, hot, erythematous, or painful, or to go to the emergency room after business hours.     Dr. Villa was present for all integral parts of the procedure.     Follow up:Return in about 3 months (around 8/28/2025) for Evaluation with X-rays OUT of casts/splints.    Orders Placed This Encounter   Medications    vitamin D (ERGOCALCIFEROL) 1.25 MG (44518 UT) CAPS capsule     Sig: Take 1 capsule by mouth once a week     Dispense:  8 capsule     Refill:  1            Orders Placed This Encounter   Procedures    External Referral To Physical Therapy     Referral Priority:   Routine     Referral Type:   Eval and Treat     Referral Reason:   Specialty Services Required     Requested Specialty:   Physical Therapy     Number of Visits Requested:   1    Ambulatory Referral to Bariatric Surgery     Referral Priority:   Routine     Referral Type:   Consult for Advice and Opinion     Referral Reason:   Specialty Services Required     Requested Specialty:   Bariatric Surgery     Number of Visits Requested:   1       Scott Brock MD  Orthopedic Surgery, PGY-2  [place], [place]

## 2025-06-02 DIAGNOSIS — J30.9 ALLERGIC RHINITIS, UNSPECIFIED SEASONALITY, UNSPECIFIED TRIGGER: ICD-10-CM

## 2025-06-02 DIAGNOSIS — E11.9 TYPE 2 DIABETES MELLITUS WITHOUT COMPLICATION, WITHOUT LONG-TERM CURRENT USE OF INSULIN (HCC): ICD-10-CM

## 2025-06-02 NOTE — TELEPHONE ENCOUNTER
Please address the medication refill and close the encounter.  If I can be of assistance, please route to the applicable pool.      Thank you.      Last visit: 5-  Last Med refill: 5-7-2025  Does patient have enough medication for 72 hours: No:     Next Visit Date:  Future Appointments   Date Time Provider Department Center   6/26/2025  9:00 AM [facility]it MD [facility] DE[facility]   7/15/2025  1:15 PM Maury Boateng MD [facility]   8/21/2025 10:00 AM [facility]ettit MD [facility]       Health Maintenance   Topic Date Due    Diabetic retinal exam  Never done    Shingles vaccine (1 of 2) Never done    Hepatitis B vaccine (2 of 3 - 19+ 3-dose series) 03/11/2022    Respiratory Syncytial Virus (RSV) Pregnant or age 60 yrs+ (1 - Risk 60-74 years 1-dose series) Never done    COVID-19 Vaccine (3 - 2024-25 season) 09/01/2024    Diabetic foot exam  12/26/2025    Diabetic Alb to Cr ratio (uACR) test  12/26/2025    GFR test (Diabetes, CKD 3-4, OR last GFR 15-59)  03/11/2026    Depression Screen  03/13/2026    A1C test (Diabetic or Prediabetic)  04/18/2026    Lipids  04/18/2026    Breast cancer screen  02/12/2027    DTaP/Tdap/Td vaccine (3 - Td or Tdap) 12/26/2034    Colorectal Cancer Screen  02/05/2035    Flu vaccine  Completed    Pneumococcal 50+ years Vaccine  Completed    Hepatitis C screen  Completed    HIV screen  Completed    Hepatitis A vaccine  Aged Out    Hib vaccine  Aged Out    Polio vaccine  Aged Out    Meningococcal (ACWY) vaccine  Aged Out    Meningococcal B vaccine  Aged Out    Pneumococcal 0-49 years Vaccine  Discontinued       Hemoglobin A1C (%)   Date Value   04/18/2025 6.4   12/20/2024 6.2 (H)   04/08/2024 6.4             ( goal A1C is < 7)   No components found for: \"LABMICR\"  No components found for: \"LDLCHOLESTEROL\", \"LDLCALC\"    (goal LDL is <100)   AST (U/L)   Date Value   03/11/2025 26     ALT (U/L)   Date Value   03/11/2025 19     BUN (mg/dL)   Date Value

## 2025-06-02 NOTE — TELEPHONE ENCOUNTER
Last visit: 05/22/2025  Last Med refill: 12/17/2024  Does patient have enough medication for 72 hours: No:     Next Visit Date:  Future Appointments   Date Time Provider Department Center   6/26/2025  9:00 AM Sarwat Pettit MD [facility]   7/15/2025  1:15 PM Maury Boateng MD [facility]   8/21/2025 10:00 AM Sarwat Pettit MD [facility]       Health Maintenance   Topic Date Due    Diabetic retinal exam  Never done    Shingles vaccine (1 of 2) Never done    Hepatitis B vaccine (2 of 3 - 19+ 3-dose series) 03/11/2022    Respiratory Syncytial Virus (RSV) Pregnant or age 60 yrs+ (1 - Risk 60-74 years 1-dose series) Never done    COVID-19 Vaccine (3 - 2024-25 season) 09/01/2024    Diabetic foot exam  12/26/2025    Diabetic Alb to Cr ratio (uACR) test  12/26/2025    GFR test (Diabetes, CKD 3-4, OR last GFR 15-59)  03/11/2026    Depression Screen  03/13/2026    A1C test (Diabetic or Prediabetic)  04/18/2026    Lipids  04/18/2026    Breast cancer screen  02/12/2027    DTaP/Tdap/Td vaccine (3 - Td or Tdap) 12/26/2034    Colorectal Cancer Screen  02/05/2035    Flu vaccine  Completed    Pneumococcal 50+ years Vaccine  Completed    Hepatitis C screen  Completed    HIV screen  Completed    Hepatitis A vaccine  Aged Out    Hib vaccine  Aged Out    Polio vaccine  Aged Out    Meningococcal (ACWY) vaccine  Aged Out    Meningococcal B vaccine  Aged Out    Pneumococcal 0-49 years Vaccine  Discontinued       Hemoglobin A1C (%)   Date Value   04/18/2025 6.4   12/20/2024 6.2 (H)   04/08/2024 6.4             ( goal A1C is < 7)   No components found for: \"LABMICR\"  No components found for: \"LDLCHOLESTEROL\", \"LDLCALC\"    (goal LDL is <100)   AST (U/L)   Date Value   03/11/2025 26     ALT (U/L)   Date Value   03/11/2025 19     BUN (mg/dL)   Date Value   03/11/2025 17     BP Readings from Last 3 Encounters:   05/22/25 125/85   04/18/25 127/79   03/13/25 121/75          (goal 120/80)    All Future Testing

## 2025-06-04 ENCOUNTER — CLINICAL DOCUMENTATION (OUTPATIENT)
Dept: PRIMARY CARE CLINIC | Age: 65
End: 2025-06-04

## 2025-06-04 RX ORDER — FLUTICASONE PROPIONATE 50 MCG
SPRAY, SUSPENSION (ML) NASAL
Qty: 16 G | Refills: 0 | Status: SHIPPED | OUTPATIENT
Start: 2025-06-04

## 2025-06-26 ENCOUNTER — OFFICE VISIT (OUTPATIENT)
Age: 65
End: 2025-06-26
Payer: COMMERCIAL

## 2025-06-26 ENCOUNTER — HOSPITAL ENCOUNTER (OUTPATIENT)
Age: 65
Setting detail: SPECIMEN
Discharge: HOME | End: 2025-06-26

## 2025-06-26 VITALS
BODY MASS INDEX: 47.91 KG/M2 | SYSTOLIC BLOOD PRESSURE: 117 MMHG | WEIGHT: 262 LBS | TEMPERATURE: 97.6 F | DIASTOLIC BLOOD PRESSURE: 65 MMHG | HEART RATE: 93 BPM

## 2025-06-26 DIAGNOSIS — L30.9 DERMATITIS: Primary | ICD-10-CM

## 2025-06-26 DIAGNOSIS — I10 PRIMARY HYPERTENSION: ICD-10-CM

## 2025-06-26 DIAGNOSIS — M17.0 PRIMARY OSTEOARTHRITIS OF BOTH KNEES: ICD-10-CM

## 2025-06-26 DIAGNOSIS — E66.813 CLASS 3 SEVERE OBESITY WITHOUT SERIOUS COMORBIDITY WITH BODY MASS INDEX (BMI) OF 45.0 TO 49.9 IN ADULT, UNSPECIFIED OBESITY TYPE (HCC): ICD-10-CM

## 2025-06-26 LAB — 25(OH)D3 SERPL-MCNC: 38.9 NG/ML (ref 30–100)

## 2025-06-26 PROCEDURE — 99213 OFFICE O/P EST LOW 20 MIN: CPT

## 2025-06-26 PROCEDURE — 3074F SYST BP LT 130 MM HG: CPT

## 2025-06-26 PROCEDURE — 3078F DIAST BP <80 MM HG: CPT

## 2025-06-26 RX ORDER — CELECOXIB 100 MG/1
100 CAPSULE ORAL 3 TIMES DAILY PRN
Qty: 90 CAPSULE | Refills: 0 | Status: CANCELLED | OUTPATIENT
Start: 2025-06-26

## 2025-06-26 RX ORDER — MINERAL OIL/HYDROPHIL PETROLAT
OINTMENT (GRAM) TOPICAL
Qty: 50 G | Refills: 0 | Status: SHIPPED | OUTPATIENT
Start: 2025-06-26

## 2025-06-26 RX ORDER — ERGOCALCIFEROL 1.25 MG/1
50000 CAPSULE, LIQUID FILLED ORAL WEEKLY
Qty: 8 CAPSULE | Refills: 0 | Status: SHIPPED | OUTPATIENT
Start: 2025-06-26

## 2025-06-26 RX ORDER — HYDROCHLOROTHIAZIDE 25 MG/1
25 TABLET ORAL EVERY MORNING
Qty: 90 TABLET | Refills: 1 | Status: SHIPPED | OUTPATIENT
Start: 2025-06-26

## 2025-06-26 ASSESSMENT — ENCOUNTER SYMPTOMS
NAUSEA: 0
CONSTIPATION: 0
STRIDOR: 0
SHORTNESS OF BREATH: 0
BLOOD IN STOOL: 0
WHEEZING: 0
ABDOMINAL DISTENTION: 0
DIARRHEA: 0
VOMITING: 0

## 2025-06-26 NOTE — PROGRESS NOTES
Attending Physician Statement  I have discussed the care of Lizbeth Hoff, including pertinent history and exam findings,  with the resident. I have reviewed the key elements of all parts of the encounter with the resident.  I agree with the assessment, plan and orders as documented by the resident.  (GE Modifier)    Kat Hartman MD  
Visit Information    Have you changed or started any medications since your last visit including any over-the-counter medicines, vitamins, or herbal medicines? no   Have you stopped taking any of your medications? Is so, why? -  no  Are you having any side effects from any of your medications? - no    Have you seen any other physician or provider since your last visit?  no   Have you had any other diagnostic tests since your last visit?  no   Have you been seen in the emergency room and/or had an admission in a hospital since we last saw you?  no   Have you had your routine dental cleaning in the past 6 months?  no     Do you have an active MyChart account? If no, what is the barrier?  Yes    Patient Care Team:  Sarwat Pettit MD as PCP - General (Family Medicine)  Edgardo Pineda MD as Consulting Physician (Pulmonology)  Ashok Reynolds MD as Consulting Physician (Pain Management)    Medical History Review  Past Medical, Family, and Social History reviewed and does not contribute to the patient presenting condition    Health Maintenance   Topic Date Due    Diabetic retinal exam  Never done    Shingles vaccine (1 of 2) Never done    Hepatitis B vaccine (2 of 3 - 19+ 3-dose series) 03/11/2022    Respiratory Syncytial Virus (RSV) Pregnant or age 60 yrs+ (1 - Risk 60-74 years 1-dose series) Never done    COVID-19 Vaccine (3 - 2024-25 season) 09/01/2024    Diabetic foot exam  12/26/2025    Diabetic Alb to Cr ratio (uACR) test  12/26/2025    GFR test (Diabetes, CKD 3-4, OR last GFR 15-59)  03/11/2026    Depression Screen  03/13/2026    A1C test (Diabetic or Prediabetic)  04/18/2026    Lipids  04/18/2026    Breast cancer screen  02/12/2027    DTaP/Tdap/Td vaccine (3 - Td or Tdap) 12/26/2034    Colorectal Cancer Screen  02/05/2035    Flu vaccine  Completed    Pneumococcal 50+ years Vaccine  Completed    Hepatitis C screen  Completed    HIV screen  Completed    Hepatitis A vaccine  Aged Out    Hib vaccine  Aged Out    Polio 
use,benefit, and side effects of prescribed medications.  Barriers to medication compliance addressed.      All patient questions answered.  Pt voiced understanding.     Return in about 4 weeks (around 7/24/2025) for obesity.        Disclaimer: Some orall of this note was transcribed using voice-recognition software.This may cause typographical errors occasionally. Although all effort is made to fix these errors, please do not hesitate to contact our office if there isany concern with the understanding of this note.

## 2025-06-30 DIAGNOSIS — J30.9 ALLERGIC RHINITIS, UNSPECIFIED SEASONALITY, UNSPECIFIED TRIGGER: ICD-10-CM

## 2025-06-30 DIAGNOSIS — K58.2 IRRITABLE BOWEL SYNDROME WITH BOTH CONSTIPATION AND DIARRHEA: ICD-10-CM

## 2025-06-30 DIAGNOSIS — M17.0 PRIMARY OSTEOARTHRITIS OF BOTH KNEES: ICD-10-CM

## 2025-06-30 DIAGNOSIS — I10 PRIMARY HYPERTENSION: ICD-10-CM

## 2025-06-30 NOTE — TELEPHONE ENCOUNTER
Please address the medication refill and close the encounter.  If I can be of assistance, please route to the applicable pool.      Thank you.      Last visit: 6-  Last Med refill: 4-  Does patient have enough medication for 72 hours: No:     Next Visit Date:  Future Appointments   Date Time Provider Department Center   7/22/2025  1:15 PM Maury Boateng MD [facility]   8/21/2025 10:00 AM Sarwat Pettit MD [facility]       Health Maintenance   Topic Date Due    Diabetic retinal exam  Never done    Shingles vaccine (1 of 2) Never done    Hepatitis B vaccine (2 of 3 - 19+ 3-dose series) 03/11/2022    Respiratory Syncytial Virus (RSV) Pregnant or age 60 yrs+ (1 - Risk 60-74 years 1-dose series) Never done    COVID-19 Vaccine (3 - 2024-25 season) 09/01/2024    Diabetic foot exam  12/26/2025    Diabetic Alb to Cr ratio (uACR) test  12/26/2025    GFR test (Diabetes, CKD 3-4, OR last GFR 15-59)  03/11/2026    Depression Screen  03/13/2026    A1C test (Diabetic or Prediabetic)  04/18/2026    Lipids  04/18/2026    Breast cancer screen  02/12/2027    DTaP/Tdap/Td vaccine (3 - Td or Tdap) 12/26/2034    Colorectal Cancer Screen  02/05/2035    Flu vaccine  Completed    Pneumococcal 50+ years Vaccine  Completed    Hepatitis C screen  Completed    HIV screen  Completed    Hepatitis A vaccine  Aged Out    Hib vaccine  Aged Out    Polio vaccine  Aged Out    Meningococcal (ACWY) vaccine  Aged Out    Meningococcal B vaccine  Aged Out    Pneumococcal 0-49 years Vaccine  Discontinued       Hemoglobin A1C (%)   Date Value   04/18/2025 6.4   12/20/2024 6.2 (H)   04/08/2024 6.4             ( goal A1C is < 7)   No components found for: \"LABMICR\"  No components found for: \"LDLCHOLESTEROL\", \"LDLCALC\"    (goal LDL is <100)   AST (U/L)   Date Value   03/11/2025 26     ALT (U/L)   Date Value   03/11/2025 19     BUN (mg/dL)   Date Value   03/11/2025 17     BP Readings from Last 3 Encounters:   06/26/25

## 2025-06-30 NOTE — TELEPHONE ENCOUNTER
Please address the medication refill and close the encounter.  If I can be of assistance, please route to the applicable pool.      Thank you.    Last visit: 6-  Last Med refill: 6-4-2025  Does patient have enough medication for 72 hours: No:     Next Visit Date:  Future Appointments   Date Time Provider Department Center   7/22/2025  1:15 PM Maury Boateng MD [facility]   8/21/2025 10:00 AM Sarwat Pettit MD [facility]       Health Maintenance   Topic Date Due    Diabetic retinal exam  Never done    Shingles vaccine (1 of 2) Never done    Hepatitis B vaccine (2 of 3 - 19+ 3-dose series) 03/11/2022    Respiratory Syncytial Virus (RSV) Pregnant or age 60 yrs+ (1 - Risk 60-74 years 1-dose series) Never done    COVID-19 Vaccine (3 - 2024-25 season) 09/01/2024    Diabetic foot exam  12/26/2025    Diabetic Alb to Cr ratio (uACR) test  12/26/2025    GFR test (Diabetes, CKD 3-4, OR last GFR 15-59)  03/11/2026    Depression Screen  03/13/2026    A1C test (Diabetic or Prediabetic)  04/18/2026    Lipids  04/18/2026    Breast cancer screen  02/12/2027    DTaP/Tdap/Td vaccine (3 - Td or Tdap) 12/26/2034    Colorectal Cancer Screen  02/05/2035    Flu vaccine  Completed    Pneumococcal 50+ years Vaccine  Completed    Hepatitis C screen  Completed    HIV screen  Completed    Hepatitis A vaccine  Aged Out    Hib vaccine  Aged Out    Polio vaccine  Aged Out    Meningococcal (ACWY) vaccine  Aged Out    Meningococcal B vaccine  Aged Out    Pneumococcal 0-49 years Vaccine  Discontinued       Hemoglobin A1C (%)   Date Value   04/18/2025 6.4   12/20/2024 6.2 (H)   04/08/2024 6.4             ( goal A1C is < 7)   No components found for: \"LABMICR\"  No components found for: \"LDLCHOLESTEROL\", \"LDLCALC\"    (goal LDL is <100)   AST (U/L)   Date Value   03/11/2025 26     ALT (U/L)   Date Value   03/11/2025 19     BUN (mg/dL)   Date Value   03/11/2025 17     BP Readings from Last 3 Encounters:   06/26/25 117/65

## 2025-07-01 RX ORDER — LINACLOTIDE 145 UG/1
145 CAPSULE, GELATIN COATED ORAL
Qty: 90 CAPSULE | Refills: 1 | Status: SHIPPED | OUTPATIENT
Start: 2025-07-01

## 2025-07-01 RX ORDER — LORATADINE 10 MG/1
10 TABLET ORAL DAILY
Qty: 30 TABLET | Refills: 3 | Status: SHIPPED | OUTPATIENT
Start: 2025-07-01 | End: 2025-10-29

## 2025-07-01 RX ORDER — FLUTICASONE PROPIONATE 50 MCG
SPRAY, SUSPENSION (ML) NASAL
Qty: 16 G | Refills: 0 | Status: SHIPPED | OUTPATIENT
Start: 2025-07-01

## 2025-07-01 RX ORDER — LISINOPRIL 5 MG/1
2.5 TABLET ORAL DAILY
Qty: 45 TABLET | Refills: 1 | Status: SHIPPED | OUTPATIENT
Start: 2025-07-01

## 2025-07-10 ENCOUNTER — TELEPHONE (OUTPATIENT)
Age: 65
End: 2025-07-10

## 2025-07-11 DIAGNOSIS — M17.0 PRIMARY OSTEOARTHRITIS OF BOTH KNEES: Primary | ICD-10-CM

## 2025-07-16 ENCOUNTER — TELEPHONE (OUTPATIENT)
Age: 65
End: 2025-07-16

## 2025-07-16 NOTE — TELEPHONE ENCOUNTER
Patient called in asking if  sent referral for pain management writer informed patient that she will fax referral to [facility]

## 2025-07-23 DIAGNOSIS — E11.9 TYPE 2 DIABETES MELLITUS WITHOUT COMPLICATION, WITHOUT LONG-TERM CURRENT USE OF INSULIN (HCC): ICD-10-CM

## 2025-07-24 RX ORDER — ATORVASTATIN CALCIUM 40 MG/1
40 TABLET, FILM COATED ORAL DAILY
Qty: 30 TABLET | Refills: 3 | Status: SHIPPED | OUTPATIENT
Start: 2025-07-24

## 2025-07-24 NOTE — TELEPHONE ENCOUNTER
Last visit: 06/26/25  Last Med refill: 04/07/25  Does patient have enough medication for 72 hours: No:     Next Visit Date:  Future Appointments   Date Time Provider Department Center   8/4/2025  3:15 PM Bertin Burnette DO [facility]   8/21/2025 10:00 AM [facility]   9/3/2025 11:15 AM Jose Miguel Simon DO [facility]   1/20/2026  1:15 PM Maury Boateng MD [facility]       Health Maintenance   Topic Date Due    Diabetic retinal exam  Never done    Shingles vaccine (1 of 2) Never done    Hepatitis B vaccine (2 of 3 - 19+ 3-dose series) 03/11/2022    Respiratory Syncytial Virus (RSV) Pregnant or age 60 yrs+ (1 - Risk 60-74 years 1-dose series) Never done    COVID-19 Vaccine (3 - 2024-25 season) 09/01/2024    Flu vaccine (1) 08/01/2025    Diabetic foot exam  12/26/2025    Diabetic Alb to Cr ratio (uACR) test  12/26/2025    GFR test (Diabetes, CKD 3-4, OR last GFR 15-59)  03/11/2026    Depression Screen  03/13/2026    A1C test (Diabetic or Prediabetic)  04/18/2026    Lipids  04/18/2026    Breast cancer screen  02/12/2027    DTaP/Tdap/Td vaccine (3 - Td or Tdap) 12/26/2034    Colorectal Cancer Screen  02/05/2035    Pneumococcal 50+ years Vaccine  Completed    Hepatitis C screen  Completed    HIV screen  Completed    Hepatitis A vaccine  Aged Out    Hib vaccine  Aged Out    Polio vaccine  Aged Out    Meningococcal (ACWY) vaccine  Aged Out    Meningococcal B vaccine  Aged Out    Pneumococcal 0-49 years Vaccine  Discontinued       Hemoglobin A1C (%)   Date Value   04/18/2025 6.4   12/20/2024 6.2 (H)   04/08/2024 6.4             ( goal A1C is < 7)   No components found for: \"LABMICR\"  No components found for: \"LDLCHOLESTEROL\", \"LDLCALC\"    (goal LDL is <100)   AST (U/L)   Date Value   03/11/2025 26     ALT (U/L)   Date Value   03/11/2025 19     BUN (mg/dL)   Date Value   03/11/2025 17     BP Readings from Last 3 Encounters:   07/22/25 136/82   06/26/25

## 2025-07-30 DIAGNOSIS — J30.9 ALLERGIC RHINITIS, UNSPECIFIED SEASONALITY, UNSPECIFIED TRIGGER: ICD-10-CM

## 2025-07-30 RX ORDER — FLUTICASONE PROPIONATE 50 MCG
SPRAY, SUSPENSION (ML) NASAL
Qty: 16 G | Refills: 0 | Status: SHIPPED | OUTPATIENT
Start: 2025-07-30

## 2025-07-30 NOTE — TELEPHONE ENCOUNTER
Last visit: 06/26/25  Last Med refill: 07/01/25  Does patient have enough medication for 72 hours: No:     Next Visit Date:  Future Appointments   Date Time Provider Department Center   8/4/2025  3:15 PM Bertin Burnette DO [facility]   8/21/2025 10:00 AM Sarwat Pettit [facility]   9/3/2025 11:15 AM Jose Miguel Simon DO [facility]   1/20/2026  1:15 PM Maury Boateng MD [facility]       Health Maintenance   Topic Date Due    Diabetic retinal exam  Never done    Shingles vaccine (1 of 2) Never done    Hepatitis B vaccine (2 of 3 - 19+ 3-dose series) 03/11/2022    Respiratory Syncytial Virus (RSV) Pregnant or age 60 yrs+ (1 - Risk 60-74 years 1-dose series) Never done    COVID-19 Vaccine (3 - 2024-25 season) 09/01/2024    Flu vaccine (1) 08/01/2025    Diabetic foot exam  12/26/2025    Diabetic Alb to Cr ratio (uACR) test  12/26/2025    GFR test (Diabetes, CKD 3-4, OR last GFR 15-59)  03/11/2026    Depression Screen  03/13/2026    A1C test (Diabetic or Prediabetic)  04/18/2026    Lipids  04/18/2026    Breast cancer screen  02/12/2027    DTaP/Tdap/Td vaccine (3 - Td or Tdap) 12/26/2034    Colorectal Cancer Screen  02/05/2035    Pneumococcal 50+ years Vaccine  Completed    Hepatitis C screen  Completed    HIV screen  Completed    Hepatitis A vaccine  Aged Out    Hib vaccine  Aged Out    Polio vaccine  Aged Out    Meningococcal (ACWY) vaccine  Aged Out    Meningococcal B vaccine  Aged Out    Pneumococcal 0-49 years Vaccine  Discontinued       Hemoglobin A1C (%)   Date Value   04/18/2025 6.4   12/20/2024 6.2 (H)   04/08/2024 6.4             ( goal A1C is < 7)   No components found for: \"LABMICR\"  No components found for: \"LDLCHOLESTEROL\", \"LDLCALC\"    (goal LDL is <100)   AST (U/L)   Date Value   03/11/2025 26     ALT (U/L)   Date Value   03/11/2025 19     BUN (mg/dL)   Date Value   03/11/2025 17     BP Readings from Last 3 Encounters:   07/22/25 136/82   06/26/25

## 2025-08-04 ENCOUNTER — HOSPITAL ENCOUNTER (OUTPATIENT)
Dept: PAIN MANAGEMENT | Age: 65
Discharge: HOME | End: 2025-08-04
Payer: COMMERCIAL

## 2025-08-04 VITALS — HEIGHT: 62 IN | WEIGHT: 266 LBS | BODY MASS INDEX: 48.95 KG/M2

## 2025-08-04 DIAGNOSIS — G89.29 CHRONIC PAIN OF BOTH KNEES: Primary | ICD-10-CM

## 2025-08-04 DIAGNOSIS — M25.562 CHRONIC PAIN OF BOTH KNEES: Primary | ICD-10-CM

## 2025-08-04 DIAGNOSIS — M17.0 OSTEOARTHRITIS OF BOTH KNEES, UNSPECIFIED OSTEOARTHRITIS TYPE: ICD-10-CM

## 2025-08-04 DIAGNOSIS — M25.561 CHRONIC PAIN OF BOTH KNEES: Primary | ICD-10-CM

## 2025-08-04 DIAGNOSIS — E66.813 CLASS 3 SEVERE OBESITY WITH BODY MASS INDEX (BMI) OF 45.0 TO 49.9 IN ADULT, UNSPECIFIED OBESITY TYPE, UNSPECIFIED WHETHER SERIOUS COMORBIDITY PRESENT (HCC): ICD-10-CM

## 2025-08-04 PROCEDURE — 99203 OFFICE O/P NEW LOW 30 MIN: CPT

## 2025-08-04 PROCEDURE — 99204 OFFICE O/P NEW MOD 45 MIN: CPT | Performed by: STUDENT IN AN ORGANIZED HEALTH CARE EDUCATION/TRAINING PROGRAM

## 2025-08-04 RX ORDER — METHOCARBAMOL 500 MG/1
500 TABLET, FILM COATED ORAL 2 TIMES DAILY PRN
Qty: 60 TABLET | Refills: 2 | Status: SHIPPED | OUTPATIENT
Start: 2025-08-04 | End: 2025-11-02

## 2025-08-20 DIAGNOSIS — M17.0 PRIMARY OSTEOARTHRITIS OF BOTH KNEES: ICD-10-CM

## 2025-08-21 ENCOUNTER — OFFICE VISIT (OUTPATIENT)
Age: 65
End: 2025-08-21
Payer: COMMERCIAL

## 2025-08-21 VITALS
SYSTOLIC BLOOD PRESSURE: 122 MMHG | DIASTOLIC BLOOD PRESSURE: 79 MMHG | WEIGHT: 261.2 LBS | HEART RATE: 104 BPM | BODY MASS INDEX: 48.07 KG/M2 | HEIGHT: 62 IN

## 2025-08-21 DIAGNOSIS — E66.813 CLASS 3 SEVERE OBESITY WITHOUT SERIOUS COMORBIDITY WITH BODY MASS INDEX (BMI) OF 45.0 TO 49.9 IN ADULT, UNSPECIFIED OBESITY TYPE (HCC): ICD-10-CM

## 2025-08-21 DIAGNOSIS — M17.0 PRIMARY OSTEOARTHRITIS OF BOTH KNEES: Primary | ICD-10-CM

## 2025-08-21 PROCEDURE — 3074F SYST BP LT 130 MM HG: CPT

## 2025-08-21 PROCEDURE — 3078F DIAST BP <80 MM HG: CPT

## 2025-08-21 PROCEDURE — 99213 OFFICE O/P EST LOW 20 MIN: CPT

## 2025-08-21 RX ORDER — ERGOCALCIFEROL 1.25 MG/1
50000 CAPSULE, LIQUID FILLED ORAL WEEKLY
Qty: 4 CAPSULE | Refills: 0 | Status: SHIPPED | OUTPATIENT
Start: 2025-08-21

## 2025-08-21 RX ORDER — CELECOXIB 100 MG/1
CAPSULE ORAL
Qty: 30 CAPSULE | Refills: 0 | Status: SHIPPED | OUTPATIENT
Start: 2025-08-21

## 2025-08-21 ASSESSMENT — PATIENT HEALTH QUESTIONNAIRE - PHQ9
SUM OF ALL RESPONSES TO PHQ QUESTIONS 1-9: 0
2. FEELING DOWN, DEPRESSED OR HOPELESS: NOT AT ALL
SUM OF ALL RESPONSES TO PHQ QUESTIONS 1-9: 0
1. LITTLE INTEREST OR PLEASURE IN DOING THINGS: NOT AT ALL
SUM OF ALL RESPONSES TO PHQ QUESTIONS 1-9: 0
SUM OF ALL RESPONSES TO PHQ QUESTIONS 1-9: 0

## 2025-08-26 ENCOUNTER — TELEPHONE (OUTPATIENT)
Age: 65
End: 2025-08-26

## 2025-08-26 DIAGNOSIS — J30.9 ALLERGIC RHINITIS, UNSPECIFIED SEASONALITY, UNSPECIFIED TRIGGER: ICD-10-CM

## 2025-08-26 RX ORDER — FLUTICASONE PROPIONATE 50 MCG
SPRAY, SUSPENSION (ML) NASAL
Qty: 16 G | Refills: 0 | Status: SHIPPED | OUTPATIENT
Start: 2025-08-26

## 2025-09-03 ENCOUNTER — OFFICE VISIT (OUTPATIENT)
Dept: ORTHOPEDIC SURGERY | Age: 65
End: 2025-09-03

## 2025-09-03 VITALS — BODY MASS INDEX: 48.03 KG/M2 | HEIGHT: 62 IN | WEIGHT: 261 LBS

## 2025-09-03 DIAGNOSIS — E66.813 CLASS 3 SEVERE OBESITY WITH BODY MASS INDEX (BMI) OF 45.0 TO 49.9 IN ADULT, UNSPECIFIED OBESITY TYPE, UNSPECIFIED WHETHER SERIOUS COMORBIDITY PRESENT (HCC): Primary | ICD-10-CM

## 2025-09-03 DIAGNOSIS — M17.0 PRIMARY OSTEOARTHRITIS OF BOTH KNEES: ICD-10-CM

## (undated) DEVICE — FORCEPS BX L240CM JAW DIA2.8MM L CAP W/ NDL MIC MESH TOOTH

## (undated) DEVICE — GLOVE SURG 8 11.7IN BEAD CUF LIGHT BRN SENSICARE LTX FREE

## (undated) DEVICE — BITE BLOCK ENDOSCP AD 60 FR W/ ADJ STRP PLAS GRN BLOX

## (undated) DEVICE — FORCEPS BX L240CM JAW DIA22MM ORNG STD CAP W NDL RAD JAW 4

## (undated) DEVICE — STAZ ENDO KIT: Brand: MEDLINE INDUSTRIES, INC.

## (undated) DEVICE — YANKAUER,FLEXIBLE HANDLE,REGLR CAPACITY: Brand: MEDLINE INDUSTRIES, INC.